# Patient Record
Sex: FEMALE | Race: WHITE | NOT HISPANIC OR LATINO | Employment: OTHER
[De-identification: names, ages, dates, MRNs, and addresses within clinical notes are randomized per-mention and may not be internally consistent; named-entity substitution may affect disease eponyms.]

---

## 2017-01-10 ENCOUNTER — RECORDS - HEALTHEAST (OUTPATIENT)
Dept: ADMINISTRATIVE | Facility: OTHER | Age: 82
End: 2017-01-10

## 2017-01-18 ENCOUNTER — RECORDS - HEALTHEAST (OUTPATIENT)
Dept: ADMINISTRATIVE | Facility: OTHER | Age: 82
End: 2017-01-18

## 2017-01-24 ENCOUNTER — RECORDS - HEALTHEAST (OUTPATIENT)
Dept: ADMINISTRATIVE | Facility: OTHER | Age: 82
End: 2017-01-24

## 2017-01-30 ENCOUNTER — OFFICE VISIT - HEALTHEAST (OUTPATIENT)
Dept: ENDOCRINOLOGY | Facility: CLINIC | Age: 82
End: 2017-01-30

## 2017-01-30 DIAGNOSIS — M94.9 DISORDER OF BONE AND CARTILAGE: ICD-10-CM

## 2017-01-30 DIAGNOSIS — M89.9 DISORDER OF BONE AND CARTILAGE: ICD-10-CM

## 2017-01-30 ASSESSMENT — MIFFLIN-ST. JEOR: SCORE: 791.21

## 2017-02-01 ENCOUNTER — RECORDS - HEALTHEAST (OUTPATIENT)
Dept: ADMINISTRATIVE | Facility: OTHER | Age: 82
End: 2017-02-01

## 2017-02-13 ENCOUNTER — COMMUNICATION - HEALTHEAST (OUTPATIENT)
Dept: ENDOCRINOLOGY | Facility: CLINIC | Age: 82
End: 2017-02-13

## 2017-03-01 ENCOUNTER — RECORDS - HEALTHEAST (OUTPATIENT)
Dept: ADMINISTRATIVE | Facility: OTHER | Age: 82
End: 2017-03-01

## 2017-03-03 ENCOUNTER — RECORDS - HEALTHEAST (OUTPATIENT)
Dept: ADMINISTRATIVE | Facility: OTHER | Age: 82
End: 2017-03-03

## 2017-04-05 ENCOUNTER — RECORDS - HEALTHEAST (OUTPATIENT)
Dept: ADMINISTRATIVE | Facility: OTHER | Age: 82
End: 2017-04-05

## 2017-04-06 ENCOUNTER — COMMUNICATION - HEALTHEAST (OUTPATIENT)
Dept: INTERNAL MEDICINE | Facility: CLINIC | Age: 82
End: 2017-04-06

## 2017-04-06 DIAGNOSIS — I10 HTN (HYPERTENSION): ICD-10-CM

## 2017-04-26 ENCOUNTER — COMMUNICATION - HEALTHEAST (OUTPATIENT)
Dept: INTERNAL MEDICINE | Facility: CLINIC | Age: 82
End: 2017-04-26

## 2017-04-26 ENCOUNTER — OFFICE VISIT - HEALTHEAST (OUTPATIENT)
Dept: INTERNAL MEDICINE | Facility: CLINIC | Age: 82
End: 2017-04-26

## 2017-04-26 DIAGNOSIS — E11.9 TYPE 2 DIABETES MELLITUS (H): ICD-10-CM

## 2017-04-26 DIAGNOSIS — R05.9 COUGH: ICD-10-CM

## 2017-04-26 LAB
CHOLEST SERPL-MCNC: 231 MG/DL
FASTING STATUS PATIENT QL REPORTED: YES
HBA1C MFR BLD: 7 % (ref 3.5–6)
HDLC SERPL-MCNC: 51 MG/DL
LDLC SERPL CALC-MCNC: 145 MG/DL
TRIGL SERPL-MCNC: 175 MG/DL

## 2017-05-05 ENCOUNTER — AMBULATORY - HEALTHEAST (OUTPATIENT)
Dept: ENDOCRINOLOGY | Facility: CLINIC | Age: 82
End: 2017-05-05

## 2017-05-05 DIAGNOSIS — M89.9 DISORDER OF BONE AND CARTILAGE: ICD-10-CM

## 2017-05-05 DIAGNOSIS — M94.9 DISORDER OF BONE AND CARTILAGE: ICD-10-CM

## 2017-05-10 ENCOUNTER — HOSPITAL ENCOUNTER (OUTPATIENT)
Dept: MAMMOGRAPHY | Facility: HOSPITAL | Age: 82
Discharge: HOME | End: 2017-05-10
Attending: INTERNAL MEDICINE

## 2017-05-10 DIAGNOSIS — Z12.31 VISIT FOR SCREENING MAMMOGRAM: ICD-10-CM

## 2017-05-12 ENCOUNTER — COMMUNICATION - HEALTHEAST (OUTPATIENT)
Dept: INTERNAL MEDICINE | Facility: CLINIC | Age: 82
End: 2017-05-12

## 2017-05-12 ENCOUNTER — RECORDS - HEALTHEAST (OUTPATIENT)
Dept: ADMINISTRATIVE | Facility: OTHER | Age: 82
End: 2017-05-12

## 2017-05-26 ENCOUNTER — COMMUNICATION - HEALTHEAST (OUTPATIENT)
Dept: INTERNAL MEDICINE | Facility: CLINIC | Age: 82
End: 2017-05-26

## 2017-05-26 DIAGNOSIS — I10 HTN (HYPERTENSION): ICD-10-CM

## 2017-06-15 ENCOUNTER — OFFICE VISIT - HEALTHEAST (OUTPATIENT)
Dept: PULMONOLOGY | Facility: OTHER | Age: 82
End: 2017-06-15

## 2017-06-15 DIAGNOSIS — J84.10 PULMONARY FIBROSIS (H): ICD-10-CM

## 2017-06-15 DIAGNOSIS — R05.9 COUGH: ICD-10-CM

## 2017-06-21 ENCOUNTER — COMMUNICATION - HEALTHEAST (OUTPATIENT)
Dept: INTERNAL MEDICINE | Facility: CLINIC | Age: 82
End: 2017-06-21

## 2017-06-21 ENCOUNTER — RECORDS - HEALTHEAST (OUTPATIENT)
Dept: ADMINISTRATIVE | Facility: OTHER | Age: 82
End: 2017-06-21

## 2017-06-22 ENCOUNTER — AMBULATORY - HEALTHEAST (OUTPATIENT)
Dept: PULMONOLOGY | Facility: OTHER | Age: 82
End: 2017-06-22

## 2017-06-22 DIAGNOSIS — R05.3 CHRONIC COUGH: ICD-10-CM

## 2017-07-13 ENCOUNTER — AMBULATORY - HEALTHEAST (OUTPATIENT)
Dept: PULMONOLOGY | Facility: OTHER | Age: 82
End: 2017-07-13

## 2017-07-13 ENCOUNTER — COMMUNICATION - HEALTHEAST (OUTPATIENT)
Dept: PULMONOLOGY | Facility: OTHER | Age: 82
End: 2017-07-13

## 2017-08-22 ENCOUNTER — OFFICE VISIT - HEALTHEAST (OUTPATIENT)
Dept: INTERNAL MEDICINE | Facility: CLINIC | Age: 82
End: 2017-08-22

## 2017-08-22 DIAGNOSIS — G47.00 INSOMNIA: ICD-10-CM

## 2017-08-22 DIAGNOSIS — D64.9 ANEMIA: ICD-10-CM

## 2017-08-22 DIAGNOSIS — E11.9 DIABETES MELLITUS (H): ICD-10-CM

## 2017-08-22 LAB — HBA1C MFR BLD: 6.6 % (ref 3.5–6)

## 2017-08-23 ENCOUNTER — COMMUNICATION - HEALTHEAST (OUTPATIENT)
Dept: INTERNAL MEDICINE | Facility: CLINIC | Age: 82
End: 2017-08-23

## 2017-08-27 ENCOUNTER — COMMUNICATION - HEALTHEAST (OUTPATIENT)
Dept: INTERNAL MEDICINE | Facility: CLINIC | Age: 82
End: 2017-08-27

## 2017-08-27 DIAGNOSIS — I25.10 CAD (CORONARY ARTERY DISEASE): ICD-10-CM

## 2017-10-10 ENCOUNTER — OFFICE VISIT - HEALTHEAST (OUTPATIENT)
Dept: CARDIOLOGY | Facility: CLINIC | Age: 82
End: 2017-10-10

## 2017-10-10 DIAGNOSIS — I10 ESSENTIAL HYPERTENSION: ICD-10-CM

## 2017-10-10 DIAGNOSIS — E78.2 MIXED HYPERLIPIDEMIA: ICD-10-CM

## 2017-10-10 DIAGNOSIS — I25.10 CORONARY ARTERY DISEASE INVOLVING NATIVE CORONARY ARTERY OF NATIVE HEART, ANGINA PRESENCE UNSPECIFIED: ICD-10-CM

## 2017-10-10 ASSESSMENT — MIFFLIN-ST. JEOR: SCORE: 780.32

## 2017-11-01 ENCOUNTER — COMMUNICATION - HEALTHEAST (OUTPATIENT)
Dept: INTERNAL MEDICINE | Facility: CLINIC | Age: 82
End: 2017-11-01

## 2017-11-01 DIAGNOSIS — I10 ESSENTIAL HYPERTENSION: ICD-10-CM

## 2017-11-01 DIAGNOSIS — I10 HTN (HYPERTENSION): ICD-10-CM

## 2017-11-13 ENCOUNTER — COMMUNICATION - HEALTHEAST (OUTPATIENT)
Dept: INTERNAL MEDICINE | Facility: CLINIC | Age: 82
End: 2017-11-13

## 2017-11-21 ENCOUNTER — COMMUNICATION - HEALTHEAST (OUTPATIENT)
Dept: PULMONOLOGY | Facility: OTHER | Age: 82
End: 2017-11-21

## 2017-11-21 ENCOUNTER — AMBULATORY - HEALTHEAST (OUTPATIENT)
Dept: PULMONOLOGY | Facility: OTHER | Age: 82
End: 2017-11-21

## 2017-11-21 DIAGNOSIS — R05.3 CHRONIC COUGH: ICD-10-CM

## 2018-04-11 ENCOUNTER — AMBULATORY - HEALTHEAST (OUTPATIENT)
Dept: PULMONOLOGY | Facility: OTHER | Age: 83
End: 2018-04-11

## 2018-04-11 DIAGNOSIS — R05.3 CHRONIC COUGH: ICD-10-CM

## 2018-04-29 ENCOUNTER — COMMUNICATION - HEALTHEAST (OUTPATIENT)
Dept: INTERNAL MEDICINE | Facility: CLINIC | Age: 83
End: 2018-04-29

## 2018-04-29 DIAGNOSIS — I10 HTN (HYPERTENSION): ICD-10-CM

## 2018-05-15 ENCOUNTER — OFFICE VISIT - HEALTHEAST (OUTPATIENT)
Dept: INTERNAL MEDICINE | Facility: CLINIC | Age: 83
End: 2018-05-15

## 2018-05-15 DIAGNOSIS — M89.9 DISORDER OF BONE AND CARTILAGE: ICD-10-CM

## 2018-05-15 DIAGNOSIS — E11.9 TYPE 2 DIABETES MELLITUS (H): ICD-10-CM

## 2018-05-15 DIAGNOSIS — Z00.00 ROUTINE GENERAL MEDICAL EXAMINATION AT A HEALTH CARE FACILITY: ICD-10-CM

## 2018-05-15 DIAGNOSIS — I10 ESSENTIAL HYPERTENSION: ICD-10-CM

## 2018-05-15 DIAGNOSIS — I25.10 CORONARY ARTERY DISEASE INVOLVING NATIVE CORONARY ARTERY OF NATIVE HEART, ANGINA PRESENCE UNSPECIFIED: ICD-10-CM

## 2018-05-15 DIAGNOSIS — Z12.31 SCREENING MAMMOGRAM, ENCOUNTER FOR: ICD-10-CM

## 2018-05-15 DIAGNOSIS — E78.2 MIXED HYPERLIPIDEMIA: ICD-10-CM

## 2018-05-15 DIAGNOSIS — M94.9 DISORDER OF BONE AND CARTILAGE: ICD-10-CM

## 2018-05-15 LAB
ALBUMIN SERPL-MCNC: 4 G/DL (ref 3.5–5)
ALP SERPL-CCNC: 70 U/L (ref 45–120)
ALT SERPL W P-5'-P-CCNC: 21 U/L (ref 0–45)
ANION GAP SERPL CALCULATED.3IONS-SCNC: 11 MMOL/L (ref 5–18)
AST SERPL W P-5'-P-CCNC: 28 U/L (ref 0–40)
BILIRUB SERPL-MCNC: 0.6 MG/DL (ref 0–1)
BUN SERPL-MCNC: 27 MG/DL (ref 8–28)
CALCIUM SERPL-MCNC: 10.1 MG/DL (ref 8.5–10.5)
CHLORIDE BLD-SCNC: 100 MMOL/L (ref 98–107)
CHOLEST SERPL-MCNC: 176 MG/DL
CO2 SERPL-SCNC: 27 MMOL/L (ref 22–31)
CREAT SERPL-MCNC: 0.83 MG/DL (ref 0.6–1.1)
FASTING STATUS PATIENT QL REPORTED: YES
GFR SERPL CREATININE-BSD FRML MDRD: >60 ML/MIN/1.73M2
GLUCOSE BLD-MCNC: 127 MG/DL (ref 70–125)
HBA1C MFR BLD: 7.1 % (ref 3.5–6)
HDLC SERPL-MCNC: 62 MG/DL
LDLC SERPL CALC-MCNC: 91 MG/DL
POTASSIUM BLD-SCNC: 4.3 MMOL/L (ref 3.5–5)
PROT SERPL-MCNC: 7.6 G/DL (ref 6–8)
SODIUM SERPL-SCNC: 138 MMOL/L (ref 136–145)
TRIGL SERPL-MCNC: 113 MG/DL

## 2018-05-15 ASSESSMENT — MIFFLIN-ST. JEOR: SCORE: 792.68

## 2018-05-18 ENCOUNTER — RECORDS - HEALTHEAST (OUTPATIENT)
Dept: ADMINISTRATIVE | Facility: OTHER | Age: 83
End: 2018-05-18

## 2018-05-31 ENCOUNTER — COMMUNICATION - HEALTHEAST (OUTPATIENT)
Dept: INTERNAL MEDICINE | Facility: CLINIC | Age: 83
End: 2018-05-31

## 2018-06-04 ENCOUNTER — COMMUNICATION - HEALTHEAST (OUTPATIENT)
Dept: INTERNAL MEDICINE | Facility: CLINIC | Age: 83
End: 2018-06-04

## 2018-06-04 DIAGNOSIS — E11.9 TYPE 2 DIABETES MELLITUS (H): ICD-10-CM

## 2018-06-12 ENCOUNTER — AMBULATORY - HEALTHEAST (OUTPATIENT)
Dept: EDUCATION SERVICES | Facility: CLINIC | Age: 83
End: 2018-06-12

## 2018-06-12 DIAGNOSIS — E11.9 TYPE 2 DIABETES MELLITUS WITHOUT COMPLICATION, WITHOUT LONG-TERM CURRENT USE OF INSULIN (H): ICD-10-CM

## 2018-06-15 ENCOUNTER — HOSPITAL ENCOUNTER (OUTPATIENT)
Dept: MAMMOGRAPHY | Facility: CLINIC | Age: 83
Discharge: HOME | End: 2018-06-15
Attending: INTERNAL MEDICINE

## 2018-06-15 DIAGNOSIS — Z12.31 SCREENING MAMMOGRAM, ENCOUNTER FOR: ICD-10-CM

## 2018-06-23 ENCOUNTER — COMMUNICATION - HEALTHEAST (OUTPATIENT)
Dept: INTERNAL MEDICINE | Facility: CLINIC | Age: 83
End: 2018-06-23

## 2018-06-23 DIAGNOSIS — I10 HTN (HYPERTENSION): ICD-10-CM

## 2018-07-06 ENCOUNTER — AMBULATORY - HEALTHEAST (OUTPATIENT)
Dept: PULMONOLOGY | Facility: OTHER | Age: 83
End: 2018-07-06

## 2018-07-06 DIAGNOSIS — R05.9 COUGH: ICD-10-CM

## 2018-07-12 ENCOUNTER — OFFICE VISIT - HEALTHEAST (OUTPATIENT)
Dept: PULMONOLOGY | Facility: OTHER | Age: 83
End: 2018-07-12

## 2018-07-12 DIAGNOSIS — J84.10 PULMONARY FIBROSIS (H): ICD-10-CM

## 2018-07-12 DIAGNOSIS — R09.82 POST-NASAL DRIP: ICD-10-CM

## 2018-07-27 ENCOUNTER — AMBULATORY - HEALTHEAST (OUTPATIENT)
Dept: PULMONOLOGY | Facility: OTHER | Age: 83
End: 2018-07-27

## 2018-07-27 DIAGNOSIS — R05.3 CHRONIC COUGH: ICD-10-CM

## 2018-08-01 ENCOUNTER — RECORDS - HEALTHEAST (OUTPATIENT)
Dept: ADMINISTRATIVE | Facility: OTHER | Age: 83
End: 2018-08-01

## 2018-08-01 ENCOUNTER — RECORDS - HEALTHEAST (OUTPATIENT)
Dept: PULMONOLOGY | Facility: OTHER | Age: 83
End: 2018-08-01

## 2018-08-01 DIAGNOSIS — R05.9 COUGH: ICD-10-CM

## 2018-08-07 ENCOUNTER — COMMUNICATION - HEALTHEAST (OUTPATIENT)
Dept: INTERNAL MEDICINE | Facility: CLINIC | Age: 83
End: 2018-08-07

## 2018-08-07 ENCOUNTER — AMBULATORY - HEALTHEAST (OUTPATIENT)
Dept: SCHEDULING | Facility: CLINIC | Age: 83
End: 2018-08-07

## 2018-08-07 DIAGNOSIS — M89.9 DISORDER OF BONE AND CARTILAGE: ICD-10-CM

## 2018-08-07 DIAGNOSIS — M94.9 DISORDER OF BONE AND CARTILAGE: ICD-10-CM

## 2018-08-10 ENCOUNTER — AMBULATORY - HEALTHEAST (OUTPATIENT)
Dept: PULMONOLOGY | Facility: OTHER | Age: 83
End: 2018-08-10

## 2018-08-19 ENCOUNTER — COMMUNICATION - HEALTHEAST (OUTPATIENT)
Dept: INTERNAL MEDICINE | Facility: CLINIC | Age: 83
End: 2018-08-19

## 2018-08-19 DIAGNOSIS — I25.10 CAD (CORONARY ARTERY DISEASE): ICD-10-CM

## 2018-08-23 ENCOUNTER — COMMUNICATION - HEALTHEAST (OUTPATIENT)
Dept: INTERNAL MEDICINE | Facility: CLINIC | Age: 83
End: 2018-08-23

## 2018-08-23 DIAGNOSIS — M85.80 OSTEOPENIA: ICD-10-CM

## 2018-08-23 DIAGNOSIS — Z78.0 MENOPAUSE: ICD-10-CM

## 2018-10-12 ENCOUNTER — AMBULATORY - HEALTHEAST (OUTPATIENT)
Dept: PULMONOLOGY | Facility: OTHER | Age: 83
End: 2018-10-12

## 2018-10-12 DIAGNOSIS — R05.3 CHRONIC COUGH: ICD-10-CM

## 2018-10-19 ENCOUNTER — COMMUNICATION - HEALTHEAST (OUTPATIENT)
Dept: ADMINISTRATIVE | Facility: CLINIC | Age: 83
End: 2018-10-19

## 2018-10-26 ENCOUNTER — AMBULATORY - HEALTHEAST (OUTPATIENT)
Dept: ENDOCRINOLOGY | Facility: CLINIC | Age: 83
End: 2018-10-26

## 2018-10-31 ENCOUNTER — COMMUNICATION - HEALTHEAST (OUTPATIENT)
Dept: ENDOCRINOLOGY | Facility: CLINIC | Age: 83
End: 2018-10-31

## 2018-10-31 DIAGNOSIS — M94.9 DISORDER OF BONE AND CARTILAGE: ICD-10-CM

## 2018-10-31 DIAGNOSIS — M89.9 DISORDER OF BONE AND CARTILAGE: ICD-10-CM

## 2018-11-02 ENCOUNTER — COMMUNICATION - HEALTHEAST (OUTPATIENT)
Dept: INTERNAL MEDICINE | Facility: CLINIC | Age: 83
End: 2018-11-02

## 2018-11-02 DIAGNOSIS — I10 ESSENTIAL HYPERTENSION: ICD-10-CM

## 2018-11-06 ENCOUNTER — AMBULATORY - HEALTHEAST (OUTPATIENT)
Dept: LAB | Facility: CLINIC | Age: 83
End: 2018-11-06

## 2018-11-06 DIAGNOSIS — M89.9 DISORDER OF BONE AND CARTILAGE: ICD-10-CM

## 2018-11-06 DIAGNOSIS — M94.9 DISORDER OF BONE AND CARTILAGE: ICD-10-CM

## 2018-11-06 LAB
CALCIUM SERPL-MCNC: 10.4 MG/DL (ref 8.5–10.5)
CREAT SERPL-MCNC: 0.86 MG/DL (ref 0.6–1.1)
GFR SERPL CREATININE-BSD FRML MDRD: >60 ML/MIN/1.73M2
POTASSIUM BLD-SCNC: 4.8 MMOL/L (ref 3.5–5)

## 2018-11-07 LAB — 25(OH)D3 SERPL-MCNC: 36.1 NG/ML (ref 30–80)

## 2018-11-13 ENCOUNTER — COMMUNICATION - HEALTHEAST (OUTPATIENT)
Dept: INTERNAL MEDICINE | Facility: CLINIC | Age: 83
End: 2018-11-13

## 2018-11-13 DIAGNOSIS — I10 HTN (HYPERTENSION): ICD-10-CM

## 2018-11-23 ENCOUNTER — OFFICE VISIT - HEALTHEAST (OUTPATIENT)
Dept: ENDOCRINOLOGY | Facility: CLINIC | Age: 83
End: 2018-11-23

## 2018-11-23 DIAGNOSIS — E11.65 TYPE 2 DIABETES MELLITUS WITH HYPERGLYCEMIA, WITHOUT LONG-TERM CURRENT USE OF INSULIN (H): ICD-10-CM

## 2018-11-23 ASSESSMENT — MIFFLIN-ST. JEOR: SCORE: 787.58

## 2018-11-26 ENCOUNTER — COMMUNICATION - HEALTHEAST (OUTPATIENT)
Dept: ENDOCRINOLOGY | Facility: CLINIC | Age: 83
End: 2018-11-26

## 2018-11-28 ENCOUNTER — COMMUNICATION - HEALTHEAST (OUTPATIENT)
Dept: INTERNAL MEDICINE | Facility: CLINIC | Age: 83
End: 2018-11-28

## 2018-11-28 DIAGNOSIS — E11.9 TYPE 2 DIABETES MELLITUS WITHOUT COMPLICATION, WITHOUT LONG-TERM CURRENT USE OF INSULIN (H): ICD-10-CM

## 2018-12-03 ENCOUNTER — COMMUNICATION - HEALTHEAST (OUTPATIENT)
Dept: INTERNAL MEDICINE | Facility: CLINIC | Age: 83
End: 2018-12-03

## 2018-12-03 ENCOUNTER — AMBULATORY - HEALTHEAST (OUTPATIENT)
Dept: LAB | Facility: CLINIC | Age: 83
End: 2018-12-03

## 2018-12-03 DIAGNOSIS — E11.9 TYPE 2 DIABETES MELLITUS WITHOUT COMPLICATION, WITHOUT LONG-TERM CURRENT USE OF INSULIN (H): ICD-10-CM

## 2018-12-03 LAB — HBA1C MFR BLD: 6.9 % (ref 3.5–6)

## 2019-01-22 ENCOUNTER — COMMUNICATION - HEALTHEAST (OUTPATIENT)
Dept: INTERNAL MEDICINE | Facility: CLINIC | Age: 84
End: 2019-01-22

## 2019-04-15 ENCOUNTER — COMMUNICATION - HEALTHEAST (OUTPATIENT)
Dept: PULMONOLOGY | Facility: OTHER | Age: 84
End: 2019-04-15

## 2019-04-15 ENCOUNTER — COMMUNICATION - HEALTHEAST (OUTPATIENT)
Dept: ADMINISTRATIVE | Facility: CLINIC | Age: 84
End: 2019-04-15

## 2019-04-15 DIAGNOSIS — Z79.4 TYPE 2 DIABETES MELLITUS WITH HYPERGLYCEMIA, WITH LONG-TERM CURRENT USE OF INSULIN (H): ICD-10-CM

## 2019-04-15 DIAGNOSIS — M94.9 DISORDER OF BONE AND CARTILAGE: ICD-10-CM

## 2019-04-15 DIAGNOSIS — E55.9 VITAMIN D DEFICIENCY: ICD-10-CM

## 2019-04-15 DIAGNOSIS — R73.9 HYPERGLYCEMIA: ICD-10-CM

## 2019-04-15 DIAGNOSIS — E11.65 TYPE 2 DIABETES MELLITUS WITH HYPERGLYCEMIA, WITH LONG-TERM CURRENT USE OF INSULIN (H): ICD-10-CM

## 2019-04-15 DIAGNOSIS — M89.9 DISORDER OF BONE AND CARTILAGE: ICD-10-CM

## 2019-04-17 ENCOUNTER — AMBULATORY - HEALTHEAST (OUTPATIENT)
Dept: PULMONOLOGY | Facility: OTHER | Age: 84
End: 2019-04-17

## 2019-04-17 ENCOUNTER — COMMUNICATION - HEALTHEAST (OUTPATIENT)
Dept: ENDOCRINOLOGY | Facility: CLINIC | Age: 84
End: 2019-04-17

## 2019-04-17 DIAGNOSIS — Z79.4 TYPE 2 DIABETES MELLITUS WITH HYPERGLYCEMIA, WITH LONG-TERM CURRENT USE OF INSULIN (H): ICD-10-CM

## 2019-04-17 DIAGNOSIS — R73.9 HYPERGLYCEMIA: ICD-10-CM

## 2019-04-17 DIAGNOSIS — E11.9 TYPE 2 DIABETES MELLITUS WITHOUT COMPLICATION, WITHOUT LONG-TERM CURRENT USE OF INSULIN (H): ICD-10-CM

## 2019-04-17 DIAGNOSIS — E11.65 TYPE 2 DIABETES MELLITUS WITH HYPERGLYCEMIA, WITH LONG-TERM CURRENT USE OF INSULIN (H): ICD-10-CM

## 2019-04-17 DIAGNOSIS — R05.3 CHRONIC COUGH: ICD-10-CM

## 2019-04-22 ENCOUNTER — COMMUNICATION - HEALTHEAST (OUTPATIENT)
Dept: ADMINISTRATIVE | Facility: CLINIC | Age: 84
End: 2019-04-22

## 2019-04-26 ENCOUNTER — COMMUNICATION - HEALTHEAST (OUTPATIENT)
Dept: ENDOCRINOLOGY | Facility: CLINIC | Age: 84
End: 2019-04-26

## 2019-05-01 ENCOUNTER — AMBULATORY - HEALTHEAST (OUTPATIENT)
Dept: ENDOCRINOLOGY | Facility: CLINIC | Age: 84
End: 2019-05-01

## 2019-05-01 ENCOUNTER — HOSPITAL ENCOUNTER (OUTPATIENT)
Dept: LAB | Age: 84
Setting detail: SPECIMEN
Discharge: HOME | End: 2019-05-01

## 2019-05-01 ENCOUNTER — AMBULATORY - HEALTHEAST (OUTPATIENT)
Dept: LAB | Facility: CLINIC | Age: 84
End: 2019-05-01

## 2019-05-01 DIAGNOSIS — Z79.4 TYPE 2 DIABETES MELLITUS WITH HYPERGLYCEMIA, WITH LONG-TERM CURRENT USE OF INSULIN (H): ICD-10-CM

## 2019-05-01 DIAGNOSIS — E11.65 TYPE 2 DIABETES MELLITUS WITH HYPERGLYCEMIA, WITH LONG-TERM CURRENT USE OF INSULIN (H): ICD-10-CM

## 2019-05-01 DIAGNOSIS — E55.9 VITAMIN D DEFICIENCY: ICD-10-CM

## 2019-05-01 LAB
CALCIUM SERPL-MCNC: 10.6 MG/DL (ref 8.5–10.5)
CREAT SERPL-MCNC: 0.97 MG/DL (ref 0.6–1.1)
GFR SERPL CREATININE-BSD FRML MDRD: 54 ML/MIN/1.73M2
HBA1C MFR BLD: 7 % (ref 3.5–6)
POTASSIUM BLD-SCNC: 4.6 MMOL/L (ref 3.5–5)

## 2019-05-02 LAB — 25(OH)D3 SERPL-MCNC: 32.6 NG/ML (ref 30–80)

## 2019-05-06 ENCOUNTER — OFFICE VISIT - HEALTHEAST (OUTPATIENT)
Dept: EDUCATION SERVICES | Facility: CLINIC | Age: 84
End: 2019-05-06

## 2019-05-06 DIAGNOSIS — E11.65 TYPE 2 DIABETES MELLITUS WITH HYPERGLYCEMIA, WITH LONG-TERM CURRENT USE OF INSULIN (H): ICD-10-CM

## 2019-05-06 DIAGNOSIS — Z79.4 TYPE 2 DIABETES MELLITUS WITH HYPERGLYCEMIA, WITH LONG-TERM CURRENT USE OF INSULIN (H): ICD-10-CM

## 2019-05-10 ENCOUNTER — COMMUNICATION - HEALTHEAST (OUTPATIENT)
Dept: ENDOCRINOLOGY | Facility: CLINIC | Age: 84
End: 2019-05-10

## 2019-06-11 ENCOUNTER — COMMUNICATION - HEALTHEAST (OUTPATIENT)
Dept: INTERNAL MEDICINE | Facility: CLINIC | Age: 84
End: 2019-06-11

## 2019-06-12 ENCOUNTER — OFFICE VISIT - HEALTHEAST (OUTPATIENT)
Dept: INTERNAL MEDICINE | Facility: CLINIC | Age: 84
End: 2019-06-12

## 2019-06-12 DIAGNOSIS — I25.10 CORONARY ARTERY DISEASE INVOLVING NATIVE CORONARY ARTERY OF NATIVE HEART, ANGINA PRESENCE UNSPECIFIED: ICD-10-CM

## 2019-06-12 DIAGNOSIS — E11.9 TYPE 2 DIABETES MELLITUS WITHOUT COMPLICATION, WITHOUT LONG-TERM CURRENT USE OF INSULIN (H): ICD-10-CM

## 2019-06-12 DIAGNOSIS — G47.00 INSOMNIA, UNSPECIFIED TYPE: ICD-10-CM

## 2019-06-12 DIAGNOSIS — E78.2 MIXED HYPERLIPIDEMIA: ICD-10-CM

## 2019-06-12 DIAGNOSIS — M85.80 OSTEOPENIA, UNSPECIFIED LOCATION: ICD-10-CM

## 2019-06-12 DIAGNOSIS — I10 ESSENTIAL HYPERTENSION: ICD-10-CM

## 2019-06-12 LAB
ALBUMIN SERPL-MCNC: 4.4 G/DL (ref 3.5–5)
ALP SERPL-CCNC: 58 U/L (ref 45–120)
ALT SERPL W P-5'-P-CCNC: 25 U/L (ref 0–45)
ANION GAP SERPL CALCULATED.3IONS-SCNC: 13 MMOL/L (ref 5–18)
AST SERPL W P-5'-P-CCNC: 29 U/L (ref 0–40)
BASOPHILS # BLD AUTO: 0.1 THOU/UL (ref 0–0.2)
BASOPHILS NFR BLD AUTO: 1 % (ref 0–2)
BILIRUB SERPL-MCNC: 0.5 MG/DL (ref 0–1)
BUN SERPL-MCNC: 24 MG/DL (ref 8–28)
CALCIUM SERPL-MCNC: 10.4 MG/DL (ref 8.5–10.5)
CHLORIDE BLD-SCNC: 98 MMOL/L (ref 98–107)
CHOLEST SERPL-MCNC: 194 MG/DL
CO2 SERPL-SCNC: 27 MMOL/L (ref 22–31)
CREAT SERPL-MCNC: 0.94 MG/DL (ref 0.6–1.1)
CREAT UR-MCNC: 26.4 MG/DL
EOSINOPHIL # BLD AUTO: 0.2 THOU/UL (ref 0–0.4)
EOSINOPHIL NFR BLD AUTO: 2 % (ref 0–6)
ERYTHROCYTE [DISTWIDTH] IN BLOOD BY AUTOMATED COUNT: 12 % (ref 11–14.5)
FASTING STATUS PATIENT QL REPORTED: NORMAL
GFR SERPL CREATININE-BSD FRML MDRD: 56 ML/MIN/1.73M2
GLUCOSE BLD-MCNC: 136 MG/DL (ref 70–125)
HBA1C MFR BLD: 7.3 % (ref 3.5–6)
HCT VFR BLD AUTO: 38.6 % (ref 35–47)
HDLC SERPL-MCNC: 63 MG/DL
HGB BLD-MCNC: 12.9 G/DL (ref 12–16)
LDLC SERPL CALC-MCNC: 102 MG/DL
LYMPHOCYTES # BLD AUTO: 1.5 THOU/UL (ref 0.8–4.4)
LYMPHOCYTES NFR BLD AUTO: 15 % (ref 20–40)
MCH RBC QN AUTO: 30.1 PG (ref 27–34)
MCHC RBC AUTO-ENTMCNC: 33.5 G/DL (ref 32–36)
MCV RBC AUTO: 90 FL (ref 80–100)
MICROALBUMIN UR-MCNC: 6.19 MG/DL (ref 0–1.99)
MICROALBUMIN/CREAT UR: 234.5 MG/G
MONOCYTES # BLD AUTO: 0.6 THOU/UL (ref 0–0.9)
MONOCYTES NFR BLD AUTO: 6 % (ref 2–10)
NEUTROPHILS # BLD AUTO: 7.8 THOU/UL (ref 2–7.7)
NEUTROPHILS NFR BLD AUTO: 78 % (ref 50–70)
PLATELET # BLD AUTO: 229 THOU/UL (ref 140–440)
PMV BLD AUTO: 8.6 FL (ref 7–10)
POTASSIUM BLD-SCNC: 4.6 MMOL/L (ref 3.5–5)
PROT SERPL-MCNC: 7.5 G/DL (ref 6–8)
RBC # BLD AUTO: 4.3 MILL/UL (ref 3.8–5.4)
SODIUM SERPL-SCNC: 138 MMOL/L (ref 136–145)
TRIGL SERPL-MCNC: 146 MG/DL
TSH SERPL DL<=0.005 MIU/L-ACNC: 1.95 UIU/ML (ref 0.3–5)
VIT B12 SERPL-MCNC: >2000 PG/ML (ref 213–816)
WBC: 10.1 THOU/UL (ref 4–11)

## 2019-06-12 ASSESSMENT — MIFFLIN-ST. JEOR: SCORE: 783.04

## 2019-06-13 ENCOUNTER — COMMUNICATION - HEALTHEAST (OUTPATIENT)
Dept: INTERNAL MEDICINE | Facility: CLINIC | Age: 84
End: 2019-06-13

## 2019-06-18 ENCOUNTER — OFFICE VISIT - HEALTHEAST (OUTPATIENT)
Dept: INTERNAL MEDICINE | Facility: CLINIC | Age: 84
End: 2019-06-18

## 2019-06-18 DIAGNOSIS — Z00.00 ENCOUNTER FOR MEDICARE ANNUAL WELLNESS EXAM: ICD-10-CM

## 2019-06-18 DIAGNOSIS — I10 ESSENTIAL HYPERTENSION: ICD-10-CM

## 2019-06-18 DIAGNOSIS — R80.9 PROTEINURIA, UNSPECIFIED TYPE: ICD-10-CM

## 2019-06-18 DIAGNOSIS — I25.10 CORONARY ARTERY DISEASE INVOLVING NATIVE CORONARY ARTERY OF NATIVE HEART, ANGINA PRESENCE UNSPECIFIED: ICD-10-CM

## 2019-06-18 DIAGNOSIS — M89.9 DISORDER OF BONE AND CARTILAGE: ICD-10-CM

## 2019-06-18 DIAGNOSIS — G47.00 INSOMNIA, UNSPECIFIED TYPE: ICD-10-CM

## 2019-06-18 DIAGNOSIS — E11.65 TYPE 2 DIABETES MELLITUS WITH HYPERGLYCEMIA, WITHOUT LONG-TERM CURRENT USE OF INSULIN (H): ICD-10-CM

## 2019-06-18 DIAGNOSIS — E78.2 MIXED HYPERLIPIDEMIA: ICD-10-CM

## 2019-06-18 DIAGNOSIS — M94.9 DISORDER OF BONE AND CARTILAGE: ICD-10-CM

## 2019-06-18 ASSESSMENT — MIFFLIN-ST. JEOR: SCORE: 783.04

## 2019-06-19 ENCOUNTER — COMMUNICATION - HEALTHEAST (OUTPATIENT)
Dept: INTERNAL MEDICINE | Facility: CLINIC | Age: 84
End: 2019-06-19

## 2019-06-20 ENCOUNTER — COMMUNICATION - HEALTHEAST (OUTPATIENT)
Dept: INTERNAL MEDICINE | Facility: CLINIC | Age: 84
End: 2019-06-20

## 2019-06-20 DIAGNOSIS — I10 HTN (HYPERTENSION): ICD-10-CM

## 2019-06-26 ENCOUNTER — COMMUNICATION - HEALTHEAST (OUTPATIENT)
Dept: INTERNAL MEDICINE | Facility: CLINIC | Age: 84
End: 2019-06-26

## 2019-07-02 ENCOUNTER — RECORDS - HEALTHEAST (OUTPATIENT)
Dept: ADMINISTRATIVE | Facility: OTHER | Age: 84
End: 2019-07-02

## 2019-07-18 ENCOUNTER — OFFICE VISIT - HEALTHEAST (OUTPATIENT)
Dept: PULMONOLOGY | Facility: OTHER | Age: 84
End: 2019-07-18

## 2019-07-18 DIAGNOSIS — R05.3 CHRONIC COUGH: ICD-10-CM

## 2019-07-18 DIAGNOSIS — J84.10 PULMONARY FIBROSIS (H): ICD-10-CM

## 2019-07-19 ENCOUNTER — OFFICE VISIT - HEALTHEAST (OUTPATIENT)
Dept: CARDIOLOGY | Facility: CLINIC | Age: 84
End: 2019-07-19

## 2019-07-19 DIAGNOSIS — E78.2 MIXED HYPERLIPIDEMIA: ICD-10-CM

## 2019-07-19 DIAGNOSIS — I25.10 CORONARY ARTERY DISEASE INVOLVING NATIVE CORONARY ARTERY OF NATIVE HEART WITHOUT ANGINA PECTORIS: ICD-10-CM

## 2019-07-19 DIAGNOSIS — I10 ESSENTIAL HYPERTENSION: ICD-10-CM

## 2019-07-19 ASSESSMENT — MIFFLIN-ST. JEOR: SCORE: 779.85

## 2019-07-23 ENCOUNTER — COMMUNICATION - HEALTHEAST (OUTPATIENT)
Dept: INTERNAL MEDICINE | Facility: CLINIC | Age: 84
End: 2019-07-23

## 2019-07-23 DIAGNOSIS — E11.65 TYPE 2 DIABETES MELLITUS WITH HYPERGLYCEMIA, WITHOUT LONG-TERM CURRENT USE OF INSULIN (H): ICD-10-CM

## 2019-07-25 ENCOUNTER — HOSPITAL ENCOUNTER (OUTPATIENT)
Dept: CT IMAGING | Facility: HOSPITAL | Age: 84
Discharge: HOME | End: 2019-07-25
Attending: INTERNAL MEDICINE

## 2019-07-25 DIAGNOSIS — J84.10 PULMONARY FIBROSIS (H): ICD-10-CM

## 2019-07-31 ENCOUNTER — OFFICE VISIT - HEALTHEAST (OUTPATIENT)
Dept: ENDOCRINOLOGY | Facility: CLINIC | Age: 84
End: 2019-07-31

## 2019-07-31 ENCOUNTER — AMBULATORY - HEALTHEAST (OUTPATIENT)
Dept: ENDOCRINOLOGY | Facility: CLINIC | Age: 84
End: 2019-07-31

## 2019-07-31 DIAGNOSIS — E11.65 TYPE 2 DIABETES MELLITUS WITH HYPERGLYCEMIA, WITHOUT LONG-TERM CURRENT USE OF INSULIN (H): ICD-10-CM

## 2019-07-31 DIAGNOSIS — M89.9 DISORDER OF BONE AND CARTILAGE: ICD-10-CM

## 2019-07-31 DIAGNOSIS — M94.9 DISORDER OF BONE AND CARTILAGE: ICD-10-CM

## 2019-07-31 LAB
ANION GAP SERPL CALCULATED.3IONS-SCNC: 9 MMOL/L (ref 5–18)
BUN SERPL-MCNC: 32 MG/DL (ref 8–28)
CALCIUM SERPL-MCNC: 10.7 MG/DL (ref 8.5–10.5)
CALCIUM SERPL-MCNC: 10.7 MG/DL (ref 8.5–10.5)
CHLORIDE BLD-SCNC: 100 MMOL/L (ref 98–107)
CO2 SERPL-SCNC: 29 MMOL/L (ref 22–31)
CREAT SERPL-MCNC: 1.02 MG/DL (ref 0.6–1.1)
CREAT SERPL-MCNC: 1.02 MG/DL (ref 0.6–1.1)
GFR SERPL CREATININE-BSD FRML MDRD: 51 ML/MIN/1.73M2
GFR SERPL CREATININE-BSD FRML MDRD: 51 ML/MIN/1.73M2
GLUCOSE BLD-MCNC: 180 MG/DL (ref 70–125)
PHOSPHATE SERPL-MCNC: 3.4 MG/DL (ref 2.5–4.5)
POTASSIUM BLD-SCNC: 4.8 MMOL/L (ref 3.5–5)
PTH-INTACT SERPL-MCNC: 36 PG/ML (ref 10–86)
SODIUM SERPL-SCNC: 138 MMOL/L (ref 136–145)

## 2019-07-31 ASSESSMENT — MIFFLIN-ST. JEOR: SCORE: 777.59

## 2019-08-05 ENCOUNTER — AMBULATORY - HEALTHEAST (OUTPATIENT)
Dept: LAB | Facility: CLINIC | Age: 84
End: 2019-08-05

## 2019-08-05 DIAGNOSIS — I10 BENIGN HYPERTENSION: ICD-10-CM

## 2019-08-05 DIAGNOSIS — N18.2 CHRONIC RENAL DISEASE, STAGE II: ICD-10-CM

## 2019-08-15 ENCOUNTER — COMMUNICATION - HEALTHEAST (OUTPATIENT)
Dept: ADMINISTRATIVE | Facility: CLINIC | Age: 84
End: 2019-08-15

## 2019-08-17 ENCOUNTER — RECORDS - HEALTHEAST (OUTPATIENT)
Dept: ADMINISTRATIVE | Facility: OTHER | Age: 84
End: 2019-08-17

## 2019-09-05 ENCOUNTER — COMMUNICATION - HEALTHEAST (OUTPATIENT)
Dept: INTERNAL MEDICINE | Facility: CLINIC | Age: 84
End: 2019-09-05

## 2019-09-05 ENCOUNTER — OFFICE VISIT - HEALTHEAST (OUTPATIENT)
Dept: INTERNAL MEDICINE | Facility: CLINIC | Age: 84
End: 2019-09-05

## 2019-09-05 ENCOUNTER — HOSPITAL ENCOUNTER (OUTPATIENT)
Dept: MAMMOGRAPHY | Facility: CLINIC | Age: 84
Discharge: HOME | End: 2019-09-05

## 2019-09-05 ENCOUNTER — AMBULATORY - HEALTHEAST (OUTPATIENT)
Dept: INTERNAL MEDICINE | Facility: CLINIC | Age: 84
End: 2019-09-05

## 2019-09-05 DIAGNOSIS — R10.13 EPIGASTRIC PAIN: ICD-10-CM

## 2019-09-05 DIAGNOSIS — I25.10 CAD (CORONARY ARTERY DISEASE): ICD-10-CM

## 2019-09-05 DIAGNOSIS — Z12.31 VISIT FOR SCREENING MAMMOGRAM: ICD-10-CM

## 2019-09-05 LAB
ALBUMIN SERPL-MCNC: 4 G/DL (ref 3.5–5)
ALBUMIN UR-MCNC: NEGATIVE MG/DL
ALP SERPL-CCNC: 55 U/L (ref 45–120)
ALT SERPL W P-5'-P-CCNC: 23 U/L (ref 0–45)
AMYLASE SERPL-CCNC: 44 U/L (ref 5–120)
ANION GAP SERPL CALCULATED.3IONS-SCNC: 10 MMOL/L (ref 5–18)
APPEARANCE UR: CLEAR
AST SERPL W P-5'-P-CCNC: 28 U/L (ref 0–40)
BACTERIA #/AREA URNS HPF: ABNORMAL HPF
BASOPHILS # BLD AUTO: 0.1 THOU/UL (ref 0–0.2)
BASOPHILS NFR BLD AUTO: 1 % (ref 0–2)
BILIRUB SERPL-MCNC: 0.4 MG/DL (ref 0–1)
BILIRUB UR QL STRIP: NEGATIVE
BUN SERPL-MCNC: 30 MG/DL (ref 8–28)
CALCIUM SERPL-MCNC: 10.7 MG/DL (ref 8.5–10.5)
CHLORIDE BLD-SCNC: 99 MMOL/L (ref 98–107)
CO2 SERPL-SCNC: 28 MMOL/L (ref 22–31)
COLOR UR AUTO: YELLOW
CREAT SERPL-MCNC: 1.05 MG/DL (ref 0.6–1.1)
EOSINOPHIL # BLD AUTO: 0.3 THOU/UL (ref 0–0.4)
EOSINOPHIL NFR BLD AUTO: 3 % (ref 0–6)
ERYTHROCYTE [DISTWIDTH] IN BLOOD BY AUTOMATED COUNT: 11.9 % (ref 11–14.5)
GFR SERPL CREATININE-BSD FRML MDRD: 49 ML/MIN/1.73M2
GLUCOSE BLD-MCNC: 130 MG/DL (ref 70–125)
GLUCOSE UR STRIP-MCNC: NEGATIVE MG/DL
HCT VFR BLD AUTO: 34.3 % (ref 35–47)
HGB BLD-MCNC: 11.8 G/DL (ref 12–16)
HGB UR QL STRIP: NEGATIVE
KETONES UR STRIP-MCNC: NEGATIVE MG/DL
LEUKOCYTE ESTERASE UR QL STRIP: ABNORMAL
LIPASE SERPL-CCNC: 48 U/L (ref 0–52)
LYMPHOCYTES # BLD AUTO: 2.4 THOU/UL (ref 0.8–4.4)
LYMPHOCYTES NFR BLD AUTO: 25 % (ref 20–40)
MCH RBC QN AUTO: 30.6 PG (ref 27–34)
MCHC RBC AUTO-ENTMCNC: 34.4 G/DL (ref 32–36)
MCV RBC AUTO: 89 FL (ref 80–100)
MONOCYTES # BLD AUTO: 0.7 THOU/UL (ref 0–0.9)
MONOCYTES NFR BLD AUTO: 7 % (ref 2–10)
MUCOUS THREADS #/AREA URNS LPF: ABNORMAL LPF
NEUTROPHILS # BLD AUTO: 6.2 THOU/UL (ref 2–7.7)
NEUTROPHILS NFR BLD AUTO: 65 % (ref 50–70)
NITRATE UR QL: NEGATIVE
PH UR STRIP: 5.5 [PH] (ref 5–8)
PLATELET # BLD AUTO: 285 THOU/UL (ref 140–440)
PMV BLD AUTO: 8.2 FL (ref 7–10)
POTASSIUM BLD-SCNC: 4.4 MMOL/L (ref 3.5–5)
PROT SERPL-MCNC: 7.6 G/DL (ref 6–8)
RBC # BLD AUTO: 3.85 MILL/UL (ref 3.8–5.4)
RBC #/AREA URNS AUTO: ABNORMAL HPF
SODIUM SERPL-SCNC: 137 MMOL/L (ref 136–145)
SP GR UR STRIP: 1.02 (ref 1–1.03)
SQUAMOUS #/AREA URNS AUTO: ABNORMAL LPF
UROBILINOGEN UR STRIP-ACNC: ABNORMAL
WBC #/AREA URNS AUTO: ABNORMAL HPF
WBC: 9.5 THOU/UL (ref 4–11)

## 2019-09-05 ASSESSMENT — MIFFLIN-ST. JEOR: SCORE: 770.79

## 2019-09-06 ENCOUNTER — COMMUNICATION - HEALTHEAST (OUTPATIENT)
Dept: ADMINISTRATIVE | Facility: CLINIC | Age: 84
End: 2019-09-06

## 2019-09-06 ENCOUNTER — COMMUNICATION - HEALTHEAST (OUTPATIENT)
Dept: INTERNAL MEDICINE | Facility: CLINIC | Age: 84
End: 2019-09-06

## 2019-09-06 LAB — BACTERIA SPEC CULT: NO GROWTH

## 2019-09-19 ENCOUNTER — OFFICE VISIT - HEALTHEAST (OUTPATIENT)
Dept: INTERNAL MEDICINE | Facility: CLINIC | Age: 84
End: 2019-09-19

## 2019-09-19 DIAGNOSIS — D64.9 ANEMIA, UNSPECIFIED TYPE: ICD-10-CM

## 2019-09-19 DIAGNOSIS — K29.70 GASTRITIS, PRESENCE OF BLEEDING UNSPECIFIED, UNSPECIFIED CHRONICITY, UNSPECIFIED GASTRITIS TYPE: ICD-10-CM

## 2019-09-19 LAB
BASOPHILS # BLD AUTO: 0 THOU/UL (ref 0–0.2)
BASOPHILS NFR BLD AUTO: 1 % (ref 0–2)
EOSINOPHIL # BLD AUTO: 0.2 THOU/UL (ref 0–0.4)
EOSINOPHIL NFR BLD AUTO: 3 % (ref 0–6)
ERYTHROCYTE [DISTWIDTH] IN BLOOD BY AUTOMATED COUNT: 11.7 % (ref 11–14.5)
HCT VFR BLD AUTO: 35.1 % (ref 35–47)
HGB BLD-MCNC: 11.9 G/DL (ref 12–16)
LYMPHOCYTES # BLD AUTO: 1.8 THOU/UL (ref 0.8–4.4)
LYMPHOCYTES NFR BLD AUTO: 21 % (ref 20–40)
MCH RBC QN AUTO: 30.6 PG (ref 27–34)
MCHC RBC AUTO-ENTMCNC: 33.9 G/DL (ref 32–36)
MCV RBC AUTO: 90 FL (ref 80–100)
MONOCYTES # BLD AUTO: 0.5 THOU/UL (ref 0–0.9)
MONOCYTES NFR BLD AUTO: 6 % (ref 2–10)
NEUTROPHILS # BLD AUTO: 5.8 THOU/UL (ref 2–7.7)
NEUTROPHILS NFR BLD AUTO: 70 % (ref 50–70)
PLATELET # BLD AUTO: 219 THOU/UL (ref 140–440)
PMV BLD AUTO: 8.3 FL (ref 7–10)
RBC # BLD AUTO: 3.89 MILL/UL (ref 3.8–5.4)
WBC: 8.4 THOU/UL (ref 4–11)

## 2019-09-19 ASSESSMENT — MIFFLIN-ST. JEOR: SCORE: 770.79

## 2019-10-03 ENCOUNTER — COMMUNICATION - HEALTHEAST (OUTPATIENT)
Dept: INTERNAL MEDICINE | Facility: CLINIC | Age: 84
End: 2019-10-03

## 2019-10-23 ENCOUNTER — AMBULATORY - HEALTHEAST (OUTPATIENT)
Dept: ENDOCRINOLOGY | Facility: CLINIC | Age: 84
End: 2019-10-23

## 2019-10-23 DIAGNOSIS — M89.9 DISORDER OF BONE AND CARTILAGE: ICD-10-CM

## 2019-10-23 DIAGNOSIS — M94.9 DISORDER OF BONE AND CARTILAGE: ICD-10-CM

## 2019-11-05 ENCOUNTER — COMMUNICATION - HEALTHEAST (OUTPATIENT)
Dept: ENDOCRINOLOGY | Facility: CLINIC | Age: 84
End: 2019-11-05

## 2019-11-05 DIAGNOSIS — E11.9 TYPE 2 DIABETES MELLITUS WITHOUT COMPLICATION, WITHOUT LONG-TERM CURRENT USE OF INSULIN (H): ICD-10-CM

## 2019-11-06 ENCOUNTER — COMMUNICATION - HEALTHEAST (OUTPATIENT)
Dept: ENDOCRINOLOGY | Facility: CLINIC | Age: 84
End: 2019-11-06

## 2019-11-06 DIAGNOSIS — E11.9 TYPE 2 DIABETES MELLITUS WITHOUT COMPLICATION, WITHOUT LONG-TERM CURRENT USE OF INSULIN (H): ICD-10-CM

## 2019-11-07 ENCOUNTER — AMBULATORY - HEALTHEAST (OUTPATIENT)
Dept: ENDOCRINOLOGY | Facility: CLINIC | Age: 84
End: 2019-11-07

## 2019-11-07 ENCOUNTER — COMMUNICATION - HEALTHEAST (OUTPATIENT)
Dept: ENDOCRINOLOGY | Facility: CLINIC | Age: 84
End: 2019-11-07

## 2019-11-07 DIAGNOSIS — E11.9 TYPE 2 DIABETES MELLITUS WITHOUT COMPLICATION, WITHOUT LONG-TERM CURRENT USE OF INSULIN (H): ICD-10-CM

## 2019-11-12 ENCOUNTER — COMMUNICATION - HEALTHEAST (OUTPATIENT)
Dept: ENDOCRINOLOGY | Facility: CLINIC | Age: 84
End: 2019-11-12

## 2019-11-13 ENCOUNTER — AMBULATORY - HEALTHEAST (OUTPATIENT)
Dept: LAB | Facility: CLINIC | Age: 84
End: 2019-11-13

## 2019-11-13 DIAGNOSIS — M89.9 DISORDER OF BONE AND CARTILAGE: ICD-10-CM

## 2019-11-13 DIAGNOSIS — E11.65 TYPE 2 DIABETES MELLITUS WITH HYPERGLYCEMIA, WITHOUT LONG-TERM CURRENT USE OF INSULIN (H): ICD-10-CM

## 2019-11-13 DIAGNOSIS — M94.9 DISORDER OF BONE AND CARTILAGE: ICD-10-CM

## 2019-11-13 LAB
CALCIUM SERPL-MCNC: 9.9 MG/DL (ref 8.5–10.5)
HBA1C MFR BLD: 6.8 % (ref 3.5–6)

## 2019-11-14 LAB — 25(OH)D3 SERPL-MCNC: 34.7 NG/ML (ref 30–80)

## 2019-11-19 ENCOUNTER — COMMUNICATION - HEALTHEAST (OUTPATIENT)
Dept: ENDOCRINOLOGY | Facility: CLINIC | Age: 84
End: 2019-11-19

## 2019-11-19 ENCOUNTER — OFFICE VISIT - HEALTHEAST (OUTPATIENT)
Dept: ENDOCRINOLOGY | Facility: CLINIC | Age: 84
End: 2019-11-19

## 2019-11-19 DIAGNOSIS — E11.65 TYPE 2 DIABETES MELLITUS WITH HYPERGLYCEMIA, WITH LONG-TERM CURRENT USE OF INSULIN (H): ICD-10-CM

## 2019-11-19 DIAGNOSIS — E11.65 TYPE 2 DIABETES MELLITUS WITH HYPERGLYCEMIA, WITHOUT LONG-TERM CURRENT USE OF INSULIN (H): ICD-10-CM

## 2019-11-19 DIAGNOSIS — Z79.4 TYPE 2 DIABETES MELLITUS WITH HYPERGLYCEMIA, WITH LONG-TERM CURRENT USE OF INSULIN (H): ICD-10-CM

## 2019-11-19 DIAGNOSIS — R73.9 HYPERGLYCEMIA: ICD-10-CM

## 2019-11-19 ASSESSMENT — MIFFLIN-ST. JEOR: SCORE: 772.15

## 2019-11-22 ENCOUNTER — OFFICE VISIT - HEALTHEAST (OUTPATIENT)
Dept: INTERNAL MEDICINE | Facility: CLINIC | Age: 84
End: 2019-11-22

## 2019-11-22 DIAGNOSIS — G47.00 INSOMNIA, UNSPECIFIED TYPE: ICD-10-CM

## 2019-11-22 DIAGNOSIS — K86.1 OTHER CHRONIC PANCREATITIS (H): ICD-10-CM

## 2019-11-22 ASSESSMENT — MIFFLIN-ST. JEOR: SCORE: 770.79

## 2019-12-11 ENCOUNTER — RECORDS - HEALTHEAST (OUTPATIENT)
Dept: ADMINISTRATIVE | Facility: OTHER | Age: 84
End: 2019-12-11

## 2019-12-27 ENCOUNTER — COMMUNICATION - HEALTHEAST (OUTPATIENT)
Dept: LAB | Facility: CLINIC | Age: 84
End: 2019-12-27

## 2019-12-27 ENCOUNTER — OFFICE VISIT - HEALTHEAST (OUTPATIENT)
Dept: INTERNAL MEDICINE | Facility: CLINIC | Age: 84
End: 2019-12-27

## 2019-12-27 DIAGNOSIS — E56.9 VITAMIN DEFICIENCY: ICD-10-CM

## 2019-12-27 DIAGNOSIS — K86.1 OTHER CHRONIC PANCREATITIS (H): ICD-10-CM

## 2019-12-27 ASSESSMENT — MIFFLIN-ST. JEOR: SCORE: 770.79

## 2020-01-02 ENCOUNTER — AMBULATORY - HEALTHEAST (OUTPATIENT)
Dept: PULMONOLOGY | Facility: OTHER | Age: 85
End: 2020-01-02

## 2020-01-02 DIAGNOSIS — R05.3 CHRONIC COUGH: ICD-10-CM

## 2020-01-20 ENCOUNTER — COMMUNICATION - HEALTHEAST (OUTPATIENT)
Dept: INTERNAL MEDICINE | Facility: CLINIC | Age: 85
End: 2020-01-20

## 2020-01-21 ENCOUNTER — AMBULATORY - HEALTHEAST (OUTPATIENT)
Dept: LAB | Facility: CLINIC | Age: 85
End: 2020-01-21

## 2020-01-21 DIAGNOSIS — E11.65 TYPE 2 DIABETES MELLITUS WITH HYPERGLYCEMIA, WITHOUT LONG-TERM CURRENT USE OF INSULIN (H): ICD-10-CM

## 2020-01-21 LAB
HBA1C MFR BLD: 6.9 % (ref 3.5–6)
VIT B12 SERPL-MCNC: 711 PG/ML (ref 213–816)

## 2020-01-23 ENCOUNTER — COMMUNICATION - HEALTHEAST (OUTPATIENT)
Dept: INTERNAL MEDICINE | Facility: CLINIC | Age: 85
End: 2020-01-23

## 2020-02-20 ENCOUNTER — RECORDS - HEALTHEAST (OUTPATIENT)
Dept: ADMINISTRATIVE | Facility: OTHER | Age: 85
End: 2020-02-20

## 2020-04-21 ENCOUNTER — COMMUNICATION - HEALTHEAST (OUTPATIENT)
Dept: ENDOCRINOLOGY | Facility: CLINIC | Age: 85
End: 2020-04-21

## 2020-04-21 ENCOUNTER — AMBULATORY - HEALTHEAST (OUTPATIENT)
Dept: ENDOCRINOLOGY | Facility: CLINIC | Age: 85
End: 2020-04-21

## 2020-04-21 DIAGNOSIS — M89.9 DISORDER OF BONE AND CARTILAGE: ICD-10-CM

## 2020-04-21 DIAGNOSIS — M94.9 DISORDER OF BONE AND CARTILAGE: ICD-10-CM

## 2020-04-28 ENCOUNTER — COMMUNICATION - HEALTHEAST (OUTPATIENT)
Dept: ADMINISTRATIVE | Facility: CLINIC | Age: 85
End: 2020-04-28

## 2020-04-28 ENCOUNTER — AMBULATORY - HEALTHEAST (OUTPATIENT)
Dept: ENDOCRINOLOGY | Facility: CLINIC | Age: 85
End: 2020-04-28

## 2020-04-28 DIAGNOSIS — N18.30 CHRONIC KIDNEY DISEASE (CKD), STAGE III (MODERATE) (H): ICD-10-CM

## 2020-04-28 DIAGNOSIS — M94.9 DISORDER OF BONE AND CARTILAGE: ICD-10-CM

## 2020-04-28 DIAGNOSIS — T88.7XXS UNSPECIFIED ADVERSE EFFECT OF DRUG OR MEDICAMENT, SEQUELA (CODE): ICD-10-CM

## 2020-04-28 DIAGNOSIS — M89.9 DISORDER OF BONE AND CARTILAGE: ICD-10-CM

## 2020-05-07 ENCOUNTER — AMBULATORY - HEALTHEAST (OUTPATIENT)
Dept: ENDOCRINOLOGY | Facility: CLINIC | Age: 85
End: 2020-05-07

## 2020-05-22 ENCOUNTER — AMBULATORY - HEALTHEAST (OUTPATIENT)
Dept: PULMONOLOGY | Facility: OTHER | Age: 85
End: 2020-05-22

## 2020-05-22 DIAGNOSIS — R05.3 CHRONIC COUGH: ICD-10-CM

## 2020-05-26 ENCOUNTER — AMBULATORY - HEALTHEAST (OUTPATIENT)
Dept: PULMONOLOGY | Facility: OTHER | Age: 85
End: 2020-05-26

## 2020-05-26 DIAGNOSIS — R05.3 CHRONIC COUGH: ICD-10-CM

## 2020-06-30 ENCOUNTER — AMBULATORY - HEALTHEAST (OUTPATIENT)
Dept: SCHEDULING | Facility: CLINIC | Age: 85
End: 2020-06-30

## 2020-06-30 DIAGNOSIS — M94.9 DISORDER OF BONE AND CARTILAGE: ICD-10-CM

## 2020-06-30 DIAGNOSIS — M89.9 DISORDER OF BONE AND CARTILAGE: ICD-10-CM

## 2020-07-01 ENCOUNTER — COMMUNICATION - HEALTHEAST (OUTPATIENT)
Dept: INTERNAL MEDICINE | Facility: CLINIC | Age: 85
End: 2020-07-01

## 2020-07-01 DIAGNOSIS — I25.10 CAD (CORONARY ARTERY DISEASE): ICD-10-CM

## 2020-07-24 ENCOUNTER — OFFICE VISIT - HEALTHEAST (OUTPATIENT)
Dept: PULMONOLOGY | Facility: OTHER | Age: 85
End: 2020-07-24

## 2020-07-24 DIAGNOSIS — J84.112 IPF (IDIOPATHIC PULMONARY FIBROSIS) (H): ICD-10-CM

## 2020-07-24 DIAGNOSIS — R05.3 CHRONIC COUGH: ICD-10-CM

## 2020-08-10 ENCOUNTER — COMMUNICATION - HEALTHEAST (OUTPATIENT)
Dept: INTERNAL MEDICINE | Facility: CLINIC | Age: 85
End: 2020-08-10

## 2020-08-10 DIAGNOSIS — I10 HTN (HYPERTENSION): ICD-10-CM

## 2020-08-28 ENCOUNTER — COMMUNICATION - HEALTHEAST (OUTPATIENT)
Dept: INTERNAL MEDICINE | Facility: CLINIC | Age: 85
End: 2020-08-28

## 2020-08-28 DIAGNOSIS — Z12.31 ENCOUNTER FOR SCREENING MAMMOGRAM FOR MALIGNANT NEOPLASM OF BREAST: ICD-10-CM

## 2020-08-28 DIAGNOSIS — Z12.39 ENCOUNTER FOR SCREENING BREAST EXAMINATION: ICD-10-CM

## 2020-09-03 ENCOUNTER — COMMUNICATION - HEALTHEAST (OUTPATIENT)
Dept: INTERNAL MEDICINE | Facility: CLINIC | Age: 85
End: 2020-09-03

## 2020-09-09 ENCOUNTER — HOSPITAL ENCOUNTER (OUTPATIENT)
Dept: MAMMOGRAPHY | Facility: CLINIC | Age: 85
Discharge: HOME | End: 2020-09-09

## 2020-09-09 DIAGNOSIS — Z12.31 VISIT FOR SCREENING MAMMOGRAM: ICD-10-CM

## 2020-09-22 ENCOUNTER — AMBULATORY - HEALTHEAST (OUTPATIENT)
Dept: ENDOCRINOLOGY | Facility: CLINIC | Age: 85
End: 2020-09-22

## 2020-09-22 DIAGNOSIS — E11.65 TYPE 2 DIABETES MELLITUS WITH HYPERGLYCEMIA, WITHOUT LONG-TERM CURRENT USE OF INSULIN (H): ICD-10-CM

## 2020-10-06 ENCOUNTER — COMMUNICATION - HEALTHEAST (OUTPATIENT)
Dept: ENDOCRINOLOGY | Facility: CLINIC | Age: 85
End: 2020-10-06

## 2020-10-08 ENCOUNTER — COMMUNICATION - HEALTHEAST (OUTPATIENT)
Dept: CARDIOLOGY | Facility: CLINIC | Age: 85
End: 2020-10-08

## 2020-10-09 ENCOUNTER — OFFICE VISIT - HEALTHEAST (OUTPATIENT)
Dept: CARDIOLOGY | Facility: CLINIC | Age: 85
End: 2020-10-09

## 2020-10-09 DIAGNOSIS — I25.10 CORONARY ARTERY DISEASE INVOLVING NATIVE CORONARY ARTERY OF NATIVE HEART WITHOUT ANGINA PECTORIS: ICD-10-CM

## 2020-10-09 DIAGNOSIS — E11.65 TYPE 2 DIABETES MELLITUS WITH HYPERGLYCEMIA, WITHOUT LONG-TERM CURRENT USE OF INSULIN (H): ICD-10-CM

## 2020-10-09 DIAGNOSIS — I10 ESSENTIAL HYPERTENSION: ICD-10-CM

## 2020-10-09 DIAGNOSIS — R60.0 EDEMA OF BOTH LEGS: ICD-10-CM

## 2020-10-09 LAB
ANION GAP SERPL CALCULATED.3IONS-SCNC: 10 MMOL/L (ref 5–18)
BUN SERPL-MCNC: 33 MG/DL (ref 8–28)
CALCIUM SERPL-MCNC: 9.7 MG/DL (ref 8.5–10.5)
CHLORIDE BLD-SCNC: 101 MMOL/L (ref 98–107)
CO2 SERPL-SCNC: 28 MMOL/L (ref 22–31)
CREAT SERPL-MCNC: 0.92 MG/DL (ref 0.6–1.1)
GFR SERPL CREATININE-BSD FRML MDRD: 57 ML/MIN/1.73M2
GLUCOSE BLD-MCNC: 204 MG/DL (ref 70–125)
LDLC SERPL CALC-MCNC: 85 MG/DL
POTASSIUM BLD-SCNC: 4.4 MMOL/L (ref 3.5–5)
SODIUM SERPL-SCNC: 139 MMOL/L (ref 136–145)

## 2020-10-09 ASSESSMENT — MIFFLIN-ST. JEOR: SCORE: 756.39

## 2020-10-10 LAB — HBA1C MFR BLD: 6.8 %

## 2020-10-16 ENCOUNTER — COMMUNICATION - HEALTHEAST (OUTPATIENT)
Dept: ADMINISTRATIVE | Facility: CLINIC | Age: 85
End: 2020-10-16

## 2020-10-16 DIAGNOSIS — M94.9 DISORDER OF BONE AND CARTILAGE: ICD-10-CM

## 2020-10-16 DIAGNOSIS — M89.9 DISORDER OF BONE AND CARTILAGE: ICD-10-CM

## 2020-10-29 ENCOUNTER — COMMUNICATION - HEALTHEAST (OUTPATIENT)
Dept: CARDIOLOGY | Facility: CLINIC | Age: 85
End: 2020-10-29

## 2020-10-30 ENCOUNTER — COMMUNICATION - HEALTHEAST (OUTPATIENT)
Dept: CARDIOLOGY | Facility: CLINIC | Age: 85
End: 2020-10-30

## 2020-11-06 ENCOUNTER — HOSPITAL ENCOUNTER (OUTPATIENT)
Dept: CARDIOLOGY | Facility: HOSPITAL | Age: 85
Discharge: HOME | End: 2020-11-06
Attending: INTERNAL MEDICINE

## 2020-11-06 ENCOUNTER — COMMUNICATION - HEALTHEAST (OUTPATIENT)
Dept: INTERNAL MEDICINE | Facility: CLINIC | Age: 85
End: 2020-11-06

## 2020-11-06 DIAGNOSIS — I10 ESSENTIAL HYPERTENSION: ICD-10-CM

## 2020-11-06 DIAGNOSIS — E11.9 TYPE 2 DIABETES MELLITUS WITHOUT COMPLICATION, WITHOUT LONG-TERM CURRENT USE OF INSULIN (H): ICD-10-CM

## 2020-11-06 DIAGNOSIS — R60.0 EDEMA OF BOTH LEGS: ICD-10-CM

## 2020-11-06 DIAGNOSIS — I25.10 CORONARY ARTERY DISEASE INVOLVING NATIVE CORONARY ARTERY OF NATIVE HEART WITHOUT ANGINA PECTORIS: ICD-10-CM

## 2020-11-06 LAB
AORTIC ROOT: 2.6 CM
AORTIC VALVE MEAN VELOCITY: 109 CM/S
ASCENDING AORTA: 2.7 CM
AV DIMENSIONLESS INDEX VTI: 0.7
AV MEAN GRADIENT: 5 MMHG
AV PEAK GRADIENT: 6.8 MMHG
AV VALVE AREA: 1.8 CM2
BSA FOR ECHO PROCEDURE: 1.4 M2
CV BLOOD PRESSURE: ABNORMAL MMHG
CV ECHO HEIGHT: 56 IN
CV ECHO WEIGHT: 109 LBS
DOP CALC AO PEAK VEL: 130 CM/S
DOP CALC AO VTI: 32.1 CM
DOP CALC LVOT AREA: 2.54 CM2
DOP CALC LVOT DIAMETER: 1.8 CM
DOP CALC LVOT STROKE VOLUME: 58.2 CM3
DOP CALC MV VTI: 26.5 CM
DOP CALCLVOT PEAK VEL VTI: 22.9 CM
EJECTION FRACTION: 62 % (ref 55–75)
FRACTIONAL SHORTENING: 37.9 % (ref 28–44)
INTERVENTRICULAR SEPTUM IN END DIASTOLE: 1.1 CM (ref 0.6–0.9)
IVS/PW RATIO: 1.2
LA AREA 1: 16.4 CM2
LA AREA 2: 14.4 CM2
LEFT ATRIUM AREA: 16.4 CM2
LEFT ATRIUM LENGTH: 5.1 CM
LEFT ATRIUM SIZE: 2.5 CM
LEFT ATRIUM TO AORTIC ROOT RATIO: 0.96 NO UNITS
LEFT ATRIUM VOLUME INDEX: 28.1 ML/M2
LEFT ATRIUM VOLUME: 39.4 ML
LEFT VENTRICLE CARDIAC INDEX: 3.2 L/MIN/M2
LEFT VENTRICLE CARDIAC OUTPUT: 4.5 L/MIN
LEFT VENTRICLE DIASTOLIC VOLUME INDEX: 37.9 CM3/M2 (ref 29–61)
LEFT VENTRICLE DIASTOLIC VOLUME: 53 CM3 (ref 46–106)
LEFT VENTRICLE HEART RATE: 77 BPM
LEFT VENTRICLE MASS INDEX: 55.9 G/M2
LEFT VENTRICLE SYSTOLIC VOLUME INDEX: 14.3 CM3/M2 (ref 8–24)
LEFT VENTRICLE SYSTOLIC VOLUME: 20 CM3 (ref 14–42)
LEFT VENTRICULAR INTERNAL DIMENSION IN DIASTOLE: 2.9 CM (ref 3.8–5.2)
LEFT VENTRICULAR INTERNAL DIMENSION IN SYSTOLE: 1.8 CM (ref 2.2–3.5)
LEFT VENTRICULAR MASS: 78.2 G
LEFT VENTRICULAR OUTFLOW TRACT MEAN GRADIENT: 2 MMHG
LEFT VENTRICULAR OUTFLOW TRACT MEAN VELOCITY: 71.5 CM/S
LEFT VENTRICULAR POSTERIOR WALL IN END DIASTOLE: 0.9 CM (ref 0.6–0.9)
LV STROKE VOLUME INDEX: 41.6 ML/M2
MITRAL VALVE E/A RATIO: 0.6
MITRAL VALVE MEAN INFLOW VELOCITY: 91.9 CM/S
MITRAL VALVE PEAK VELOCITY: 151 CM/S
MV AREA VTI: 2.2 CM2
MV AVERAGE E/E' RATIO: 20.5 CM/S
MV DECELERATION TIME: 211 MS
MV E'TISSUE VEL-LAT: 5.07 CM/S
MV E'TISSUE VEL-MED: 4.78 CM/S
MV LATERAL E/E' RATIO: 19.9
MV MEAN GRADIENT: 4 MMHG
MV MEDIAL E/E' RATIO: 21.1
MV PEAK A VELOCITY: 159 CM/S
MV PEAK E VELOCITY: 101 CM/S
MV PEAK GRADIENT: 9.1 MMHG
MV VALVE AREA BY CONTINUITY EQUATION: 2.2 CM2
NUC REST DIASTOLIC VOLUME INDEX: 1744 LBS
NUC REST SYSTOLIC VOLUME INDEX: 56 IN
TRICUSPID REGURGITATION PEAK PRESSURE GRADIENT: 23.6 MMHG
TRICUSPID VALVE ANULAR PLANE SYSTOLIC EXCURSION: 2.2 CM
TRICUSPID VALVE PEAK REGURGITANT VELOCITY: 243 CM/S

## 2020-11-06 RX ORDER — BLOOD SUGAR DIAGNOSTIC
STRIP MISCELLANEOUS
Qty: 100 STRIP | Refills: 5 | Status: SHIPPED | OUTPATIENT
Start: 2020-11-06 | End: 2021-12-14

## 2020-11-06 ASSESSMENT — MIFFLIN-ST. JEOR: SCORE: 752.42

## 2020-11-10 ENCOUNTER — AMBULATORY - HEALTHEAST (OUTPATIENT)
Dept: ENDOCRINOLOGY | Facility: CLINIC | Age: 85
End: 2020-11-10

## 2020-11-10 DIAGNOSIS — M89.9 DISORDER OF BONE AND CARTILAGE: ICD-10-CM

## 2020-11-10 DIAGNOSIS — M94.9 DISORDER OF BONE AND CARTILAGE: ICD-10-CM

## 2020-11-11 ENCOUNTER — COMMUNICATION - HEALTHEAST (OUTPATIENT)
Dept: CARDIOLOGY | Facility: CLINIC | Age: 85
End: 2020-11-11

## 2020-11-16 ENCOUNTER — AMBULATORY - HEALTHEAST (OUTPATIENT)
Dept: LAB | Facility: CLINIC | Age: 85
End: 2020-11-16

## 2020-11-16 ENCOUNTER — AMBULATORY - HEALTHEAST (OUTPATIENT)
Dept: ENDOCRINOLOGY | Facility: CLINIC | Age: 85
End: 2020-11-16

## 2020-11-16 DIAGNOSIS — M89.9 DISORDER OF BONE AND CARTILAGE: ICD-10-CM

## 2020-11-16 DIAGNOSIS — M94.9 DISORDER OF BONE AND CARTILAGE: ICD-10-CM

## 2020-11-16 LAB
CREAT UR-MCNC: 17.7 MG/DL
MICROALBUMIN UR-MCNC: 1.81 MG/DL (ref 0–1.99)
MICROALBUMIN/CREAT UR: 102.3 MG/G

## 2020-11-17 ENCOUNTER — OFFICE VISIT - HEALTHEAST (OUTPATIENT)
Dept: ENDOCRINOLOGY | Facility: CLINIC | Age: 85
End: 2020-11-17

## 2020-11-17 DIAGNOSIS — E11.65 TYPE 2 DIABETES MELLITUS WITH HYPERGLYCEMIA, WITHOUT LONG-TERM CURRENT USE OF INSULIN (H): ICD-10-CM

## 2020-11-17 LAB — 25(OH)D3 SERPL-MCNC: 39.3 NG/ML (ref 30–80)

## 2020-11-24 ENCOUNTER — COMMUNICATION - HEALTHEAST (OUTPATIENT)
Dept: INTERNAL MEDICINE | Facility: CLINIC | Age: 85
End: 2020-11-24

## 2020-11-24 DIAGNOSIS — I25.10 CAD (CORONARY ARTERY DISEASE): ICD-10-CM

## 2020-12-03 ENCOUNTER — COMMUNICATION - HEALTHEAST (OUTPATIENT)
Dept: CARDIOLOGY | Facility: CLINIC | Age: 85
End: 2020-12-03

## 2020-12-08 ENCOUNTER — RECORDS - HEALTHEAST (OUTPATIENT)
Dept: ADMINISTRATIVE | Facility: OTHER | Age: 85
End: 2020-12-08

## 2021-01-13 ENCOUNTER — AMBULATORY - HEALTHEAST (OUTPATIENT)
Dept: INTENSIVE CARE | Facility: CLINIC | Age: 86
End: 2021-01-13

## 2021-01-13 ENCOUNTER — COMMUNICATION - HEALTHEAST (OUTPATIENT)
Dept: PULMONOLOGY | Facility: OTHER | Age: 86
End: 2021-01-13

## 2021-01-13 DIAGNOSIS — R05.3 CHRONIC COUGH: ICD-10-CM

## 2021-01-13 RX ORDER — CODEINE PHOSPHATE AND GUAIFENESIN 10; 100 MG/5ML; MG/5ML
5-10 SOLUTION ORAL 4 TIMES DAILY PRN
Qty: 240 ML | Refills: 0 | Status: SHIPPED | OUTPATIENT
Start: 2021-01-13 | End: 2021-08-19

## 2021-01-22 ENCOUNTER — COMMUNICATION - HEALTHEAST (OUTPATIENT)
Dept: INTERNAL MEDICINE | Facility: CLINIC | Age: 86
End: 2021-01-22

## 2021-01-22 DIAGNOSIS — I10 HTN (HYPERTENSION): ICD-10-CM

## 2021-02-02 ENCOUNTER — COMMUNICATION - HEALTHEAST (OUTPATIENT)
Dept: TELEHEALTH | Facility: CLINIC | Age: 86
End: 2021-02-02

## 2021-02-16 ENCOUNTER — AMBULATORY - HEALTHEAST (OUTPATIENT)
Dept: NURSING | Facility: CLINIC | Age: 86
End: 2021-02-16

## 2021-02-19 ENCOUNTER — AMBULATORY - HEALTHEAST (OUTPATIENT)
Dept: LAB | Facility: CLINIC | Age: 86
End: 2021-02-19

## 2021-02-19 ENCOUNTER — OFFICE VISIT - HEALTHEAST (OUTPATIENT)
Dept: INTERNAL MEDICINE | Facility: CLINIC | Age: 86
End: 2021-02-19

## 2021-02-19 DIAGNOSIS — E11.65 TYPE 2 DIABETES MELLITUS WITH HYPERGLYCEMIA, WITHOUT LONG-TERM CURRENT USE OF INSULIN (H): ICD-10-CM

## 2021-02-19 DIAGNOSIS — I25.10 CAD (CORONARY ARTERY DISEASE): ICD-10-CM

## 2021-02-19 DIAGNOSIS — Z00.00 ROUTINE GENERAL MEDICAL EXAMINATION AT A HEALTH CARE FACILITY: ICD-10-CM

## 2021-02-19 DIAGNOSIS — M85.80 OSTEOPENIA, UNSPECIFIED LOCATION: ICD-10-CM

## 2021-02-19 DIAGNOSIS — R80.9 PROTEINURIA, UNSPECIFIED TYPE: ICD-10-CM

## 2021-02-19 DIAGNOSIS — N18.30 STAGE 3 CHRONIC KIDNEY DISEASE, UNSPECIFIED WHETHER STAGE 3A OR 3B CKD (H): ICD-10-CM

## 2021-02-19 DIAGNOSIS — D64.9 ANEMIA, UNSPECIFIED TYPE: ICD-10-CM

## 2021-02-19 DIAGNOSIS — I10 HTN (HYPERTENSION): ICD-10-CM

## 2021-02-19 LAB
ALBUMIN SERPL-MCNC: 4.4 G/DL (ref 3.5–5)
ALP SERPL-CCNC: 50 U/L (ref 45–120)
ALT SERPL W P-5'-P-CCNC: 28 U/L (ref 0–45)
ANION GAP SERPL CALCULATED.3IONS-SCNC: 12 MMOL/L (ref 5–18)
AST SERPL W P-5'-P-CCNC: 33 U/L (ref 0–40)
BASOPHILS # BLD AUTO: 0 THOU/UL (ref 0–0.2)
BASOPHILS NFR BLD AUTO: 0 % (ref 0–2)
BILIRUB SERPL-MCNC: 0.5 MG/DL (ref 0–1)
BUN SERPL-MCNC: 35 MG/DL (ref 8–28)
CALCIUM SERPL-MCNC: 9.6 MG/DL (ref 8.5–10.5)
CHLORIDE BLD-SCNC: 101 MMOL/L (ref 98–107)
CHOLEST SERPL-MCNC: 165 MG/DL
CO2 SERPL-SCNC: 26 MMOL/L (ref 22–31)
CREAT SERPL-MCNC: 0.94 MG/DL (ref 0.6–1.1)
EOSINOPHIL # BLD AUTO: 0.1 THOU/UL (ref 0–0.4)
EOSINOPHIL NFR BLD AUTO: 1 % (ref 0–6)
ERYTHROCYTE [DISTWIDTH] IN BLOOD BY AUTOMATED COUNT: 14 % (ref 11–14.5)
FASTING STATUS PATIENT QL REPORTED: YES
FERRITIN SERPL-MCNC: 113 NG/ML (ref 10–130)
GFR SERPL CREATININE-BSD FRML MDRD: 55 ML/MIN/1.73M2
GLUCOSE BLD-MCNC: 150 MG/DL (ref 70–125)
HBA1C MFR BLD: 6.6 %
HCT VFR BLD AUTO: 35.6 % (ref 35–47)
HDLC SERPL-MCNC: 62 MG/DL
HGB BLD-MCNC: 12 G/DL (ref 12–16)
IMM GRANULOCYTES # BLD: 0 THOU/UL
IMM GRANULOCYTES NFR BLD: 0 %
IRON SATN MFR SERPL: 21 % (ref 20–50)
IRON SERPL-MCNC: 77 UG/DL (ref 42–175)
LDLC SERPL CALC-MCNC: 85 MG/DL
LYMPHOCYTES # BLD AUTO: 1.4 THOU/UL (ref 0.8–4.4)
LYMPHOCYTES NFR BLD AUTO: 16 % (ref 20–40)
MCH RBC QN AUTO: 30.4 PG (ref 27–34)
MCHC RBC AUTO-ENTMCNC: 33.7 G/DL (ref 32–36)
MCV RBC AUTO: 90 FL (ref 80–100)
MONOCYTES # BLD AUTO: 0.5 THOU/UL (ref 0–0.9)
MONOCYTES NFR BLD AUTO: 5 % (ref 2–10)
NEUTROPHILS # BLD AUTO: 6.5 THOU/UL (ref 2–7.7)
NEUTROPHILS NFR BLD AUTO: 77 % (ref 50–70)
PLATELET # BLD AUTO: 209 THOU/UL (ref 140–440)
PMV BLD AUTO: 10.7 FL (ref 7–10)
POTASSIUM BLD-SCNC: 4.7 MMOL/L (ref 3.5–5)
PROT SERPL-MCNC: 7.5 G/DL (ref 6–8)
RBC # BLD AUTO: 3.95 MILL/UL (ref 3.8–5.4)
SODIUM SERPL-SCNC: 139 MMOL/L (ref 136–145)
TIBC SERPL-MCNC: 374 UG/DL (ref 313–563)
TRANSFERRIN SERPL-MCNC: 300 MG/DL (ref 212–360)
TRIGL SERPL-MCNC: 92 MG/DL
WBC: 8.5 THOU/UL (ref 4–11)

## 2021-02-19 RX ORDER — HYDROCHLOROTHIAZIDE 25 MG/1
25 TABLET ORAL DAILY
Qty: 90 TABLET | Refills: 1 | Status: SHIPPED | OUTPATIENT
Start: 2021-02-19 | End: 2021-08-31

## 2021-02-19 RX ORDER — LOSARTAN POTASSIUM 25 MG/1
25 TABLET ORAL DAILY
Qty: 90 TABLET | Refills: 1 | Status: SHIPPED | OUTPATIENT
Start: 2021-02-19 | End: 2021-09-13

## 2021-02-19 RX ORDER — ATORVASTATIN CALCIUM 40 MG/1
40 TABLET, FILM COATED ORAL DAILY
Qty: 90 TABLET | Refills: 1 | Status: SHIPPED | OUTPATIENT
Start: 2021-02-19 | End: 2021-09-13

## 2021-02-19 ASSESSMENT — MIFFLIN-ST. JEOR: SCORE: 756.96

## 2021-02-19 ASSESSMENT — PATIENT HEALTH QUESTIONNAIRE - PHQ9: SUM OF ALL RESPONSES TO PHQ QUESTIONS 1-9: 1

## 2021-02-25 ENCOUNTER — COMMUNICATION - HEALTHEAST (OUTPATIENT)
Dept: ADMINISTRATIVE | Facility: CLINIC | Age: 86
End: 2021-02-25

## 2021-03-09 ENCOUNTER — OFFICE VISIT - HEALTHEAST (OUTPATIENT)
Dept: INTERNAL MEDICINE | Facility: CLINIC | Age: 86
End: 2021-03-09

## 2021-03-09 ENCOUNTER — AMBULATORY - HEALTHEAST (OUTPATIENT)
Dept: NURSING | Facility: CLINIC | Age: 86
End: 2021-03-09

## 2021-03-09 DIAGNOSIS — M25.511 ACUTE PAIN OF RIGHT SHOULDER DUE TO TRAUMA: ICD-10-CM

## 2021-03-09 DIAGNOSIS — G89.11 ACUTE PAIN OF RIGHT SHOULDER DUE TO TRAUMA: ICD-10-CM

## 2021-03-09 RX ORDER — LORAZEPAM 0.5 MG/1
TABLET ORAL
Qty: 2 TABLET | Refills: 0 | Status: SHIPPED | OUTPATIENT
Start: 2021-03-09 | End: 2022-04-27

## 2021-03-10 ENCOUNTER — COMMUNICATION - HEALTHEAST (OUTPATIENT)
Dept: INTERNAL MEDICINE | Facility: CLINIC | Age: 86
End: 2021-03-10

## 2021-03-15 ENCOUNTER — COMMUNICATION - HEALTHEAST (OUTPATIENT)
Dept: SCHEDULING | Facility: CLINIC | Age: 86
End: 2021-03-15

## 2021-03-15 ENCOUNTER — HOSPITAL ENCOUNTER (OUTPATIENT)
Dept: MRI IMAGING | Facility: HOSPITAL | Age: 86
Discharge: HOME | End: 2021-03-15

## 2021-03-15 ENCOUNTER — COMMUNICATION - HEALTHEAST (OUTPATIENT)
Dept: INTERNAL MEDICINE | Facility: CLINIC | Age: 86
End: 2021-03-15

## 2021-03-15 DIAGNOSIS — G89.11 ACUTE PAIN OF RIGHT SHOULDER DUE TO TRAUMA: ICD-10-CM

## 2021-03-15 DIAGNOSIS — M25.511 ACUTE PAIN OF RIGHT SHOULDER DUE TO TRAUMA: ICD-10-CM

## 2021-03-17 ENCOUNTER — COMMUNICATION - HEALTHEAST (OUTPATIENT)
Dept: ADMINISTRATIVE | Facility: CLINIC | Age: 86
End: 2021-03-17

## 2021-03-17 ENCOUNTER — AMBULATORY - HEALTHEAST (OUTPATIENT)
Dept: INTERNAL MEDICINE | Facility: CLINIC | Age: 86
End: 2021-03-17

## 2021-03-17 DIAGNOSIS — S46.011A TRAUMATIC COMPLETE TEAR OF RIGHT ROTATOR CUFF, INITIAL ENCOUNTER: ICD-10-CM

## 2021-03-18 ENCOUNTER — RECORDS - HEALTHEAST (OUTPATIENT)
Dept: ADMINISTRATIVE | Facility: OTHER | Age: 86
End: 2021-03-18

## 2021-03-29 ENCOUNTER — COMMUNICATION - HEALTHEAST (OUTPATIENT)
Dept: HOME HEALTH SERVICES | Facility: HOME HEALTH | Age: 86
End: 2021-03-29

## 2021-04-16 ENCOUNTER — COMMUNICATION - HEALTHEAST (OUTPATIENT)
Dept: ADMINISTRATIVE | Facility: CLINIC | Age: 86
End: 2021-04-16

## 2021-04-16 DIAGNOSIS — M94.9 DISORDER OF BONE AND CARTILAGE: ICD-10-CM

## 2021-04-16 DIAGNOSIS — N18.30 CHRONIC KIDNEY DISEASE (CKD), STAGE III (MODERATE) (H): ICD-10-CM

## 2021-04-16 DIAGNOSIS — M89.9 DISORDER OF BONE AND CARTILAGE: ICD-10-CM

## 2021-04-16 DIAGNOSIS — T88.7XXS UNSPECIFIED ADVERSE EFFECT OF DRUG OR MEDICAMENT, SEQUELA (CODE): ICD-10-CM

## 2021-04-27 ENCOUNTER — COMMUNICATION - HEALTHEAST (OUTPATIENT)
Dept: INTERNAL MEDICINE | Facility: CLINIC | Age: 86
End: 2021-04-27

## 2021-04-30 ENCOUNTER — AMBULATORY - HEALTHEAST (OUTPATIENT)
Dept: ENDOCRINOLOGY | Facility: CLINIC | Age: 86
End: 2021-04-30

## 2021-04-30 ENCOUNTER — COMMUNICATION - HEALTHEAST (OUTPATIENT)
Dept: ENDOCRINOLOGY | Facility: CLINIC | Age: 86
End: 2021-04-30

## 2021-04-30 DIAGNOSIS — M89.9 DISORDER OF BONE AND CARTILAGE: ICD-10-CM

## 2021-04-30 DIAGNOSIS — Z92.29 PERSONAL HISTORY OF OTHER DRUG THERAPY: ICD-10-CM

## 2021-04-30 DIAGNOSIS — M94.9 DISORDER OF BONE AND CARTILAGE: ICD-10-CM

## 2021-05-18 ENCOUNTER — AMBULATORY - HEALTHEAST (OUTPATIENT)
Dept: ENDOCRINOLOGY | Facility: CLINIC | Age: 86
End: 2021-05-18

## 2021-05-21 ENCOUNTER — AMBULATORY - HEALTHEAST (OUTPATIENT)
Dept: LAB | Facility: CLINIC | Age: 86
End: 2021-05-21

## 2021-05-21 ENCOUNTER — RECORDS - HEALTHEAST (OUTPATIENT)
Dept: ADMINISTRATIVE | Facility: OTHER | Age: 86
End: 2021-05-21

## 2021-05-21 DIAGNOSIS — G45.3 AMAUROSIS FUGAX: ICD-10-CM

## 2021-05-21 LAB
C REACTIVE PROTEIN LHE: 0.3 MG/DL (ref 0–0.8)
ERYTHROCYTE [SEDIMENTATION RATE] IN BLOOD BY WESTERGREN METHOD: 31 MM/HR (ref 0–20)
PLATELET # BLD AUTO: 218 THOU/UL (ref 140–440)

## 2021-05-25 ENCOUNTER — RECORDS - HEALTHEAST (OUTPATIENT)
Dept: ADMINISTRATIVE | Facility: CLINIC | Age: 86
End: 2021-05-25

## 2021-05-26 ENCOUNTER — RECORDS - HEALTHEAST (OUTPATIENT)
Dept: ADMINISTRATIVE | Facility: CLINIC | Age: 86
End: 2021-05-26

## 2021-05-27 ENCOUNTER — RECORDS - HEALTHEAST (OUTPATIENT)
Dept: ADMINISTRATIVE | Facility: CLINIC | Age: 86
End: 2021-05-27

## 2021-05-27 ASSESSMENT — PATIENT HEALTH QUESTIONNAIRE - PHQ9: SUM OF ALL RESPONSES TO PHQ QUESTIONS 1-9: 1

## 2021-05-27 NOTE — TELEPHONE ENCOUNTER
Received faxed from [facility], pt requested new rx for meter and test strip.    rx sent for signature.

## 2021-05-27 NOTE — TELEPHONE ENCOUNTER
Labs order placed. Please call pt to [facility] lab appt.     DE referral placed.

## 2021-05-27 NOTE — TELEPHONE ENCOUNTER
Please verify with pt want pt call back.     Is pt testing blood sugar 2 times weekly or 2 times daily?    Sig - Route: Use 1 each As Directed daily. - Miscellaneous   Patient taking differently: Use 2 each As Directed once a week .             Received from [facility] that medicare part B only cover testing once daily.

## 2021-05-27 NOTE — TELEPHONE ENCOUNTER
Bone dexa scan reviewed, it shows moderate bone loss but it is stable. Which is an improvement. Repeat bone dexa scan in 2 years.

## 2021-05-27 NOTE — TELEPHONE ENCOUNTER
Per CSS    Please verify with pt want pt call back.      Is pt testing blood sugar 2 times weekly or 2 times daily?             Sig - Route: Use 1 each As Directed daily. - Miscellaneous   Patient taking differently: Use 2 each As Directed once a week .                    Received from [facility] pharmacy that medicare part B only cover testing once daily.

## 2021-05-27 NOTE — TELEPHONE ENCOUNTER
Call from [facility] asking for refill on her codeine cough syrup.  Please send refill to [facility] in [place].

## 2021-05-27 NOTE — TELEPHONE ENCOUNTER
Dr. Parikh patient      She is requesting for a new prescription for a new  Accu-chek Lacy meter as her current meter isn't working. She will need a prescription for the lancets as well.She just picked up her test strip refills, so she's good with the test strips.     She is also interested in Diabetes Education. Please have Dr. Parikh place an order for Diabetes Education.     She would also like to schedule her next prolia injection, but she wants to make sure Medicare will approve for her to receive the injection this year. Please start process for PA.       [facility]    Patricia @ [phone]

## 2021-05-27 NOTE — TELEPHONE ENCOUNTER
[facility] after 04.26.2019, DM Education and DXA scan.     Please have patient come in for labs to  check a1c , cr, k ca 25 vit d

## 2021-05-28 ENCOUNTER — RECORDS - HEALTHEAST (OUTPATIENT)
Dept: ADMINISTRATIVE | Facility: CLINIC | Age: 86
End: 2021-05-28

## 2021-05-28 NOTE — TELEPHONE ENCOUNTER
There are no overrides with Medicare Part B. Cash price for 50 strips = $86.00, so that is not an option for the patient. I spoke with repCinthya, from [facility] and am having a qty of 50 strips mailed to patients home directly along with a prepaid labeled return  for the patient to send back her old meter to the company. Left voice mail for patient to call me back to let her know this info. I also called wolf Julio at pharmacy to let her know so if she see's patient before I speak with her she can inform the patient of this as well. Let me know if you have any questions.     Micki

## 2021-05-28 NOTE — PROGRESS NOTES
Assessment: pt seen today for DM education. She brings in her folder of all her DM materials, food chart, labels etc. Also brings in her BG meter. She did get everything worked out with her new meter. She is checking her BG just a couple times per week. All readings are fasting: January: 138, 142, 134; February: 153, 130; March: 139, 121. Last night after ice cream 168, at  and FBG at 149.   Reviewed BG and A1c goals. Her A1c is very steady and within goal. Pt reports she is not taking any DM medications. She did not start the januvia last fall because her PCP was worried she would get too low. With her A1c really not changing, she likely does not need it.   Pt is active, [facility] also goes walking and golfing. Discussed the benefits of exercise and encouraged her to continue.   She feels the diet is going well. No concerns here.      Plan: continue to monitor BG weekly, eating healthy and staying active. Call with any concerns or if BG are out of goal. Otherwise f/u as needed per pt.     Subjective and Objective:      Patricia Bose is referred by Dr. Parikh for Diabetes Education.     Lab Results   Component Value Date    HGBA1C 7.0 (H) 05/01/2019       Current diabetes medications:  None       Follow up:   PRN per pt       Education:     Monitoring   Meter (per above goals): Assessed and Discussed  Monitoring: Assessed and Discussed  BG goals: Discussed    Nutrition Management  Nutrition Management: Assessed and Discussed  Weight: Not addressed  Portions/Balance: Assessed and Discussed  Carb ID/Count: Assessed and Discussed  Label Reading: Assessed and Discussed  Heart Healthy Fats: Assessed and Discussed  Menu Planning: Assessed  Dining Out: Assessed and Discussed  Physical Activity: Assessed and Discussed  Medications: Discussed    Diabetes Disease Process: Discussed    Acute Complications: Prevent, Detect, Treat:  Hypoglycemia: Assessed and  Discussed  Hyperglycemia: Assessed and Discussed  Sick Days: Discussed  Driving: Discussed    Chronic Complications  Foot Care:Needs instruction/review at follow-up  Skin Care: Needs instruction/review at follow-up  Eye: Discussed  ABC: Assessed and Discussed  Teeth:Needs instruction/review at follow-up  Goal Setting and Problem Solving: Assessed and Discussed  Barriers: Assessed and Discussed  Psychosocial Adjustments: Assessed and Discussed      Time spent with the patient: 60 minutes for diabetes education and counseling.   Previous Education: yes  Visit Type:DSMT  Hours Remaining: DSMT 1 and MNT 2      Lisette Garces  5/6/2019

## 2021-05-28 NOTE — TELEPHONE ENCOUNTER
----- Message from Titus Parikh MD sent at 5/9/2019  5:50 PM CDT -----  Please discontinue calcium supplements. Continue prolia 60 mg  Sq/ 6 months. Check same labs in 6 months plus PTH and ph. . The high calcium is most likely due to the HCTZ.

## 2021-05-28 NOTE — TELEPHONE ENCOUNTER
Patient returned call, I relayed the message below to her and she expressed verbal understanding. She will call if she has further questions.

## 2021-05-28 NOTE — TELEPHONE ENCOUNTER
I called the pt, she states that the meter stopped working and the pharmacy told her she cannot obtain a new meter and strips until 5/6/19. I informed I would call the pharmacy to obtain more information. I called the pharmacy, they inform me that that pt can get a meter now, however the insurance will not approve strips for her because she recently picked up strips for the old meter. The old meter was discontinued so they pharmacy cannot get a meter for her. I will see if we can get a over-ride as the pt cannot get a meter that the strips she picked up go with. The pt needs accu-check chinedu strips.

## 2021-05-28 NOTE — TELEPHONE ENCOUNTER
Patricia returned my call late yesterday afternoon, and I informed her of all the info in my note below and she verbalized understanding.

## 2021-05-28 NOTE — TELEPHONE ENCOUNTER
Informed Patricia that I spoke with [employer] rep that tracked her test stips and latest delivery with be Tuesday 4/30.

## 2021-05-28 NOTE — TELEPHONE ENCOUNTER
Endo  Team    In regards of: glucose meter    Lost her meter. unable to get a new meter until 5.6, has been unable to check sugars for 2 weeks now.     [id number] [place], [place] - [address]    She can be reached @ [phone]

## 2021-05-28 NOTE — PROGRESS NOTES
"Prolia Injection Phone Screen      Screening questions have been asked 2-3 days prior to administration visit for Prolia. If any questions are answered with \"Yes,\" this phone encounter were will routed to ordering provider for further evaluation.     1.  When was the last injection?  10/26/18    2.  Has insurance for this injection been verified?  Yes    3.  Did you experience any new onset achiness or rashes that lasted for over a month with your previous Prolia injection?   No    4.  Do you have a fever over 101?F or a new deep cough that is unusual for you today? No    5.  Have you started any new medications in the last 6 months that you were told could affect your immune system? These may have been prescribed by oncologist, transplant, rheumatology, or dermatology.   No    6.  In the last 6 months have you have gastric bypass or parathyroid surgery?   No    7.  Do you plan dental work requiring drilling into the bone such as implants/extractions or oral surgery in the next 2-3 months?   No    8. Do you have new insurance since the last injection?    Patient informed if symptoms discussed above present prior to their administration appointment, they are to notify clinic immediately.     Catina Guidry        The following steps were completed to comply with the REMS program for Prolia:  1. Ordering provider has previously reviewed information in the Medication Guide and Patient Counseling Chart, including the serious risks of Prolia  and the symptoms of each risk and have been advised to seek prompt medical attention if they have signs or symptoms of any of the serious risks.  2. Provided each patient a copy of the Medication Guide and Patient Brochure.  See MAR for administration details.   Indication: Prolia  (denosumab) is a prescription medicine used to treat osteoporosis in patients who:   Are at high risk for fracture, meaning patients who have had a fracture related to osteoporosis, or who have multiple " risk factors for fracture; Cannot use another osteoporosis medicine or other osteoporosis medicines did not work well.   The timeline for early/late injections would be 4 weeks early and any time after the 6 month grupo. If a patient receives their injection late, then the subsequent injection would be 6 months from the date that they actually received the injection    Have the screening questions been asked prior to this administration? Yes    After obtaining consent, and per orders of Dr. Parikh, injection of Prolia 60 mg given by Catina Guidry. Patient instructed to remain in clinic for 20 minutes afterwards, and to report any adverse reaction to me immediately.

## 2021-05-29 ENCOUNTER — RECORDS - HEALTHEAST (OUTPATIENT)
Dept: ADMINISTRATIVE | Facility: CLINIC | Age: 86
End: 2021-05-29

## 2021-05-29 NOTE — PROGRESS NOTES
Assessment/Plan:       1. Proteinuria, unspecified type  - Ambulatory referral to Nephrology    2. Type 2 diabetes mellitus with hyperglycemia, without long-term current use of insulin (H)  Remains stable recommend no changes  3. Coronary artery disease involving native coronary artery of native heart, angina presence unspecified  4. Mixed hyperlipidemia  Remains stable recommend no changes  5. Hypertension  Remains stable recommend no changes  6. Osteopenia  Recommend continued daily physical activity a  7. Insomnia, unspecified type  Continue to encourage patient to attempt to push back her bedtime  8. Encounter for Medicare annual wellness exam  Follow-up 6 months, sooner if needed.    Subjective:      Patricia Bose is a 89 y.o. male who presents for a Subsequent Annual Wellness Visit.  Patient has chronic conditions coronary artery disease involving native coronary artery of native heart and did not present which patient underwent an angiogram in 2015 is on med management, hypertension continues on amlodipine, atenolol and hydrochlorothiazide, mixed hyperlipidemia continues on atorvastatin 40 mg, type 2 diabetes managed with diet, osteopenia last DEXA scan fall 2017, insomnia, carpal tunnel syndrome.  Patient reports she is doing well.  She is completing yoga several times a week as well as swimming she remains very active she attends [employer], has a flower garden, enjoys crossword puzzles.    Patient has some insomnia she was advised that her previous clinic visit to attempt to push her bedtime back 30 minutes each day as she was going to sleep between 7 and 8 PM.  She reports she has been slowly doing this noting she is getting adequate rest now but does get up in the night and does have some difficulty with falling back to sleep after utilizing the restroom.    Patient completed her lab work at her last clinic visit was noted to have proteinuria.  These results reviewed with patient she is interested in  being seen by nephrology.    Healthy Habits:   Regular Exercise: Yes  Sunscreen Use: Yes  Healthy Diet: Yes  Dental Visits Regularly: Yes  Seat Belt: Yes  Sexually active: No  Monthly Self Testicular Exams:  N/A  Hemoccults: N/A  Flex Sig: N/A  Colonoscopy: Yes  Lipid Profile: Yes  Glucose Screen: Yes  Prevention of Osteoporosis: Yes  Last Dexa: Yes  Guns at Home:  No      Immunization History   Administered Date(s) Administered     DT (pediatric) 01/01/1995, 11/02/2004     Influenza high dose, seasonal 09/03/2016     Influenza, inj, historic,unspecified 10/28/2005, 11/02/2007, 09/20/2011, 09/10/2014, 08/20/2016     Pneumo Conj 13-V (2010&after) 10/28/2014     Pneumo Conj 7-V(before 2010) 10/28/2014     Pneumo Polysac 23-V 01/01/1991, 11/02/2004     Td,adult,historic,unspecified 11/02/2004     Tdap 10/28/2014     ZOSTER, LIVE 11/12/2009     Immunization status: up to date and documented.    Current Outpatient Medications   Medication Sig Dispense Refill     amLODIPine (NORVASC) 5 MG tablet Take 1 tablet (5 mg total) by mouth daily. 90 tablet 3     amLODIPine (NORVASC) 5 MG tablet TAKE 1 TABLET BY MOUTH ONCE DAILY. DUE FOR APPOINTMENT.. 90 tablet 1     amoxicillin (AMOXIL) 500 MG capsule Take 500 mg by mouth. Take 4 pills prior to dental work       aspirin 81 MG EC tablet Take 81 mg by mouth daily.       atenolol (TENORMIN) 25 MG tablet Take 1 tablet (25 mg total) by mouth daily. 90 tablet 2     atorvastatin (LIPITOR) 40 MG tablet TAKE 1 TABLET (40 MG TOTAL) BY MOUTH BEDTIME FOR CHOLESTEROL. 90 tablet 2     blood glucose meter (GLUCOMETER) Use 1 each As Directed daily. Dispense glucometer brand per patient's insurance at pharmacy discretion. 1 each 0     blood glucose test strips Use 2 each As Directed once a week. 100 strip 0     cholecalciferol, vitamin D3, (VITAMIN D3) 1,000 unit capsule Take 1,000 Units by mouth daily.       codeine 15 MG tablet Take 1 tablet (15 mg total) by mouth every 6 (six) hours as needed  for pain. 30 tablet 0     codeine-guaiFENesin (GUAIFENESIN AC)  mg/5 mL liquid Take 5-10 mL by mouth as needed for cough. 240 mL 0     cyanocobalamin (VITAMIN B-12) 50 mcg tablet Take 50 mcg by mouth daily.       DENTA 5000 PLUS 1.1 % Crea        fluticasone (FLONASE) 50 mcg/actuation nasal spray 1-2 sprays twice daily 16 g 3     generic lancets (ACCU-CHEK FASTCLIX LANCING DEV) Use 1 each As Directed 2 (two) times a day. 200 each 2     glucosamine-chondroitin 500-400 mg cap Take 2 capsules by mouth daily.       hydroCHLOROthiazide (HYDRODIURIL) 25 MG tablet Take 1 tablet (25 mg total) by mouth daily. 90 tablet 1     magnesium 250 mg Tab Take 1 tablet by mouth daily.       MELATONIN ORAL Take 1 capsule by mouth as needed.       multivitamin therapeutic (THERAGRAN) tablet Take 1 tablet by mouth daily.       sitaGLIPtin (JANUVIA) 25 MG tablet Take 1 tablet (25 mg total) by mouth daily. 90 tablet 1     vitamin A-vitamin C-vit E-min (OCUVITE) Tab tablet Take 1 tablet by mouth daily.       Current Facility-Administered Medications   Medication Dose Route Frequency Provider Last Rate Last Dose     denosumab 60 mg (PROLIA 60 mg/ml)  60 mg Subcutaneous Q6 Months Titus Parikh MD   60 mg at 05/01/19 1244     Past Medical History:   Diagnosis Date     Carpal tunnel syndrome      Diabetes mellitus (H)      Hyperlipidemia      Hypertension      Upper respiratory infection      Past Surgical History:   Procedure Laterality Date     BREAST BIOPSY Left 1980    benign     BREAST BIOPSY Left     before 1980    benign     CATARACT EXTRACTION       HYSTERECTOMY  1957    benign     OOPHORECTOMY  1957    benign     WA REMOVAL OF TONSILS,<13 Y/O      Description: Tonsillectomy;  Recorded: 04/29/2014;     WA TOTAL ABDOM HYSTERECTOMY      Description: Hysterectomy;  Recorded: 04/29/2014;     ROTATOR CUFF REPAIR  10 years ago    left shoulder      Ibuprofen  Family History   Problem Relation Age of Onset     Breast cancer  Mother 52     Parkinsonism Mother      Breast cancer Sister 72     Coronary artery disease Brother      Heart attack Brother 78     Diabetes Brother      Coronary artery disease Father      Heart attack Father 67     Cancer Father         bladder     Social History     Socioeconomic History     Marital status:      Spouse name: Not on file     Number of children: Not on file     Years of education: Not on file     Highest education level: Not on file   Occupational History     Not on file   Social Needs     Financial resource strain: Not on file     Food insecurity:     Worry: Not on file     Inability: Not on file     Transportation needs:     Medical: Not on file     Non-medical: Not on file   Tobacco Use     Smoking status: Never Smoker     Smokeless tobacco: Never Used   Substance and Sexual Activity     Alcohol use: Yes     Alcohol/week: 2.4 oz     Types: 4 Glasses of wine per week     Drug use: No     Sexual activity: Not Currently   Lifestyle     Physical activity:     Days per week: Not on file     Minutes per session: Not on file     Stress: Not on file   Relationships     Social connections:     Talks on phone: Not on file     Gets together: Not on file     Attends Gnosticism service: Not on file     Active member of club or organization: Not on file     Attends meetings of clubs or organizations: Not on file     Relationship status: Not on file     Intimate partner violence:     Fear of current or ex partner: Not on file     Emotionally abused: Not on file     Physically abused: Not on file     Forced sexual activity: Not on file   Other Topics Concern     Not on file   Social History Narrative    . Dated a gentlemen for 35 years who  .  Has 1 son, 2 grandchildren and 3 great grandchildren.  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  She is very social getting together with friends often, she is part of a book club, she enjoys yoga and swimming at the [facility].  She  "used to golf.       Current Diet:  well balanced diet  Amount Consumed Per Day  adequate    Exercise Type: walking, stretching/Yoga and swimming  Exercise Frequency: Daily exercise    Mood Disorder and Cognitive Impairment Screenings  Anxiety Screening Tool:  GAD7       Anxiety Screening Tool Score:   Depression Screening Tool:  PHQ9    Depression Screening Tool Score:    Cognitive Impairment and Additional Screening:  No difficulty.    Functional Ability/Level of Safety  Fall Risk Factors:  visual impairment  - macular degeneration left eye  Home Safety Risk Factors: NA    Advanced Directive:  The patient has a living will.    Co-Managers and Medical Equipment/Suppliers:  N/A    Review of Systems  Review of Systems     A complete 10 point review of systems was obtained and is negative other than what is stated in the HPI.       Objective:     Vitals:    06/18/19 1008   BP: 140/68   Pulse: 67   Resp: 20   Temp: 98.5  F (36.9  C)   SpO2: 96%   Weight: 114 lb (51.7 kg)   Height: 4' 8.5\" (1.435 m)          Gen: Well developed, well nourished, no acute distress.  HEENT: normocephalic/atraumatic, PERRLA/EOMI, TMs: Gray, without effusions or erythema.  Oral mucosa: no erythema/exudate  Neck: No LAD/masses/thyromegaly/bruits  Lungs: clear to auscultation bilaterally. No adventitious lung sounds noted.    Chest: regular rate and rhythm, no murmurs/gallops/rubs  Breasts: non-tender, symmetric, no masses/skin changes, nipple discharge, or axillary LAD.  BSE reviewed.  Abdomen: Normal bowel sounds, soft, non-tender, non-distended, no masses, neg Coon's/McBurney's, no rebound/guarding  Genital: Deferred   Lymphatics: no supraclavicular/axillary/epitrochlear/inguinal LAD. No edema.  Neuro/Musculoskeletal: A&O x 3, CN II-XII intact, strength 5/5, reflexes symmetric, sensory intact to light touch. No gross deformities.  Psych: Behavior appropriate, engaging.  Thought processes congruent, non-tangential.  Skin: no rashes or " lesions.

## 2021-05-29 NOTE — TELEPHONE ENCOUNTER
Refill Approved    Rx renewed per Medication Renewal Policy. Medication was last renewed on 4/29/18.    Nehal Liang, [facility] Triage/Med Refill 6/21/2019     Requested Prescriptions   Pending Prescriptions Disp Refills     hydroCHLOROthiazide (HYDRODIURIL) 25 MG tablet [Pharmacy Med Name: HYDROCHLOROTHIAZIDE 25 MG TAB] 90 tablet 1     Sig: TAKE 1 TABLET BY MOUTH DAILY       Diuretics/Combination Diuretics Refill Protocol  Passed - 6/20/2019  1:45 AM        Passed - Visit with PCP or prescribing provider visit in past 12 months     Last office visit with prescriber/PCP: 8/22/2017 Saritha Hampton MD OR same dept: Visit date not found OR same specialty: 6/12/2019 Vidhi Pearl, CNP  Last physical: 5/15/2018 Last MTM visit: Visit date not found   Next visit within 3 mo: Visit date not found  Next physical within 3 mo: Visit date not found  Prescriber OR PCP: Saritha Hampton MD  Last diagnosis associated with med order: 1. HTN (hypertension)  - hydroCHLOROthiazide (HYDRODIURIL) 25 MG tablet [Pharmacy Med Name: HYDROCHLOROTHIAZIDE 25 MG TAB]; TAKE 1 TABLET BY MOUTH DAILY  Dispense: 90 tablet; Refill: 1    If protocol passes may refill for 12 months if within 3 months of last provider visit (or a total of 15 months).             Passed - Serum Potassium in past 12 months      Lab Results   Component Value Date    Potassium 4.6 06/12/2019             Passed - Serum Sodium in past 12 months      Lab Results   Component Value Date    Sodium 138 06/12/2019             Passed - Blood pressure on file in past 12 months     BP Readings from Last 1 Encounters:   06/18/19 140/68             Passed - Serum Creatinine in past 12 months      Creatinine   Date Value Ref Range Status   06/12/2019 0.94 0.60 - 1.10 mg/dL Final

## 2021-05-29 NOTE — TELEPHONE ENCOUNTER
Upcoming Appointment Question  When is the appointment: Tomorrow  What is your appointment for?: To establish care with Vidhi Pearl.  Who is your appointment scheduled with?: Vidhi Pearl  What is your question/concern?: Patient said they know there will be a lot of paperwork to fill out at this appointment and is wondering if they can come in today and  the paperwork to fill out at their home before the appointment?  Okay to leave a detailed message?: Yes

## 2021-05-29 NOTE — TELEPHONE ENCOUNTER
Who is calling:  patient  Reason for Call:  Can you mail her a copy of her post visit summary from yesterdays visit?  Date of last appointment with primary care: 06/18/19  Okay to leave a detailed message: No

## 2021-05-29 NOTE — PROGRESS NOTES
Internal Medicine Office Visit  [facility]  Patient Name: Patricia Bose  Patient Age: 89 y.o.  YOB: 1926  MRN: [id number]    Date of Visit: 2019  Reason for Office Visit:   Chief Complaint   Patient presents with     Establish Care     Is requesting that she gets her labs done today. Patient is fasting.            Assessment / Plan / Medical Decision Makin. Type 2 diabetes mellitus without complication, without long-term current use of insulin (H)  - Microalbumin, Random Urine  - Glycosylated Hemoglobin A1c  - Comprehensive Metabolic Panel  - Vitamin B12    2. Osteopenia, unspecified location  Continue physical activity as she has been currently    3. Coronary artery disease involving native coronary artery of native heart, angina presence unspecified  - Lipid [place] FASTING    4. Hypertension  - HM1(CBC and Differential)  - HM1 (CBC with Diff)    5. Mixed hyperlipidemia  We will obtain a lipid panel today    6. Insomnia, unspecified type  - Thyroid Stimulating Hormone (TSH)  Patient states she is going to bed between 8 and 9:00 at night did recommend that patient attempt to extended which the time she goes to bed between 10 and 11    7. Essential hypertension  Remains stable continue current medications  - HM1(CBC and Differential)  - HM1 (CBC with Diff)    Patient will return in 2 weeks for annual Medicare exam, sooner if needed.  All patient's questions addressed she verbalized understanding and agree with plan.    Orders Placed This Encounter   Procedures     Microalbumin, Random Urine     Glycosylated Hemoglobin A1c     Thyroid Stimulating Hormone (TSH)     Lipid [place] FASTING     Comprehensive Metabolic Panel     Vitamin B12     HM1 (CBC with Diff)   Followup: Return in about 2 weeks (around 2019). earlier if needed.    Health Maintenance Review  Health Maintenance   Topic Date Due     ZOSTER VACCINES (2 of 3) 2010     DIABETES  FOLLOW-UP  10/26/2017     DIABETES FOOT EXAM  04/26/2018     DIABETES URINE MICROALBUMIN  04/26/2018     FALL RISK ASSESSMENT  04/26/2018     DIABETES OPHTHALMOLOGY EXAM  05/12/2018     INFLUENZA VACCINE RULE BASED (Season Ended) 08/01/2019     DIABETES HEMOGLOBIN A1C  11/01/2019     DXA SCAN  09/21/2020     ADVANCE DIRECTIVES DISCUSSED WITH PATIENT  05/15/2023     TD 18+ HE  10/28/2024     PNEUMOCOCCAL POLYSACCHARIDE VACCINE AGE 65 AND OVER  Completed     PNEUMOCOCCAL CONJUGATE VACCINE FOR ADULTS (PCV13 OR PREVNAR)  Completed         I am having Patricia Bose maintain her amoxicillin, aspirin, calcium carbonate-vitamin D3, glucosamine-chondroitin, magnesium, vitamin A-vitamin C-vit E-min, cyanocobalamin, cholecalciferol (vitamin D3), MELATONIN ORAL, multivitamin therapeutic, DENTA 5000 PLUS, codeine, hydroCHLOROthiazide, amLODIPine, fluticasone propionate, atorvastatin, atenolol, amLODIPine, sitaGLIPtin, generic lancets, blood glucose meter, blood glucose test, and codeine-guaiFENesin. We will continue to administer denosumab.      HPI:  Patricia Bose is a 89 y.o. year old who presents to the office today Presents to clinic today to establish care.  Patient has chronic conditions coronary artery disease involving native coronary artery of native heart and did not present which patient underwent an angiogram in 2015 is on med management, hypertension continues on amlodipine, atenolol and hydrochlorothiazide, mixed hyperlipidemia continues on atorvastatin 40 mg, type 2 diabetes managed with diet, osteopenia last DEXA scan fall 2017, insomnia, carpal tunnel syndrome.  Patient reports she is doing well.  She is completing yoga several times a week as well as swimming she remains very active she attends book club, has a flower garden, enjoys crossword puzzles.          Review of Systems- pertinent positive in bold:  Constitutional: Fever, chills, night sweats, fainting, weight change, fatigue, dizziness, sleeping  difficulties, loud snoring/pauses in breathing  Eyes: change in vision, blurred or double vision, redness/eye pain  Ears, nose, mouth, throat: change in hearing, ear pain, hoarseness, difficulty swallowing, sores in the mouth or throat  Respiratory: shortness of breath, cough, bloody sputum, wheezing  Cardiovascular: chest pain, palpitations   Gastrointestinal: abdominal pain, heartburn/indigestion, nausea/vomiting, change in appetite, change in bowel habits, constipation or diarrhea, rectal bleeding/dark stools, difficulty swallowing  Urinary: painful urination, frequent urination, urinary urgency/incontinence, blood in urine/dark urine, nocturia (new or increasing), muscle cramps, swelling of hands, feet, ankles, leg pain/redness  Skin: change in moles/freckles, rash, nodules  Hematologic/lymphatic: swollen lymph glands, abnormal bruising/bleeding  Endocrine: excessive thirst/urination, cold or heat intolerance  Neurologic/emotional: worrisome memory change, numbness/tingling, anxiety, mood swings      Current Scheduled Meds:  Outpatient Encounter Medications as of 6/12/2019   Medication Sig Dispense Refill     amLODIPine (NORVASC) 5 MG tablet Take 1 tablet (5 mg total) by mouth daily. 90 tablet 3     amLODIPine (NORVASC) 5 MG tablet TAKE 1 TABLET BY MOUTH ONCE DAILY. DUE FOR APPOINTMENT.. 90 tablet 1     amoxicillin (AMOXIL) 500 MG capsule Take 500 mg by mouth. Take 4 pills prior to dental work       aspirin 81 MG EC tablet Take 81 mg by mouth daily.       atenolol (TENORMIN) 25 MG tablet Take 1 tablet (25 mg total) by mouth daily. 90 tablet 2     atorvastatin (LIPITOR) 40 MG tablet TAKE 1 TABLET (40 MG TOTAL) BY MOUTH BEDTIME FOR CHOLESTEROL. 90 tablet 2     blood glucose meter (GLUCOMETER) Use 1 each As Directed daily. Dispense glucometer brand per patient's insurance at pharmacy discretion. 1 each 0     blood glucose test strips Use 2 each As Directed once a week. 100 strip 0     cholecalciferol, vitamin D3,  (VITAMIN D3) 1,000 unit capsule Take 1,000 Units by mouth daily.       codeine 15 MG tablet Take 1 tablet (15 mg total) by mouth every 6 (six) hours as needed for pain. 30 tablet 0     codeine-guaiFENesin (GUAIFENESIN AC)  mg/5 mL liquid Take 5-10 mL by mouth as needed for cough. 240 mL 0     cyanocobalamin (VITAMIN B-12) 50 mcg tablet Take 50 mcg by mouth daily.       DENTA 5000 PLUS 1.1 % Crea        fluticasone (FLONASE) 50 mcg/actuation nasal spray 1-2 sprays twice daily 16 g 3     generic lancets (ACCU-CHEK FASTCLIX LANCING DEV) Use 1 each As Directed 2 (two) times a day. 200 each 2     glucosamine-chondroitin 500-400 mg cap Take 2 capsules by mouth daily.       hydroCHLOROthiazide (HYDRODIURIL) 25 MG tablet Take 1 tablet (25 mg total) by mouth daily. 90 tablet 1     magnesium 250 mg Tab Take 1 tablet by mouth daily.       MELATONIN ORAL Take 1 capsule by mouth as needed.       multivitamin therapeutic (THERAGRAN) tablet Take 1 tablet by mouth daily.       vitamin A-vitamin C-vit E-min (OCUVITE) Tab tablet Take 1 tablet by mouth daily.       calcium carbonate-vitamin D3 (CALCIUM 600 + D,3,) 600 mg calcium- 200 unit cap Take 1 tablet by mouth daily.        sitaGLIPtin (JANUVIA) 25 MG tablet Take 1 tablet (25 mg total) by mouth daily. 90 tablet 1     Facility-Administered Encounter Medications as of 6/12/2019   Medication Dose Route Frequency Provider Last Rate Last Dose     denosumab 60 mg (PROLIA 60 mg/ml)  60 mg Subcutaneous Q6 Months Titus Parikh MD   60 mg at 05/01/19 1244     Past Medical History:   Diagnosis Date     Carpal tunnel syndrome      Diabetes mellitus (H)      Hyperlipidemia      Hypertension      Upper respiratory infection      Past Surgical History:   Procedure Laterality Date     BREAST BIOPSY Left 1980    benign     BREAST BIOPSY Left     before 1980    benign     CATARACT EXTRACTION       HYSTERECTOMY  1957    benign     OOPHORECTOMY  1957    benign     AK REMOVAL OF  "TONSILS,<11 Y/O      Description: Tonsillectomy;  Recorded: 04/29/2014;     MS TOTAL ABDOM HYSTERECTOMY      Description: Hysterectomy;  Recorded: 04/29/2014;     ROTATOR CUFF REPAIR  10 years ago    left shoulder      Social History     Tobacco Use     Smoking status: Never Smoker     Smokeless tobacco: Never Used   Substance Use Topics     Alcohol use: Yes     Alcohol/week: 2.4 oz     Types: 4 Glasses of wine per week     Drug use: No       Objective / Physical Examination:  Vitals:    06/12/19 0948   BP: 138/70   Pulse: 86   Resp: 16   Temp: 97.9  F (36.6  C)   SpO2: 97%   Weight: 114 lb (51.7 kg)   Height: 4' 8.5\" (1.435 m)     Wt Readings from Last 3 Encounters:   06/12/19 114 lb (51.7 kg)   11/23/18 115 lb (52.2 kg)   07/12/18 113 lb (51.3 kg)     Body mass index is 25.11 kg/m .     General Appearance: Alert and oriented, cooperative, affect appropriate, speech clear, in no apparent distress, appears younger than her stated age  Head: Normocephalic, atraumatic  Ears: Tympanic membrane clear with landmarks well visualized bilaterally cerumen noted right ear this is cleaned by the nurse  Eyes: PERRL,  Conjunctivae clear and sclerae non-icteric, corrective lenses noted  Nose: Septum midline, nares patent,  mucosa moist and without drainage  Throat: Lips and mucosa moist. Teeth in good repair, pharynx without erythema or exudate  Neck: Supple, trachea midline. No cervical adenopathy  Lungs: Clear to auscultation bilaterally. No adventitious lung sounds noted. Normal inspiratory and expiratory effort  Cardiovascular: Regular rate, normal S1, S2.   Extremities: Pulses 2+ and equal throughout. No edema.   Integumentary: Warm and dry. Without suspicious looking lesions  Neuro: Alert and oriented, follows commands appropriately.         Vidhi Pearl, CNP  "

## 2021-05-30 ENCOUNTER — RECORDS - HEALTHEAST (OUTPATIENT)
Dept: ADMINISTRATIVE | Facility: CLINIC | Age: 86
End: 2021-05-30

## 2021-05-30 VITALS — BODY MASS INDEX: 24.98 KG/M2 | WEIGHT: 115.8 LBS | HEIGHT: 57 IN

## 2021-05-30 VITALS — BODY MASS INDEX: 25.38 KG/M2 | WEIGHT: 115.25 LBS

## 2021-05-30 NOTE — TELEPHONE ENCOUNTER
Medication Request  Medication name:     sitaGLIPtin (JANUVIA) 25 MG tablet 90 tablet 1 11/23/2018     Sig - Route: Take 1 tablet (25 mg total) by mouth daily. - Oral    Sent to pharmacy as: sitaGLIPtin (JANUVIA) 25 MG tablet    E-Prescribing Status: Receipt confirmed by pharmacy (11/23/2018  1:58 PM CST)      Pharmacy Name and Location: [facility] [facility] - [place], [place] - [address]  Reason for request: The patient's endocrinologist is out of the office on medical leave and the patient would like to know if Vidhi Pearl NP would be willing to fill the prescription for her.  When did you use medication last?:  The patient states that she has not started taking the medication yet.  Patient offered appointment:  patient declined  Okay to leave a detailed message: yes

## 2021-05-30 NOTE — PROGRESS NOTES
Pulmonary Follow Up Note  7/18/2019      Reason for Follow Up: Pulmonary fibrosis, unspecified etiology, chronic cough      Problem List:     Patient Active Problem List   Diagnosis     Osteopenia     Mixed hyperlipidemia     Hypertension     Insomnia     CAD (coronary artery disease)     Type 2 diabetes mellitus (H)           History:     Ms. Patricia Bose is an 89 yo female with PMH significant for CAD, HTN, and DM who was seen in pulmonary clinic for evaluation of pulmonary fibrosis.  She reports that this was first identified about 7 years ago on a CXR.  She has never had many symptoms from it except for a cough.  She does note that she has an intermittent dry cough and this persists but does not bother her.  She has had this cough for some time, and it is mostly nonproductive.  She has been using codeine cough syrup for a cough at night, but this occurs after lying down.  She thinks she may be developing some post nasal drip.  She will use the codeine then, but not during the day as it makes her drowsy.  She does report mostly a throat clearing cough during the day but this does not bother her and is sporadic, not constant.  She denies any dyspnea, even on exertion.  She denies any chest pain.  She feels things are going well.  She does note today though that she seems to have mucus production.  She is not sure why.  She does wonder if it is coming from more postnasal drip.  Otherwise though her symptoms all remain very similar.    Past Medical History:   Diagnosis Date     Carpal tunnel syndrome      Diabetes mellitus (H)      Hyperlipidemia      Hypertension      Upper respiratory infection      Past Surgical History:   Procedure Laterality Date     BREAST BIOPSY Left 1980    benign     BREAST BIOPSY Left     before 1980    benign     CATARACT EXTRACTION       HYSTERECTOMY  1957    benign     OOPHORECTOMY  1957    benign     IA REMOVAL OF TONSILS,<13 Y/O      Description: Tonsillectomy;  Recorded:  2014;     SD TOTAL ABDOM HYSTERECTOMY      Description: Hysterectomy;  Recorded: 2014;     ROTATOR CUFF REPAIR  10 years ago    left shoulder      Social History     Socioeconomic History     Marital status:      Spouse name: Not on file     Number of children: Not on file     Years of education: Not on file     Highest education level: Not on file   Occupational History     Not on file   Social Needs     Financial resource strain: Not on file     Food insecurity:     Worry: Not on file     Inability: Not on file     Transportation needs:     Medical: Not on file     Non-medical: Not on file   Tobacco Use     Smoking status: Never Smoker     Smokeless tobacco: Never Used   Substance and Sexual Activity     Alcohol use: Yes     Alcohol/week: 2.4 oz     Types: 4 Glasses of wine per week     Drug use: No     Sexual activity: Not Currently   Lifestyle     Physical activity:     Days per week: Not on file     Minutes per session: Not on file     Stress: Not on file   Relationships     Social connections:     Talks on phone: Not on file     Gets together: Not on file     Attends Islam service: Not on file     Active member of club or organization: Not on file     Attends meetings of clubs or organizations: Not on file     Relationship status: Not on file     Intimate partner violence:     Fear of current or ex partner: Not on file     Emotionally abused: Not on file     Physically abused: Not on file     Forced sexual activity: Not on file   Other Topics Concern     Not on file   Social History Narrative    . Dated a gentlemen for 35 years who  .  Has 1 son, 2 grandchildren and 3 great grandchildren.  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  She is very social getting together with friends often, she is part of a book club, she enjoys yoga and [place] at the [place].  She used to golf.     Family History   Problem Relation Age of Onset     Breast cancer Mother 52      Parkinsonism Mother      Breast cancer Sister 72     Coronary artery disease Brother      Heart attack Brother 78     Diabetes Brother      Coronary artery disease Father      Heart attack Father 67     Cancer Father         bladder     Allergies   Allergen Reactions     Ibuprofen Shortness Of Breath       Review of Systems - 10 point review of systems negative except what is mentioned in the HPI.        Medications:     Current Outpatient Medications on File Prior to Visit   Medication Sig Dispense Refill     amoxicillin (AMOXIL) 500 MG capsule Take 500 mg by mouth. Take 4 pills prior to dental work       aspirin 81 MG EC tablet Take 81 mg by mouth daily.       atorvastatin (LIPITOR) 40 MG tablet TAKE 1 TABLET (40 MG TOTAL) BY MOUTH BEDTIME FOR CHOLESTEROL. 90 tablet 2     blood glucose meter (GLUCOMETER) Use 1 each As Directed daily. Dispense glucometer brand per patient's insurance at pharmacy discretion. 1 each 0     blood glucose test strips Use 2 each As Directed once a week. 100 strip 0     cholecalciferol, vitamin D3, (VITAMIN D3) 1,000 unit capsule Take 1,000 Units by mouth daily.       codeine-guaiFENesin (GUAIFENESIN AC)  mg/5 mL liquid Take 5-10 mL by mouth as needed for cough. 240 mL 0     DENTA 5000 PLUS 1.1 % Crea        fluticasone (FLONASE) 50 mcg/actuation nasal spray 1-2 sprays twice daily 16 g 3     generic lancets (ACCU-CHEK FASTCLIX LANCING DEV) Use 1 each As Directed 2 (two) times a day. 200 each 2     glucosamine-chondroitin 500-400 mg cap Take 2 capsules by mouth daily.       hydroCHLOROthiazide (HYDRODIURIL) 25 MG tablet TAKE 1 TABLET BY MOUTH DAILY 90 tablet 3     losartan (COZAAR) 25 MG tablet Take 25 mg by mouth daily.  11     magnesium 250 mg Tab Take 1 tablet by mouth daily.       MELATONIN ORAL Take 1 capsule by mouth as needed.       multivitamin therapeutic (THERAGRAN) tablet Take 1 tablet by mouth daily.       sitaGLIPtin (JANUVIA) 25 MG tablet Take 1 tablet (25 mg total)  by mouth daily. 90 tablet 1     vitamin A-vitamin C-vit E-min (OCUVITE) Tab tablet Take 1 tablet by mouth daily.       [DISCONTINUED] amLODIPine (NORVASC) 5 MG tablet Take 1 tablet (5 mg total) by mouth daily. 90 tablet 3     [DISCONTINUED] amLODIPine (NORVASC) 5 MG tablet TAKE 1 TABLET BY MOUTH ONCE DAILY. DUE FOR APPOINTMENT.. 90 tablet 1     [DISCONTINUED] atenolol (TENORMIN) 25 MG tablet Take 1 tablet (25 mg total) by mouth daily. 90 tablet 2     [DISCONTINUED] codeine 15 MG tablet Take 1 tablet (15 mg total) by mouth every 6 (six) hours as needed for pain. 30 tablet 0     [DISCONTINUED] cyanocobalamin (VITAMIN B-12) 50 mcg tablet Take 50 mcg by mouth daily.       Current Facility-Administered Medications on File Prior to Visit   Medication Dose Route Frequency Provider Last Rate Last Dose     denosumab 60 mg (PROLIA 60 mg/ml)  60 mg Subcutaneous Q6 Months Titus Parikh MD   60 mg at 05/01/19 1244           Exam/Data:   Vitals  Vitals:    07/18/19 1005   BP: 138/60   Pulse: 92   Resp: 24   SpO2: 97%       EXAM:  GEN: Alert and oriented x3, NAD  HEENT: Nares patent, posterior oropharynx clear.  Mild turbinate edema bilaterally, no ulcerations.  RESPIRATORY: CTAB.  No wheeze or rales  CARDIOVASCULAR: RRR, no m/r/g  GASTROINTESTINAL: Round, soft, NT/ND  HEM/ONC: no adenopathy, no ecchymosis  MSK: no clubbing.  Ambulates normally.  DIP deformities consistent with OA  NEURO: cranial nerves appear grossly intact, muscle strength equal  SKIN: no rash or ulcerations      DATA      PFT DATA:  Pulmonary Functions Testing Results:   FEV1/FVC is normal   FEV1 is normal   FVC is normal   There was no improvement in spirometry after a single inhaled dose of bronchodilator   TLC is normal   DLCO is reduced (62) when it is corrected for hemoglobin.   Impression:   Lung Volumes and Spirometry are normal   DLCO is mildly reduced (62).        IMAGING:     XR CHEST PA AND LATERAL7/29/2015 3:24 PMINDICATION: Chest  painCOMPARISON: NoneFINDINGS: Negative chest.    7/14/2015 11:18 AM   INDICATION: Shortness of breath. Abnormal stress test.   COMPARISON: None.   FINDINGS:   LIMITED CHEST: Slight septal thickening and either fibrosis or subsegmental atelectasis involving the periphery of both lower lobes.   LIMITED MEDIASTINUM: Coronary artery calcifications. Atherosclerotic disease thoracic aorta. Small esophageal hiatal hernia.   LIMITED UPPER ABDOMEN: Negative.   IMPRESSION:   CONCLUSION:   1. No significant incidental extracardiac findings.   2. Please refer to cardiologist's dictation for the cardiac CT report.    Personally reviewed previously.  Minimal basilar peripheral fibrosis.       Previous modified walk showed no evidence of desaturation or hypoxia on room.  Patient ambulated 300 ft.    Assessment/Plan:   Patricia Bose is a 89 y.o. female with pulmonary fibrosis on CT    1. Pulmonary Fibrosis:  The etiology is not clear, but is likely related to reflux or aspiration.  She has no systemic findings, and no real symptoms other than cough, but now has some symptoms of PND as the cough worsens about 2 hours after lying down.  She has normal pulmonary function tests, and she has excellent exertional capacity and this persists.  This is likely stable and will not cause her any detriment in the next several years.  She continues on omeprazole for GERD.  Otherwise, her pattern is not consistent with IPF, and she does not need further workup for other causes at this time.  We did ultimately elect to hold off on repeating her PFTs as they have been fairly unremarkable.  However due to the increased cough in the morning and some sputum production will repeat a CT scan to ensure stability of her fibrosis.    2. Cough:  Likely related to pulmonary fibrosis vs GERD vs post nasal drip.  Continued on omprazole.  Continue with codeine for social functions and as needed.  I will also try flonase 1-2 sprays each nostril twice daily.   We did discuss potential side effects including burning and epistaxis.  She will contact us if she experiences this and will back off the medication frequency or stop it all together.    Recommend:  - follow up in 1 year unless her CT scan reveals any other changes  - continue omeprazole  - try flonase 1-2 sprays twice daily as needed  - codeine romy Garces, DO

## 2021-05-30 NOTE — PATIENT INSTRUCTIONS - HE
I want to do a CT scan of your chest to see if the scarring is worse, and could be making the cough a little worse.    Otherwise keep using the cough syrup as needed for the cough.

## 2021-05-30 NOTE — TELEPHONE ENCOUNTER
Appt notes from 6/18/19 appt with Vidhi Pearl CNP:  Patient was seeing Dr. Parikh for this specific medication, who is out on leave.   Assessment/Plan:        1. Proteinuria, unspecified type  - Ambulatory referral to Nephrology     2. Type 2 diabetes mellitus with hyperglycemia, without long-term current use of insulin (H)  Remains stable recommend no changes  3. Coronary artery disease involving native coronary artery of native heart, angina presence unspecified  4. Mixed hyperlipidemia  Remains stable recommend no changes  5. Hypertension  Remains stable recommend no changes  6. Osteopenia  Recommend continued daily physical activity a  7. Insomnia, unspecified type  Continue to encourage patient to attempt to push back her bedtime  8. Encounter for Medicare annual wellness exam  Follow-up 6 months, sooner if needed.

## 2021-05-30 NOTE — TELEPHONE ENCOUNTER
Who is calling:  Patient   Reason for Call:  Patient is calling the nephrology office and no one is answering that line and all it says is leave a message and she is unsure who the message is going to and wondering if she has the correct number.   Date of last appointment with primary care: 06/18/19  Okay to leave a detailed message: Yes

## 2021-05-31 VITALS — WEIGHT: 114.9 LBS | BODY MASS INDEX: 25.31 KG/M2

## 2021-05-31 VITALS — HEIGHT: 57 IN | WEIGHT: 113.4 LBS | BODY MASS INDEX: 24.47 KG/M2

## 2021-05-31 VITALS — WEIGHT: 115.3 LBS | BODY MASS INDEX: 25.39 KG/M2

## 2021-05-31 NOTE — TELEPHONE ENCOUNTER
I called the pt to inform that she told BRENDA Riley that she never started the Januvia, and dario did not want her to start the Januvia because this could lead to low BG and in turn a fall. The pt understands.

## 2021-05-31 NOTE — PROGRESS NOTES
"[facility]  ENDOCRINOLOGY    Osteoporosis Follow Up 8/3/2019    Patricia Bose, 8/31/1926, [id number]          Reason for visit      1. Osteopenia    2. Type 2 diabetes mellitus with hyperglycemia, without long-term current use of insulin (H)        History     Patricia Bose is a very pleasant 89 y.o. old female who presents for follow up.   SUMMARY:  Patricia is here today as a CORRY from Dr Parikh for Osteopenia and DM 2.  She has been getting Prolia injections without difficulty and had her last on May 1 of this year. She has also taken Bisphosphonates in the past.  Her last Dexa Scan of 2018 showed: Since the previous bone density dated  September 19, 2016, stability is seen in the lumbar vertebrae and hips bilaterally.  Stability is seen as a positive response to pharmacologic treatment for osteoporosis. She remains quite active and to quote her Nephrologist \"is remarkably well preserved.\" Current Vit D level is 32.6 and Calcium level is 10.4.     Pt states that she is currently taking nothing for management of her BG. She was instructed by Dr Parikh to start taking Januvia at her last appointment, and in spite of picking it up, she reports that she did not start taking the medication. She states that she was told by her PCP, Dr Pearl, not to start the medication.  She brings her log book with her, and has been taking her BG every morning, after she takes her shower and readies herself for the day.  Her blood sugars range between 110 and 160. She reports no episodes of hypoglycemia.     Risk Factors     The following high- risk conditions have been ruled out: celiac disease, eating disorders, gastric bypass, hyperparathyroidism, inflammatory bowel disease, hyperthyroidism, rheumatoid arthritis, lupus, chronic kidney disease.    Patricia Bose has the following risk factors: Age, Female gender and     She is not on high risk medications such as glucocorticoids, anti-coagulants, anti-convulsants, " "chemotherapy or levothyroxine.    Patient deniesBreast cancer.        Past Medical History     Patient Active Problem List   Diagnosis     Osteopenia     Mixed hyperlipidemia     Hypertension     Insomnia     CAD (coronary artery disease)     Type 2 diabetes mellitus (H)       Family History       family history includes Breast cancer (age of onset: 52) in her mother; Breast cancer (age of onset: 72) in her sister; Cancer in her father; Coronary artery disease in her brother and father; Diabetes in her brother; Heart attack (age of onset: 67) in her father; Heart attack (age of onset: 78) in her brother; Parkinsonism in her mother.    Social History      reports that she has never smoked. She has never used smokeless tobacco. She reports that she drinks about 2.4 oz of alcohol per week. She reports that she does not use drugs.      Review of Systems     Patient denies current pain, limited mobility, fractures.   DelRemainder per HPI.      Vital Signs     /52 (Patient Site: Right Arm, Patient Position: Sitting, Cuff Size: Adult Regular)   Pulse 80   Ht 4' 8.3\" (1.43 m)   Wt 113 lb 8 oz (51.5 kg)   LMP 05/05/1957   BMI 25.18 kg/m      Physical Exam     GENERAL:  Normal, NIRD  EYES:  Pupils equal, round and reactive to light; no proptosis, lid lag or  periorbital edema.  THYROID:  Thyroid is normal.  No tenderness or bruit  NECK: No lymph nodes  MUSCULOSKELETAL: No joint abnormalities, FROM in all four extremities. No kyphosis. Muscle strength grossly normal without evidence of wasting.  HEART:  Regular rate and rhythm without murmur.  LUNGS:  Clear to auscultation.  ABDOMEN:Soft, non-tender, no masses or organomegaly  NEURO:  Patella Reflexes were normal.No tremors  SKIN:  No acanthosis nigricans or vitiligo        Assessment     1. Osteopenia    2. Type 2 diabetes mellitus with hyperglycemia, without long-term current use of insulin (H)        Plan     Patricia will continue getting Prolia injections every " 6 months. Instructed to double her current Vit D supplement to 2000 IU daily. She will also continue her activity, especially walking regularly. We will plan on another Dexa Scan next year. F/u with me afterward.     Instructed not to start the Januvia. We discussed the possibility of having low BG and how this could potentially cause a fall with possible hip fracture and that would be the end.  She whole heartedly agreed.  I did ask her to take her BG before she is up doing anything for a more accurate picture of her FBS. Verbalized understanding.       Total visit minutes:25  Time spent counseling and coordination of care:23    Verónica Lopez   Endocrinology  8/3/2019  12:34 PM      Current Medications     Outpatient Medications Prior to Visit   Medication Sig Dispense Refill     amoxicillin (AMOXIL) 500 MG capsule Take 500 mg by mouth. Take 4 pills prior to dental work       aspirin 81 MG EC tablet Take 81 mg by mouth daily.       atorvastatin (LIPITOR) 40 MG tablet TAKE 1 TABLET (40 MG TOTAL) BY MOUTH BEDTIME FOR CHOLESTEROL. 90 tablet 2     blood glucose meter (GLUCOMETER) Use 1 each As Directed daily. Dispense glucometer brand per patient's insurance at pharmacy discretion. 1 each 0     blood glucose test strips Use 2 each As Directed once a week. 100 strip 0     cholecalciferol, vitamin D3, (VITAMIN D3) 1,000 unit capsule Take 1,000 Units by mouth daily.       codeine-guaiFENesin (GUAIFENESIN AC)  mg/5 mL liquid Take 5-10 mL by mouth 4 (four) times a day as needed for cough. 240 mL 0     DENTA 5000 PLUS 1.1 % Crea        fluticasone (FLONASE) 50 mcg/actuation nasal spray 1-2 sprays twice daily 16 g 3     generic lancets (ACCU-CHEK FASTCLIX LANCING DEV) Use 1 each As Directed 2 (two) times a day. 200 each 2     glucosamine-chondroitin 500-400 mg cap Take 2 capsules by mouth daily.       hydroCHLOROthiazide (HYDRODIURIL) 25 MG tablet TAKE 1 TABLET BY MOUTH DAILY 90 tablet 3     losartan (COZAAR) 25 MG  tablet Take 25 mg by mouth daily.  11     magnesium 250 mg Tab Take 1 tablet by mouth daily.       MELATONIN ORAL Take 1 capsule by mouth as needed.       multivitamin therapeutic (THERAGRAN) tablet Take 1 tablet by mouth daily.       SITagliptin (JANUVIA) 25 MG tablet Take 1 tablet (25 mg total) by mouth daily. 90 tablet 1     vitamin A-vitamin C-vit E-min (OCUVITE) Tab tablet Take 1 tablet by mouth daily.       Facility-Administered Medications Prior to Visit   Medication Dose Route Frequency Provider Last Rate Last Dose     denosumab 60 mg (PROLIA 60 mg/ml)  60 mg Subcutaneous Q6 Months Titus Parikh MD   60 mg at 05/01/19 1244         Lab Results     TSH   Date Value Ref Range Status   06/12/2019 1.95 0.30 - 5.00 uIU/mL Final     PTH   Date Value Ref Range Status   07/31/2019 36 10 - 86 pg/mL Final     Calcium   Date Value Ref Range Status   07/31/2019 10.7 (H) 8.5 - 10.5 mg/dL Final   07/31/2019 10.7 (H) 8.5 - 10.5 mg/dL Final     Phosphorus   Date Value Ref Range Status   07/31/2019 3.4 2.5 - 4.5 mg/dL Final           Imaging Results   Last DEXA scan:  Results for orders placed in visit on 08/07/18   DXA Bone Density Scan    Narrative 9/21/2018      RE: Patricia Bose  YOB: 1926        Dear Saritha Hampton,    Patient Profile:  89 y.o. female, postmenopausal, is here for the follow up bone density   test.   History of fractures - None. Family history of osteoporosis - None.    Family history of hip fracture: None. Smoking history - No. Osteoporosis   treatment past -  Yes;  HRT and Bisphosphonates. Osteoporosis treatment   current - Yes;  Prolia.  Chronic medical problems - Hysterectomy,   Oophorectomy and Premature menopause. High risk medications -  None.      Assessment:    1. The spine bone density L1-L2 with T-score -1.8.  2. Femoral bone densities show left femoral neck T- score -1.5 and right   femoral neck T-score -1.5.  3. Trabecular bone score indicates good trabecular bone  architecture.      89 y.o. female with LOW BONE DENSITY (OSTEOPENIA), currently receiving   treatment with Prolia therapy.    Since the previous bone density dated  September 19, 2016, stability is   seen in the lumbar vertebrae and hips bilaterally.  Stability is seen as a   positive response to pharmacologic treatment for osteoporosis.    Recommendations:  Continuation of current therapy is recommended with follow up bone density   scan in 2 years.      Bone densitometry was performed on your patient using our Introhive iDOrbiter   densitometer. The results are summarized and a copy of the actual scans   are included for your review. In conformity with the International Society   of Clinical Densitometry's most recent position statement for DXA   interpretation (2015), the diagnosis will be made on the lowest measured   T-score of the lumbar spine, femoral neck, total proximal femur or 33%   radius. Note the change in terminology for diagnostic classification from   OSTEOPENIA to LOW BONE MASS. All trending for sequential exams will be   done using multiple vertebrae or the total proximal femur. Fracture risk   is based on the WHO Fracture Risk Assessment Tool (FRAX). If additional   information is needed or if you would like to discuss the results, please   do not hesitate to call me.       Thank you for referring this patient to [facility].   We are happy to be of service in support of you and your practice. If you   have any questions or suggestions to improve our service, please call me   at [phone].     Sincerely,     Tamika Ramirez M.D. CAlbaCAPRYL.  Osteoporosis Services, [facility]

## 2021-06-01 VITALS — WEIGHT: 116.13 LBS | BODY MASS INDEX: 25.06 KG/M2 | HEIGHT: 57 IN

## 2021-06-01 VITALS — BODY MASS INDEX: 24.89 KG/M2 | WEIGHT: 113 LBS

## 2021-06-01 NOTE — PROGRESS NOTES
Internal Medicine Office Visit  [facility]  Patient Name: Patricia Bose  Patient Age: 89 y.o.  YOB: 1926  MRN: [id number]    Date of Visit: 2019  Reason for Office Visit:   Chief Complaint   Patient presents with     Follow-up     patient is feeling better. Patient did not take the prilosec. She is feeling better. Took Tums instead and that helped.            Assessment / Plan / Medical Decision Makin. Anemia, unspecified type  We will recheck a hemoglobin today, if remains low, will recommend adding a peripheral smear    2. Gastritis, presence of bleeding unspecified, unspecified chronicity, unspecified gastritis type  To have completely resolved.  Discussed with patient utilization of a PPI for the next couple of months she declines at this time.  She requests to utilize Tums as needed.      Orders Placed This Encounter   Procedures     HM1 (CBC with Diff)   Followup: Return in about 3 months (around 2019). earlier if needed.    Health Maintenance Review  Health Maintenance   Topic Date Due     ZOSTER VACCINES (2 of 3) 2010     DIABETES FOLLOW-UP  10/26/2017     DIABETES FOOT EXAM  2018     DIABETES OPHTHALMOLOGY EXAM  2018     INFLUENZA VACCINE RULE BASED (1) 2019     DIABETES HEMOGLOBIN A1C  2019     DIABETES URINE MICROALBUMIN  2020     MEDICARE ANNUAL WELLNESS VISIT  2020     FALL RISK ASSESSMENT  2020     DXA SCAN  2020     ADVANCE CARE PLANNING  05/15/2023     TD 18+ HE  10/28/2024     PNEUMOCOCCAL POLYSACCHARIDE VACCINE AGE 65 AND OVER  Completed     PNEUMOCOCCAL CONJUGATE VACCINE FOR ADULTS (PCV13 OR PREVNAR)  Completed         I am having Patricia Bose maintain her amoxicillin, aspirin, glucosamine-chondroitin, magnesium, vitamin A-vitamin C-vit E-min, cholecalciferol (vitamin D3), MELATONIN ORAL, multivitamin therapeutic, DENTA 5000 PLUS, fluticasone propionate, generic  "lancets, blood glucose meter, blood glucose test, hydroCHLOROthiazide, losartan, codeine-guaiFENesin, SITagliptin, atorvastatin, and omeprazole. We will continue to administer denosumab.      HPI:  Patricia Bose is a 89 y.o. year old who presents to the office today patient's chronic conditions include hyperlipidemia, hypertension, coronary artery disease, type 2 diabetes, osteopenia, insomnia, history of upper respiratory infection.    Patient was last seen on 9/5/2019.  She reported that on 8/18/2019 she did a celebration of august birthdays and the next day she thought she had the flu.  She states on that Monday she had 1/2 dozen bowel movements on that day.  Reports \"uncomfortable feeling\" in the epigastric region states mild nausea which is most prominent in the morning and fades throughout the day. She states she does not eat a lot. She also reports a lack of energy.   She denies any vomiting, constipation, diarrhea, blood in stool, black tarry stools, no fever no chills, diaphoresis.    She recently completed colon screening though has not yet received the results.     She has been seen by the Nephrologist and was advised not to changes her diet but states that she has reduced her protein intake.    Patient does report she knows that she had a CT abdomen pelvis in the past which indicated she had pancreatitis.    Physical exam was unremarkable and her lab work indicated hemoglobin decreased slightly which will need to be rechecked today.  Suspected gastritis is all other diagnostic tests were unremarkable given patient's symptoms she was subsequently placed on omeprazole 40 mg daily and scheduled for follow-up. She states she has history of taking this medication and it caused a lot of gas.  She did report she took Tums 2 on date of clinic visit and then again the next day her symptoms have completely resolved.  During the clinic visit she was noted to have a slightly low hemoglobin this will be rechecked " today.  Patient notes no additional concerns.      Review of Systems- pertinent positive in bold:  Constitutional: Fever, chills, night sweats, fainting, weight change, fatigue, dizziness, sleeping difficulties, loud snoring/pauses in breathing  Eyes: change in vision, blurred or double vision, redness/eye pain  Ears, nose, mouth, throat: change in hearing, ear pain, hoarseness, difficulty swallowing, sores in the mouth or throat  Respiratory: shortness of breath, cough, bloody sputum, wheezing  Cardiovascular: chest pain, palpitations   Gastrointestinal: abdominal pain, heartburn/indigestion, nausea/vomiting, change in appetite, change in bowel habits, constipation or diarrhea, rectal bleeding/dark stools, difficulty swallowing  Urinary: painful urination, frequent urination, urinary urgency/incontinence, blood in urine/dark urine, nocturia (new or increasing), muscle cramps, swelling of hands, feet, ankles, leg pain/redness  Skin: change in moles/freckles, rash, nodules  Hematologic/lymphatic: swollen lymph glands, abnormal bruising/bleeding  Endocrine: excessive thirst/urination, cold or heat intolerance  Neurologic/emotional: worrisome memory change, numbness/tingling, anxiety, mood swings      Current Scheduled Meds:  Outpatient Encounter Medications as of 9/19/2019   Medication Sig Dispense Refill     amoxicillin (AMOXIL) 500 MG capsule Take 500 mg by mouth. Take 4 pills prior to dental work       aspirin 81 MG EC tablet Take 81 mg by mouth daily.       atorvastatin (LIPITOR) 40 MG tablet Take 1 tablet (40 mg total) by mouth daily. 90 tablet 1     blood glucose meter (GLUCOMETER) Use 1 each As Directed daily. Dispense glucometer brand per patient's insurance at pharmacy discretion. 1 each 0     blood glucose test strips Use 2 each As Directed once a week. 100 strip 0     cholecalciferol, vitamin D3, (VITAMIN D3) 1,000 unit capsule Take 1,000 Units by mouth daily.       codeine-guaiFENesin (GUAIFENESIN AC)   mg/5 mL liquid Take 5-10 mL by mouth 4 (four) times a day as needed for cough. 240 mL 0     DENTA 5000 PLUS 1.1 % Crea        fluticasone (FLONASE) 50 mcg/actuation nasal spray 1-2 sprays twice daily 16 g 3     generic lancets (ACCU-CHEK FASTCLIX LANCING DEV) Use 1 each As Directed 2 (two) times a day. 200 each 2     glucosamine-chondroitin 500-400 mg cap Take 2 capsules by mouth daily.       hydroCHLOROthiazide (HYDRODIURIL) 25 MG tablet TAKE 1 TABLET BY MOUTH DAILY 90 tablet 3     losartan (COZAAR) 25 MG tablet Take 25 mg by mouth daily.  11     magnesium 250 mg Tab Take 1 tablet by mouth daily.       MELATONIN ORAL Take 1 capsule by mouth as needed.       multivitamin therapeutic (THERAGRAN) tablet Take 1 tablet by mouth daily.       SITagliptin (JANUVIA) 25 MG tablet Take 1 tablet (25 mg total) by mouth daily. 90 tablet 1     vitamin A-vitamin C-vit E-min (OCUVITE) Tab tablet Take 1 tablet by mouth daily.       omeprazole (PRILOSEC) 40 MG capsule Take 1 capsule (40 mg total) by mouth daily. 30 capsule 1     Facility-Administered Encounter Medications as of 9/19/2019   Medication Dose Route Frequency Provider Last Rate Last Dose     denosumab 60 mg (PROLIA 60 mg/ml)  60 mg Subcutaneous Q6 Months Titus Parikh MD   60 mg at 05/01/19 1244     Past Medical History:   Diagnosis Date     Carpal tunnel syndrome      Diabetes mellitus (H)      Hyperlipidemia      Hypertension      Upper respiratory infection      Past Surgical History:   Procedure Laterality Date     BREAST BIOPSY Left 1980    benign     BREAST BIOPSY Left     before 1980    benign     CATARACT EXTRACTION       HYSTERECTOMY  1957    benign     OOPHORECTOMY  1957    benign     VT REMOVAL OF TONSILS,<11 Y/O      Description: Tonsillectomy;  Recorded: 04/29/2014;     VT TOTAL ABDOM HYSTERECTOMY      Description: Hysterectomy;  Recorded: 04/29/2014;     ROTATOR CUFF REPAIR  10 years ago    left shoulder      Social History     Tobacco Use      "Smoking status: Never Smoker     Smokeless tobacco: Never Used   Substance Use Topics     Alcohol use: Yes     Alcohol/week: 2.4 oz     Types: 4 Glasses of wine per week     Drug use: No     Family History   Problem Relation Age of Onset     Breast cancer Mother 52     Parkinsonism Mother      Breast cancer Sister 72     Coronary artery disease Brother      Heart attack Brother 78     Diabetes Brother      Coronary artery disease Father      Heart attack Father 67     Cancer Father         bladder     Objective / Physical Examination:  Vitals:    09/19/19 0948   BP: 142/62   Pulse: 93   Resp: 16   Temp: 97.9  F (36.6  C)   SpO2: 96%   Weight: 112 lb (50.8 kg)   Height: 4' 8.3\" (1.43 m)     Wt Readings from Last 3 Encounters:   09/19/19 112 lb (50.8 kg)   09/05/19 112 lb (50.8 kg)   07/31/19 113 lb 8 oz (51.5 kg)     Body mass index is 24.84 kg/m .     General Appearance: Alert and oriented, cooperative, affect appropriate, speech clear, in no apparent distress  Head: Normocephalic, atraumatic  Eyes:  Conjunctivae clear and sclerae non-icteric  Throat: Lips and mucosa moist.   Lungs: Normal inspiratory and expiratory effort  Neuro: Alert and oriented, follows commands appropriately.         Vidhi Pearl, CNP  "

## 2021-06-01 NOTE — TELEPHONE ENCOUNTER
Patient called.    Pending Prolia appointment scheduled for 11.07.2019.   She just spoke w/[employer] and was informed that a PA is required for the Prolia.   Please start PA asap. She was told it needs to be completed by 10.17.2019    She would appreciate a call back once PA is complete.    Patricia @ [phone]

## 2021-06-01 NOTE — TELEPHONE ENCOUNTER
Letter from health [employer] received, if medicare covers they will cover as secondary. Letter sent to scan.

## 2021-06-01 NOTE — PROGRESS NOTES
Internal Medicine Office Visit  [facility]  Patient Name: Patricia Bose  Patient Age: 89 y.o.  YOB: 1926  MRN: [id number]    Date of Visit: 2019  Reason for Office Visit:   Chief Complaint   Patient presents with     Abdominal Pain     Upper abd discomfort x 2 weeks. Wakes up with the discomfort and then it goes away through the day. BM are normal. No gas.      Shoulder Pain     Aches acorss the shoulders as well. No injuries. Feeling very fatigued.            Assessment / Plan / Medical Decision Makin. CAD (coronary artery disease)  Patient requesting refill of his Lipitor this is provided today  - atorvastatin (LIPITOR) 40 MG tablet; Take 1 tablet (40 mg total) by mouth daily.  Dispense: 90 tablet; Refill: 1    2. Epigastric pain  - Comprehensive Metabolic Panel  - Urinalysis-UC if Indicated  - HM1(CBC and Differential)  - Amylase  - Lipase  - HM1 (CBC with Diff)  Did advise patient possible need for imaging will contact patient post lab results.      Patient advised if symptoms persist, worsen or new symptoms arise they are to seek medical care.  All patients questions addressed. Patient verbalized understanding and agreement with plan.       Orders Placed This Encounter   Procedures     Comprehensive Metabolic Panel     Urinalysis-UC if Indicated     Amylase     Lipase     HM1 (CBC with Diff)   Followup: Return in about 1 week (around 2019). earlier if needed.    Health Maintenance Review  Health Maintenance   Topic Date Due     ZOSTER VACCINES (2 of 3) 2010     DIABETES FOLLOW-UP  10/26/2017     DIABETES FOOT EXAM  2018     DIABETES OPHTHALMOLOGY EXAM  2018     INFLUENZA VACCINE RULE BASED (1) 2019     DIABETES HEMOGLOBIN A1C  2019     DIABETES URINE MICROALBUMIN  2020     MEDICARE ANNUAL WELLNESS VISIT  2020     FALL RISK ASSESSMENT  2020     DXA SCAN  2020     ADVANCE CARE PLANNING   "05/15/2023     TD 18+ HE  10/28/2024     PNEUMOCOCCAL POLYSACCHARIDE VACCINE AGE 65 AND OVER  Completed     PNEUMOCOCCAL CONJUGATE VACCINE FOR ADULTS (PCV13 OR PREVNAR)  Completed         I have changed Patricia Bose's atorvastatin. I am also having her maintain her amoxicillin, aspirin, glucosamine-chondroitin, magnesium, vitamin A-vitamin C-vit E-min, cholecalciferol (vitamin D3), MELATONIN ORAL, multivitamin therapeutic, DENTA 5000 PLUS, fluticasone propionate, generic lancets, blood glucose meter, blood glucose test, hydroCHLOROthiazide, losartan, codeine-guaiFENesin, and SITagliptin. We will continue to administer denosumab.      HPI:  Patricia Bose is a 89 y.o. year old who presents to the office today patient's chronic conditions include hyperlipidemia, hypertension, coronary artery disease, type 2 diabetes, osteopenia, insomnia, history of upper respiratory infection.    She states it started on a Monday, 2 weeks ago.  She did a celebration of august birthdays the Sunday before and the next day she thought she had the flu.  She states on that Monday she had 1/2 dozen bowel movements on that day.  Reports \"uncomfortable feeling\" in the epigastric region states mild nausea which is most prominent in the morning and fades throughout the day. She states she does not eat a lot. She also reports a lack of energy.   She denies any vomiting, constipation, diarrhea, blood in stool, black tarry stools, no fever no chills, diaphoresis.    She recently completed colon screening though has not yet received the results.     She has been seen by the Nephrologist and was advised not to changes her diet but states that she has reduced her protein intake.    Patient does report she knows that she had a CT abdomen pelvis in the past which indicated she had pancreatitis.    She states she has not tried any OTC medications     Review of Systems- pertinent positive in bold:  Constitutional: Fever, chills, night sweats, " fainting, weight change, fatigue, dizziness, sleeping difficulties, loud snoring/pauses in breathing  Eyes: change in vision, blurred or double vision, redness/eye pain  Ears, nose, mouth, throat: change in hearing, ear pain, hoarseness, difficulty swallowing, sores in the mouth or throat  Respiratory: shortness of breath, cough, bloody sputum, wheezing  Cardiovascular: chest pain, palpitations   Gastrointestinal: abdominal pain, heartburn/indigestion, nausea/vomiting, change in appetite, change in bowel habits, constipation or diarrhea, rectal bleeding/dark stools, difficulty swallowing  Urinary: painful urination, frequent urination, urinary urgency/incontinence, blood in urine/dark urine, nocturia (new or increasing), muscle cramps, swelling of hands, feet, ankles, leg pain/redness  Skin: change in moles/freckles, rash, nodules  Hematologic/lymphatic: swollen lymph glands, abnormal bruising/bleeding  Endocrine: excessive thirst/urination, cold or heat intolerance  Neurologic/emotional: worrisome memory change, numbness/tingling, anxiety, mood swings      Current Scheduled Meds:  Outpatient Encounter Medications as of 9/5/2019   Medication Sig Dispense Refill     amoxicillin (AMOXIL) 500 MG capsule Take 500 mg by mouth. Take 4 pills prior to dental work       aspirin 81 MG EC tablet Take 81 mg by mouth daily.       atorvastatin (LIPITOR) 40 MG tablet Take 1 tablet (40 mg total) by mouth daily. 90 tablet 1     blood glucose meter (GLUCOMETER) Use 1 each As Directed daily. Dispense glucometer brand per patient's insurance at pharmacy discretion. 1 each 0     blood glucose test strips Use 2 each As Directed once a week. 100 strip 0     cholecalciferol, vitamin D3, (VITAMIN D3) 1,000 unit capsule Take 1,000 Units by mouth daily.       codeine-guaiFENesin (GUAIFENESIN AC)  mg/5 mL liquid Take 5-10 mL by mouth 4 (four) times a day as needed for cough. 240 mL 0     DENTA 5000 PLUS 1.1 % Crea        fluticasone  (FLONASE) 50 mcg/actuation nasal spray 1-2 sprays twice daily 16 g 3     generic lancets (ACCU-CHEK FASTCLIX LANCING DEV) Use 1 each As Directed 2 (two) times a day. 200 each 2     glucosamine-chondroitin 500-400 mg cap Take 2 capsules by mouth daily.       hydroCHLOROthiazide (HYDRODIURIL) 25 MG tablet TAKE 1 TABLET BY MOUTH DAILY 90 tablet 3     losartan (COZAAR) 25 MG tablet Take 25 mg by mouth daily.  11     magnesium 250 mg Tab Take 1 tablet by mouth daily.       MELATONIN ORAL Take 1 capsule by mouth as needed.       multivitamin therapeutic (THERAGRAN) tablet Take 1 tablet by mouth daily.       SITagliptin (JANUVIA) 25 MG tablet Take 1 tablet (25 mg total) by mouth daily. 90 tablet 1     vitamin A-vitamin C-vit E-min (OCUVITE) Tab tablet Take 1 tablet by mouth daily.       [DISCONTINUED] atorvastatin (LIPITOR) 40 MG tablet TAKE 1 TABLET (40 MG TOTAL) BY MOUTH BEDTIME FOR CHOLESTEROL. 90 tablet 2     Facility-Administered Encounter Medications as of 9/5/2019   Medication Dose Route Frequency Provider Last Rate Last Dose     denosumab 60 mg (PROLIA 60 mg/ml)  60 mg Subcutaneous Q6 Months Titus Parikh MD   60 mg at 05/01/19 1244     Past Medical History:   Diagnosis Date     Carpal tunnel syndrome      Diabetes mellitus (H)      Hyperlipidemia      Hypertension      Upper respiratory infection      Past Surgical History:   Procedure Laterality Date     BREAST BIOPSY Left 1980    benign     BREAST BIOPSY Left     before 1980    benign     CATARACT EXTRACTION       HYSTERECTOMY  1957    benign     OOPHORECTOMY  1957    benign     NM REMOVAL OF TONSILS,<11 Y/O      Description: Tonsillectomy;  Recorded: 04/29/2014;     NM TOTAL ABDOM HYSTERECTOMY      Description: Hysterectomy;  Recorded: 04/29/2014;     ROTATOR CUFF REPAIR  10 years ago    left shoulder      Social History     Tobacco Use     Smoking status: Never Smoker     Smokeless tobacco: Never Used   Substance Use Topics     Alcohol use: Yes      "Alcohol/week: 2.4 oz     Types: 4 Glasses of wine per week     Drug use: No     Family History   Problem Relation Age of Onset     Breast cancer Mother 52     Parkinsonism Mother      Breast cancer Sister 72     Coronary artery disease Brother      Heart attack Brother 78     Diabetes Brother      Coronary artery disease Father      Heart attack Father 67     Cancer Father         bladder     Objective / Physical Examination:  Vitals:    09/05/19 1156   BP: 140/72   Pulse: 76   Resp: 20   Temp: 98.4  F (36.9  C)   SpO2: 98%   Weight: 112 lb (50.8 kg)   Height: 4' 8.3\" (1.43 m)     Wt Readings from Last 3 Encounters:   09/05/19 112 lb (50.8 kg)   07/31/19 113 lb 8 oz (51.5 kg)   07/19/19 114 lb (51.7 kg)     Body mass index is 24.84 kg/m .     General Appearance: Alert and oriented, cooperative, affect appropriate, speech clear, in no apparent distress  Head: Normocephalic, atraumatic  Eyes: ,  Conjunctivae clear and sclerae non-icteric  Nose: Septum midline, nares patent,  mucosa moist and without drainage  Throat: Lips and mucosa moist.  pharynx without erythema or exudate  Neck: Supple, trachea midline. No cervical adenopathy  Lungs: Clear to auscultation bilaterally. No adventitious lung sounds noted. Normal inspiratory and expiratory effort  Cardiovascular: Regular rate, normal S1, S2.   Abdomen: Bowel sounds active all four quadrants.  Soft mild epigastric tenderness upon palpation, no organomegaly masses rebound or guarding.  Extremities: Pulses 2+ and equal throughout. No edema.   Integumentary: Warm and dry.   Neuro: Alert and oriented, follows commands appropriately.       Vidhi Pearl, CNP  "

## 2021-06-02 VITALS — BODY MASS INDEX: 24.81 KG/M2 | HEIGHT: 57 IN | WEIGHT: 115 LBS

## 2021-06-03 VITALS
BODY MASS INDEX: 25.19 KG/M2 | DIASTOLIC BLOOD PRESSURE: 72 MMHG | HEIGHT: 56 IN | OXYGEN SATURATION: 98 % | RESPIRATION RATE: 20 BRPM | WEIGHT: 112 LBS | TEMPERATURE: 98.4 F | SYSTOLIC BLOOD PRESSURE: 140 MMHG | HEART RATE: 76 BPM

## 2021-06-03 VITALS — HEIGHT: 57 IN | BODY MASS INDEX: 24.59 KG/M2 | WEIGHT: 114 LBS

## 2021-06-03 VITALS
OXYGEN SATURATION: 96 % | HEART RATE: 93 BPM | SYSTOLIC BLOOD PRESSURE: 142 MMHG | BODY MASS INDEX: 25.19 KG/M2 | DIASTOLIC BLOOD PRESSURE: 62 MMHG | WEIGHT: 112 LBS | RESPIRATION RATE: 16 BRPM | HEIGHT: 56 IN | TEMPERATURE: 97.9 F

## 2021-06-03 VITALS — WEIGHT: 114 LBS | HEIGHT: 56 IN | BODY MASS INDEX: 25.64 KG/M2

## 2021-06-03 VITALS — WEIGHT: 113.5 LBS | HEIGHT: 56 IN | BODY MASS INDEX: 25.53 KG/M2

## 2021-06-03 VITALS — BODY MASS INDEX: 25.17 KG/M2 | WEIGHT: 114.3 LBS

## 2021-06-03 NOTE — PROGRESS NOTES
[facility]  ENDOCRINOLOGY    Diabetes Note 11/20/2019    Patricia Bose, 8/31/1926, [id number]          Reason for visit      1. Type 2 diabetes mellitus with hyperglycemia, without long-term current use of insulin (H)        HPI     Patricia Bose is a very pleasant 89 y.o. old female who presents for follow up.  SUMMARY:  Patricia returns today in f/u for DM 2 and Osteoporosis.  Her current A1c is 6.8, and improved from her last at 7.3.  She is following up with me because 6 months ago, there was some concern regarding her starting Januvia for management of her DM. She is a CORRY from Dr Parikh, who suggested that she start it.  Her PCP, Vidhi Pearl, CNP didn't want her to start it because of possible hypoglycemia.  She came to me then with the same question. I didn't feel that it was a good idea either, but we decided to give it another 6 months to see how she did managing her BG without it. She has done very well, in fact has improved them without it.  She is not currently taking any medication, and is managing entirely with diet and movement. She goes to the  regularly and moves around.     She is getting Prolia injections every 6 months without difficulty.  Vit D level is 34.7 and Calcium level is 9.9.     Past Medical History     Patient Active Problem List   Diagnosis     Osteopenia     Mixed hyperlipidemia     Hypertension     Insomnia     CAD (coronary artery disease)     Type 2 diabetes mellitus (H)        Family History       family history includes Breast cancer (age of onset: 52) in her mother; Breast cancer (age of onset: 72) in her sister; Cancer in her father; Coronary artery disease in her brother and father; Diabetes in her brother; Heart attack (age of onset: 67) in her father; Heart attack (age of onset: 78) in her brother; Parkinsonism in her mother.    Social History      reports that she has never smoked. She has never used smokeless tobacco. She reports current alcohol use of about 4.0  "standard drinks of alcohol per week. She reports that she does not use drugs.      Review of Systems     Patient has no polyuria or polydipsia, no chest pain, dyspnea or TIA's, no numbness, tingling or pain in extremities  Remainder negative except as noted in HPI.    Vital Signs     /54 (Patient Site: Right Arm, Patient Position: Sitting, Cuff Size: Adult Regular)   Pulse 88   Ht 4' 8.3\" (1.43 m)   Wt 112 lb 4.8 oz (50.9 kg)   LMP 05/05/1957   BMI 24.91 kg/m    Wt Readings from Last 3 Encounters:   11/19/19 112 lb 4.8 oz (50.9 kg)   09/19/19 112 lb (50.8 kg)   09/05/19 112 lb (50.8 kg)       Physical Exam     Constitutional:  Well developed, Well nourished  HENT:  Normocephalic,   Neck: Thyroid normal, No lymph nodes, Supple  Eyes:  PERRL, Conjunctiva pink  Respiratory:  Normal breath sounds, No respiratory distress  Cardiovascular:  Normal heart rate, Normal rhythm, No murmurs  GI:  Bowel sounds normal, Soft, No tenderness  Musculoskeletal:  No gross deformity or lesions, normal dorsalis pedis pulses  Skin: No acanthosis nigricans, lipoatrophy or lipodystrophy  Neurologic:  Alert & oriented x 3, nonfocal  Psychiatric:  Affect, Mood, Insight appropriate      Assessment     1. Type 2 diabetes mellitus with hyperglycemia, without long-term current use of insulin (H)        Plan     Reassured pt today that she doesn't need the Januvia, and given means to dispose of medication safely. She has improved her DM control over the last 6 months without it.  I will see her back in clinic for her Osteoporosis treatment, which is already scheduled. Time spent with pt today: 25 min with >50% spent in counseling and coordination of care.          Verónica OLIVAS Endocrinology  11/20/2019  7:19 AM        Lab Results     Hemoglobin A1c   Date Value Ref Range Status   11/13/2019 6.8 (H) 3.5 - 6.0 % Final   06/12/2019 7.3 (H) 3.5 - 6.0 % Final   05/01/2019 7.0 (H) 3.5 - 6.0 % Final   12/03/2018 6.9 (H) 3.5 - 6.0 % " Final   05/15/2018 7.1 (H) 3.5 - 6.0 % Final     Creatinine   Date Value Ref Range Status   09/05/2019 1.05 0.60 - 1.10 mg/dL Final   07/31/2019 1.02 0.60 - 1.10 mg/dL Final   07/31/2019 1.02 0.60 - 1.10 mg/dL Final     Microalbumin, Random Urine   Date Value Ref Range Status   06/12/2019 6.19 (H) 0.00 - 1.99 mg/dL Final       Cholesterol   Date Value Ref Range Status   06/12/2019 194 <=199 mg/dL Final     HDL Cholesterol   Date Value Ref Range Status   06/12/2019 63 >=50 mg/dL Final     LDL Calculated   Date Value Ref Range Status   06/12/2019 102 <=129 mg/dL Final     Triglycerides   Date Value Ref Range Status   06/12/2019 146 <=149 mg/dL Final       Lab Results   Component Value Date    ALT 23 09/05/2019    AST 28 09/05/2019    ALKPHOS 55 09/05/2019    BILITOT 0.4 09/05/2019         Current Medications     Outpatient Medications Prior to Visit   Medication Sig Dispense Refill     amoxicillin (AMOXIL) 500 MG capsule Take 500 mg by mouth. Take 4 pills prior to dental work       aspirin 81 MG EC tablet Take 81 mg by mouth daily.       atorvastatin (LIPITOR) 40 MG tablet Take 1 tablet (40 mg total) by mouth daily. 90 tablet 1     blood glucose meter (GLUCOMETER) Use 1 each As Directed daily. Dispense glucometer brand per patient's insurance at pharmacy discretion. 1 each 0     blood glucose test (ACCU-CHEK AMIRA PLUS TEST STRP) strips USE 1 STRIP DAILY TO TEST BLOOD SUGAR 100 strip 5     cholecalciferol, vitamin D3, (VITAMIN D3) 1,000 unit capsule Take 1,000 Units by mouth daily.       codeine-guaiFENesin (GUAIFENESIN AC)  mg/5 mL liquid Take 5-10 mL by mouth 4 (four) times a day as needed for cough. 240 mL 0     DENTA 5000 PLUS 1.1 % Crea        FLUAD 2973-8456, 65 YR UP,,PF, 45 mcg (15 mcg x 3)/0.5 mL Syrg TO BE ADMINISTERED BY PHARMACIST FOR IMMUNIZATION  0     fluticasone (FLONASE) 50 mcg/actuation nasal spray 1-2 sprays twice daily 16 g 3     glucosamine-chondroitin 500-400 mg cap Take 2 capsules by mouth  daily.       hydroCHLOROthiazide (HYDRODIURIL) 25 MG tablet TAKE 1 TABLET BY MOUTH DAILY 90 tablet 3     losartan (COZAAR) 25 MG tablet Take 25 mg by mouth daily.  11     magnesium 250 mg Tab Take 1 tablet by mouth daily.       MELATONIN ORAL Take 1 capsule by mouth as needed.       multivitamin therapeutic (THERAGRAN) tablet Take 1 tablet by mouth daily.       SITagliptin (JANUVIA) 25 MG tablet Take 1 tablet (25 mg total) by mouth daily. 90 tablet 1     vitamin A-vitamin C-vit E-min (OCUVITE) Tab tablet Take 1 tablet by mouth daily.       generic lancets (ACCU-CHEK FASTCLIX LANCING DEV) Use 1 each As Directed 2 (two) times a day. 200 each 2     Facility-Administered Medications Prior to Visit   Medication Dose Route Frequency Provider Last Rate Last Dose     denosumab 60 mg (PROLIA 60 mg/ml)  60 mg Subcutaneous Q6 Months Titus Parikh MD   60 mg at 11/07/19 3822

## 2021-06-03 NOTE — PROGRESS NOTES
Internal Medicine Office Visit  [facility]  Patient Name: Patricia Bose  Patient Age: 89 y.o.  YOB: 1926  MRN: [id number]    Date of Visit: 2019  Reason for Office Visit:   Chief Complaint   Patient presents with     Follow Up     patient states that she is doing well. She would like to talk about the CT that was done in July. States that she has pancreatitis and is wanting to look in to that.            Assessment / Plan / Medical Decision Makin. Insomnia, unspecified type  Recommended to patient she may increase her melatonin to 6 mg at bedtime encouraged not to take all late nap in the afternoon and if she is having difficulty in the overnight it often do a quiet activity    2. Other chronic pancreatitis (H) as noted on imaging  No symptoms continue to monitor patient is provided education material on dietary considerations for chronic pancreatitis limit her alcohol consumption follow-up as needed.      No orders of the defined types were placed in this encounter.  Followup: Return in about 6 months (around 2020). earlier if needed.    Health Maintenance Review  Health Maintenance   Topic Date Due     ZOSTER VACCINES (2 of 3) 2010     DIABETIC FOOT EXAM  2018     DIABETIC EYE EXAM  2018     INFLUENZA VACCINE RULE BASED (1) 2019     A1C  2020     LIPID  2020     MICROALBUMIN  2020     MEDICARE ANNUAL WELLNESS VISIT  2020     FALL RISK ASSESSMENT  2020     BMP  2020     DXA SCAN  2020     ADVANCE CARE PLANNING  05/15/2023     TD 18+ HE  10/28/2024     PNEUMOCOCCAL IMMUNIZATION 65+ HIGH/HIGHEST RISK  Completed         I am having Patricia Bose maintain her amoxicillin, aspirin, glucosamine-chondroitin, magnesium, vitamin A-vitamin C-vit E-min, cholecalciferol (vitamin D3), MELATONIN ORAL, multivitamin therapeutic, DENTA 5000 PLUS, fluticasone propionate, blood glucose meter,  hydroCHLOROthiazide, losartan, codeine-guaiFENesin, atorvastatin, blood glucose test, FLUAD [id number] (65 YR UP)(PF), and generic lancets. We will continue to administer denosumab.      HPI:  Patricia Bose is a 89 y.o. year old who presents to the office today chronic conditions include hyperlipidemia, hypertension, coronary artery disease, type 2 diabetes, osteopenia, insomnia, history of upper respiratory infection.    Patient underwent CT chest high-resolution without contrast July 2019 due to history of cough and pulmonary fibrosis.  This was ordered by pulmonology which showed findings consistent with mild UIP pattern pulmonary fibrosis and findings have mildly worsened since CT July 2015.  Severe multivessel coronary calcifications.  Small hiatus hernia.  CT also had an incidental finding of a pancreatic calcifications suggesting chronic pancreatitis.  Patient has no symptoms of pancreatitis    She goes to bed at 9am she gets up between 12-2 up for an hour to two hours goes to the bathroom and then has warm milk and attempts to go back to bed with some success. She will doze off in afternoon at time for about 20-30 minutes.  She reports most days she feels well rested.  She will take tylenol pm, melatonin 3mg or codeine cough medication and will typically sleep through the night.       Review of Systems- pertinent positive in bold:  Constitutional: Fever, chills, night sweats, fainting, weight change, fatigue, dizziness, sleeping difficulties, loud snoring/pauses in breathing  Eyes: change in vision, blurred or double vision, redness/eye pain  Ears, nose, mouth, throat: change in hearing, ear pain, hoarseness, difficulty swallowing, sores in the mouth or throat  Respiratory: shortness of breath, cough, bloody sputum, wheezing  Cardiovascular: chest pain, palpitations   Gastrointestinal: abdominal pain, heartburn/indigestion, nausea/vomiting, change in appetite, change in bowel habits, constipation or  diarrhea, rectal bleeding/dark stools, difficulty swallowing  Urinary: painful urination, frequent urination, urinary urgency/incontinence, blood in urine/dark urine, nocturia (new or increasing), muscle cramps, swelling of hands, feet, ankles, leg pain/redness  Skin: change in moles/freckles, rash, nodules  Hematologic/lymphatic: swollen lymph glands, abnormal bruising/bleeding  Endocrine: excessive thirst/urination, cold or heat intolerance  Neurologic/emotional: worrisome memory change, numbness/tingling, anxiety, mood swings      Current Scheduled Meds:  Outpatient Encounter Medications as of 11/22/2019   Medication Sig Dispense Refill     amoxicillin (AMOXIL) 500 MG capsule Take 500 mg by mouth. Take 4 pills prior to dental work       aspirin 81 MG EC tablet Take 81 mg by mouth daily.       atorvastatin (LIPITOR) 40 MG tablet Take 1 tablet (40 mg total) by mouth daily. 90 tablet 1     blood glucose meter (GLUCOMETER) Use 1 each As Directed daily. Dispense glucometer brand per patient's insurance at pharmacy discretion. 1 each 0     blood glucose test (ACCU-CHEK AMIRA PLUS TEST STRP) strips USE 1 STRIP DAILY TO TEST BLOOD SUGAR 100 strip 5     cholecalciferol, vitamin D3, (VITAMIN D3) 1,000 unit capsule Take 1,000 Units by mouth daily.       codeine-guaiFENesin (GUAIFENESIN AC)  mg/5 mL liquid Take 5-10 mL by mouth 4 (four) times a day as needed for cough. 240 mL 0     DENTA 5000 PLUS 1.1 % Crea        FLUAD [id number], 65 YR UP,,PF, 45 mcg (15 mcg x 3)/0.5 mL Syrg TO BE ADMINISTERED BY PHARMACIST FOR IMMUNIZATION  0     fluticasone (FLONASE) 50 mcg/actuation nasal spray 1-2 sprays twice daily 16 g 3     generic lancets (ACCU-CHEK FASTCLIX LANCING DEV) Use 1 each As Directed daily. 200 each 2     glucosamine-chondroitin 500-400 mg cap Take 2 capsules by mouth daily.       hydroCHLOROthiazide (HYDRODIURIL) 25 MG tablet TAKE 1 TABLET BY MOUTH DAILY 90 tablet 3     losartan (COZAAR) 25 MG tablet Take 25 mg by  "mouth daily.  11     magnesium 250 mg Tab Take 1 tablet by mouth daily.       MELATONIN ORAL Take 1 capsule by mouth as needed.       multivitamin therapeutic (THERAGRAN) tablet Take 1 tablet by mouth daily.       vitamin A-vitamin C-vit E-min (OCUVITE) Tab tablet Take 1 tablet by mouth daily.       Facility-Administered Encounter Medications as of 11/22/2019   Medication Dose Route Frequency Provider Last Rate Last Dose     denosumab 60 mg (PROLIA 60 mg/ml)  60 mg Subcutaneous Q6 Months Titus Parikh MD   60 mg at 11/07/19 1431     Past Medical History:   Diagnosis Date     Carpal tunnel syndrome      Diabetes mellitus (H)      Hyperlipidemia      Hypertension      Upper respiratory infection      Past Surgical History:   Procedure Laterality Date     BREAST BIOPSY Left 1980    benign     BREAST BIOPSY Left     before 1980    benign     CATARACT EXTRACTION       HYSTERECTOMY  1957    benign     OOPHORECTOMY  1957    benign     OR REMOVAL OF TONSILS,<13 Y/O      Description: Tonsillectomy;  Recorded: 04/29/2014;     OR TOTAL ABDOM HYSTERECTOMY      Description: Hysterectomy;  Recorded: 04/29/2014;     ROTATOR CUFF REPAIR  10 years ago    left shoulder      Social History     Tobacco Use     Smoking status: Never Smoker     Smokeless tobacco: Never Used   Substance Use Topics     Alcohol use: Yes     Alcohol/week: 4.0 standard drinks     Types: 4 Glasses of wine per week     Drug use: No     Family History   Problem Relation Age of Onset     Breast cancer Mother 52     Parkinsonism Mother      Breast cancer Sister 72     Coronary artery disease Brother      Heart attack Brother 78     Diabetes Brother      Coronary artery disease Father      Heart attack Father 67     Cancer Father         bladder     Objective / Physical Examination:  Vitals:    11/22/19 1238   BP: 130/62   Pulse: 86   Resp: 16   Temp: 97.9  F (36.6  C)   SpO2: 97%   Weight: 112 lb (50.8 kg)   Height: 4' 8.3\" (1.43 m)     Wt Readings from " Last 3 Encounters:   11/22/19 112 lb (50.8 kg)   11/19/19 112 lb 4.8 oz (50.9 kg)   09/19/19 112 lb (50.8 kg)     Body mass index is 24.84 kg/m .     General Appearance: Alert and oriented, cooperative, affect appropriate, speech clear, in no apparent distress  Head: Normocephalic, atraumatic  Eyes:Conjunctivae clear and sclerae non-icteric  Throat: Lips and mucosa moist.   Lungs:  Normal inspiratory and expiratory effort  Neuro: Alert and oriented, follows commands appropriately.     No results found.  Recent Results (from the past 240 hour(s))   Vitamin D, Total (25-Hydroxy)   Result Value Ref Range    Vitamin D, Total (25-Hydroxy) 34.7 30.0 - 80.0 ng/mL   Calcium   Result Value Ref Range    Calcium 9.9 8.5 - 10.5 mg/dL   Hemoglobin A1c - future   Result Value Ref Range    Hemoglobin A1c 6.8 (H) 3.5 - 6.0 %       Vidhi Pearl, CNP

## 2021-06-03 NOTE — TELEPHONE ENCOUNTER
Patient would like a copy of her test results mailed to her. She did not receive a copy when she was in yesterday to see Rosemary.

## 2021-06-03 NOTE — PROGRESS NOTES

## 2021-06-04 VITALS
OXYGEN SATURATION: 97 % | WEIGHT: 112 LBS | SYSTOLIC BLOOD PRESSURE: 130 MMHG | TEMPERATURE: 97.9 F | DIASTOLIC BLOOD PRESSURE: 62 MMHG | HEART RATE: 86 BPM | BODY MASS INDEX: 25.19 KG/M2 | HEIGHT: 56 IN | RESPIRATION RATE: 16 BRPM

## 2021-06-04 VITALS
BODY MASS INDEX: 25.26 KG/M2 | DIASTOLIC BLOOD PRESSURE: 54 MMHG | WEIGHT: 112.3 LBS | HEART RATE: 88 BPM | SYSTOLIC BLOOD PRESSURE: 134 MMHG | HEIGHT: 56 IN

## 2021-06-04 VITALS
DIASTOLIC BLOOD PRESSURE: 60 MMHG | HEART RATE: 90 BPM | SYSTOLIC BLOOD PRESSURE: 138 MMHG | RESPIRATION RATE: 16 BRPM | WEIGHT: 112 LBS | BODY MASS INDEX: 25.19 KG/M2 | TEMPERATURE: 97.8 F | OXYGEN SATURATION: 98 % | HEIGHT: 56 IN

## 2021-06-04 NOTE — PROGRESS NOTES
Internal Medicine Office Visit  [facility]  Patient Name: Patricia Bose  Patient Age: 89 y.o.  YOB: 1926  MRN: [id number]    Date of Visit: 2019  Reason for Office Visit:   Chief Complaint   Patient presents with     Follow-up     Would like to talk about the treatment of her pancreas. having fatigue, weight loss, and pre DM.            Assessment / Plan / Medical Decision Makin. Other chronic pancreatitis (H)  - Ambulatory referral to Gastroenterology    2. Vitamin deficiency  - Vitamin B12      Orders Placed This Encounter   Procedures     Vitamin B12     Ambulatory referral to Gastroenterology   Followup: Return in about 3 months (around 3/27/2020). earlier if needed.    Health Maintenance Review  Health Maintenance   Topic Date Due     ZOSTER VACCINES (2 of 3) 2010     DIABETIC FOOT EXAM  2018     DIABETIC EYE EXAM  2018     A1C  2020     LIPID  2020     MICROALBUMIN  2020     MEDICARE ANNUAL WELLNESS VISIT  2020     FALL RISK ASSESSMENT  2020     BMP  2020     DXA SCAN  2020     ADVANCE CARE PLANNING  05/15/2023     TD 18+ HE  10/28/2024     PNEUMOCOCCAL IMMUNIZATION 65+ HIGH/HIGHEST RISK  Completed     INFLUENZA VACCINE RULE BASED  Completed         I am having Patricia Bose maintain her amoxicillin, aspirin, glucosamine-chondroitin, magnesium, vitamin A-vitamin C-vit E-min, cholecalciferol (vitamin D3), MELATONIN ORAL, multivitamin therapeutic, Denta 5000 Plus, fluticasone propionate, blood glucose meter, hydroCHLOROthiazide, losartan, atorvastatin, blood glucose test, Fluad [id number] (65 yr up)(PF), and generic lancets. We will continue to administer denosumab.      HPI:  Patricia Bose is a 89 y.o. year old who presents to the office today with chronic conditions including hyperlipidemia, hypertension, coronary artery disease, type 2 diabetes, osteopenia, insomnia, history  of upper respiratory infection, History of insomnia did increase her melatonin to 6 mg at bedtime in addition to encourage her not to take a late afternoon nap, chronic pancreatitis noted on imaging and she was advised to limit her alcohol consumption, pulmonary fibrosis.    She recently got over a cold and had a cough which has now improved.  She made an appt with Pulmonary due to pulmonary fibrosis.      Patient reports she is concerned for her chronic pancreatitis without symptoms. She reports she has been limiting her alcohol consumption.      She reports she is limiting her naps and still is having difficulty with sleeping through the night.  She does take a sleep aid on occasion and does report if she drinks a glass of wine, she does not sleep well. She is using a OTC sleep medication and does find some help.          Review of Systems- pertinent positive in bold:  Constitutional: Fever, chills, night sweats, fainting, weight change, fatigue, dizziness, sleeping difficulties, loud snoring/pauses in breathing  Eyes: change in vision, blurred or double vision, redness/eye pain  Ears, nose, mouth, throat: change in hearing, ear pain, hoarseness, difficulty swallowing, sores in the mouth or throat  Respiratory: shortness of breath, cough, bloody sputum, wheezing  Cardiovascular: chest pain, palpitations   Gastrointestinal: abdominal pain, heartburn/indigestion, nausea/vomiting, change in appetite, change in bowel habits, constipation or diarrhea, rectal bleeding/dark stools, difficulty swallowing  Urinary: painful urination, frequent urination, urinary urgency/incontinence, blood in urine/dark urine, nocturia (new or increasing), muscle cramps, swelling of hands, feet, ankles, leg pain/redness  Skin: change in moles/freckles, rash, nodules  Hematologic/lymphatic: swollen lymph glands, abnormal bruising/bleeding  Endocrine: excessive thirst/urination, cold or heat intolerance  Neurologic/emotional: worrisome memory  change, numbness/tingling, anxiety, mood swings      Current Scheduled Meds:  Outpatient Encounter Medications as of 12/27/2019   Medication Sig Dispense Refill     amoxicillin (AMOXIL) 500 MG capsule Take 500 mg by mouth. Take 4 pills prior to dental work       aspirin 81 MG EC tablet Take 81 mg by mouth daily.       atorvastatin (LIPITOR) 40 MG tablet Take 1 tablet (40 mg total) by mouth daily. 90 tablet 1     blood glucose meter (GLUCOMETER) Use 1 each As Directed daily. Dispense glucometer brand per patient's insurance at pharmacy discretion. 1 each 0     blood glucose test (ACCU-CHEK AMIRA PLUS TEST STRP) strips USE 1 STRIP DAILY TO TEST BLOOD SUGAR 100 strip 5     cholecalciferol, vitamin D3, (VITAMIN D3) 1,000 unit capsule Take 1,000 Units by mouth daily.       DENTA 5000 PLUS 1.1 % Crea        FLUAD 2101-8540, 65 YR UP,,PF, 45 mcg (15 mcg x 3)/0.5 mL Syrg TO BE ADMINISTERED BY PHARMACIST FOR IMMUNIZATION  0     fluticasone (FLONASE) 50 mcg/actuation nasal spray 1-2 sprays twice daily 16 g 3     generic lancets (ACCU-CHEK FASTCLIX LANCING DEV) Use 1 each As Directed daily. 200 each 2     glucosamine-chondroitin 500-400 mg cap Take 2 capsules by mouth daily.       hydroCHLOROthiazide (HYDRODIURIL) 25 MG tablet TAKE 1 TABLET BY MOUTH DAILY 90 tablet 3     losartan (COZAAR) 25 MG tablet Take 25 mg by mouth daily.  11     magnesium 250 mg Tab Take 1 tablet by mouth daily.       MELATONIN ORAL Take 1 capsule by mouth as needed.       multivitamin therapeutic (THERAGRAN) tablet Take 1 tablet by mouth daily.       vitamin A-vitamin C-vit E-min (OCUVITE) Tab tablet Take 1 tablet by mouth daily.       [DISCONTINUED] codeine-guaiFENesin (GUAIFENESIN AC)  mg/5 mL liquid Take 5-10 mL by mouth 4 (four) times a day as needed for cough. 240 mL 0     Facility-Administered Encounter Medications as of 12/27/2019   Medication Dose Route Frequency Provider Last Rate Last Dose     denosumab 60 mg (PROLIA 60 mg/ml)  60 mg  "Subcutaneous Q6 Months Titus Parikh MD   60 mg at 19 1431     Past Medical History:   Diagnosis Date     Carpal tunnel syndrome      Diabetes mellitus (H)      Hyperlipidemia      Hypertension      Upper respiratory infection      Past Surgical History:   Procedure Laterality Date     BREAST BIOPSY Left     benign     BREAST BIOPSY Left     before     benign     CATARACT EXTRACTION       HYSTERECTOMY      benign     OOPHORECTOMY      benign     CT REMOVAL OF TONSILS,<13 Y/O      Description: Tonsillectomy;  Recorded: 2014;     CT TOTAL ABDOM HYSTERECTOMY      Description: Hysterectomy;  Recorded: 2014;     ROTATOR CUFF REPAIR  10 years ago    left shoulder      Social History     Tobacco Use     Smoking status: Never Smoker     Smokeless tobacco: Never Used   Substance Use Topics     Alcohol use: Yes     Alcohol/week: 4.0 standard drinks     Types: 4 Glasses of wine per week     Drug use: No     Family History   Problem Relation Age of Onset     Breast cancer Mother 52     Parkinsonism Mother      Breast cancer Sister 72     Coronary artery disease Brother      Heart attack Brother 78     Diabetes Brother      Coronary artery disease Father      Heart attack Father 67     Cancer Father         bladder     Social History     Social History Narrative    . Dated a gentlemen for 35 years who  .  Has 1 son, 2 grandchildren and 3 great grandchildren.  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  She is very social getting together with friends often, she is part of a book club, she enjoys yoga and swimming at the [place].  She used to golf.       Objective / Physical Examination:  Vitals:    19 1415   BP: 138/60   Pulse: 90   Resp: 16   Temp: 97.8  F (36.6  C)   SpO2: 98%   Weight: 112 lb (50.8 kg)   Height: 4' 8.3\" (1.43 m)     Wt Readings from Last 3 Encounters:   19 112 lb (50.8 kg)   19 112 lb (50.8 kg)   19 112 lb 4.8 oz " (50.9 kg)     Body mass index is 24.84 kg/m .     General Appearance: Alert and oriented, cooperative, affect appropriate, speech clear, in no apparent distress  Head: Normocephalic, atraumatic  Eyes:  Conjunctivae clear and sclerae non-icteric  Throat: Lips and mucosa moist.   Lungs: Normal inspiratory and expiratory effort  Neuro: Alert and oriented, follows commands appropriately.     No results found.  No results found for this or any previous visit (from the past 240 hour(s)).        Vidhi Pearl, CNP

## 2021-06-05 ENCOUNTER — RECORDS - HEALTHEAST (OUTPATIENT)
Dept: BONE DENSITY | Facility: CLINIC | Age: 86
End: 2021-06-05

## 2021-06-05 VITALS
OXYGEN SATURATION: 100 % | SYSTOLIC BLOOD PRESSURE: 122 MMHG | TEMPERATURE: 96.6 F | HEIGHT: 56 IN | DIASTOLIC BLOOD PRESSURE: 68 MMHG | RESPIRATION RATE: 20 BRPM | BODY MASS INDEX: 24.75 KG/M2 | WEIGHT: 110 LBS | HEART RATE: 102 BPM

## 2021-06-05 VITALS
HEART RATE: 97 BPM | WEIGHT: 109 LBS | BODY MASS INDEX: 24.52 KG/M2 | RESPIRATION RATE: 16 BRPM | DIASTOLIC BLOOD PRESSURE: 60 MMHG | SYSTOLIC BLOOD PRESSURE: 154 MMHG | HEIGHT: 56 IN

## 2021-06-05 VITALS — BODY MASS INDEX: 24.52 KG/M2 | HEIGHT: 56 IN | WEIGHT: 109 LBS

## 2021-06-05 VITALS
HEART RATE: 80 BPM | WEIGHT: 110 LBS | SYSTOLIC BLOOD PRESSURE: 152 MMHG | DIASTOLIC BLOOD PRESSURE: 62 MMHG | BODY MASS INDEX: 24.66 KG/M2

## 2021-06-05 DIAGNOSIS — M89.9 DISORDER OF BONE AND CARTILAGE: ICD-10-CM

## 2021-06-05 DIAGNOSIS — M94.9 DISORDER OF BONE AND CARTILAGE: ICD-10-CM

## 2021-06-05 NOTE — TELEPHONE ENCOUNTER
Upcoming Appointment Question  When is the appointment: Tomorrow  What is your appointment for?: Lab   Who is your appointment scheduled with?: Lab only   What is your question/concern?: Patient is questioning what lab work she is co;kenneth in for and does she need to come fastening for labs.Please yon patient with information .  Okay to leave a detailed message?: No

## 2021-06-05 NOTE — TELEPHONE ENCOUNTER
Pt has orders from [facility] for A1C and previous order for B12 - does not need to fast for either test.      Called and LVM for pt that pt does not need to fast for tests

## 2021-06-07 NOTE — TELEPHONE ENCOUNTER
I called the pt and discussed the below. She will go ahead with the copay and continuing on the prolia. She does not understand why her insurance covered the medication 6 mo ago and not now. She does want to continue with therapy. [facility] will call her for the co-pay and to deliver the medication to the clinic.

## 2021-06-07 NOTE — TELEPHONE ENCOUNTER
I called the pt and informed that her insurance would not cover through medical benefit and had to go through pharmacy benefit for her insurance to pay for it The pt states that when she was called she was informed the co-pay was 72$. I informed the pt that the co-pay should be 24$. If the co-pay os 72$ the pt would like an alternative medication.

## 2021-06-07 NOTE — TELEPHONE ENCOUNTER
I sent in the Fosamax and THEN I decided that I should look at her Renal function - she shouldn't take it because it is hard on the kidneys, and she is starting into Renal failure.  The Prolia is only twice a year and will help keep her from broken bones.  Can we find some help for her if it is only $72?

## 2021-06-07 NOTE — TELEPHONE ENCOUNTER
I called  for the pt to c/b to inform that prolia would not be covered under her medical benefits because she has osteopenia, however her pharmacy benefits would cover prolia with a 24$ co-pay. The RX has been sent to  and they will call to set up payment for the co-pay and ship the medication to our office for her appt.

## 2021-06-07 NOTE — TELEPHONE ENCOUNTER
I think that she needs to be approached in that manner - This will be $72 twice a year to help protect you from a possible hip fracture or Vertebral fracture. Can you put back some money every month over 6 months to be able to afford your co-pay? That is 12 dollars a month... I wish that I could have her come in so I could talk to her and try and reason with her about this. She is still in charge though and if she understands fully what it means to stop the Prolia, and the risk to her, then that's all that we can do.

## 2021-06-07 NOTE — TELEPHONE ENCOUNTER
Patient would like a call back to discuss the Prolia injection and the payment for it.  She said someone called her and asked her to pay for it over the phone prior to the injection scheduled 5/7.  Is this something new and why?

## 2021-06-08 NOTE — PROGRESS NOTES

## 2021-06-08 NOTE — PROGRESS NOTES
Progress Note    Reason for Visit:  Chief Complaint     Osteoporosis          Progress Note:    HPI:         Saritha Hampton MD    This patient was seen in consultation at the request of  because of osteopenia.  Thank you for referring this pleasant 89-year-old female patient who has was to be up was high fracture risk.  She was on Actonel for 3 years.  At 8 years ago.  She had a bone DEXA scan which showed T score of the spine is -1.7    At the left hip -1.4 of the right -1.5 she has lost 1.8% since 2014.    Risk of major posttraumatic fracture is 11% and hip fracture 3.3%.    She is denying previous fractures but she has multiple attention height.      Patient Profile:  89 y.o. female, postmenopausal, is here for the follow up bone density test.   History of fractures - None. Family history of osteoporosis - None. Family history of hip fracture: None. Smoking history - No. Osteoporosis treatment past - Yes; Bisphosphonates. Osteoporosis treatment current - No. Chronic medical problems - None. High risk medications - None.        Assessment:     1. The spine bone density L1-L2 with T-score -1.7, stable compared to 2014.  2. Femoral bone densities show left femoral neck T- score -1.4 and right femoral neck T-score -1.5 with decline of 1.8% on the left hip compared to 2014.  3. Vertebral fracture assessment did not show any vertebral compression fracture.     89 y.o. female with LOW BONE DENSITY (OSTEOPENIA) and HIGH fracture risk with major osteoporotic fracture risk 11% and hip fracture risk 3.3%.        Patricia Bose comes in to follow up on her bone density test. She is a very  [place] 88-year-old woman who previously used Actonel and then her bone  density was actually quite satisfactory and we stopped treatment. She  remains very active playing golf once a week with a league and she walks in  the mall several days a week and maintains a very active lifestyle. She has  had no fractures.      Review of  Systems:    Nervous System: No headache, dizziness, fainting or memory loss. No tingling sensation of hand or feet.  Ears: No hearing loss or ringing in the ears  Eyes: No blurring of vision, redness, itching or dryness.  Nose: No nosebleed or loss of smell  Mouth: No mouth sores or loss of taste  Throat: No hoarseness or difficulty swallowing  Neck: No enlarged thyroid or lymph nodes.  Heart: No chest pain, palpitation or irregular heartbeat. No swelling of hands or feet  Lungs: No shortness of breath, cough, night sweats, wheezing or hemoptysis.  Gastrointestinal: No nausea or vomiting, constipation or diarrhea.  No acid reflux, abdominal pain or blood in stools.  Kidney/Bladdr: No polyuria, polydipsia, nocturia or hematuria.  Genital/Sexual: No loss of libido  Skin: No rash, hair loss or hirsutism.  No abnormal striae  Muscles/Joints/Bones: No morning stiffness, muscle aches and pain or loss of height.    Current Medications:  Current Outpatient Prescriptions   Medication Sig     amLODIPine (NORVASC) 5 MG tablet TAKE ONE TABLET BY MOUTH ONE TIME DAILY-Please Schedule Clinic Appointment in November     amoxicillin (AMOXIL) 500 MG capsule Take 500 mg by mouth. Take 4 pills prior to dental work     aspirin 81 MG EC tablet Take 81 mg by mouth daily.     atorvastatin (LIPITOR) 40 MG tablet Take 1 tablet (40 mg total) by mouth bedtime.     blood glucose test strips TEST TWICE WEEKLY     calcium carbonate-vitamin D3 (CALCIUM 600 + D,3,) 600 mg calcium- 200 unit cap Take 1 tablet by mouth daily.      cholecalciferol, vitamin D3, (VITAMIN D3) 1,000 unit capsule Take 1,000 Units by mouth daily.     cyanocobalamin (VITAMIN B-12) 50 mcg tablet Take 50 mcg by mouth daily.     DENTA 5000 PLUS 1.1 % Crea      EPIPEN 2-CAROLINA 0.3 mg/0.3 mL (1:1,000) atIn      generic lancets (ACCU-CHEK FASTCLIX) Use 1 each As Directed 2 (two) times a day.     glucosamine-chondroitin 500-400 mg cap Take 2 capsules by mouth daily.      hydroCHLOROthiazide (HYDRODIURIL) 25 MG tablet TAKE ONE TABLET BY MOUTH ONE TIME DAILY     magnesium 250 mg Tab Take 1 tablet by mouth daily.     MELATONIN ORAL Take 1 capsule by mouth as needed.     multivitamin therapeutic (THERAGRAN) tablet Take 1 tablet by mouth daily.     OMEGA-3/DHA/EPA/FISH OIL (FISH OIL-OMEGA-3 FATTY ACIDS) 300-1,000 mg capsule Take 1 g by mouth daily.     vitamin A-vitamin C-vit E-min (OCUVITE) Tab tablet Take 1 tablet by mouth daily.       Patients Active Problems:  Patient Active Problem List   Diagnosis     Osteopenia     Mixed hyperlipidemia     Hypertension     Insomnia     CAD (coronary artery disease)     Hyperglycemia       History:   reports that she has never smoked. She has never used smokeless tobacco. She reports that she drinks about 2.4 oz of alcohol per week  She reports that she does not use illicit drugs.   reports that she has never smoked. She has never used smokeless tobacco. She reports that she drinks about 2.4 oz of alcohol per week  She reports that she does not use illicit drugs.  History   Smoking Status     Never Smoker   Smokeless Tobacco     Never Used      reports that she has never smoked. She has never used smokeless tobacco. She reports that she drinks about 2.4 oz of alcohol per week  She reports that she does not use illicit drugs.  History   Sexual Activity     Sexual activity: Not on file     Past Medical History   Diagnosis Date     Carpal tunnel syndrome      Diabetes mellitus      Hyperlipidemia      Hypertension      Upper respiratory infection      Family History   Problem Relation Age of Onset     Breast cancer Mother 52     Breast cancer Sister 72     Coronary artery disease Brother      Heart attack Brother 78     Coronary artery disease Father      Heart attack Father 67     Past Medical History   Diagnosis Date     Carpal tunnel syndrome      Diabetes mellitus      Hyperlipidemia      Hypertension      Upper respiratory infection      Past  "Surgical History   Procedure Laterality Date     Pr total abdom hysterectomy       Description: Hysterectomy;  Recorded: 04/29/2014;     Pr removal of tonsils,<13 y/o       Description: Tonsillectomy;  Recorded: 04/29/2014;     Breast biopsy Left 1980     benign     Rotator cuff repair  10 years ago     left shoulder      Hysterectomy  1957     benign     Oophorectomy  1957     benign     Cataract extraction         Vitals   height is 4' 8.5\" (1.435 m) and weight is 115 lb 12.8 oz (52.5 kg). Her blood pressure is 128/60.         Exam  General appearance: The patient looked well, not in acute distress.  Eyes: no evidence of thyroid eye disease.   Retinal exam: No evidence of diabetic retinopathy.  Mouth and Throat: Normal  Neck: No evidence of thyromegaly, enlarged lymph node or tenderness  Chest: Trachea is central. Chest is clear to auscultation and percussion. Breat sounds are normal.  Cardiovascular exam: JVP is not raised. Heart sounds are normal, no murmurs or rub  Peripheral pulses are palpable.   Abdomen: No masses or tenderness.    Back: No vertebral tenderness or kyphosis.  Extremities: No evidence of leg edema.   Skin: Normal to touch.  No abnormal striae  Neurologic exam:  Visual fields are intact by confrontation, grossly intact. No evidence of peripheral neuropathy.  Detailed foot exam normal.        Diagnosis:  No diagnosis found.    Orders:   No orders of the defined types were placed in this encounter.        Assessment and Plan:  Osteopenia with high risk of fracture.  The patient does not smoke or drink he has low body weight she is physically active plays golf.    I did advise the patient that the high risk of fracture is at the head only prolia is proving to be effective and the hip I did advise her that we will contact insurance to get approval of prolia 60 mg subcutaneously.  Even if we can get that once a year it will be helpful.    The patient refuses to take Fosamax.    I did advise to take " vitamin D 2000 units daily plus calcium 600 mg daily she'll return to clinic in 1 year I did spend 45 minutes was a patient more than 50% was spent on counseling and managing her care.

## 2021-06-09 NOTE — PROGRESS NOTES
"Patricia Bose is a 89 y.o. female who is being evaluated via a billable telephone visit.      The patient has been notified of following:     \"This telephone visit will be conducted via a call between you and your physician/provider. We have found that certain health care needs can be provided without the need for a physical exam.  This service lets us provide the care you need with a short phone conversation.  If a prescription is necessary we can send it directly to your pharmacy.  If lab work is needed we can place an order for that and you can then stop by our lab to have the test done at a later time.    Telephone visits are billed at different rates depending on your insurance coverage. During this emergency period, for some insurers they may be billed the same as an in-person visit.  Please reach out to your insurance provider with any questions.    If during the course of the call the physician/provider feels a telephone visit is not appropriate, you will not be charged for this service.\"    Patient has given verbal consent to a Telephone visit? Yes    What phone number would you like to be contacted at? [phone]    Patient would like to receive their AVS by AVS Preference: Mail a copy.    Additional provider notes:   Ms. Patricia Bose is an 89 yo female with PMH significant for CAD, HTN, and DM who was seen in pulmonary clinic for evaluation of pulmonary fibrosis and associated chronic cough.  - she reports that her cough remains stable, if not a little better  - she really has no problems with dyspnea, and reports she can climb 1-2 flights of stairs without issues  - her cough is usually a little more prominent in the morning and at night  - she just uses prn codeine cough syrup  - her PFTs in 2018 were normal except a mild reduction in DLCO, and her CT scans have been stable in the past  - no other issues    A/P: Patricia Bose is a 89 y.o. female with pulmonary fibrosis on CT and chronic " cough     1. Pulmonary Fibrosis:  The etiology is not clear, but is likely related to reflux or aspiration.  She has no systemic findings, and no real symptoms other than cough, but now has some symptoms of PND as the cough worsens about 2 hours after lying down.  She has normal pulmonary function tests, and she has excellent exertional capacity and this persists.  This is likely stable and will not cause her any detriment in the next several years.  She continues on omeprazole for GERD.  Otherwise, her pattern is not consistent with IPF, and she does not need further workup for other causes at this time.  We did ultimately elect to hold off on repeating her PFTs as they have been fairly unremarkable.       2. Cough:  Likely related to pulmonary fibrosis vs GERD vs post nasal drip.  Continued on omprazole.  Continue with codeine for social functions and as needed.      Recommend:  - follow up in 1 year unless her CT scan reveals any other changes  - continue omeprazole  - codeine prn    Phone call duration: 10 minutes    Emilio Garces DO

## 2021-06-09 NOTE — TELEPHONE ENCOUNTER
Refill Approved    Rx renewed per Medication Renewal Policy. Medication was last renewed on 9/5/19.    Nehal Liang, [facility] Triage/Med Refill 7/2/2020     Requested Prescriptions   Pending Prescriptions Disp Refills     atorvastatin (LIPITOR) 40 MG tablet 90 tablet 1     Sig: Take 1 tablet (40 mg total) by mouth daily.       Statins Refill Protocol (Hmg CoA Reductase Inhibitors) Passed - 7/1/2020 12:59 PM        Passed - PCP or prescribing provider visit in past 12 months      Last office visit with prescriber/PCP: 12/27/2019 Vidhi Pearl CNP OR same dept: 12/27/2019 Vidhi Pearl CNP OR same specialty: 12/27/2019 Vidhi Pearl CNP  Last physical: 6/18/2019 Last MTM visit: Visit date not found   Next visit within 3 mo: Visit date not found  Next physical within 3 mo: Visit date not found  Prescriber OR PCP: Vidhi Pearl CNP  Last diagnosis associated with med order: 1. CAD (coronary artery disease)  - atorvastatin (LIPITOR) 40 MG tablet; Take 1 tablet (40 mg total) by mouth daily.  Dispense: 90 tablet; Refill: 1    If protocol passes may refill for 12 months if within 3 months of last provider visit (or a total of 15 months).

## 2021-06-09 NOTE — PATIENT INSTRUCTIONS - HE
I am glad you are still doing well.  Please call with concerns or change in your symptoms.    If you are interested in purchasing the device to check you oxygen saturation levels, it is called a PULSE OXIMETER.  These can be purchased for around $20-30 online through sites such as amazon.com, or through most local pharmacies.  Due to the pandemic though, they can be somewhat hard to find locally.    We will see you back again in 1 year.

## 2021-06-10 ENCOUNTER — RECORDS - HEALTHEAST (OUTPATIENT)
Dept: ADMINISTRATIVE | Facility: OTHER | Age: 86
End: 2021-06-10

## 2021-06-10 NOTE — TELEPHONE ENCOUNTER
"Medication Question or Clarification  Who is calling: patient What medication are you calling about (include dose and sig)?:  hydroCHLOROthiazide (HYDRODIURIL) 25 MG tablet           Summary: TAKE 1 TABLET BY MOUTH DAILY, Normal          Who prescribed the medication?: Vidhi Pearl, CNP  What is your question/concern?: Writer shared an appointment is due.  Trying to exepidte the request with a bridge.  Patient dismissed that she needs an appointment .  Charting states Vidhi wanted to see patient in March.  \"I do not see why this is such a problem I been getting it for years.\" Writer explained provider needs to have an appointment yearly at the very least to take good care of patient.  May also needs labs writer shared. Writer explained the virtual visit. Please call patient if needs an appointment  Requested Pharmacy: [facility]  [place]  Okay to leave a detailed message?: Yes        "

## 2021-06-10 NOTE — TELEPHONE ENCOUNTER
Last Office Visit  12/27/2019 Vidhi Pearl, CNP  Notes:  1. Other chronic pancreatitis (H)  - Ambulatory referral to Gastroenterology     2. Vitamin deficiency  - Vitamin B12    Last Filled:  hydroCHLOROthiazide (HYDRODIURIL) 25 MG tablet  90 tablet  3  6/21/2019   No    Sig: TAKE 1 TABLET BY MOUTH DAILY    Sent to pharmacy as: hydroCHLOROthiazide (HYDRODIURIL) 25 MG tablet    E-Prescribing Status: Receipt confirmed by pharmacy (6/21/2019 10:01 PM CDT)        Next OV:  Visit date not found      See message below     Medication teed up for provider signature

## 2021-06-10 NOTE — PROGRESS NOTES
ASSESSMENT and PLAN:  1. Cough  She's used this syrup in the past and it's worked well w/o negative SEs  - codeine-guaiFENesin (CHERATUSSIN AC)  mg/5 mL liquid; Take 5 mL by mouth 3 (three) times a day as needed for cough.  Dispense: 120 mL; Refill: 0    2. Type 2 diabetes mellitus  I anticipate she's well controlled, managed with diet.  - Glycosylated Hemoglobin A1c  - Microalbumin, Random Urine  - Lipid Cascade  - Comprehensive Metabolic Panel    Patient Instructions   I sent in the cough syrup for you.  Let me know if the cough gets worse and we can consider antibiotics at that point.  Today's labs will be available on [facility].  If you aren't signed up, then we'll mail them out.  If anything needs more immediate follow up, we'll also call.  Take care!          CHIEF COMPLAINT:  Chief Complaint   Patient presents with     Hypertension     Cough     Productive cough x 3 weeks, denies SOB     Labs Only     Pt requesting fasting labs       HISTORY OF PRESENT ILLNESS:  Patricia Bose is a 89 y.o. female  presenting to the clinic today for hypertension. She is fasting.     Cough: She has had a productive cough for 3 weeks. The phlegm is clear. She did improve for a little while, but it has since then worsened. She tried Cheratussin ac and found that it helped a lot. The medication allowed her to sleep through the night and she did not have a cough the rest of the night. She denies sinus headache or pressure.     Hyperglycemia: Her blood sugar this morning was 117. Other days her sugars tend to be more elevated. She has participated in the Pre-Diabetes class twice.     Hypertension: Her blood pressure today was 134/52.     Health Maintenance: She has her eyes checked regularly. She has not had a diabetic eye check. She is planning on seeing the pulmonologist in the fall.     REVIEW OF SYSTEMS:   She denies fevers, chills, and shortness of breath. She denies itchy or watery eyes or itchy throat. She denies  numbness or tingling in her feet. The fingers of her left hand are numb. She recently had a wart removed from her back. She consumes protein drinks. All other systems are negative.    PFSH:  She has fibrosis in her left lung.       TOBACCO USE:  History   Smoking Status     Never Smoker   Smokeless Tobacco     Never Used       VITALS:  Vitals:    04/26/17 0939   BP: 134/52   Patient Site: Right Arm   Pulse: 74   Weight: 115 lb 4 oz (52.3 kg)     Wt Readings from Last 3 Encounters:   04/26/17 115 lb 4 oz (52.3 kg)   01/30/17 115 lb 12.8 oz (52.5 kg)   11/15/16 116 lb (52.6 kg)       PHYSICAL EXAM:  Constitutional:  Reveals an alert, pleasant elderly female.   Vitals:  Noted.   Lungs: dry cackles in bases bilaterally   Heart: Regular rate and rhythm, S1 and S2 normal, no murmur, rub, or gallop,   Extremities: Extremities normal, atraumatic, no cyanosis or edema   Neurologic: Normal     ADDITIONAL HISTORY SUMMARIZED (2): None.  DECISION TO OBTAIN EXTRA INFORMATION (1): None.   RADIOLOGY TESTS (1): None.  LABS (1): Reviewed labs from 11/9/16. Ordered labs.   MEDICINE TESTS (1): None.  INDEPENDENT REVIEW (2 each): None.     The visit lasted a total of 10 minutes face to face with the patient. Over 50% of the time was spent counseling and educating the patient about cough.    I, Linda Tanner, am scribing for and in the presence of, Dr. Saritha Hampton.    I, Dr. Saritha Hampton, personally performed the services described in this documentation, as scribed by Linda Tanner in my presence, and it is both accurate and complete.    MEDICATIONS:  Current Outpatient Prescriptions   Medication Sig Dispense Refill     amLODIPine (NORVASC) 5 MG tablet TAKE ONE TABLET BY MOUTH ONE TIME DAILY-Please Schedule Clinic Appointment in November 90 tablet 0     amoxicillin (AMOXIL) 500 MG capsule Take 500 mg by mouth. Take 4 pills prior to dental work       aspirin 81 MG EC tablet Take 81 mg by mouth daily.       atorvastatin  (LIPITOR) 40 MG tablet Take 1 tablet (40 mg total) by mouth bedtime. 90 tablet 2     blood glucose test strips TEST TWICE WEEKLY 100 each 1     calcium carbonate-vitamin D3 (CALCIUM 600 + D,3,) 600 mg calcium- 200 unit cap Take 1 tablet by mouth daily.        cholecalciferol, vitamin D3, (VITAMIN D3) 1,000 unit capsule Take 1,000 Units by mouth daily.       cyanocobalamin (VITAMIN B-12) 50 mcg tablet Take 50 mcg by mouth daily.       DENTA 5000 PLUS 1.1 % Crea        generic lancets (ACCU-CHEK FASTCLIX) Use 1 each As Directed 2 (two) times a day. 200 each 3     glucosamine-chondroitin 500-400 mg cap Take 2 capsules by mouth daily.       hydroCHLOROthiazide (HYDRODIURIL) 25 MG tablet TAKE ONE TABLET BY MOUTH ONE TIME DAILY 90 tablet 1     magnesium 250 mg Tab Take 1 tablet by mouth daily.       MELATONIN ORAL Take 1 capsule by mouth as needed.       multivitamin therapeutic (THERAGRAN) tablet Take 1 tablet by mouth daily.       OMEGA-3/DHA/EPA/FISH OIL (FISH OIL-OMEGA-3 FATTY ACIDS) 300-1,000 mg capsule Take 1 g by mouth daily.       vitamin A-vitamin C-vit E-min (OCUVITE) Tab tablet Take 1 tablet by mouth daily.       codeine-guaiFENesin (CHERATUSSIN AC)  mg/5 mL liquid Take 5 mL by mouth 3 (three) times a day as needed for cough. 120 mL 0     EPIPEN 2-CAROLINA 0.3 mg/0.3 mL (1:1,000) atIn        No current facility-administered medications for this visit.        Total data points: 1

## 2021-06-11 NOTE — TELEPHONE ENCOUNTER
Orders being requested: 3 D Mammogram  Reason service is needed/diagnosis: previous hx of biopsies  When are orders needed by: next Wednesday has an appointment @ the [facility],patient reports they do not yet have any orders  Where to send Orders: please advise asap and notify patient  Okay to leave detailed message?  Yes

## 2021-06-11 NOTE — PROGRESS NOTES
"Pulmonary Follow Up Note  6/15/2017      Reason for Follow Up: Pulmonary fibrosis, unspecified etiology.      Problem List:     Patient Active Problem List   Diagnosis     Osteopenia     Mixed hyperlipidemia     Hypertension     Insomnia     CAD (coronary artery disease)     Type 2 diabetes mellitus           History:     Ms. Patricia Bose is an 89 yo female with PMH significant for CAD, HTN, and DM who was seen in pulmonary clinic for evaluation of pulmonary fibrosis.  She reports that this was first identified about 7 years ago on a CXR.  She has never had many symptoms from it, and was wondering if this is something she needs to worry about.  She does note that she has an intermittent dry cough and this persists but does not bother her.  She has had this cough for some time, and it is mostly nonproductive.  It does not bother her at night.  She does have a history of symptomatic GERD, and takes 20 mg omeprazole daily, but this is well controlled.  She does note that her nose is \"moist\" but denies any post-nasal drip.  She denies any fevers or chills, chest pain, abdominal pain, arthralgias.  She denies choking on any liquids or food.  She does not have any smoking history.  No family history of lung disease.      Since being seen last, she has continued to do well.  She was playing golf yesterday for 9 holes.  She has no dyspnea or dyspnea on exertion.  She feels well and has only a minimal cough that does not bother her.  We did do a modified walk and she had no exertional hypoxia.      Past Medical History:   Diagnosis Date     Carpal tunnel syndrome      Diabetes mellitus      Hyperlipidemia      Hypertension      Upper respiratory infection      Past Surgical History:   Procedure Laterality Date     BREAST BIOPSY Left 1980    benign     BREAST BIOPSY Left     before 1980    benign     CATARACT EXTRACTION       HYSTERECTOMY  1957    benign     OOPHORECTOMY  1957    benign     LA REMOVAL OF TONSILS,<13 Y/O   "    Description: Tonsillectomy;  Recorded: 04/29/2014;     OH TOTAL ABDOM HYSTERECTOMY      Description: Hysterectomy;  Recorded: 04/29/2014;     ROTATOR CUFF REPAIR  10 years ago    left shoulder      Social History     Social History     Marital status:      Spouse name: N/A     Number of children: N/A     Years of education: N/A     Occupational History     Not on file.     Social History Main Topics     Smoking status: Never Smoker     Smokeless tobacco: Never Used     Alcohol use 2.4 oz/week     4 Glasses of wine per week     Drug use: No     Sexual activity: Not on file     Other Topics Concern     Not on file     Social History Narrative     Family History   Problem Relation Age of Onset     Breast cancer Mother 52     Breast cancer Sister 72     Coronary artery disease Brother      Heart attack Brother 78     Coronary artery disease Father      Heart attack Father 67     Allergies   Allergen Reactions     Ibuprofen Shortness Of Breath       Review of Systems - 10 point review of systems negative except what is mentioned in the HPI.        Medications:     Current Outpatient Prescriptions on File Prior to Visit   Medication Sig Dispense Refill     amLODIPine (NORVASC) 5 MG tablet TAKE ONE TABLET BY MOUTH ONE TIME DAILY-Please Schedule Clinic Appointment in November 90 tablet 3     amoxicillin (AMOXIL) 500 MG capsule Take 500 mg by mouth. Take 4 pills prior to dental work       aspirin 81 MG EC tablet Take 81 mg by mouth daily.       atorvastatin (LIPITOR) 40 MG tablet Take 1 tablet (40 mg total) by mouth bedtime. 90 tablet 2     blood glucose test strips TEST TWICE WEEKLY 100 each 1     calcium carbonate-vitamin D3 (CALCIUM 600 + D,3,) 600 mg calcium- 200 unit cap Take 1 tablet by mouth daily.        cholecalciferol, vitamin D3, (VITAMIN D3) 1,000 unit capsule Take 1,000 Units by mouth daily.       cyanocobalamin (VITAMIN B-12) 50 mcg tablet Take 50 mcg by mouth daily.       DENTA 5000 PLUS 1.1 % Crea         generic lancets (ACCU-CHEK FASTCLIX) Use 1 each As Directed 2 (two) times a day. 200 each 3     glucosamine-chondroitin 500-400 mg cap Take 2 capsules by mouth daily.       hydroCHLOROthiazide (HYDRODIURIL) 25 MG tablet TAKE ONE TABLET BY MOUTH ONE TIME DAILY 90 tablet 1     magnesium 250 mg Tab Take 1 tablet by mouth daily.       MELATONIN ORAL Take 1 capsule by mouth as needed.       multivitamin therapeutic (THERAGRAN) tablet Take 1 tablet by mouth daily.       OMEGA-3/DHA/EPA/FISH OIL (FISH OIL-OMEGA-3 FATTY ACIDS) 300-1,000 mg capsule Take 1 g by mouth daily.       vitamin A-vitamin C-vit E-min (OCUVITE) Tab tablet Take 1 tablet by mouth daily.       [DISCONTINUED] EPIPEN 2-CAROLINA 0.3 mg/0.3 mL (1:1,000) atIn        Current Facility-Administered Medications on File Prior to Visit   Medication Dose Route Frequency Provider Last Rate Last Dose     denosumab 60 mg (PROLIA 60 mg/ml)  60 mg Subcutaneous Q6 Months Titus Parikh MD   60 mg at 05/05/17 1253           Exam/Data:   Vitals  Vitals:    06/15/17 1523   BP: 110/50   Pulse: 81   Resp: 16   SpO2: 98%       EXAM:  GEN: Alert and oriented x3, NAD  HEENT: Nares patent, posterior oropharynx clear.  RESPIRATORY: CTAB.  No wheeze or rales  CARDIOVASCULAR: RRR, no m/r/g  GASTROINTESTINAL: Round, soft, NT/ND  HEM/ONC: no adenopathy, no ecchymosis  MSK: no clubbing.  Ambulates normally.  DIP deformities consistent with OA  NEURO: cranial nerves appear grossly intact, muscle strength equal  SKIN: no rash or ulcerations      DATA      PFT DATA:  Pulmonary Functions Testing Results:   FEV1/FVC is normal   FEV1 is normal   FVC is normal   There was no improvement in spirometry after a single inhaled dose of bronchodilator   TLC is normal   DLCO is reduced (62) when it is corrected for hemoglobin.   Impression:   Lung Volumes and Spirometry are normal   DLCO is mildly reduced (62).        IMAGING:     XR CHEST PA AND LATERAL7/29/2015 3:24 PMINDICATION: Chest  painCOMPARISON: NoneFINDINGS: Negative chest.    7/14/2015 11:18 AM   INDICATION: Shortness of breath. Abnormal stress test.   COMPARISON: None.   FINDINGS:   LIMITED CHEST: Slight septal thickening and either fibrosis or subsegmental atelectasis involving the periphery of both lower lobes.   LIMITED MEDIASTINUM: Coronary artery calcifications. Atherosclerotic disease thoracic aorta. Small esophageal hiatal hernia.   LIMITED UPPER ABDOMEN: Negative.   IMPRESSION:   CONCLUSION:   1. No significant incidental extracardiac findings.   2. Please refer to cardiologist's dictation for the cardiac CT report.    Personally reviewed previously.  Minimal basilar peripheral fibrosis.       Modified walk showed no evidence of desaturation or hypoxia on room.  Patient ambulated 300 ft.    Assessment/Plan:   Patricia Bose is a 89 y.o. female with pulmonary fibrosis on CT    1. Pulmonary Fibrosis:  The etiology is not clear, but is likely related to reflux or aspiration.  She has no systemic findings, and no real symptoms other than cough.  She has normal pulmonary function tests, and she has excellent exertional capacity and this persists.  This is likely stable and will not cause her any detriment in the next several years.  She continues on omeprazole for GERD.  Otherwise, her pattern is not consistent with IPF, and she does not need further workup for other causes at this time.  I will see her back in 1 year with repeat PFTs to monitor for any progression.    2. Cough:  Likely related to pulmonary fibrosis vs GERD.  Continued on omprazole.  I will also try codeine for social functions.        Recommend:  - follow up in 1 year with repeat PFTs  - continue omeprazole  - codeine prn    Emilio Garces,

## 2021-06-11 NOTE — PROGRESS NOTES
"Called patient and gave her the message from Dr. Garces.  Dr. Garces wanted the patient to know that her \"breathing tests are good and unchanged from previous tests.\"  Patient stated that she would like a copy sent to her in the mail, but had no further questions at this time.  "

## 2021-06-11 NOTE — TELEPHONE ENCOUNTER
Orders being requested: 3D Mammogram  Reason service is needed/diagnosis: The patient states she had the 3D mammogram last year and if she wants it  again Vidhi Pearl CNP needs to recommend the 3D.  When are orders needed by: 9/9/20-the date she is scheduled for the mammogram  Where to send Orders: [facility] La[facility] to leave detailed message?  Yes

## 2021-06-12 NOTE — TELEPHONE ENCOUNTER
Return call to patient her that results were sent to Verónica Lopez NP for review since her orders were used and questioned if she had been contacted with results/recommendations - patient reported that she had not rec'd results as yet and confirmed she had rec'd 10-9-20 AVS was given to her after appt but she misplaced it.    Reassured patient that results would be forwarded to Dr. Krueger to review and new AVS would be mailed to her - instructed her to also contact endocrinologist KAMILLA Lopez NP for further recommendations - patient verbalized understanding.    Please review 10-9-20 lab results - echo sched for 11-6-20 - no f/u sched or pending - any new orders?  mg

## 2021-06-12 NOTE — TELEPHONE ENCOUNTER
[facility] Team    For upcoming prolia injection, 11/2020, pt is suppose to  injection and bring to our clinic so we can administer.     Patient is wondering how she is suppose to do this. Does pharmacy already know that they will be dispensing injection to her? Does she have to do anything on her end, besides picking up injection?      Please call her back @ [phone]

## 2021-06-12 NOTE — TELEPHONE ENCOUNTER
Wellness Screening Tool  Symptom Screening:  Do you have one of the following NEW symptoms:    Fever (subjective or >100.0)?  No    A new cough?  No    Shortness of breath?  No     Chills? No     New loss of taste or smell? No     Generalized body aches? No     New persistent headache? No     New sore throat? No     Nausea, vomiting, or diarrhea?  No    Within the past 2 weeks, have you been exposed to someone with a known positive illness below:    COVID-19 (known or suspected)?  No    Chicken pox?  No    Mealses?  No    Pertussis?  No    Patient notified of visitor policy- They may have one person accompany them to their appointment, but they will need to wear a mask and will be screened upon arrival for symptoms: Yes  Pt informed to wear a mask: Yes  Pt notified if they develop any symptoms listed above, prior to their appointment, they are to call the clinic directly at [phone] for further instructions.  Yes  Patient's appointment status: Patient will be seen in clinic as scheduled on 10/9

## 2021-06-12 NOTE — TELEPHONE ENCOUNTER
----- Message from Chichi Lyle sent at 10/29/2020  9:39 AM CDT -----  General phone call:  PATIENT CALLING FOR LAB RESULTS, SHE WOULD LIKE THEM MAILED TO HER ALONG WITH THE AVS FROM LAST VISIT WITH DR VANCE.  PLEASE CALL  Caller: PATIENT  Primary cardiologist: DR VANCE  Detailed reason for call: SEE ABOVE  New or active symptoms? NO  Best phone number: [phone]  Best time to contact: ANY TIME  Ok to leave a detailed message? YES  Device? NO    Additional Info:

## 2021-06-12 NOTE — TELEPHONE ENCOUNTER
FYI - Status Update  Who is Calling: Patient  Update: Please rush this I am totally out of test stripts,   Okay to leave a detailed message?:  Yes

## 2021-06-12 NOTE — TELEPHONE ENCOUNTER
RN cannot approve Refill Request    RN can NOT refill this medication Protocol failed and NO refill given. Last office visit: 12/27/2019 Vidhi Peral CNP Last Physical: 6/18/2019 Last MTM visit: Visit date not found Last visit same specialty: 12/27/2019 Vidhi Pearl CNP.  Next visit within 3 mo: Visit date not found  Next physical within 3 mo: Visit date not found      Nehal Liang, [facility] Triage/Med Refill 11/6/2020    Requested Prescriptions   Pending Prescriptions Disp Refills     blood glucose test (ACCU-CHEK AMIRA PLUS TEST STRP) strips 100 strip 5     Sig: USE 1 STRIP DAILY TO TEST BLOOD SUGAR       Diabetic Supplies Refill Protocol Failed - 11/6/2020  8:09 AM        Failed - Visit with PCP or prescribing provider visit in last 6 months     Last office visit with prescriber/PCP: 12/27/2019 Vidhi Pearl CNP OR same dept: 12/27/2019 Vidhi Pearl CNP OR same specialty: 12/27/2019 Vidhi Pearl CNP  Last physical: 6/18/2019 Last MTM visit: Visit date not found   Next visit within 3 mo: Visit date not found  Next physical within 3 mo: Visit date not found  Prescriber OR PCP: Vidhi Pearl CNP  Last diagnosis associated with med order: 1. Type 2 diabetes mellitus without complication, without long-term current use of insulin (H)  - blood glucose test (ACCU-CHEK AMIRA PLUS TEST STRP) strips; USE 1 STRIP DAILY TO TEST BLOOD SUGAR  Dispense: 100 strip; Refill: 5    If protocol passes may refill for 12 months if within 3 months of last provider visit (or a total of 15 months).             Passed - A1C in last 6 months     Hemoglobin A1c   Date Value Ref Range Status   10/09/2020 6.8 (H) <=5.6 % Final     Comment:     Prediabetes:   HBA1c       5.7 to 6.4%        Diabetes:        HBA1c        >=6.5%   Patients with Hgb F >5%, total bilirubin >10.0 mg/dL, abnormal red cell turnover, severe renal or hepatic disease or malignancy should not have this  A1C method used to diagnose or monitor diabetes.

## 2021-06-12 NOTE — TELEPHONE ENCOUNTER
You can let her know that her LDL looked good at 85.  No change in cholesterol medication at this point.    JAYDON

## 2021-06-12 NOTE — TELEPHONE ENCOUNTER
I called the pt and informed that I have sent the prescription to the pharmacy and they will call her to obtain her co-pay. I informed that they will send the medication to our clinic for her appt.

## 2021-06-12 NOTE — PROGRESS NOTES
ASSESSMENT and PLAN:  1. Insomnia  She'll try trazodone for her sleep.  Med usage and side effects discussed.  - traZODone (DESYREL) 50 MG tablet; Take 1 tablet (50 mg total) by mouth at bedtime as needed for sleep.  Dispense: 90 tablet; Refill: 1    2. Diabetes mellitus  She's been well controlled with diet.  We'll get her A1c today.  - Glycosylated Hemoglobin A1c  - blood glucose test strips; TEST TWICE WEEKLY  Dispense: 50 strip; Refill: 3    3. Anemia  She's fatigued, but otherwise asymptomatic.  Her fatigue is chronic.  She has no sx of active loss.  We'll get labs and then discuss possible eval pending results.  - HM2(CBC w/o Differential)  - Vitamin B12  - Ferritin        Medications Discontinued During This Encounter   Medication Reason     blood glucose test strips Reorder       No Follow-up on file.    Patient Instructions   Today's labs will be available on [facility].  If you aren't signed up, then we'll mail them out.  If anything needs more immediate follow up, we'll also call.    Try the sleep medication.    I'll see you in Nov; bring your list!    Take care!      CHIEF COMPLAINT:  Chief Complaint   Patient presents with     Follow-up     hemoglobin check       HISTORY OF PRESENT ILLNESS:  Patricia Bose is a 89 y.o. female  presenting to the clinic today for a recheck of her Hgb.  She was noted to be low at 10.8 on 6/21/17 when she had PFTs done.  She hasn't had any bloody stools or dark tarry stools.  She takes B12 supplements twice daily; there have been no changes w/ that.  She's tired, but that hasn't changed.      She has trouble w/ sleep.  She uses tylenol PM and melatonin; they help a little.      She needs her test strips refilled.  She could have an A1c checked today.  She remains controlled w/ diet.    REVIEW OF SYSTEMS:    All other systems are negative.    TOBACCO USE:  History   Smoking Status     Never Smoker   Smokeless Tobacco     Never Used       VITALS:  Vitals:    08/22/17 1531    BP: 134/66   Patient Site: Right Arm   Patient Position: Sitting   Cuff Size: Adult Regular   Pulse: 76   Weight: 115 lb 4.8 oz (52.3 kg)     Wt Readings from Last 3 Encounters:   08/22/17 115 lb 4.8 oz (52.3 kg)   06/15/17 114 lb 14.4 oz (52.1 kg)   04/26/17 115 lb 4 oz (52.3 kg)         PHYSICAL EXAM:  Constitutional:  Reveals an alert, pleasant adult female. , looks younger than stated age  Vitals:  Noted.       QUALITY MEASURES:  The following high BMI interventions were performed this visit: weight monitoring    MEDICATIONS:  Current Outpatient Prescriptions   Medication Sig Dispense Refill     amLODIPine (NORVASC) 5 MG tablet TAKE ONE TABLET BY MOUTH ONE TIME DAILY-Please Schedule Clinic Appointment in November 90 tablet 3     amoxicillin (AMOXIL) 500 MG capsule Take 500 mg by mouth. Take 4 pills prior to dental work       aspirin 81 MG EC tablet Take 81 mg by mouth daily.       atenolol (TENORMIN) 25 MG tablet TAKE 1 TABLET (25 MG TOTAL) BY MOUTH DAILY.  3     atorvastatin (LIPITOR) 40 MG tablet Take 1 tablet (40 mg total) by mouth bedtime. 90 tablet 2     blood glucose test strips TEST TWICE WEEKLY 50 strip 3     calcium carbonate-vitamin D3 (CALCIUM 600 + D,3,) 600 mg calcium- 200 unit cap Take 1 tablet by mouth daily.        cholecalciferol, vitamin D3, (VITAMIN D3) 1,000 unit capsule Take 1,000 Units by mouth daily.       codeine 15 MG tablet Take 1 tablet (15 mg total) by mouth every 6 (six) hours as needed for pain. 30 tablet 0     cyanocobalamin (VITAMIN B-12) 50 mcg tablet Take 50 mcg by mouth daily.       DENTA 5000 PLUS 1.1 % Crea        generic lancets (ACCU-CHEK FASTCLIX) Use 1 each As Directed 2 (two) times a day. 200 each 3     glucosamine-chondroitin 500-400 mg cap Take 2 capsules by mouth daily.       hydroCHLOROthiazide (HYDRODIURIL) 25 MG tablet TAKE ONE TABLET BY MOUTH ONE TIME DAILY 90 tablet 1     magnesium 250 mg Tab Take 1 tablet by mouth daily.       MELATONIN ORAL Take 1 capsule by  mouth as needed.       multivitamin therapeutic (THERAGRAN) tablet Take 1 tablet by mouth daily.       OMEGA-3/DHA/EPA/FISH OIL (FISH OIL-OMEGA-3 FATTY ACIDS) 300-1,000 mg capsule Take 1 g by mouth daily.       vitamin A-vitamin C-vit E-min (OCUVITE) Tab tablet Take 1 tablet by mouth daily.       traZODone (DESYREL) 50 MG tablet Take 1 tablet (50 mg total) by mouth at bedtime as needed for sleep. 90 tablet 1     Current Facility-Administered Medications   Medication Dose Route Frequency Provider Last Rate Last Dose     denosumab 60 mg (PROLIA 60 mg/ml)  60 mg Subcutaneous Q6 Months Titus Parikh MD   60 mg at 05/05/17 1477

## 2021-06-13 NOTE — TELEPHONE ENCOUNTER
Pt called for echo result from 11/6. I had spoken to her on 11/11 and sent results to her. She had no memory of this and states she did not get the report. Reviewed report with her and mail a new report.

## 2021-06-13 NOTE — PROGRESS NOTES
"Patricia Bose is a 89 y.o. female who is being evaluated via a billable telephone visit.      The patient has been notified of following:     \"This telephone visit will be conducted via a call between you and your physician/provider. We have found that certain health care needs can be provided without the need for a physical exam.  This service lets us provide the care you need with a short phone conversation.  If a prescription is necessary we can send it directly to your pharmacy.  If lab work is needed we can place an order for that and you can then stop by our lab to have the test done at a later time.    Telephone visits are billed at different rates depending on your insurance coverage. During this emergency period, for some insurers they may be billed the same as an in-person visit.  Please reach out to your insurance provider with any questions.    If during the course of the call the physician/provider feels a telephone visit is not appropriate, you will not be charged for this service.\"    Patient has given verbal consent to a Telephone visit? Yes    What phone number would you like to be contacted at? [phone]    Patient would like to receive their AVS by AVS Preference: Mail a copy.    Additional provider notes:       Reason for visit      1. Type 2 diabetes mellitus with hyperglycemia, without long-term current use of insulin (H)        HPI     Patricia Bose is a very pleasant 89 y.o. old female who presents for follow up.  SUMMARY:    Patricia is contacted today in f/u for DM 2 and Osteopenia. She reports that she is feeling well. Her last A1c was 6.8 and she is currently just managing with her diet. She reports that her son is shopping for her right now and that her nephew is living with her - he is 76.     She continues on Prolia injections without difficulty. She is doing \"a lot of walking\".  Her Vit D level is 39 and her Calcium level is 9.7. Her last   Dexa Scan showed: Since the previous " bone density dated  September 21, 2018, there has been a   +4.5% change in the bone density of the spine.  Additionally there has been no statistically significant % change in the hips bilaterally.   An increase in/or stability is considered a positive response to treatment.         Blood glucose data:  132,120,118    Past Medical History     Patient Active Problem List   Diagnosis     Osteopenia     Mixed hyperlipidemia     Hypertension     Insomnia     CAD (coronary artery disease)     Type 2 diabetes mellitus (H)        Family History       family history includes Breast cancer (age of onset: 52) in her mother; Breast cancer (age of onset: 72) in her sister; Cancer in her father; Coronary artery disease in her brother and father; Diabetes in her brother; Heart attack (age of onset: 67) in her father; Heart attack (age of onset: 78) in her brother; Parkinsonism in her mother.    Social History      reports that she has never smoked. She has never used smokeless tobacco. She reports current alcohol use of about 4.0 standard drinks of alcohol per week. She reports that she does not use drugs.      Review of Systems     Patient has no polyuria or polydipsia, no chest pain, dyspnea or TIA's, no numbness, tingling or pain in extremities  Remainder negative except as noted in HPI.    Vital Signs     LMP 05/05/1957   Wt Readings from Last 3 Encounters:   11/06/20 109 lb (49.4 kg)   10/09/20 109 lb (49.4 kg)   12/27/19 112 lb (50.8 kg)             Assessment     1. Type 2 diabetes mellitus with hyperglycemia, without long-term current use of insulin (H)        Plan     Patricia' DM management is great. No further treatment needed at this point.     She will remain on the Prolia injections. We will f/u together in 1 year, sooner with problems or concerns.         Lab Results     Hemoglobin A1c   Date Value Ref Range Status   10/09/2020 6.8 (H) <=5.6 % Final     Comment:     Prediabetes:   HBA1c       5.7 to 6.4%         Diabetes:        HBA1c        >=6.5%   Patients with Hgb F >5%, total bilirubin >10.0 mg/dL, abnormal red cell turnover, severe renal or hepatic disease or malignancy should not have this A1C method used to diagnose or monitor diabetes.       01/21/2020 6.9 (H) 3.5 - 6.0 % Final   11/13/2019 6.8 (H) 3.5 - 6.0 % Final   06/12/2019 7.3 (H) 3.5 - 6.0 % Final   05/01/2019 7.0 (H) 3.5 - 6.0 % Final     Creatinine   Date Value Ref Range Status   10/09/2020 0.92 0.60 - 1.10 mg/dL Final   09/05/2019 1.05 0.60 - 1.10 mg/dL Final   07/31/2019 1.02 0.60 - 1.10 mg/dL Final   07/31/2019 1.02 0.60 - 1.10 mg/dL Final     Microalbumin, Random Urine   Date Value Ref Range Status   11/16/2020 1.81 0.00 - 1.99 mg/dL Final       Cholesterol   Date Value Ref Range Status   06/12/2019 194 <=199 mg/dL Final     HDL Cholesterol   Date Value Ref Range Status   06/12/2019 63 >=50 mg/dL Final     LDL Calculated   Date Value Ref Range Status   06/12/2019 102 <=129 mg/dL Final     Triglycerides   Date Value Ref Range Status   06/12/2019 146 <=149 mg/dL Final       Lab Results   Component Value Date    ALT 23 09/05/2019    AST 28 09/05/2019    ALKPHOS 55 09/05/2019    BILITOT 0.4 09/05/2019         Current Medications     Outpatient Medications Prior to Visit   Medication Sig Dispense Refill     amoxicillin (AMOXIL) 500 MG capsule Take 500 mg by mouth. Take 4 pills prior to dental work       aspirin 81 MG EC tablet Take 81 mg by mouth daily.       atorvastatin (LIPITOR) 40 MG tablet Take 1 tablet (40 mg total) by mouth daily. 90 tablet 1     blood glucose meter (GLUCOMETER) Use 1 each As Directed daily. Dispense glucometer brand per patient's insurance at pharmacy discretion. 1 each 0     blood glucose test (ACCU-CHEK AMIRA PLUS TEST STRP) strips USE 1 STRIP DAILY TO TEST BLOOD SUGAR (Patient taking differently: USE 2 STRIP weekly TO TEST BLOOD SUGAR) 100 strip 5     cholecalciferol, vitamin D3, (VITAMIN D3) 1,000 unit capsule Take 1,000 Units  by mouth daily.       codeine-guaiFENesin (GUAIFENESIN AC)  mg/5 mL liquid Take 5-10 mL by mouth 4 (four) times a day as needed for cough. 240 mL 0     DENTA 5000 PLUS 1.1 % Crea        fluticasone (FLONASE) 50 mcg/actuation nasal spray 1-2 sprays twice daily 16 g 3     generic lancets (ACCU-CHEK FASTCLIX LANCING DEV) Use 1 each As Directed daily. 200 each 2     glucosamine-chondroitin 500-400 mg cap Take 2 capsules by mouth daily.       hydroCHLOROthiazide (HYDRODIURIL) 25 MG tablet Take 1 tablet (25 mg total) by mouth daily. 90 tablet 3     losartan (COZAAR) 25 MG tablet Take 25 mg by mouth daily.  11     magnesium 250 mg Tab Take 1 tablet by mouth daily.       MELATONIN ORAL Take 10 mg by mouth daily.        multivitamin therapeutic (THERAGRAN) tablet Take 1 tablet by mouth daily.       vitamin A-vitamin C-vit E-min (OCUVITE) Tab tablet Take 1 tablet by mouth 2 (two) times a day.        Facility-Administered Medications Prior to Visit   Medication Dose Route Frequency Provider Last Rate Last Admin     denosumab 60 mg (PROLIA 60 mg/ml)  60 mg Subcutaneous Q6 Months Verónica Lopez NP   60 mg at 11/16/20 1256     denosumab 60 mg (PROLIA 60 mg/ml)  60 mg Subcutaneous Q6 Months Titus Parikh MD   60 mg at 11/07/19 1431               Phone call duration: 18 minutes    Benita LANGFORD

## 2021-06-13 NOTE — PROGRESS NOTES
"Prolia Injection Phone Screen      Screening questions have been asked 2-3 days prior to administration visit for Prolia. If any questions are answered with \"Yes,\" this phone encounter were will routed to ordering provider for further evaluation.     1.  When was the last injection?  5/7/20    2.  Has insurance for this injection been verified?  Yes    3.  Did you experience any new onset achiness or rashes that lasted for over a month with your previous Prolia injection?   No    4.  Do you have a fever over 101?F or a new deep cough that is unusual for you today? No    5.  Have you started any new medications in the last 6 months that you were told could affect your immune system? These may have been prescribed by oncologist, transplant, rheumatology, or dermatology.   No    6.  In the last 6 months have you have gastric bypass or parathyroid surgery?   No    7.  Do you plan dental work requiring drilling into the bone such as implants/extractions or oral surgery in the next 2-3 months?   No    8. Do you have new insurance since the last injection?    Patient informed if symptoms discussed above present prior to their administration appointment, they are to notify clinic immediately.     Catina Guidry          The following steps were completed to comply with the REMS program for Prolia:  1. Ordering provider has previously reviewed information in the Medication Guide and Patient Counseling Chart, including the serious risks of Prolia  and the symptoms of each risk and have been advised to seek prompt medical attention if they have signs or symptoms of any of the serious risks.  2. Provided each patient a copy of the Medication Guide and Patient Brochure.  See MAR for administration details.   Indication: Prolia  (denosumab) is a prescription medicine used to treat osteoporosis in patients who:   Are at high risk for fracture, meaning patients who have had a fracture related to osteoporosis, or who have multiple " risk factors for fracture; Cannot use another osteoporosis medicine or other osteoporosis medicines did not work well.   The timeline for early/late injections would be 4 weeks early and any time after the 6 month grupo. If a patient receives their injection late, then the subsequent injection would be 6 months from the date that they actually received the injection    Have the screening questions been asked prior to this administration? Yes      After obtaining consent, and per orders of Verónica Lopez NP, injection of Prolia 60 mg given by Catina Guidry. Patient instructed to remain in clinic for 20 minutes afterwards, and to report any adverse reaction to me immediately.

## 2021-06-14 NOTE — TELEPHONE ENCOUNTER
RN cannot approve Refill Request    RN can NOT refill this medication Protocol failed and NO refill given. Last office visit: 12/27/2019 Vidhi Pearl CNP Last Physical: 6/18/2019 Last MTM visit: Visit date not found Last visit same specialty: 12/27/2019 Vidhi Pearl CNP.  Next visit within 3 mo: Visit date not found  Next physical within 3 mo: Visit date not found      Nehal Liang, [facility] Triage/Med Refill 1/22/2021    Requested Prescriptions   Pending Prescriptions Disp Refills     hydroCHLOROthiazide (HYDRODIURIL) 25 MG tablet 90 tablet 0     Sig: Take 1 tablet (25 mg total) by mouth daily.       Diuretics/Combination Diuretics Refill Protocol  Failed - 1/22/2021  9:31 AM        Failed - Visit with PCP or prescribing provider visit in past 12 months     Last office visit with prescriber/PCP: 12/27/2019 Vidhi Pearl CNP OR same dept: Visit date not found OR same specialty: 12/27/2019 Vidhi Pearl CNP  Last physical: 6/18/2019 Last MTM visit: Visit date not found   Next visit within 3 mo: Visit date not found  Next physical within 3 mo: Visit date not found  Prescriber OR PCP: Vidhi Pearl CNP  Last diagnosis associated with med order: 1. HTN (hypertension)  - hydroCHLOROthiazide (HYDRODIURIL) 25 MG tablet; Take 1 tablet (25 mg total) by mouth daily.  Dispense: 90 tablet; Refill: 0    If protocol passes may refill for 12 months if within 3 months of last provider visit (or a total of 15 months).             Passed - Serum Potassium in past 12 months      Lab Results   Component Value Date    Potassium 4.4 10/09/2020             Passed - Serum Sodium in past 12 months      Lab Results   Component Value Date    Sodium 139 10/09/2020             Passed - Blood pressure on file in past 12 months     BP Readings from Last 1 Encounters:   11/06/20 135/75             Passed - Serum Creatinine in past 12 months      Creatinine   Date Value Ref Range Status    10/09/2020 0.92 0.60 - 1.10 mg/dL Final

## 2021-06-14 NOTE — TELEPHONE ENCOUNTER
Called the patient and let her know that she is due for a follow up and patient wants to call back to schedule.

## 2021-06-15 NOTE — TELEPHONE ENCOUNTER
See other telephone encounter regarding this. Patient notified of provider recommendation to wait to take medication until she gets there

## 2021-06-15 NOTE — TELEPHONE ENCOUNTER
Charity Perdomo,    Patient is calling because she is having MRI this morning and she got a rx for Lorazepam and she was told by the pharmacist that she shouldn't take this if she has fibrosis of the lung.  She is wondering if it is ok to take her Lorazepam.  Please call patient back at .

## 2021-06-15 NOTE — TELEPHONE ENCOUNTER
Patient has an MRI scheduled today at 12:45. She was prescribed Ativan to take one hour prior to the imaging. Patient was reading the information given and is concerned that it says not to take if you have any lung issues. 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     Message will be sent to care team to advise. Please review and call patient back in the next 45 minutes with recommendations.    Jennifer Larson RN, BSN Nurse Triage Advisor 10:57 AM 3/15/2021

## 2021-06-15 NOTE — TELEPHONE ENCOUNTER
Per discussion with Charity Perdomo FNP- patient to not take 1 hr before but to take when she gets to where she is having her MRI. Patient stated understanding and thanked for the call.

## 2021-06-15 NOTE — TELEPHONE ENCOUNTER
Pt is requesting after visit summary printed out and mailed to her.    She does not want to access info via [employer] as that is on her son's PC.    She needs print out for her own records.

## 2021-06-15 NOTE — PROGRESS NOTES
Assessment and Plan:   1. Routine general medical examination at a health care facility  2. HTN (hypertension)  - losartan (COZAAR) 25 MG tablet; Take 1 tablet (25 mg total) by mouth daily.  Dispense: 90 tablet; Refill: 1  - hydroCHLOROthiazide (HYDRODIURIL) 25 MG tablet; Take 1 tablet (25 mg total) by mouth daily.  Dispense: 90 tablet; Refill: 1  - Comprehensive Metabolic Panel  3. Stage 3 chronic kidney disease, unspecified whether stage 3a or 3b CKD  4. CAD (coronary artery disease)  - Lipid Cascade FASTING  - atorvastatin (LIPITOR) 40 MG tablet; Take 1 tablet (40 mg total) by mouth daily.  Dispense: 90 tablet; Refill: 1  5. Anemia, unspecified type  - HM1(CBC and Differential)  - Iron and Transferrin Iron Binding Capacity  - Ferritin  6. Type 2 diabetes mellitus with hyperglycemia, without long-term current use of insulin (H)  - Glycosylated Hemoglobin A1c; Future  7. Proteinuria, unspecified type  8. Osteopenia, unspecified location  Known issue that I take into account further medical decisions, no current exacerbations or new concerns    The patient's current medical problems were reviewed.    The following health maintenance schedule was reviewed with the patient and provided in printed form in the after visit summary:   Health Maintenance   Topic Date Due     DIABETIC FOOT EXAM  04/26/2018     LIPID  06/12/2020     COVID-19 Vaccine (2 of 2 - Pfizer series) 03/09/2021     A1C  04/09/2021     DIABETIC EYE EXAM  10/01/2021     BMP  10/09/2021     MICROALBUMIN  11/16/2021     MEDICARE ANNUAL WELLNESS VISIT  02/19/2022     FALL RISK ASSESSMENT  02/19/2022     ADVANCE CARE PLANNING  05/15/2023     TD 18+ HE  10/28/2024     Pneumococcal Vaccine: Pediatrics (0 to 5 Years) and At-Risk Patients (6 to 64 Years)  Completed     Pneumococcal Vaccine: 65+ Years  Completed     INFLUENZA VACCINE RULE BASED  Completed     ZOSTER VACCINES  Completed        Subjective:   Chief Complaint: Patricia Bose is an 89 y.o. female  here for an Annual Wellness visit.   HPI: chronic conditions including hyperlipidemia, hypertension, coronary artery disease, type 2 diabetes, osteopenia, insomnia, history of upper respiratory infection, History of insomnia, chronic pancreatitis noted on imaging and she was advised to limit her alcohol consumption, pulmonary fibrosis.  She reports that she is feeling well and denies concerns today.    Review of Systems:   Please see above.  The rest of the review of systems are negative for all systems.    Patient Care Team:  Vidhi Pearl CNP as PCP - General (Nurse Practitioner)  Vidhi Pearl CNP as Assigned PCP  Garima Krueger MD as Assigned Heart and Vascular Provider  Emilio Garces DO as Assigned Pulmonology Provider     Patient Active Problem List   Diagnosis     Osteopenia     Mixed hyperlipidemia     Hypertension     Insomnia     CAD (coronary artery disease)     Type 2 diabetes mellitus (H)     Past Medical History:   Diagnosis Date     Carpal tunnel syndrome      Diabetes mellitus (H)      Hyperlipidemia      Hypertension      Upper respiratory infection       Past Surgical History:   Procedure Laterality Date     BREAST BIOPSY Left 1980    benign     BREAST BIOPSY Left     before 1980    benign     CATARACT EXTRACTION       HYSTERECTOMY  1957    benign     OOPHORECTOMY  1957    benign     CO REMOVAL OF TONSILS,<11 Y/O      Description: Tonsillectomy;  Recorded: 04/29/2014;     CO TOTAL ABDOM HYSTERECTOMY      Description: Hysterectomy;  Recorded: 04/29/2014;     ROTATOR CUFF REPAIR  10 years ago    left shoulder       Family History   Problem Relation Age of Onset     Breast cancer Mother 52     Parkinsonism Mother      Breast cancer Sister 72     Coronary artery disease Brother      Heart attack Brother 78     Diabetes Brother      Coronary artery disease Father      Heart attack Father 67     Cancer Father         bladder      Social History     Socioeconomic History      Marital status:      Spouse name: Not on file     Number of children: Not on file     Years of education: Not on file     Highest education level: Not on file   Occupational History     Not on file   Social Needs     Financial resource strain: Not on file     Food insecurity     Worry: Not on file     Inability: Not on file     Transportation needs     Medical: Not on file     Non-medical: Not on file   Tobacco Use     Smoking status: Never Smoker     Smokeless tobacco: Never Used   Substance and Sexual Activity     Alcohol use: Yes     Alcohol/week: 4.0 standard drinks     Types: 4 Glasses of wine per week     Drug use: No     Sexual activity: Not Currently   Lifestyle     Physical activity     Days per week: Not on file     Minutes per session: Not on file     Stress: Not on file   Relationships     Social connections     Talks on phone: Not on file     Gets together: Not on file     Attends Gnosticist service: Not on file     Active member of club or organization: Not on file     Attends meetings of clubs or organizations: Not on file     Relationship status: Not on file     Intimate partner violence     Fear of current or ex partner: Not on file     Emotionally abused: Not on file     Physically abused: Not on file     Forced sexual activity: Not on file   Other Topics Concern     Not on file   Social History Narrative    . Dated a gentlemen for 35 years who  .  Has 1 son, 2 grandchildren and 3 great grandchildren.  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  She is very social getting together with friends often, she is part of a book club, she enjoys yoga and swimming at the [employer].  She used to golf.      Current Outpatient Medications   Medication Sig Dispense Refill     aspirin 81 MG EC tablet Take 81 mg by mouth daily.       atorvastatin (LIPITOR) 40 MG tablet Take 1 tablet (40 mg total) by mouth daily. 90 tablet 1     blood glucose meter (GLUCOMETER) Use 1 each As  "Directed daily. Dispense glucometer brand per patient's insurance at pharmacy discretion. 1 each 0     blood glucose test (ACCU-CHEK AMIRA PLUS TEST STRP) strips USE 1 STRIP DAILY TO TEST BLOOD SUGAR (Patient taking differently: USE 2 STRIP weekly TO TEST BLOOD SUGAR) 100 strip 5     cholecalciferol, vitamin D3, (VITAMIN D3) 1,000 unit capsule Take 1,000 Units by mouth daily.       codeine-guaiFENesin (GUAIFENESIN AC)  mg/5 mL liquid TAKE 5-10 ML BY MOUTH 4 (FOUR) TIMES A DAY AS NEEDED FOR COUGH. 240 mL 0     DENTA 5000 PLUS 1.1 % Crea        generic lancets (ACCU-CHEK FASTCLIX LANCING DEV) Use 1 each As Directed daily. 200 each 2     glucosamine-chondroitin 500-400 mg cap Take 2 capsules by mouth daily.       hydroCHLOROthiazide (HYDRODIURIL) 25 MG tablet Take 1 tablet (25 mg total) by mouth daily. 90 tablet 0     losartan (COZAAR) 25 MG tablet Take 25 mg by mouth daily.  11     magnesium 250 mg Tab Take 1 tablet by mouth daily.       MELATONIN ORAL Take 10 mg by mouth daily.        multivitamin therapeutic (THERAGRAN) tablet Take 1 tablet by mouth daily.       vitamin A-vitamin C-vit E-min (OCUVITE) Tab tablet Take 1 tablet by mouth 2 (two) times a day.        amoxicillin (AMOXIL) 500 MG capsule Take 500 mg by mouth. Take 4 pills prior to dental work       fluticasone (FLONASE) 50 mcg/actuation nasal spray 1-2 sprays twice daily 16 g 3     Current Facility-Administered Medications   Medication Dose Route Frequency Provider Last Rate Last Admin     denosumab 60 mg (PROLIA 60 mg/ml)  60 mg Subcutaneous Q6 Months Verónica Lopez NP   60 mg at 11/16/20 1256      Objective:   Vital Signs:   Visit Vitals  /74   Pulse (!) 102   Temp 96.6  F (35.9  C)   Resp 20   Ht 4' 8\" (1.422 m)   Wt 110 lb (49.9 kg)   LMP 05/05/1957   SpO2 100%   BMI 24.66 kg/m           VisionScreening:  No exam data present     PHYSICAL EXAM  /68   Pulse (!) 102   Temp 96.6  F (35.9  C)   Resp 20   Ht 4' 8\" (1.422 m)   Wt " 110 lb (49.9 kg)   LMP 05/05/1957   SpO2 100%   BMI 24.66 kg/m      General Appearance:    Alert, cooperative, no distress, appears stated age   Head:    Normocephalic, without obvious abnormality, atraumatic   Eyes:    PERRL, conjunctiva/corneas clear, EOM's intact   Ears:    Normal TM's and external ear canals, both ears   Nose:   Nares normal, septum midline, mucosa normal, no drainage     or sinus tenderness   Throat:   Lips, mucosa, and tongue normal; teeth and gums normal   Neck:   Supple, symmetrical, trachea midline, no adenopathy;     thyroid:  no enlargement/tenderness/nodules; no carotid    bruit or JVD   Back:     Symmetric, no curvature, ROM normal, no CVA tenderness   Lungs:     Clear to auscultation bilaterally, respirations unlabored   Chest Wall:    No tenderness or deformity    Heart:    Regular rate and rhythm, S1 and S2 normal, no murmur, rub    or gallop   Breast Exam:    No tenderness, masses, or nipple abnormality   Abdomen:     Soft, non-tender, bowel sounds active all four quadrants,     no masses, no organomegaly   Extremities:   Extremities normal, atraumatic, no cyanosis or edema Diabetic foot exam: normal DP and PT pulses, no trophic changes or ulcerative lesions and normal sensory exam   Pulses:   2+ and symmetric all extremities   Skin:   Skin color, texture, turgor normal, no rashes or lesions   Lymph nodes:   Cervical, supraclavicular, and axillary nodes normal   Neurologic:   CNII-XII intact, normal strength, sensation and reflexes     throughout         Assessment Results 2/19/2021   Activities of Daily Living No help needed   Instrumental Activities of Daily Living No help needed   Mini Cog Total Score 5   Some recent data might be hidden     A Mini-Cog score of 0-2 suggests the possibility of dementia, score of 3-5 suggests no dementia    Identified Health Risks:     She is at risk for falling and has been provided with information to reduce the risk of falling at  home.  Patient's advanced directive was discussed and I am comfortable with the patient's wishes.

## 2021-06-15 NOTE — TELEPHONE ENCOUNTER
Pt has my chart but she does not have a computer.  She uses her son's computer when she is at his home.    She is requesting a copy of her lab results are mailed to her for her file.

## 2021-06-15 NOTE — PROGRESS NOTES
Internal Medicine Office Visit  [facility]   Patient Name: Patricia Bose  Patient Age: 89 y.o.  YOB: 1926  MRN: [id number]    Date of Visit: 3/9/2021  Reason for Office Visit:   Chief Complaint   Patient presents with     Fall     happened 1 week ago, rt arm not having full ROM           Assessment / Plan / Medical Decision Making:    Problem List Items Addressed This Visit     None      Visit Diagnoses     Acute pain of right shoulder due to trauma    -  Primary    - No fractures or dislocations on xray  - Given weakness and diminished ROM after injury, MRI recommended to evaluate for a rotator cuff pathology. Pre-procedure valium d/t h/o claustrophobia with MRI   - Continue acetaminophen for pain  - Prior  Shoulder surgeon was Dr. Rivera [facility]    Relevant Medications    LORazepam (ATIVAN) 0.5 MG tablet    Other Relevant Orders    XR Shoulder Right 2 or More VWS (Completed)    XR Humerus Right 2 VWS (Completed)    MR Shoulder Without Contrast Right           I am having Patricia Bose start on LORazepam. I am also having her maintain her amoxicillin, aspirin, glucosamine-chondroitin, magnesium, vitamin A-vitamin C-vit E-min, cholecalciferol (vitamin D3), MELATONIN ORAL, multivitamin therapeutic, Denta 5000 Plus, fluticasone propionate, blood glucose meter, generic lancets, Accu-Chek Tracie Plus test strp, codeine-guaiFENesin, losartan, hydroCHLOROthiazide, and atorvastatin. We will continue to administer denosumab.                Orders Placed This Encounter   Procedures     XR Shoulder Right 2 or More VWS     XR Humerus Right 2 VWS     MR Shoulder Without Contrast Right   Followup: Return in about 2 weeks (around 3/23/2021) for Recheck BP and follow up of MRI. earlier if needed.        Charity Perdomo, PATRICE        HPI:  Patricia Bose is a 89 y.o. year old who presents to the office today for right shoulder pain. 1 week ago yesterday she fell on ice landing on her  right side. She was able to stand unassisted and walk back home. She has a bruise on the right hip but no pain with ambulation. Her primary concern is right shoulder pain. She has a history of right rotator cuff repair in 2004 and has less ROM in this shoulder at baseline but she now has limited strength and increased pain in the right shoulder. She is taking acetaminophen for pain, this helps.       Health Maintenance Review  Health Maintenance   Topic Date Due     A1C  08/19/2021     DIABETIC EYE EXAM  10/01/2021     MICROALBUMIN  11/16/2021     MEDICARE ANNUAL WELLNESS VISIT  02/19/2022     BMP  02/19/2022     DIABETIC FOOT EXAM  02/19/2022     LIPID  02/19/2022     FALL RISK ASSESSMENT  02/19/2022     TD 18+ HE  10/28/2024     ADVANCE CARE PLANNING  02/19/2026     Pneumococcal Vaccine: Pediatrics (0 to 5 Years) and At-Risk Patients (6 to 64 Years)  Completed     Pneumococcal Vaccine: 65+ Years  Completed     INFLUENZA VACCINE RULE BASED  Completed     ZOSTER VACCINES  Completed     COVID-19 Vaccine  Completed       Current Scheduled Meds:  Outpatient Encounter Medications as of 3/9/2021   Medication Sig Dispense Refill     amoxicillin (AMOXIL) 500 MG capsule Take 500 mg by mouth. Take 4 pills prior to dental work       aspirin 81 MG EC tablet Take 81 mg by mouth daily.       atorvastatin (LIPITOR) 40 MG tablet Take 1 tablet (40 mg total) by mouth daily. 90 tablet 1     blood glucose meter (GLUCOMETER) Use 1 each As Directed daily. Dispense glucometer brand per patient's insurance at pharmacy discretion. 1 each 0     blood glucose test (ACCU-CHEK AMIRA PLUS TEST STRP) strips USE 1 STRIP DAILY TO TEST BLOOD SUGAR (Patient taking differently: USE 2 STRIP weekly TO TEST BLOOD SUGAR) 100 strip 5     cholecalciferol, vitamin D3, (VITAMIN D3) 1,000 unit capsule Take 1,000 Units by mouth daily.       codeine-guaiFENesin (GUAIFENESIN AC)  mg/5 mL liquid TAKE 5-10 ML BY MOUTH 4 (FOUR) TIMES A DAY AS NEEDED FOR  COUGH. 240 mL 0     DENTA 5000 PLUS 1.1 % Crea        fluticasone (FLONASE) 50 mcg/actuation nasal spray 1-2 sprays twice daily 16 g 3     generic lancets (ACCU-CHEK FASTCLIX LANCING DEV) Use 1 each As Directed daily. 200 each 2     glucosamine-chondroitin 500-400 mg cap Take 2 capsules by mouth daily.       hydroCHLOROthiazide (HYDRODIURIL) 25 MG tablet Take 1 tablet (25 mg total) by mouth daily. 90 tablet 1     losartan (COZAAR) 25 MG tablet Take 1 tablet (25 mg total) by mouth daily. 90 tablet 1     magnesium 250 mg Tab Take 1 tablet by mouth daily.       MELATONIN ORAL Take 10 mg by mouth daily.        multivitamin therapeutic (THERAGRAN) tablet Take 1 tablet by mouth daily.       vitamin A-vitamin C-vit E-min (OCUVITE) Tab tablet Take 1 tablet by mouth 2 (two) times a day.        LORazepam (ATIVAN) 0.5 MG tablet Take 1 hour prior to imaging and may take a second dose right before procedure if needed. Fill at preferred pharmacy and bring to MRI appointment. You will need a  to bring you home after your appointment. 2 tablet 0     Facility-Administered Encounter Medications as of 3/9/2021   Medication Dose Route Frequency Provider Last Rate Last Admin     denosumab 60 mg (PROLIA 60 mg/ml)  60 mg Subcutaneous Q6 Months Verónica Lopez NP   60 mg at 11/16/20 1256           Objective / Physical Examination:  Vitals:    03/09/21 1454   BP: 152/62   Pulse: 80   Weight: 110 lb (49.9 kg)     Wt Readings from Last 3 Encounters:   03/09/21 110 lb (49.9 kg)   02/19/21 110 lb (49.9 kg)   11/06/20 109 lb (49.4 kg)     Body mass index is 24.66 kg/m .     Constitutional: In no apparent distress  MSK: Shoulders appear symmetrical without obvious deformity. Some mild bruising on the right upper arm. Active shoulder flexion is limited to about 70 degrees and strength is 3/5. Abduction is also limited to about 90 degrees right shoulder. Full internal/external rotation and extension

## 2021-06-16 NOTE — TELEPHONE ENCOUNTER
Patient calling this morning to get the results of her recent MRI shoulder.  Writer found Charity Perdomo's result message in the MRI report.  Information relayed to patient, who expressed understanding.  Scheduling number given for [facility], patient will call today to schedule appt with Dr Berlin Rivera.

## 2021-06-16 NOTE — TELEPHONE ENCOUNTER
----- Message from Catina Guidry RN sent at 4/16/2021  8:25 AM CDT -----  Regarding: FW: Prolia last inj 11/16/20, RX from  pharm  Please call the pt to schedule a prolia 5/17 or after. Please let me know when she is scheduled so I can send an RX to  [facility].

## 2021-06-16 NOTE — TELEPHONE ENCOUNTER
Requesting verbal okay for follow-up home PT visits 2w3 to address R shoulder pain and weakness.      Please respond to this telephone encounter.    Thanks.

## 2021-06-16 NOTE — TELEPHONE ENCOUNTER
Date: 5/18/2021 Status: [facility]   Time: 10:00 AM Length: 15   Visit Type: NURSE VISIT [[id number]] Copay: $0.00   Provider: [facility]       Pt is wondering if she picks up RX from the pharm to bring here or if it gets shipped from pharm to us?

## 2021-06-17 NOTE — TELEPHONE ENCOUNTER
Pt is requesting to make a  Pharmacy Change    [facility] [place] beginning today.     is the [facility] number.

## 2021-06-17 NOTE — PATIENT INSTRUCTIONS - HE
Patient Instructions by Vidhi Pearl CNP at 11/22/2019 12:50 PM     Author: Vidhi Pearl CNP Service: -- Author Type: Nurse Practitioner    Filed: 11/22/2019  1:01 PM Encounter Date: 11/22/2019 Status: Addendum    : Vidhi Pearl CNP (Nurse Practitioner)    Related Notes: Original Note by Vidhi Pearl CNP (Nurse Practitioner) filed at 11/22/2019 12:47 PM         Patient Education     Discharge Instructions for Chronic Pancreatitis  You have been diagnosed with long-term (chronic) pancreatitis. This is caused by repeated cases of inflammation of your pancreas. It results in permanent scarring of the pancreatic tissue. The pancreas is an organ that makes chemicals and hormones that help you digest food and use sugar for energy. Some causes of chronic pancreatitis are the continued use of alcohol and tobacco, genetic disorders, and structural problems in the pancreas. Here's what you can do at home to help with your condition.  Home care  Suggestions for home care include the following:     Ask someone to drive you to appointments until you know how the illness has affected you.    Tell your healthcare provider about any medicines you take.    Take your medicines exactly as directed. Dont skip doses.    Ask your healthcare provider about over-the-counter pain medicines, if needed.    Learn to monitor your blood sugar. Keep a record of your readings. Work with your healthcare provider to control blood sugar levels.    Learn to take your own pulse. Keep a record of your results. Ask your healthcare provider which readings mean that you need medical attention.    Watch for symptoms that your pancreatitis is getting worse. These symptoms include abdominal pain, nausea and vomiting, diarrhea or oil in your stool, weight loss, and fever.  Diet changes  Suggestions for dietary changes include the following:    Eat a low-fat diet. Ask your healthcare provider for menus and other  diet information.    Take vitamins A, D, and E, and add calcium to your diet.    Your healthcare provider may recommend digestive enzymes to take with each meal and snack.    Stop drinking, especially if your illness was caused by alcohol.  ? Ask your healthcare provider about alcohol abuse programs and support groups such as Alcoholics Anonymous.  ? Ask your healthcare provider about prescription medicines that can help you stop drinking.  Follow-up care  Make a follow-up appointment, or as advised. Be honest in follow-up appointments about any alcohol and tobacco use. Your providers need complete health information so they can prescribe appropriate treatments   When to call your healthcare provider  Call your healthcare provider right away if you have any of the following:    Fever above 100.4 F (38 C), or as directed by your healthcare provider    Severe pain in your upper abdomen to your back    Nausea and vomiting    Abdominal swelling and tenderness    Loss of weight without dieting  Date Last Reviewed: 7/1/2016 2000-2019 [employer] [address] [place] [address]. All rights reserved. This information is not intended as a substitute for professional medical care. Always follow your healthcare professional's instructions.           Patient Education     Treating Insomnia     Learning to relax before bedtime can improve your sleep.   Good sleeping habits are a key part of treatment. If needed, some medicines may help you sleep better at first. Making healthy lifestyle changes and learning to relax can improve your sleep. Treating insomnia takes commitment. But trust that your efforts will pay off. Be sure to talk with your healthcare provider before taking any medicine.  Healthy lifestyle  Your lifestyle affects your health and your sleep. Here are some healthy habits:    Keep a regular sleep schedule. Go to bed and get up at the same time each day.    Exercise regularly. It may  help you reduce stress. Avoid strenuous exercise for 2 to 4 hours before bedtime.    Avoid or limit naps, especially in the late afternoon.    Use your bed only for sleep and sex.    Dont spend too much time in bed trying to fall asleep. If you cant fall asleep, get up and do something (no electronics) until you become tired and drowsy.    Avoid or limit caffeine and nicotine for up to 6 hours before bedtime. They can keep you awake at night.     Also avoid alcohol for at least 4 to 6 hours before bedtime. It may help you fall asleep at first. But you will have more awakenings during the night. And your sleep will not be restful.  Before bedtime  To sleep better every night, try these tips:    Have a bedtime routine to let your body and mind know when its time to sleep.    Think of going to bed as relaxing and enjoyable. Sleep will come sooner.    If your worries dont let you sleep, write them down in a diary. Then close it, and go to bed.    Make sure the room is not too hot or too cold. If its not dark enough, an eye mask can help. If its noisy, try using earplugs.    Don't eat a large meal just before bedtime.    Remove noises, bright lights, TVs, cell phones, and computers from your sleeping environment.    Use a comfortable mattress and pillow.  Learn to relax  Stress, anxiety, and body tension may keep you awake at night. To unwind before bedtime, try a warm bath, meditation, or yoga. Also try the following:    Deep breathing. Sit or lie back in a chair. Take a slow, deep breath. Hold it for 5 counts. Then breathe out slowly through your mouth. Keep doing this until you feel relaxed.    Progressive muscle relaxation. Tense and then relax the muscles in your body as you breathe deeply. Start with your feet and work up your body to your neck and face.  Date Last Reviewed: 8/1/2017 2000-2017 [employer]. [address] [place] [address], [place] [address]. All rights reserved. This information is not  intended as a substitute for professional medical care. Always follow your healthcare professional's instructions.           Patient Education     Insomnia  Insomnia is repeated difficulty going to sleep or staying asleep, or both. Whether you have insomnia is not defined by a specific amount of sleep. Different people need different amounts of sleep, and you may need more or less sleep at different times of your life.  There are 3 major types of insomnia:  short-term, chronic, and other.  Short-term, or acute insomnia lasts less than 3 months.  The symptoms are temporary and can be linked directly to a stressor, such as the death of a loved one, financial problems, or a new physical problem.  Short-term insomnia stops when the stressor resolves or the person adapts to its presence.  Chronic insomnia occurs at least 3 times a week and lasts longer than 3 months.  Chronic insomnia can occur when either the cause of the sleeping problem is not clear, or the insomnia does not get better when the stressor is resolved. A number of other criteria are also used to make the diagnosis of chronic insomnia.   Other insomnia is the third type of insomnia-related sleep disorders.  This description applies to people who have problems getting to sleep or staying asleep, but do not meet all of the factors that describe either short-term or chronic insomnia.    Many things cause insomnia. Different people may have different causes. It can be from an underlying medical or psychological condition, or lifestyle. It can also be primary insomnia, which means no cause can be found.  Causes of insomnia include:    Chronic medical problems- heart disease, gastrointestinal problems, hormonal changes, breathing problems    Anxiety    Stress    Depression    Pain    Work schedule    Sleep apnea    Illegal drugs    Certain medicines  Many different medidcines can affect your sleep, such as stimulants, caffeine, alcohol, some decongestants, and  diet pills. Other medicines may include some types of blood pressure pills, steroids, asthma medicines, antihistamines, antidepressants, seizure medicines and statins. Not all of these will affect your sleep, and they shouldnt be stopped without talking to your doctor.  Symptoms of insomnia can include:    Lying awake for long periods at night before falling asleep    Waking up several times during the night    Waking up early in the morning and not being able to get back to sleep    Feeling tired and not refreshed by sleep    Not being able to function properly during the day and finding it hard to concentrate    Irritability    Tiredness and fatigue during the day  Home care    Review your medicines with your doctor or pharmacist to find out if they can cause insomnia. Not all medicines will affect your sleep, but they shouldn't be stopped without reviewing them with your doctor. There may be serious side effects and consequences from suddenly stopping your medicines. Not taking them may cause strokes, heart attacks, and many other problems.    Caffeine, smoking and alcohol also affect sleep. Limit your daily use and do not use these before bedtime. Alcohol may make you sleepy at first, but as its effects wear off, you may awaken a few hours later and have trouble returning to sleep.    Do not exercise, eat or drink large amounts of liquid within 2 hours of your bedtime.    Improve your sleep habits. Have a fixed bed and wake-up time. Try to keep noise, light and heat in your bedroom at a comfortable level. Try using earplugs or eyeshades if needed.     Avoid watching TV in bed.    If you do not fall asleep within 30 minutes, try to relax by reading or listening to soft music.    Limit daytime napping to one 30 minute period, early in the day.    Get regular exercise. Find other ways to lessen your stress level.    If a medicine was prescribed to help reset your sleep patterns, take it as directed. Sleeping pills  are intended for short-term use, only. If taken for too long, the effect wears off while the risk of physical addiction and psychological dependence increases.  Sleep diary  If the cause isnt obvious and it is not improving, try keeping a sleep diary for a couple of weeks. Include in it:    The time you go to bed    How long it takes to fall asleep    How many times you wake up    What time you wake up    Your meal times and what you eat    What time you drink alcohol    Your exercise habits and times  Follow-up care  Follow up with your healthcare provider, or as advised. If X-rays or CT scans were done, you will be notified if there is a change in the reading, especially if it affects treatment.  Call 911  Call 911 if any of these occur:    Trouble breathing    Confusion or trouble waking    Fainting or loss of consciousness    Rapid heart rate    New chest, arm, shoulder, neck or upper back pain    Trouble with speech or vision, weakness of an arm or leg    Trouble walking or talking, loss of balance, numbness or weakness in one side of your body, facial droop  When to seek medical advice  Call your healthcare provider right away if any of these occur:    Extreme restlessness or irritability    Confusion or hallucinations (seeing or hearing things that are not there)    Anxiety, depression    Several days without sleeping  Date Last Reviewed: 11/19/2015 2000-2017 [employer]. [address] [place] [address], [place] [address]. All rights reserved. This information is not intended as a substitute for professional medical care. Always follow your healthcare professional's instructions.           Patient Education     What Is Insomnia?    Do you have trouble falling asleep? Do you wake up often during the night? Or maybe you wake up too early in the morning. You may be suffering from insomnia. Talk with your healthcare provider if it lasts longer than a few weeks and you feel tired most of the time.  What  causes insomnia?  Some common causes of insomnia are:    Health problems. These may be things such as pain, depression, medicine side effects, or trouble breathing.    Circadian rhythm disorder. This is a shift in the bodys normal 24-hour activity cycle.    Lifestyle factors. These can include a changing sleep schedule, lack of exercise, or too much caffeine or alcohol.    Sleep settings. This includes things such as a poor mattress, noise, or a room thats too hot or too cold.    Stress. You may be stressed about problems at work, money worries, or family events.  Talk to your healthcare provider  Describe your sleeping problems to your healthcare provider. Try to keep a daily sleep diary for a couple of weeks. Write down the time you go to bed, the time you wake up, and anything that seems to affect your sleep. Your healthcare provider can work with you to create a treatment plan. You may need to learn good sleeping habits and make some lifestyle changes. If you have any health problems, these may need to be treated first.  Date Last Reviewed: 8/1/2017 2000-2019 [employer]. [address] [place] [address], [place] [address]. All rights reserved. This information is not intended as a substitute for professional medical care. Always follow your healthcare professional's instructions.

## 2021-06-17 NOTE — TELEPHONE ENCOUNTER
Telephone Encounter by Micki Perea at 4/21/2020  9:13 AM     Author: Micki Perea Service: -- Author Type: Financial Resource Guide    Filed: 4/21/2020  9:20 AM Encounter Date: 4/21/2020 Status: Signed    : Micki Perea (Financial Resource Guide)       Medication: Prolia 60mg/ml  Qty: 1 syringe for 6 months  Effective Dates: 03/20/2020 - 04/20/2021  Pharmacy: [facility]  Copay Amount: $24.00  Copay Assistance: not eligible

## 2021-06-17 NOTE — TELEPHONE ENCOUNTER
Telephone Encounter by Micki Perea at 5/3/2021  2:37 PM     Author: Micki Perea Service: -- Author Type: Financial Resource Guide    Filed: 5/3/2021  2:41 PM Encounter Date: 4/30/2021 Status: Signed    : Micki Perea (Financial Resource Guide)       Prior Authorization Approval  Medication: Prolia 60mg/ml  Qty: 1 syringe for 6 months  Effective Dates: 3/30/21 - 4/30/22  Reference Number: [id number]  Insurance Company:  [employer]  Pharmacy: [facility]   Expected Copay: $72  Foundation Assistance Needed/Available: no  Patient Assistance Program Needed: [place]  Patient Notified? Already aware  Pharmacy Notified? Already aware

## 2021-06-17 NOTE — PROGRESS NOTES
"Prolia Injection Phone Screen      Screening questions have been asked 2-3 days prior to administration visit for Prolia. If any questions are answered with \"Yes,\" this phone encounter were will routed to ordering provider for further evaluation.     1.  When was the last injection?  11/16/20    2.  Has insurance for this injection been verified?  No, pt supplied, from  [facility]    3.  Did you experience any new onset achiness or rashes that lasted for over a month with your previous Prolia injection?   No    4.  Do you have a fever over 101?F or a new deep cough that is unusual for you today? No    5.  Have you started any new medications in the last 6 months that you were told could affect your immune system? These may have been prescribed by oncologist, transplant, rheumatology, or dermatology.   No    6.  In the last 6 months have you have gastric bypass or parathyroid surgery?   No    7.  Do you plan dental work requiring drilling into the bone such as implants/extractions or oral surgery in the next 2-3 months?   No    8. Do you have new insurance since the last injection?    Patient informed if symptoms discussed above present prior to their administration appointment, they are to notify clinic immediately.     Catina Guidry          The following steps were completed to comply with the REMS program for Prolia:  1. Ordering provider has previously reviewed information in the Medication Guide and Patient Counseling Chart, including the serious risks of Prolia  and the symptoms of each risk and have been advised to seek prompt medical attention if they have signs or symptoms of any of the serious risks.  2. Provided each patient a copy of the Medication Guide and Patient Brochure.  See MAR for administration details.   Indication: Prolia  (denosumab) is a prescription medicine used to treat osteoporosis in patients who:   Are at high risk for fracture, meaning patients who have had a fracture " related to osteoporosis, or who have multiple risk factors for fracture; Cannot use another osteoporosis medicine or other osteoporosis medicines did not work well.   The timeline for early/late injections would be 4 weeks early and any time after the 6 month grupo. If a patient receives their injection late, then the subsequent injection would be 6 months from the date that they actually received the injection    Have the screening questions been asked prior to this administration? Yes        After obtaining consent, and per orders of Alexa Lopez NP, injection of Prolia 60 mg given by Catina Guidry. Patient instructed to remain in clinic for 20 minutes afterwards, and to report any adverse reaction to me immediately.

## 2021-06-17 NOTE — TELEPHONE ENCOUNTER
Telephone Encounter by Micki Perea at 4/30/2021  1:13 PM     Author: Micki Perea Service: -- Author Type: Financial Resource Guide    Filed: 4/30/2021  1:15 PM Encounter Date: 4/30/2021 Status: Signed    : Micki Perea (Financial Resource Guide)       PA Initiation  Medication: Prolia 60mg/ml  QTY: 1 syringe for 6 months  Insurance Company:  [employer]  Pharmacy Filing Rx: [facility]   Filling Pharmacy Phone:   Filling Pharmacy Fax: na  Start Date: 4/30/21  Additional Information: renewal

## 2021-06-18 NOTE — PROGRESS NOTES
Assessment and Plan:     1. Coronary artery disease involving native coronary artery of native heart, angina presence unspecified  She had an angio in 2015 and is on med management.  She has no active sx.    2. Hypertension  Stable and well controlled on amlodipine, atenolol, hctz.  [place].  - Comprehensive Metabolic Panel    3. Mixed hyperlipidemia  Stable on 40 mg daily of atorvastatin.  - Comprehensive Metabolic Panel  - Lipid Cascade    4. Type 2 diabetes mellitus  Managed w/ diet.  No sx.  Labs today.  - Glycosylated Hemoglobin A1c    5. Osteopenia  Her dexa would be due this fall.    6. Routine general medical examination at a health care facility  She's UTD on her immunizations and screening.    7. Screening mammogram, encounter for  She'd like to continue breast ca screening.  - Mammo Screening Bilateral; Future        The patient's current medical problems were reviewed.    The following high BMI interventions were performed this visit: weight monitoring  The following health maintenance schedule was reviewed with the patient and provided in printed form in the after visit summary:   Health Maintenance   Topic Date Due     DIABETES FOLLOW-UP  10/26/2017     DIABETES FOOT EXAM  04/26/2018     DIABETES URINE MICROALBUMIN  04/26/2018     FALL RISK ASSESSMENT  04/26/2018     DIABETES OPHTHALMOLOGY EXAM  05/12/2018     INFLUENZA VACCINE RULE BASED (Season Ended) 08/01/2018     DXA SCAN  09/19/2018     DIABETES HEMOGLOBIN A1C  11/15/2018     ADVANCE DIRECTIVES DISCUSSED WITH PATIENT  10/28/2019     TD 18+ HE  10/28/2024     PNEUMOCOCCAL POLYSACCHARIDE VACCINE AGE 65 AND OVER  Completed     PNEUMOCOCCAL CONJUGATE VACCINE FOR ADULTS (PCV13 OR PREVNAR)  Completed     ZOSTER VACCINE  Completed        Subjective:   Chief Complaint: Patricia Bose is an 89 y.o. female here for an Annual Wellness visit.   HPI:  Patricia is a very pleasant 89 yr old female here for her annual wellness exam.  She wanted to make sure  that her statin isn't causing her sugars to be higher.  She's not sleeping well and would like to try something mild that she can use as needed to help quiet her mind.  She's interested in getting shingrix.  She wants an order for 3D mammo.  Occasionally, she takes tylenol PM.  She's taken melatonin w/ mixed results.  Saturday, she woke up and felt like she couldn't breath.  It's happened before.  She remembers being told she should go in for evaluation when it happens.      Review of Systems:  Endo: excessive urination  Please see above.  The rest of the review of systems are negative for all systems.    Patient Care Team:  Saritha Hampton MD as PCP - General     Patient Active Problem List   Diagnosis     Osteopenia     Mixed hyperlipidemia     Hypertension     Insomnia     CAD (coronary artery disease)     Type 2 diabetes mellitus     Past Medical History:   Diagnosis Date     Carpal tunnel syndrome      Diabetes mellitus      Hyperlipidemia      Hypertension      Upper respiratory infection       Past Surgical History:   Procedure Laterality Date     BREAST BIOPSY Left 1980    benign     BREAST BIOPSY Left     before 1980    benign     CATARACT EXTRACTION       HYSTERECTOMY  1957    benign     OOPHORECTOMY  1957    benign     TN REMOVAL OF TONSILS,<11 Y/O      Description: Tonsillectomy;  Recorded: 04/29/2014;     TN TOTAL ABDOM HYSTERECTOMY      Description: Hysterectomy;  Recorded: 04/29/2014;     ROTATOR CUFF REPAIR  10 years ago    left shoulder       Family History   Problem Relation Age of Onset     Breast cancer Mother 52     Breast cancer Sister 72     Coronary artery disease Brother      Heart attack Brother 78     Coronary artery disease Father      Heart attack Father 67      Social History     Social History     Marital status:      Spouse name: N/A     Number of children: N/A     Years of education: N/A     Occupational History     Not on file.     Social History Main Topics     Smoking  status: Never Smoker     Smokeless tobacco: Never Used     Alcohol use 2.4 oz/week     4 Glasses of wine per week     Drug use: No     Sexual activity: Not on file     Other Topics Concern     Not on file     Social History Narrative      Current Outpatient Prescriptions   Medication Sig Dispense Refill     amLODIPine (NORVASC) 5 MG tablet TAKE ONE TABLET BY MOUTH ONE TIME DAILY-Please Schedule Clinic Appointment in November 90 tablet 3     amoxicillin (AMOXIL) 500 MG capsule Take 500 mg by mouth. Take 4 pills prior to dental work       aspirin 81 MG EC tablet Take 81 mg by mouth daily.       atenolol (TENORMIN) 25 MG tablet TAKE 1 TABLET (25 MG TOTAL) BY MOUTH DAILY. 90 tablet 3     atorvastatin (LIPITOR) 40 MG tablet TAKE 1 TABLET (40 MG TOTAL) BY MOUTH BEDTIME FOR CHOLESTEROL. 90 tablet 3     blood glucose test strips TEST TWICE WEEKLY 50 strip 3     calcium carbonate-vitamin D3 (CALCIUM 600 + D,3,) 600 mg calcium- 200 unit cap Take 1 tablet by mouth daily.        cholecalciferol, vitamin D3, (VITAMIN D3) 1,000 unit capsule Take 1,000 Units by mouth daily.       codeine 15 MG tablet Take 1 tablet (15 mg total) by mouth every 6 (six) hours as needed for pain. 30 tablet 0     codeine-guaiFENesin (GUAIFENESIN AC)  mg/5 mL liquid Take 5-10 mL by mouth as needed for cough. 240 mL 0     cyanocobalamin (VITAMIN B-12) 50 mcg tablet Take 50 mcg by mouth daily.       DENTA 5000 PLUS 1.1 % Crea        generic lancets (ACCU-CHEK FASTCLIX) Use 1 each As Directed 2 (two) times a day. 200 each 3     glucosamine-chondroitin 500-400 mg cap Take 2 capsules by mouth daily.       hydroCHLOROthiazide (HYDRODIURIL) 25 MG tablet Take 1 tablet (25 mg total) by mouth daily. 90 tablet 1     magnesium 250 mg Tab Take 1 tablet by mouth daily.       MELATONIN ORAL Take 1 capsule by mouth as needed.       multivitamin therapeutic (THERAGRAN) tablet Take 1 tablet by mouth daily.       vitamin A-vitamin C-vit E-min (OCUVITE) Tab tablet  "Take 1 tablet by mouth daily.       atenolol (TENORMIN) 25 MG tablet TAKE 1 TABLET (25 MG TOTAL) BY MOUTH DAILY.  3     OMEGA-3/DHA/EPA/FISH OIL (FISH OIL-OMEGA-3 FATTY ACIDS) 300-1,000 mg capsule Take 1 g by mouth daily.       Current Facility-Administered Medications   Medication Dose Route Frequency Provider Last Rate Last Dose     denosumab 60 mg (PROLIA 60 mg/ml)  60 mg Subcutaneous Q6 Months Titus Parikh MD   60 mg at 05/05/17 1253      Objective:   Vital Signs:   Visit Vitals     /68 (Patient Site: Left Arm, Patient Position: Sitting, Cuff Size: Adult Regular)     Pulse 66     Ht 4' 8.5\" (1.435 m)     Wt 116 lb 2 oz (52.7 kg)     LMP 05/05/1957     SpO2 98%     Breastfeeding No     BMI 25.58 kg/m2        VisionScreening:  No exam data present     PHYSICAL EXAM  PHYSICAL EXAM:  Constitutional:  Reveals an alert, pleasant adult female.   Vitals:  Noted.   Ears: TM's normal bilaterally   Eyes: PERRL, conjunctiva/corneas clear, EOM's intact   Throat: Lips, mucosa, and tongue normal; teeth and gums normal   Neck: Normal ROM, no carotid bruits, no thyromegaly   Lungs: Clear to auscultation bilaterally, respirations unlabored.   Breast exam:  Normal skin overlying her breasts bilaterally no discrete nodule is palpable in the axilla bilaterally  Heart: Regular rate and rhythm, S1 and S2 normal, no murmur, rub, or gallop,   Abdomen: Soft, non-tender, bowel sounds active, no masses, no organomegaly   Extremities: Extremities normal, atraumatic, no cyanosis or edema   Pelvic:Not examined  Skin: Skin color, texture, turgor normal, no rashes or lesions   Neurologic: Normal       Assessment Results 5/15/2018   Activities of Daily Living No help needed   Instrumental Activities of Daily Living No help needed   Some recent data might be hidden     A Mini-Cog score of 0-2 suggests the possibility of dementia, score of 3-5 suggests no dementia    Identified Health Risks:     Patient's advanced directive was " discussed and I am comfortable with the patient's wishes.

## 2021-06-18 NOTE — PROGRESS NOTES
Assessment: Patricia is here for education on diabetes management.  She has been managing diabetes off of medications very well for 3 years.   Her last a1c came up a bit from 6.6 to 7.1.  She has been checking her blood sugars in the morning which range from 110-140 on average.  She is active and exercises 5x/weekly to stay looking so young.  She has an accu-chek timo which is about 1 year old & needs more strips.  Today we went over diabetes pathophysiology, metformin physiology and side effects, A1C & bg goals, preventing future complications, hypo/hyperglycemia, nutritional label reading, portion sizing, carb counting, and when to test in a day.  We discussed metformin physiology as this may be a medication she needs in the next few years.  She is ambitious to improve her eating to not need medication for a long time.  We discussed substitutes to pasta, rice, & potatoes such as edamame/zucchini noodles & cauliflower rice which she is interested to try.  She was encouraged to check her blood sugars before and 2 hours after a specific meal to see how certain foods influence her blood sugars.  This will also help her to make healthier choices that do not raise her blood sugars as often.      Plan: Patricia will check her blood sugars before and 2 hours after a meal to see how certain foods influence her blood sugars.  She will continue to walk and exercise for better health.  She will try some lower carb options above and eat more whole foods.  We also discussed eating more healthy proteins that were organic if she can afford them.  She will follow up with Dr. Hampton in 3-4 months for a DM check.  She will call with questions or to schedule another appointment.      Subjective and Objective:      Patricia Bose is referred by Saritha Hampton for Diabetes Education.     Lab Results   Component Value Date    HGBA1C 7.1 (H) 05/15/2018         Current diabetes medications:  none    Goals       a1c <7.0       Monitoring             Patricia will check her blood sugar before and 2 hours after a specific meal to see how certain foods influence her blood sugars.  She will do this 2-3x/week.              Follow up:   Primary care visit      Education:     Monitoring   Meter (per above goals): Assessed, Discussed and Literature provided  Monitoring: Assessed, Discussed and Literature provided  BG goals: Assessed, Discussed and Literature provided    Nutrition Management  Nutrition Management: Assessed and Discussed  Weight: Assessed, Discussed and Literature provided  Portions/Balance: Assessed, Discussed and Literature provided  Carb ID/Count: Assessed, Discussed and Literature provided  Label Reading: Assessed, Discussed and Literature provided  Heart Healthy Fats: Assessed and Discussed  Menu Planning: Assessed and Discussed  Dining Out: Assessed and Discussed  Physical Activity: Assessed and Discussed  Medications: Assessed and Discussed  Orals: Assessed and Discussed  Injected Medications: Not addressed   Storage/Exp:Not addressed   Site Rotation: Not addressed   Sites Assessed: no    Diabetes Disease Process: Assessed and Discussed    Acute Complications: Prevent, Detect, Treat:  Hypoglycemia: Not addressed  Hyperglycemia: Assessed and Discussed  Sick Days: Not addressed  Driving: Not addressed    Chronic Complications  Foot Care:Assessed and Discussed  Skin Care: Not addressed  Eye: Assessed and Discussed  ABC: Assessed and Discussed  Teeth:Not addressed  Goal Setting and Problem Solving: Assessed and Discussed  Barriers: Assessed and Discussed  Psychosocial Adjustments: Assessed and Discussed      Time spent with the patient: 60 minutes for diabetes education and counseling.   Previous Education: no  Visit Type:DSMT  Hours Remaining: DSMT 9 and MNT 3      Devin Giles  6/12/2018

## 2021-06-18 NOTE — PATIENT INSTRUCTIONS - HE
Patient Instructions by Vidhi Pearl CNP at 2/19/2021 10:20 AM     Author: Vidhi Pearl CNP Service: -- Author Type: Nurse Practitioner    Filed: 2/19/2021 10:40 AM Encounter Date: 2/19/2021 Status: Signed    : Vidhi Pearl CNP (Nurse Practitioner)         Patient Education   Preventing Falls in the Home  As you get older, falls are more likely. Thats because your reaction time slows. Your muscles and joints may also get stiffer, making them less flexible. Illness, medications, and vision changes can also affect your balance. A fall could leave you unable to live on your own. To make your home safer, follow these tips:    Floors    Put nonskid pads under area rugs.    Remove throw rugs.    Replace worn floor coverings.    Tack carpets firmly to each step on carpeted stairs. Put nonskid strips on the edges of uncarpeted stairs.    Keep floors and stairs free of clutter and cords.    Arrange furniture so there are clear pathways.    Clean up any spills right away.    Bathrooms    Install grab bars in the tub or shower.    Apply nonskid strips or put a nonskid rubber mat in the tub or shower.    Sit on a bath chair to bathe.    Use bathmats with nonskid backing.    Lighting    Keep a flashlight in each room.    Put a nightlight along the pathway between the bedroom and the bathroom.    6518-5888 [employer]. [address] [place] [address]. All rights reserved. This information is not intended as a substitute for professional medical care. Always follow your healthcare professional's instructions.           Advance Directive  Patients advance directive was discussed and I am comfortable with the patients wishes.  Patient Education   Personalized Prevention Plan  You are due for the preventive services outlined below.  Your care team is available to assist you in scheduling these services.  If you have already completed any of these items, please share that  information with your care team to update in your medical record.  Health Maintenance   Topic Date Due   ? DIABETIC FOOT EXAM  04/26/2018   ? LIPID  06/12/2020   ? COVID-19 Vaccine (2 of 2 - Pfizer series) 03/09/2021   ? A1C  04/09/2021   ? DIABETIC EYE EXAM  10/01/2021   ? BMP  10/09/2021   ? MICROALBUMIN  11/16/2021   ? MEDICARE ANNUAL WELLNESS VISIT  02/19/2022   ? FALL RISK ASSESSMENT  02/19/2022   ? ADVANCE CARE PLANNING  05/15/2023   ? TD 18+ HE  10/28/2024   ? Pneumococcal Vaccine: Pediatrics (0 to 5 Years) and At-Risk Patients (6 to 64 Years)  Completed   ? Pneumococcal Vaccine: 65+ Years  Completed   ? INFLUENZA VACCINE RULE BASED  Completed   ? ZOSTER VACCINES  Completed

## 2021-06-19 NOTE — PROGRESS NOTES
Fern called for results of her PFT done on 8/1.  Note sent to Dr Garces to review before calling with results.    Results mailed to patient at her home address at her request.

## 2021-06-19 NOTE — LETTER
Letter by Vidhi Pearl CNP at      Author: Vidhi Pearl CNP Service: -- Author Type: --    Filed:  Encounter Date: 10/3/2019 Status: Signed        Dear Patricia,     Here are the results that you requested for your pancreas. Let me know if you are in need of anything else. Have a good day!     PATRICE Regalado, EDIE     Comprehensive Metabolic Panel     Ref Range & Units 9/5/19 1225 7/31/19 0855 7/31/19 0855    Sodium 136 - 145 mmol/L 137   138     Potassium 3.5 - 5.0 mmol/L 4.4   4.8     Chloride 98 - 107 mmol/L 99   100     CO2 22 - 31 mmol/L 28   29     Anion Gap, Calculation 5 - 18 mmol/L 10   9     Glucose 70 - 125 mg/dL 130High    180High      BUN 8 - 28 mg/dL 30High    32High      Creatinine 0.60 - 1.10 mg/dL 1.05  1.02  1.02     GFR MDRD Af Amer >60 mL/min/1.73m2 59Low   >60  >60     GFR MDRD Non Af Amer >60 mL/min/1.73m2 49Low   51Low   51Low      Bilirubin, Total 0.0 - 1.0 mg/dL 0.4       Calcium 8.5 - 10.5 mg/dL 10.7High   10.7High   10.7High      Protein, Total 6.0 - 8.0 g/dL 7.6       Albumin 3.5 - 5.0 g/dL 4.0       Alkaline Phosphatase 45 - 120 U/L 55       AST 0 - 40 U/L 28       ALT 0 - 45 U/L 23

## 2021-06-19 NOTE — LETTER
Letter by Vidhi Pearl CNP at      Author: Vidhi Pearl CNP Service: -- Author Type: --    Filed:  Encounter Date: 6/13/2019 Status: (Other)         Patricia Bose  [address]  [place] [address]             June 13, 2019         Dear Ms. Bose,    Below are the results from your recent visit:    Resulted Orders   Microalbumin, Random Urine   Result Value Ref Range    Microalbumin, Random Urine 6.19 (H) 0.00 - 1.99 mg/dL    Creatinine, Urine 26.4 mg/dL    Microalbumin/Creatinine Ratio Random Urine 234.5 (H) <=19.9 mg/g    Narrative    Microalbumin, Random Urine  <2.0 mg/dL . . . . . . . . Normal  3.0-30.0 mg/dL . . . . . . Microalbuminuria  >30.0 mg/dL . . . . . .  . Clinical Proteinuria  Microalbumin/Creatinine Ratio, Random Urine  <20 mg/g . . . . .. . . . Normal   mg/g . . . . . . . Microalbuminuria  >300 mg/g . . . . . . . . Clinical Proteinuria   Glycosylated Hemoglobin A1c   Result Value Ref Range    Hemoglobin A1c 7.3 (H) 3.5 - 6.0 %   Thyroid Stimulating Hormone (TSH)   Result Value Ref Range    TSH 1.95 0.30 - 5.00 uIU/mL   Lipid Cascade FASTING   Result Value Ref Range    Cholesterol 194 <=199 mg/dL    Triglycerides 146 <=149 mg/dL    HDL Cholesterol 63 >=50 mg/dL    LDL Calculated 102 <=129 mg/dL    Patient Fasting > 8hrs? Unknown    Comprehensive Metabolic Panel   Result Value Ref Range    Sodium 138 136 - 145 mmol/L    Potassium 4.6 3.5 - 5.0 mmol/L    Chloride 98 98 - 107 mmol/L    CO2 27 22 - 31 mmol/L    Anion Gap, Calculation 13 5 - 18 mmol/L    Glucose 136 (H) 70 - 125 mg/dL    BUN 24 8 - 28 mg/dL    Creatinine 0.94 0.60 - 1.10 mg/dL    GFR MDRD Af Amer >60 >60 mL/min/1.73m2    GFR MDRD Non Af Amer 56 (L) >60 mL/min/1.73m2    Bilirubin, Total 0.5 0.0 - 1.0 mg/dL    Calcium 10.4 8.5 - 10.5 mg/dL    Protein, Total 7.5 6.0 - 8.0 g/dL    Albumin 4.4 3.5 - 5.0 g/dL    Alkaline Phosphatase 58 45 - 120 U/L    AST 29 0 - 40 U/L    ALT 25 0 - 45 U/L    Narrative     Fasting Glucose reference range is 70-99 mg/dL per  American Diabetes Association (ADA) guidelines.   Vitamin B12   Result Value Ref Range    Vitamin B-12 >2,000 (H) 213 - 816 pg/mL   HM1 (CBC with Diff)   Result Value Ref Range    WBC 10.1 4.0 - 11.0 thou/uL    RBC 4.30 3.80 - 5.40 mill/uL    Hemoglobin 12.9 12.0 - 16.0 g/dL    Hematocrit 38.6 35.0 - 47.0 %    MCV 90 80 - 100 fL    MCH 30.1 27.0 - 34.0 pg    MCHC 33.5 32.0 - 36.0 g/dL    RDW 12.0 11.0 - 14.5 %    Platelets 229 140 - 440 thou/uL    MPV 8.6 7.0 - 10.0 fL    Neutrophils % 78 (H) 50 - 70 %    Lymphocytes % 15 (L) 20 - 40 %    Monocytes % 6 2 - 10 %    Eosinophils % 2 0 - 6 %    Basophils % 1 0 - 2 %    Neutrophils Absolute 7.8 (H) 2.0 - 7.7 thou/uL    Lymphocytes Absolute 1.5 0.8 - 4.4 thou/uL    Monocytes Absolute 0.6 0.0 - 0.9 thou/uL    Eosinophils Absolute 0.2 0.0 - 0.4 thou/uL    Basophils Absolute 0.1 0.0 - 0.2 thou/uL        Please see your recent lab results.  You have significant proteinuria since your previous urine 2 years  ago.  Would recommend being seen by nephrology.  I recommend no additional changes at this time.     Please call with questions or contact us using [employer].    Sincerely,        Electronically signed by Vidhi Pearl CNP

## 2021-06-19 NOTE — PROGRESS NOTES
Pulmonary Follow Up Note  7/12/2018      Reason for Follow Up: Pulmonary fibrosis, unspecified etiology.      Problem List:     Patient Active Problem List   Diagnosis     Osteopenia     Mixed hyperlipidemia     Hypertension     Insomnia     CAD (coronary artery disease)     Type 2 diabetes mellitus (H)           History:     Ms. Patricia Bose is an 89 yo female with PMH significant for CAD, HTN, and DM who was seen in pulmonary clinic for evaluation of pulmonary fibrosis.  She reports that this was first identified about 7 years ago on a CXR.  She has never had many symptoms from it except for a cough.  She does note that she has an intermittent dry cough and this persists but does not bother her.  She has had this cough for some time, and it is mostly nonproductive.  She has been using codeine cough syrup for a cough at night, but this occurs after lying down.  She thinks she may be developing some post nasal drip.  She will use the codeine then, but not during the day as it makes her drowsy.  She does report mostly a throat clearing cough during the day but this does not bother her and is sporadic, not constant.  She denies any dyspnea, even on exertion.  She denies any chest pain.  She feels things are going well.    Past Medical History:   Diagnosis Date     Carpal tunnel syndrome      Diabetes mellitus (H)      Hyperlipidemia      Hypertension      Upper respiratory infection      Past Surgical History:   Procedure Laterality Date     BREAST BIOPSY Left 1980    benign     BREAST BIOPSY Left     before 1980    benign     CATARACT EXTRACTION       HYSTERECTOMY  1957    benign     OOPHORECTOMY  1957    benign     IA REMOVAL OF TONSILS,<13 Y/O      Description: Tonsillectomy;  Recorded: 04/29/2014;     IA TOTAL ABDOM HYSTERECTOMY      Description: Hysterectomy;  Recorded: 04/29/2014;     ROTATOR CUFF REPAIR  10 years ago    left shoulder      Social History     Social History     Marital status:       Spouse name: N/A     Number of children: N/A     Years of education: N/A     Occupational History     Not on file.     Social History Main Topics     Smoking status: Never Smoker     Smokeless tobacco: Never Used     Alcohol use 2.4 oz/week     4 Glasses of wine per week     Drug use: No     Sexual activity: Not on file     Other Topics Concern     Not on file     Social History Narrative     Family History   Problem Relation Age of Onset     Breast cancer Mother 52     Breast cancer Sister 72     Coronary artery disease Brother      Heart attack Brother 78     Coronary artery disease Father      Heart attack Father 67     Allergies   Allergen Reactions     Ibuprofen Shortness Of Breath       Review of Systems - 10 point review of systems negative except what is mentioned in the HPI.        Medications:     Current Outpatient Prescriptions on File Prior to Visit   Medication Sig Dispense Refill     amLODIPine (NORVASC) 5 MG tablet Take 1 tablet (5 mg total) by mouth daily. 90 tablet 3     amoxicillin (AMOXIL) 500 MG capsule Take 500 mg by mouth. Take 4 pills prior to dental work       aspirin 81 MG EC tablet Take 81 mg by mouth daily.       atenolol (TENORMIN) 25 MG tablet TAKE 1 TABLET (25 MG TOTAL) BY MOUTH DAILY. 90 tablet 3     atorvastatin (LIPITOR) 40 MG tablet TAKE 1 TABLET (40 MG TOTAL) BY MOUTH BEDTIME FOR CHOLESTEROL. 90 tablet 3     blood glucose test strips Use 1 each As Directed daily. 50 strip 3     calcium carbonate-vitamin D3 (CALCIUM 600 + D,3,) 600 mg calcium- 200 unit cap Take 1 tablet by mouth daily.        cholecalciferol, vitamin D3, (VITAMIN D3) 1,000 unit capsule Take 1,000 Units by mouth daily.       codeine 15 MG tablet Take 1 tablet (15 mg total) by mouth every 6 (six) hours as needed for pain. 30 tablet 0     codeine-guaiFENesin (GUAIFENESIN AC)  mg/5 mL liquid Take 5-10 mL by mouth as needed for cough. 240 mL 0     cyanocobalamin (VITAMIN B-12) 50 mcg tablet Take 50 mcg by mouth  daily.       DENTA 5000 PLUS 1.1 % Crea        generic lancets (ACCU-CHEK FASTCLIX) Use 1 each As Directed 2 (two) times a day. 200 each 3     glucosamine-chondroitin 500-400 mg cap Take 2 capsules by mouth daily.       hydroCHLOROthiazide (HYDRODIURIL) 25 MG tablet Take 1 tablet (25 mg total) by mouth daily. 90 tablet 1     magnesium 250 mg Tab Take 1 tablet by mouth daily.       MELATONIN ORAL Take 1 capsule by mouth as needed.       multivitamin therapeutic (THERAGRAN) tablet Take 1 tablet by mouth daily.       vitamin A-vitamin C-vit E-min (OCUVITE) Tab tablet Take 1 tablet by mouth daily.       [DISCONTINUED] atenolol (TENORMIN) 25 MG tablet TAKE 1 TABLET (25 MG TOTAL) BY MOUTH DAILY.  3     [DISCONTINUED] LORazepam (ATIVAN) 0.5 MG tablet Take 1 tablet (0.5 mg total) by mouth at bedtime as needed (insomnia). 15 tablet 0     Current Facility-Administered Medications on File Prior to Visit   Medication Dose Route Frequency Provider Last Rate Last Dose     denosumab 60 mg (PROLIA 60 mg/ml)  60 mg Subcutaneous Q6 Months Titus Parikh MD   60 mg at 05/05/17 1253           Exam/Data:   Vitals  Vitals:    07/12/18 0919   BP: 132/58   Pulse: 82   Resp: 16   SpO2: 97%       EXAM:  GEN: Alert and oriented x3, NAD  HEENT: Nares patent, posterior oropharynx clear.  Mild turbinate edema bilaterally, no ulcerations.  RESPIRATORY: CTAB.  No wheeze or rales  CARDIOVASCULAR: RRR, no m/r/g  GASTROINTESTINAL: Round, soft, NT/ND  HEM/ONC: no adenopathy, no ecchymosis  MSK: no clubbing.  Ambulates normally.  DIP deformities consistent with OA  NEURO: cranial nerves appear grossly intact, muscle strength equal  SKIN: no rash or ulcerations      DATA      PFT DATA:  Pulmonary Functions Testing Results:   FEV1/FVC is normal   FEV1 is normal   FVC is normal   There was no improvement in spirometry after a single inhaled dose of bronchodilator   TLC is normal   DLCO is reduced (62) when it is corrected for hemoglobin.    Impression:   Lung Volumes and Spirometry are normal   DLCO is mildly reduced (62).        IMAGING:     XR CHEST PA AND LATERAL7/29/2015 3:24 PMINDICATION: Chest painCOMPARISON: NoneFINDINGS: Negative chest.    7/14/2015 11:18 AM   INDICATION: Shortness of breath. Abnormal stress test.   COMPARISON: None.   FINDINGS:   LIMITED CHEST: Slight septal thickening and either fibrosis or subsegmental atelectasis involving the periphery of both lower lobes.   LIMITED MEDIASTINUM: Coronary artery calcifications. Atherosclerotic disease thoracic aorta. Small esophageal hiatal hernia.   LIMITED UPPER ABDOMEN: Negative.   IMPRESSION:   CONCLUSION:   1. No significant incidental extracardiac findings.   2. Please refer to cardiologist's dictation for the cardiac CT report.    Personally reviewed previously.  Minimal basilar peripheral fibrosis.       Previous modified walk showed no evidence of desaturation or hypoxia on room.  Patient ambulated 300 ft.    Assessment/Plan:   Patricia Bose is a 89 y.o. female with pulmonary fibrosis on CT    1. Pulmonary Fibrosis:  The etiology is not clear, but is likely related to reflux or aspiration.  She has no systemic findings, and no real symptoms other than cough, but now has some symptoms of PND as the cough worsens about 2 hours after lying down.  She has normal pulmonary function tests, and she has excellent exertional capacity and this persists.  This is likely stable and will not cause her any detriment in the next several years.  She continues on omeprazole for GERD.  Otherwise, her pattern is not consistent with IPF, and she does not need further workup for other causes at this time.  I will see her back in 1 year with repeat PFTs to monitor for any progression. (this years PFTs are not done yet but we will call with results once completed).    2. Cough:  Likely related to pulmonary fibrosis vs GERD vs post nasal drip.  Continued on omprazole.  Continue with codeine for  social functions and as needed.  I will also try flonase 1-2 sprays each nostril twice daily.  We did discuss potential side effects including burning and epistaxis.  She will contact us if she experiences this and will back off the medication frequency or stop it all together.    Recommend:  - follow up in 1 year with repeat PFTs  - continue omeprazole  - try flonase 1-2 sprays twice daily as needed  - codeine romy Garces, DO

## 2021-06-19 NOTE — PROGRESS NOTES
"Spoke with Fern.  Per Dr. Garces:  \"Can you let Fern know that her PFT's are pretty much normal and unchanged compared to the previous.\"    Fern had no further questions.  Wanted to know when she should follow up?  Per Dr. Garces's note, follow up in 1 year with repeat PFT.  "

## 2021-06-19 NOTE — LETTER
Letter by Vidhi Pearl CNP at      Author: Vidhi Pearl CNP Service: -- Author Type: --    Filed:  Encounter Date: 9/6/2019 Status: (Other)         Patricia Bose  [address]  [place] [address]             September 6, 2019         Dear Ms. Bose,    Below are the results from your recent visit:    Resulted Orders   Comprehensive Metabolic Panel   Result Value Ref Range    Sodium 137 136 - 145 mmol/L    Potassium 4.4 3.5 - 5.0 mmol/L    Chloride 99 98 - 107 mmol/L    CO2 28 22 - 31 mmol/L    Anion Gap, Calculation 10 5 - 18 mmol/L    Glucose 130 (H) 70 - 125 mg/dL    BUN 30 (H) 8 - 28 mg/dL    Creatinine 1.05 0.60 - 1.10 mg/dL    GFR MDRD Af Amer 59 (L) >60 mL/min/1.73m2    GFR MDRD Non Af Amer 49 (L) >60 mL/min/1.73m2    Bilirubin, Total 0.4 0.0 - 1.0 mg/dL    Calcium 10.7 (H) 8.5 - 10.5 mg/dL    Protein, Total 7.6 6.0 - 8.0 g/dL    Albumin 4.0 3.5 - 5.0 g/dL    Alkaline Phosphatase 55 45 - 120 U/L    AST 28 0 - 40 U/L    ALT 23 0 - 45 U/L    Narrative    Fasting Glucose reference range is 70-99 mg/dL per  American Diabetes Association (ADA) guidelines.   Urinalysis-UC if Indicated   Result Value Ref Range    Color, UA Yellow Colorless, Yellow, Straw, Light Yellow    Clarity, UA Clear Clear    Glucose, UA Negative Negative    Bilirubin, UA Negative Negative    Ketones, UA Negative Negative    Specific Gravity, UA 1.020 1.005 - 1.030    Blood, UA Negative Negative    pH, UA 5.5 5.0 - 8.0    Protein, UA Negative Negative mg/dL    Urobilinogen, UA 0.2 E.U./dL 0.2 E.U./dL, 1.0 E.U./dL    Nitrite, UA Negative Negative    Leukocytes, UA Small (!) Negative    Bacteria, UA None Seen None Seen hpf    RBC, UA 0-2 None Seen, 0-2 hpf    WBC, UA 10-25 (!) None Seen, 0-5 hpf    Squam Epithel, UA 0-5 None Seen, 0-5 lpf    Mucus, UA Few (!) None Seen lpf    Narrative    Urine Culture ordered based on [facility] criteria   Amylase   Result Value Ref Range    Amylase 44 5 - 120  U/L   Lipase   Result Value Ref Range    Lipase 48 0 - 52 U/L   HM1 (CBC with Diff)   Result Value Ref Range    WBC 9.5 4.0 - 11.0 thou/uL    RBC 3.85 3.80 - 5.40 mill/uL    Hemoglobin 11.8 (L) 12.0 - 16.0 g/dL    Hematocrit 34.3 (L) 35.0 - 47.0 %    MCV 89 80 - 100 fL    MCH 30.6 27.0 - 34.0 pg    MCHC 34.4 32.0 - 36.0 g/dL    RDW 11.9 11.0 - 14.5 %    Platelets 285 140 - 440 thou/uL    MPV 8.2 7.0 - 10.0 fL    Neutrophils % 65 50 - 70 %    Lymphocytes % 25 20 - 40 %    Monocytes % 7 2 - 10 %    Eosinophils % 3 0 - 6 %    Basophils % 1 0 - 2 %    Neutrophils Absolute 6.2 2.0 - 7.7 thou/uL    Lymphocytes Absolute 2.4 0.8 - 4.4 thou/uL    Monocytes Absolute 0.7 0.0 - 0.9 thou/uL    Eosinophils Absolute 0.3 0.0 - 0.4 thou/uL    Basophils Absolute 0.1 0.0 - 0.2 thou/uL        Your lab results show your hemoglobin as decreased a little.  We will recheck at your next appt.   Also, I am having the lab culture your urine and will have those results late tomorrow.  I  suspect you have a little bit of gastritis and I have sent a medication to the pharmacy to be  taken  daily.  I would like to see you back in 2 weeks, sooner if needed.  If your symptoms  persist, worsen or new symptoms arise, please see medical care sooner than your follow up  appt.     Please call with questions or contact us using [employer].    Sincerely,        Electronically signed by Vidhi Pearl CNP

## 2021-06-19 NOTE — LETTER
Letter by Vidhi Pearl CNP at      Author: Vidhi Pearl CNP Service: -- Author Type: --    Filed:  Encounter Date: 9/5/2019 Status: (Other)         Patricia Bose  [address]  [place] [address]             September 5, 2019         Dear Ms. Bose,    Below are the results from your recent visit:    Resulted Orders   Mammo Screening Bilateral    Narrative    BILATERAL FULL FIELD DIGITAL SCREENING MAMMOGRAM WITH TOMOSYNTHESIS    Performed on: 9/5/19      Compared to: 06/15/2018 Mammo Screening Bilateral, 05/10/2017 Mammo   Screening Bilateral, 05/05/2016 Mammo Screening Bilateral, 05/01/2015   Mammo Screening Bilateral, 04/18/2014 Mammo Screening Bilateral, and   04/22/2013 Mammo Diagnostic Left    Findings: The breasts are heterogeneously dense, which may obscure small   masses. There is no radiographic evidence of malignancy. This study was   evaluated with the assistance of Computer-Aided Detection. Breast   Tomosynthesis was used in interpretation. Repeat routine screening   mammogram in one year is recommended.    ACR BI-RADS Category 1: Negative        Please see recent mammogram results.  Recommend continued routine screening repeat  mammogram one year.     Please call with questions or contact us using [employer].    Sincerely,        Electronically signed by Vidhi Pearl CNP

## 2021-06-20 NOTE — LETTER
Letter by Vidhi Pearl CNP at      Author: Vidhi Pearl CNP Service: -- Author Type: --    Filed:  Encounter Date: 1/23/2020 Status: (Other)         Patricia Bose  [address]  [place] [address]             January 23, 2020         Dear Ms. Bose,    Below are the results from your recent visit:    Resulted Orders   Vitamin B12   Result Value Ref Range    Vitamin B-12 711 213 - 816 pg/mL        your B12 is within normal limits I recommend no changes at this time.     Please call with questions or contact us using [employer].    Sincerely,        Electronically signed by Vidhi Pearl CNP

## 2021-06-20 NOTE — LETTER
Letter by Verónica Lopez NP at      Author: Verónica Lopez NP Service: -- Author Type: --    Filed:  Encounter Date: 10/6/2020 Status: (Other)         Patricia Bose  [address]  [place] [address]             October 6, 2020         Dear Ms. Bose,    Below are the results from your recent visit:    Resulted Orders   DXA Bone Density Scan    Narrative    9/30/2020      RE: Patricia Bose  YOB: 1926        Dear Verónica Lopez,    Patient Profile:  89 y.o. female, postmenopausal, is here for the follow up bone density   test.   History of fractures - None. Family history of osteoporosis - Yes;  mother   and sibling.  Family history of hip fracture: Yes;  Sister. Smoking   history - No. Osteoporosis treatment past -  Yes;  HRT and   Bisphosphonates. Osteoporosis treatment current - Yes;  Prolia.  Chronic   medical problems - History of kidney disease, Hysterectomy, Oophorectomy   and Premature menopause. High risk medications -  None.      Assessment:    1. The spine bone density L1-L2 with T-score -1.4.  2. Femoral bone densities show left femoral neck T- score -1.5 and right   femoral neck T-score -1.6.  3. Trabecular bone score indicates good trabecular bone architecture.      89 y.o. female with LOW BONE DENSITY (OSTEOPENIA) and HIGH fracture risk.     Since the previous bone density dated  September 21, 2018, there has been   a   +4.5% change in the bone density of the spine.  Additionally there has   been no statistically significant % change in the hips bilaterally.   An   increase in/or stability is considered a positive response to treatment.           Recommendations:  A continuation of the current treatment is recommended with follow up bone   density scan in 2 years.      Bone densitometry was performed on your patient using our paymio   densitometer. The results are summarized and a copy of the actual scans   are included for your review. In conformity with the  International Society   of Clinical Densitometry's most recent position statement for DXA   interpretation (2015), the diagnosis will be made on the lowest measured   T-score of the lumbar spine, femoral neck, total proximal femur or 33%   radius. Note the change in terminology for diagnostic classification from   OSTEOPENIA to LOW BONE MASS. All trending for sequential exams will be   done using multiple vertebrae or the total proximal femur. Fracture risk   is based on the WHO Fracture Risk Assessment Tool (FRAX). If additional   information is needed or if you would like to discuss the results, please   do not hesitate to call me.       Thank you for referring this patient to [facility].   We are happy to be of service in support of you and your practice. If you   have any questions or suggestions to improve our service, please call me   at [phone].     Sincerely,     Tamika Ramirez M.D. C.C.D.  [facility]opor[facility], [facility]     The test results show that your current treatment is working. Please continue your current medication and plan. We recommend that you repeat the above test(s) in 2 years.    Please call with questions or contact us using [employer].    Sincerely,        Electronically signed by Verónica Lopez NP

## 2021-06-21 NOTE — LETTER
Letter by Nehal Terry RN at      Author: Nehal Terry RN Service: -- Author Type: --    Filed:  Encounter Date: 11/11/2020 Status: (Other)         Patricia Bose  [address]  [place] [address]             November 11, 2020         Dear Ms. Fariaor,    Below are the results from your recent visit:  Garima Krueger MD sent to Oly Holloway RN             No evidence of pulmonary hypertension based on echocardiogram.  Suspect lower extremity edema may be due to venous insufficiency.  Can follow-up with primary physician on this as well as her blood pressures.     KML    Contains abnormal data Echo Complete  Order: [id number]  Status:  Final result   Visible to patient:  No (not released) Dx:  Essential hypertension; Edema of both...  Details    Reading Physician Reading Date Result Priority   Presley Dunn MD  [phone] 11/6/2020 Routine      Result Text   1.   Normal left ventricular size and systolic performance with a visually estimated ejection fraction of 60%.   2. No significant valvular heart disease is identified on this study.   3. Normal right ventricular size and systolic performance.                     The test results show that your current treatment is working. P    Please call with questions or contact us using [employer].    Sincerely,        Electronically signed by Nehal Terry RN

## 2021-06-21 NOTE — PROGRESS NOTES
Progress Note    Reason for Visit:  Chief Complaint     Osteopenia          Progress Note:    HPI: This patient is here for follow-up because of osteopenia with high risk of fracture.      Component      Latest Ref Rng & Units 5/15/2018 11/6/2018   Creatinine      0.60 - 1.10 mg/dL 0.83 0.86   GFR MDRD Af Amer      >60 mL/min/1.73m2 >60 >60   GFR MDRD Non Af Amer      >60 mL/min/1.73m2 >60 >60   Bilirubin, Total      0.0 - 1.0 mg/dL 0.6    Calcium      8.5 - 10.5 mg/dL 10.1 10.4   Protein, Total      6.0 - 8.0 g/dL 7.6    ALBUMIN      3.5 - 5.0 g/dL 4.0    Alkaline Phosphatase      45 - 120 U/L 70    AST      0 - 40 U/L 28    ALT      0 - 45 U/L 21    Cholesterol      <=199 mg/dL 176    Triglycerides      <=149 mg/dL 113    HDL Cholesterol      >=50 mg/dL 62    LDL Calculated      <=129 mg/dL 91    Patient Fasting > 8hrs?       Yes    Hemoglobin A1c      3.5 - 6.0 % 7.1 (H)    Vitamin D, Total (25-Hydroxy)      30.0 - 80.0 ng/mL  36.1       Review of Systems:    Nervous System: No headache, dizziness, fainting or memory loss. No tingling sensation of hand or feet.  Ears: No hearing loss or ringing in the ears  Eyes: No blurring of vision, redness, itching or dryness.  Nose: No nosebleed or loss of smell  Mouth: No mouth sores or loss of taste  Throat: No hoarseness or difficulty swallowing  Neck: No enlarged thyroid or lymph nodes.  Heart: No chest pain, palpitation or irregular heartbeat. No swelling of hands or feet  Lungs: No shortness of breath, cough, night sweats, wheezing or hemoptysis.  Gastrointestinal: No nausea or vomiting, constipation or diarrhea.  No acid reflux, abdominal pain or blood in stools.  Kidney/Bladdr: No polyuria, polydipsia, nocturia or hematuria.  Genital/Sexual: No loss of libido  Skin: No rash, hair loss or hirsutism.  No abnormal striae  Muscles/Joints/Bones: No morning stiffness, muscle aches and pain or loss of height.    Current Medications:  Current Outpatient Medications    Medication Sig     amLODIPine (NORVASC) 5 MG tablet Take 1 tablet (5 mg total) by mouth daily.     amLODIPine (NORVASC) 5 MG tablet TAKE 1 TABLET BY MOUTH ONCE DAILY. DUE FOR APPOINTMENT..     amoxicillin (AMOXIL) 500 MG capsule Take 500 mg by mouth. Take 4 pills prior to dental work     aspirin 81 MG EC tablet Take 81 mg by mouth daily.     atenolol (TENORMIN) 25 MG tablet Take 1 tablet (25 mg total) by mouth daily.     atorvastatin (LIPITOR) 40 MG tablet TAKE 1 TABLET (40 MG TOTAL) BY MOUTH BEDTIME FOR CHOLESTEROL.     blood glucose test strips Use 1 each As Directed daily. (Patient taking differently: Use 2 each As Directed once a week .      )     calcium carbonate-vitamin D3 (CALCIUM 600 + D,3,) 600 mg calcium- 200 unit cap Take 1 tablet by mouth daily.      cholecalciferol, vitamin D3, (VITAMIN D3) 1,000 unit capsule Take 1,000 Units by mouth daily.     codeine-guaiFENesin (GUAIFENESIN AC)  mg/5 mL liquid Take 5-10 mL by mouth as needed for cough.     cyanocobalamin (VITAMIN B-12) 50 mcg tablet Take 50 mcg by mouth daily.     DENTA 5000 PLUS 1.1 % Crea      generic lancets (ACCU-CHEK FASTCLIX) Use 1 each As Directed 2 (two) times a day.     glucosamine-chondroitin 500-400 mg cap Take 2 capsules by mouth daily.     hydroCHLOROthiazide (HYDRODIURIL) 25 MG tablet Take 1 tablet (25 mg total) by mouth daily.     magnesium 250 mg Tab Take 1 tablet by mouth daily.     MELATONIN ORAL Take 1 capsule by mouth as needed.     multivitamin therapeutic (THERAGRAN) tablet Take 1 tablet by mouth daily.     vitamin A-vitamin C-vit E-min (OCUVITE) Tab tablet Take 1 tablet by mouth daily.     codeine 15 MG tablet Take 1 tablet (15 mg total) by mouth every 6 (six) hours as needed for pain.     fluticasone (FLONASE) 50 mcg/actuation nasal spray 1-2 sprays twice daily       Patients Active Problems:  Patient Active Problem List   Diagnosis     Osteopenia     Mixed hyperlipidemia     Hypertension     Insomnia     CAD  "(coronary artery disease)     Type 2 diabetes mellitus (H)       History:   reports that  has never smoked. she has never used smokeless tobacco. She reports that she drinks about 2.4 oz of alcohol per week. She reports that she does not use drugs.   reports that  has never smoked. she has never used smokeless tobacco. She reports that she drinks about 2.4 oz of alcohol per week. She reports that she does not use drugs.  Social History     Tobacco Use   Smoking Status Never Smoker   Smokeless Tobacco Never Used      reports that  has never smoked. she has never used smokeless tobacco. She reports that she drinks about 2.4 oz of alcohol per week. She reports that she does not use drugs.  Social History     Substance and Sexual Activity   Sexual Activity Not on file     Past Medical History:   Diagnosis Date     Carpal tunnel syndrome      Diabetes mellitus (H)      Hyperlipidemia      Hypertension      Upper respiratory infection      Family History   Problem Relation Age of Onset     Breast cancer Mother 52     Breast cancer Sister 72     Coronary artery disease Brother      Heart attack Brother 78     Coronary artery disease Father      Heart attack Father 67     Past Medical History:   Diagnosis Date     Carpal tunnel syndrome      Diabetes mellitus (H)      Hyperlipidemia      Hypertension      Upper respiratory infection      Past Surgical History:   Procedure Laterality Date     BREAST BIOPSY Left 1980    benign     BREAST BIOPSY Left     before 1980    benign     CATARACT EXTRACTION       HYSTERECTOMY  1957    benign     OOPHORECTOMY  1957    benign     ID REMOVAL OF TONSILS,<13 Y/O      Description: Tonsillectomy;  Recorded: 04/29/2014;     ID TOTAL ABDOM HYSTERECTOMY      Description: Hysterectomy;  Recorded: 04/29/2014;     ROTATOR CUFF REPAIR  10 years ago    left shoulder        Vitals   height is 4' 8.5\" (1.435 m) and weight is 115 lb (52.2 kg). Her blood pressure is 136/56.         Exam  General " appearance: The patient looked well, not in acute distress.  Eyes: no evidence of thyroid eye disease.   Retinal exam: No evidence of diabetic retinopathy.  Mouth and Throat: Normal  Neck: No evidence of thyromegaly, enlarged lymph node or tenderness  Chest: Trachea is central. Chest is clear to auscultation and percussion. Breat sounds are normal.  Cardiovascular exam: JVP is not raised. Heart sounds are normal, no murmurs or rub  Peripheral pulses are palpable.   Abdomen: No masses or tenderness.    Back: No vertebral tenderness or kyphosis.  Extremities: No evidence of leg edema.   Skin: Normal to touch.  No abnormal striae  Neurologic exam:  Visual fields are intact by confrontation, grossly intact. No evidence of peripheral neuropathy.  Detailed foot exam normal.        Diagnosis:  No diagnosis found.    Orders:   No orders of the defined types were placed in this encounter.        Assessment and Plan: Osteopenia with high risk of fracture the patient is taking Prolia the patient took 2 injection 1 year apart.  She had no side effects.    She had a bone DEXA scan which showed that T score at the spine is -1.8 at the left and the right hip -1.5 and has been stable since 2016.    I did advise the patient to take the Prolia every 6 months.    She does have hypertension on amlodipine 5 mg daily atenolol 25 mg daily hydrochlorothiazide 25 mg blood pressure 136/56.    She takes calcium 1 tablet a day 1500 mg daily plus vitamin D 1000 units a day I did advise the patient to discontinue calcium and take vitamin D 2000 units a day her calcium is 10.4.    I suppose the hydrochlorothiazide is also causing the high calcium.    She has hyperlipidemia on Lipitor 40 mg.    Vitamin D 36.1 creatinine 0.8 5.    She has type 2 diabetes A1c 7.1 fasting blood sugar 155 I did advise the patient to start Januvia 25 mg once a day if blood sugar after 1 week fasting above 120 to go up to 2 tablets in the morning.    Patient will return  to clinic in 1 year.    I have reviewed and ordered clinical lab test    I have reviewed and ordered radiology tests.    I have reviewed and ordered her medication as required.    I have reviewed her test results and advised with the performing physician.    I have reviewed the patient's old records.    I have reviewed and summarized the patient old records.    I did spend 40 minutes with the patient more than 50% was spent on counseling and managing her care.

## 2021-06-21 NOTE — LETTER
Letter by Oly Holloway RN at      Author: Oly Holloway RN Service: -- Author Type: --    Filed:  Encounter Date: 10/30/2020 Status: (Other)         Patricia Bose  [address]  [place] [address]               October 30, 2020    Dear Patricia:    The results of your recent cholesterol test are    Ref Range & Units 10/9/20 0921     Direct LDL <=129 mg/dl 85         Your LDL looked good at 85.  No change in cholesterol medication at this point.    Please call with questions or contact us using [facility].      Sincerely,      Dr. Garima Krueger

## 2021-06-23 NOTE — TELEPHONE ENCOUNTER
FYI - Status Update  Who is Calling: Patient  Update: due ti insurance change. Patient's INR's will not go through [facility] and there is a fax coming to the clinic about this information   Okay to leave a detailed message?:  Yes

## 2021-06-25 NOTE — PROGRESS NOTES
Progress Notes by Garima Krueger MD at 10/10/2017 10:50 AM     Author: Garima Krueger MD Service: -- Author Type: Physician    Filed: 10/10/2017 11:27 AM Encounter Date: 10/10/2017 Status: Signed    : Garima Krueger MD (Physician)           Click to link to [facility]     [facility] NOTE    Thank you, Dr. Hampton, for asking the [facility] team to see Ms. Patricia Bose to follow-up on her coronary artery disease.    Assessment/Recommendations   Assessment:    1.  Coronary artery disease, moderate by angiography in 2015.  Patricia continues to deny any exertional chest discomfort.  She did switch from working out at [employer] to working out at the [facility].  She does believe she is doing more physical activity but has also noted some exertional dyspnea.  Unclear whether this could be an anginal equivalent or related to an inadequate chronotropic response as a result of her atenolol.  I suggested we switch her atenolol to half of its current dose and see if she notes any improvement.  2.  Essential hypertension, well controlled  3.  Idiopathic pulmonary fibrosis, stable  4.  Hypercholesterolemia, treated with atorvastatin    Plan:  1.  Decrease atenolol to 12.5 mg daily.  Call with update in 2 weeks as to whether improvement in symptoms noted  2.  Negative follow-up in 1 year or sooner if worsening symptoms       History of Present Illness    Ms. Patricia Bose is a 89 y.o. female with history of moderate single-vessel coronary artery disease involving the mid LAD, idiopathic pulmonary fibrosis, essential hypertension and hypercholesterolemia who presents today for a yearly visit.  She tells me she has switched from working out at [employer] to the [facility] and has been working with a  and believe she is actually doing more physically than she did in previous years.  With the harder levels of activity, she has noted shortness of breath.  She denies any associated chest discomfort.  No  lightheadedness or near syncope.  She was seen by Dr. Garces from pulmonology who felt her pulmonary fibrosis has remained stable for the last 7 years and likely would remain stable in the near future.  Suspect this is unlikely the cause of her dyspnea on exertion.  I question whether may be due to chronotropic incompetence as a result of beta-blocker therapy.    ECG (personally reviewed): No ECG today    Cardiac Imaging Studies (personally reviewed): No recent imaging     Physical Examination Review of Systems   Vitals:    10/10/17 1045   BP: 128/50   Pulse: 60   Resp: 20     Body mass index is 24.98 kg/(m^2).  Wt Readings from Last 3 Encounters:   10/10/17 113 lb 6.4 oz (51.4 kg)   08/22/17 115 lb 4.8 oz (52.3 kg)   06/15/17 114 lb 14.4 oz (52.1 kg)     General Appearance:   Awake, Alert, No acute distress.   HEENT:  No scleral icterus; the mucous membranes were pink and moist.   Neck: No cervical bruits or jugular venous distention    Chest: The spine was straight. The chest was symmetric.   Lungs:   Respirations unlabored; the lungs are clear to auscultation. No wheezing   Cardiovascular:    Regular rate and rhythm.  S1, S2 normal.  No murmur, gallop or rub   Abdomen:  No organomegaly, masses, bruits, or tenderness. Bowels sounds are present   Extremities:  No peripheral edema bilaterally   Skin: No xanthelasma. Warm, Dry.   Musculoskeletal: No tenderness.   Neurologic: Mood and affect are appropriate.    General: WNL  Eyes: WNL  Ears/Nose/Throat: WNL  Lungs: Cough  Heart: Shortness of Breath with activity  Stomach: WNL  Bladder: WNL  Muscle/Joints: WNL  Skin: WNL  Nervous System: Daytime Sleepiness  Mental Health: WNL     Blood: WNL     Medical History  Surgical History Family History Social History   Past Medical History:   Diagnosis Date   ? Carpal tunnel syndrome    ? Diabetes mellitus    ? Hyperlipidemia    ? Hypertension    ? Upper respiratory infection     Past Surgical History:   Procedure Laterality  Date   ? BREAST BIOPSY Left 1980    benign   ? BREAST BIOPSY Left     before 1980    benign   ? CATARACT EXTRACTION     ? HYSTERECTOMY  1957    benign   ? OOPHORECTOMY  1957    benign   ? OR REMOVAL OF TONSILS,<11 Y/O      Description: Tonsillectomy;  Recorded: 04/29/2014;   ? OR TOTAL ABDOM HYSTERECTOMY      Description: Hysterectomy;  Recorded: 04/29/2014;   ? ROTATOR CUFF REPAIR  10 years ago    left shoulder     Family History   Problem Relation Age of Onset   ? Breast cancer Mother 52   ? Breast cancer Sister 72   ? Coronary artery disease Brother    ? Heart attack Brother 78   ? Coronary artery disease Father    ? Heart attack Father 67    Social History     Social History   ? Marital status:      Spouse name: N/A   ? Number of children: N/A   ? Years of education: N/A     Occupational History   ? Not on file.     Social History Main Topics   ? Smoking status: Never Smoker   ? Smokeless tobacco: Never Used   ? Alcohol use 2.4 oz/week     4 Glasses of wine per week   ? Drug use: No   ? Sexual activity: Not on file     Other Topics Concern   ? Not on file     Social History Narrative          Medications  Allergies   Current Outpatient Prescriptions   Medication Sig Dispense Refill   ? amLODIPine (NORVASC) 5 MG tablet TAKE ONE TABLET BY MOUTH ONE TIME DAILY-Please Schedule Clinic Appointment in November 90 tablet 3   ? amoxicillin (AMOXIL) 500 MG capsule Take 500 mg by mouth. Take 4 pills prior to dental work     ? aspirin 81 MG EC tablet Take 81 mg by mouth daily.     ? atenolol (TENORMIN) 25 MG tablet TAKE 1 TABLET (25 MG TOTAL) BY MOUTH DAILY.  3   ? atorvastatin (LIPITOR) 40 MG tablet TAKE 1 TABLET (40 MG TOTAL) BY MOUTH BEDTIME FOR CHOLESTEROL. 90 tablet 3   ? blood glucose test strips TEST TWICE WEEKLY 50 strip 3   ? cholecalciferol, vitamin D3, (VITAMIN D3) 1,000 unit capsule Take 1,000 Units by mouth daily.     ? codeine 15 MG tablet Take 1 tablet (15 mg total) by mouth every 6 (six) hours as  needed for pain. 30 tablet 0   ? codeine-guaiFENesin (GUAIFENESIN AC)  mg/5 mL liquid Take 5 mL by mouth as needed for cough.     ? cyanocobalamin (VITAMIN B-12) 50 mcg tablet Take 50 mcg by mouth daily.     ? DENTA 5000 PLUS 1.1 % Crea      ? generic lancets (ACCU-CHEK FASTCLIX) Use 1 each As Directed 2 (two) times a day. 200 each 3   ? glucosamine-chondroitin 500-400 mg cap Take 2 capsules by mouth daily.     ? hydroCHLOROthiazide (HYDRODIURIL) 25 MG tablet TAKE ONE TABLET BY MOUTH ONE TIME DAILY 90 tablet 1   ? magnesium 250 mg Tab Take 1 tablet by mouth daily.     ? MELATONIN ORAL Take 1 capsule by mouth as needed.     ? multivitamin therapeutic (THERAGRAN) tablet Take 1 tablet by mouth daily.     ? traZODone (DESYREL) 50 MG tablet Take 1 tablet (50 mg total) by mouth at bedtime as needed for sleep. 90 tablet 1   ? vitamin A-vitamin C-vit E-min (OCUVITE) Tab tablet Take 1 tablet by mouth daily.     ? calcium carbonate-vitamin D3 (CALCIUM 600 + D,3,) 600 mg calcium- 200 unit cap Take 1 tablet by mouth daily.      ? OMEGA-3/DHA/EPA/FISH OIL (FISH OIL-OMEGA-3 FATTY ACIDS) 300-1,000 mg capsule Take 1 g by mouth daily.       Current Facility-Administered Medications   Medication Dose Route Frequency Provider Last Rate Last Dose   ? denosumab 60 mg (PROLIA 60 mg/ml)  60 mg Subcutaneous Q6 Months Titus Parikh MD   60 mg at 05/05/17 1253      Allergies   Allergen Reactions   ? Ibuprofen Shortness Of Breath         Lab Results    Chemistry/lipid CBC Cardiac Enzymes/BNP/TSH/INR   Lab Results   Component Value Date    CHOL 231 (H) 04/26/2017    HDL 51 04/26/2017    LDLCALC 145 (H) 04/26/2017    TRIG 175 (H) 04/26/2017    CREATININE 0.82 04/26/2017    BUN 22 04/26/2017    K 3.9 04/26/2017     04/26/2017     04/26/2017    CO2 27 04/26/2017    Lab Results   Component Value Date    WBC 8.6 08/22/2017    HGB 12.6 08/22/2017    HCT 37.6 08/22/2017    MCV 90 08/22/2017     08/22/2017    Lab  Results   Component Value Date    TSH 2.24 09/27/2016

## 2021-06-27 ENCOUNTER — HEALTH MAINTENANCE LETTER (OUTPATIENT)
Age: 86
End: 2021-06-27

## 2021-06-27 NOTE — PROGRESS NOTES
Progress Notes by Garima Krueger MD at 7/19/2019 10:30 AM     Author: Garima Krueger MD Service: -- Author Type: Physician    Filed: 7/19/2019 11:05 AM Encounter Date: 7/19/2019 Status: Signed    : Garima Krueger MD (Physician)           Click to link to [facility]     [facility] HEART CARE NOTE    Thank you, Dr. Pearl, for asking the [facility] team to see Ms. Patricia Bose to follow-up on coronary artery disease and hypercholesterolemia.    Assessment/Recommendations   Assessment:    1.  Coronary artery disease, moderate by angiography in 2015.  She continues to report no anginal symptoms or decline in exercise capacity over the past year.  Have recommended continue medical management and risk factor modification.  I did explain to her that being on statin agents likely will not prolong her life although would help to stabilize the plaque that is present.   2.  Hypercholesterolemia, currently on low-dose atorvastatin.  Her most recent lipid profile this year demonstrated an LDL of 102.  We did talk a bit about her diet and I suggested that she try to cut back on high saturated fats.  I would not alter her dose of atorvastatin as risks outweigh benefits at her age.  3.  Pulmonary fibrosis, stable  4.  Essential hypertension, stable      Plan:  1.  Continue current medications  2.  Follow-up in 1 year or sooner if change in symptoms       History of Present Illness    Ms. Patricia Bose is a 89 y.o. female with history of moderate coronary artery disease by angiography in 2015, pulmonary fibrosis, osteopenia who presents today for a follow-up visit.  Since I last saw her 18 months ago, she reports no complaints of exertional chest discomfort or dyspnea.  She continues to exercise regularly at the [facility].  She denies any decline in her exercise capacity during this time frame.  No orthopnea, PND or lower extremity edema.  She did see Dr. Garces from pulmonary yesterday for follow-up of  her pulmonary fibrosis which appears to be fairly stable.    ECG (personally reviewed): No ECG today    Cardiac Imaging Studies (personally reviewed): No new imaging     Physical Examination Review of Systems   Vitals:    07/19/19 1041   BP: 124/56   Pulse: 88   Resp: 16     Body mass index is 25.29 kg/m .  Wt Readings from Last 3 Encounters:   07/19/19 114 lb (51.7 kg)   07/18/19 114 lb 4.8 oz (51.8 kg)   06/18/19 114 lb (51.7 kg)     General Appearance:   Awake, Alert, No acute distress.   HEENT:  No scleral icterus; the mucous membranes were pink and moist.   Neck: No cervical bruits or jugular venous distention    Chest: The spine was straight. The chest was symmetric.   Lungs:   Respirations unlabored; the lungs are clear to auscultation. No wheezing   Cardiovascular:    Regular rate and rhythm.  S1, S2 normal.  No murmur, gallop or rub   Abdomen:  No organomegaly, masses, bruits, or tenderness. Bowels sounds are present   Extremities:  No peripheral edema bilaterally.   Skin: No xanthelasma. Warm, Dry.   Musculoskeletal: No tenderness.   Neurologic: Mood and affect are appropriate.    General: WNL  Eyes: Visual Distubance  Ears/Nose/Throat: WNL  Lungs: Cough, Shortness of Breath  Heart: WNL  Stomach: WNL  Bladder: Frequent Urination at Night  Muscle/Joints: WNL  Skin: WNL  Nervous System: Daytime Sleepiness  Mental Health: WNL     Blood: WNL     Medical History  Surgical History Family History Social History   Past Medical History:   Diagnosis Date   ? Carpal tunnel syndrome    ? Diabetes mellitus (H)    ? Hyperlipidemia    ? Hypertension    ? Upper respiratory infection     Past Surgical History:   Procedure Laterality Date   ? BREAST BIOPSY Left 1980    benign   ? BREAST BIOPSY Left     before 1980    benign   ? CATARACT EXTRACTION     ? HYSTERECTOMY  1957    benign   ? OOPHORECTOMY  1957    benign   ? [place] REMOVAL OF TONSILS,<13 Y/O      Description: Tonsillectomy;  Recorded: 04/29/2014;   ? [place] TOTAL ABDOM  HYSTERECTOMY      Description: Hysterectomy;  Recorded: 2014;   ? ROTATOR CUFF REPAIR  10 years ago    left shoulder     Family History   Problem Relation Age of Onset   ? Breast cancer Mother 52   ? Parkinsonism Mother    ? Breast cancer Sister 72   ? Coronary artery disease Brother    ? Heart attack Brother 78   ? Diabetes Brother    ? Coronary artery disease Father    ? Heart attack Father 67   ? Cancer Father         bladder    Social History     Socioeconomic History   ? Marital status:      Spouse name: Not on file   ? Number of children: Not on file   ? Years of education: Not on file   ? Highest education level: Not on file   Occupational History   ? Not on file   Social Needs   ? Financial resource strain: Not on file   ? Food insecurity:     Worry: Not on file     Inability: Not on file   ? Transportation needs:     Medical: Not on file     Non-medical: Not on file   Tobacco Use   ? Smoking status: Never Smoker   ? Smokeless tobacco: Never Used   Substance and Sexual Activity   ? Alcohol use: Yes     Alcohol/week: 2.4 oz     Types: 4 Glasses of wine per week   ? Drug use: No   ? Sexual activity: Not Currently   Lifestyle   ? Physical activity:     Days per week: Not on file     Minutes per session: Not on file   ? Stress: Not on file   Relationships   ? Social connections:     Talks on phone: Not on file     Gets together: Not on file     Attends [employer] service: Not on file     Active member of club or organization: Not on file     Attends meetings of clubs or organizations: Not on file     Relationship status: Not on file   ? Intimate partner violence:     Fear of current or ex partner: Not on file     Emotionally abused: Not on file     Physically abused: Not on file     Forced sexual activity: Not on file   Other Topics Concern   ? Not on file   Social History Narrative    . Dated a gentlemen for 35 years who  .  Has 1 son, 2 grandchildren and 3 great grandchildren.   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  She is very social getting together with friends often, she is part of a book club, she enjoys yoga and swimming at the [employer].  She used to golf.          Medications  Allergies   Current Outpatient Medications   Medication Sig Dispense Refill   ? amoxicillin (AMOXIL) 500 MG capsule Take 500 mg by mouth. Take 4 pills prior to dental work     ? aspirin 81 MG EC tablet Take 81 mg by mouth daily.     ? atorvastatin (LIPITOR) 40 MG tablet TAKE 1 TABLET (40 MG TOTAL) BY MOUTH BEDTIME FOR CHOLESTEROL. 90 tablet 2   ? blood glucose meter (GLUCOMETER) Use 1 each As Directed daily. Dispense glucometer brand per patient's insurance at pharmacy discretion. 1 each 0   ? blood glucose test strips Use 2 each As Directed once a week. 100 strip 0   ? cholecalciferol, vitamin D3, (VITAMIN D3) 1,000 unit capsule Take 1,000 Units by mouth daily.     ? codeine-guaiFENesin (GUAIFENESIN AC)  mg/5 mL liquid Take 5-10 mL by mouth 4 (four) times a day as needed for cough. 240 mL 0   ? DENTA 5000 PLUS 1.1 % Crea      ? fluticasone (FLONASE) 50 mcg/actuation nasal spray 1-2 sprays twice daily 16 g 3   ? generic lancets (ACCU-CHEK FASTCLIX LANCING DEV) Use 1 each As Directed 2 (two) times a day. 200 each 2   ? glucosamine-chondroitin 500-400 mg cap Take 2 capsules by mouth daily.     ? hydroCHLOROthiazide (HYDRODIURIL) 25 MG tablet TAKE 1 TABLET BY MOUTH DAILY 90 tablet 3   ? losartan (COZAAR) 25 MG tablet Take 25 mg by mouth daily.  11   ? magnesium 250 mg Tab Take 1 tablet by mouth daily.     ? MELATONIN ORAL Take 1 capsule by mouth as needed.     ? multivitamin therapeutic (THERAGRAN) tablet Take 1 tablet by mouth daily.     ? vitamin A-vitamin C-vit E-min (OCUVITE) Tab tablet Take 1 tablet by mouth daily.     ? sitaGLIPtin (JANUVIA) 25 MG tablet Take 1 tablet (25 mg total) by mouth daily. 90 tablet 1     Current Facility-Administered Medications   Medication Dose Route  Frequency Provider Last Rate Last Dose   ? denosumab 60 mg (PROLIA 60 mg/ml)  60 mg Subcutaneous Q6 Months Titus Parikh MD   60 mg at 05/01/19 1244      Allergies   Allergen Reactions   ? Ibuprofen Shortness Of Breath         Lab Results    Chemistry/lipid CBC Cardiac Enzymes/BNP/TSH/INR   Lab Results   Component Value Date    CHOL 194 06/12/2019    HDL 63 06/12/2019    LDLCALC 102 06/12/2019    TRIG 146 06/12/2019    CREATININE 0.94 06/12/2019    BUN 24 06/12/2019    K 4.6 06/12/2019     06/12/2019    CL 98 06/12/2019    CO2 27 06/12/2019    Lab Results   Component Value Date    WBC 10.1 06/12/2019    HGB 12.9 06/12/2019    HCT 38.6 06/12/2019    MCV 90 06/12/2019     06/12/2019    Lab Results   Component Value Date    TSH 1.95 06/12/2019

## 2021-06-29 ENCOUNTER — COMMUNICATION - HEALTHEAST (OUTPATIENT)
Dept: PULMONOLOGY | Facility: OTHER | Age: 86
End: 2021-06-29

## 2021-06-29 NOTE — PROGRESS NOTES
Progress Notes by Garima Krueger MD at 10/9/2020  8:30 AM     Author: Garima Krueger MD Service: -- Author Type: Physician    Filed: 10/9/2020  9:01 AM Encounter Date: 10/9/2020 Status: Signed    : Garima Krueger MD (Physician)           Thank you, Dr. Pearl, for asking the [facility] team to see Ms. Patricia Bose to follow-up on coronary artery disease, mixed hyperlipidemia.    Assessment/Recommendations   Assessment:    1.  Coronary artery disease, documented to be moderate on angiography in 2015.  She reports no symptoms of exertional chest discomfort or decline in exercise capacity.  She continues to walk 45 minutes to an hour 4 days/week without issue.  At this point recommended continuation of medical therapy and risk factor modification.  2.  Hypercholesterolemia, currently on low-dose atorvastatin.  It is been more than a year since her last lipid profile.  She does have orders placed by her primary clinic.  Suggested we draw those for her today.   3.  Pulmonary fibrosis, stable  4.  Essential hypertension, questionably controlled as her blood pressure is elevated here today although she was rushing a bit to get here.  We will hold off on making a change in medication.  She will be following with her primary care physician in the next month or 2.  5.  Bilateral ankle edema, etiology unclear.  Could be related to mild pulmonary hypertension from pulmonary fibrosis although could also be related to venous insufficiency.  Suggested we check an echocardiogram as we have not done one.    Plan:  1.  Continue current medications  2.  Obtain labs today  3.  Schedule echocardiogram to further evaluate potential cardiac cause for referral lower extremity edema       History of Present Illness    Ms. Patricia Bose is a 89 y.o. female with history of essential hypertension, moderate coronary artery disease by angiography in 2015, pulmonary fibrosis and hypercholesterolemia who presents today  for a follow-up visit.  She has continued to do well over the course of the last year with no symptoms of exertional chest discomfort or dyspnea.  She denies orthopnea, PND, palpitations, lightheadedness or near syncope.  She has begun to notice swelling of her ankles which started several months ago.    ECG (personally reviewed): No ECG    Cardiac Imaging Studies (personally reviewed): No recent imaging     Physical Examination Review of Systems   Vitals:    10/09/20 0837   BP: 154/60   Pulse: 97   Resp: 16     Body mass index is 24.22 kg/m .  Wt Readings from Last 3 Encounters:   10/09/20 109 lb (49.4 kg)   12/27/19 112 lb (50.8 kg)   11/22/19 112 lb (50.8 kg)     General Appearance:   Awake, Alert, No acute distress.   HEENT:  No scleral icterus; the mucous membranes were pink and moist.   Neck: No cervical bruits or jugular venous distention    Chest: The spine was straight. The chest was symmetric.   Lungs:   Respirations unlabored; the lungs are clear to auscultation. No wheezing   Cardiovascular:    Regular rate and rhythm.  S1, S2 normal.  Soft systolic murmur heard at the left sternal border.   Abdomen:  No organomegaly, masses, bruits, or tenderness. Bowels sounds are present   Extremities:  1+ edema to the lower calves bilaterally.   Skin: No xanthelasma. Warm, Dry.   Musculoskeletal: No tenderness.   Neurologic: Mood and affect are appropriate.    General: WNL  Eyes: Visual Distubance  Ears/Nose/Throat: WNL  Lungs: Cough  Heart: Leg Swelling  Stomach: WNL  Bladder: WNL  Muscle/Joints: WNL  Skin: WNL  Nervous System: Daytime Sleepiness  Mental Health: WNL     Blood: WNL     Medical History  Surgical History Family History Social History   Past Medical History:   Diagnosis Date   ? Carpal tunnel syndrome    ? Diabetes mellitus (H)    ? Hyperlipidemia    ? Hypertension    ? Upper respiratory infection     Past Surgical History:   Procedure Laterality Date   ? BREAST BIOPSY Left 1980    benign   ? BREAST  BIOPSY Left     before 1980    benign   ? CATARACT EXTRACTION     ? HYSTERECTOMY  1957    benign   ? OOPHORECTOMY  1957    benign   ? [place] REMOVAL OF TONSILS,<13 Y/O      Description: Tonsillectomy;  Recorded: 04/29/2014;   ? [place] TOTAL ABDOM HYSTERECTOMY      Description: Hysterectomy;  Recorded: 04/29/2014;   ? ROTATOR CUFF REPAIR  10 years ago    left shoulder     Family History   Problem Relation Age of Onset   ? Breast cancer Mother 52   ? Parkinsonism Mother    ? Breast cancer Sister 72   ? Coronary artery disease Brother    ? Heart attack Brother 78   ? Diabetes Brother    ? Coronary artery disease Father    ? Heart attack Father 67   ? Cancer Father         bladder    Social History     Socioeconomic History   ? Marital status:      Spouse name: Not on file   ? Number of children: Not on file   ? Years of education: Not on file   ? Highest education level: Not on file   Occupational History   ? Not on file   Social Needs   ? Financial resource strain: Not on file   ? Food insecurity     Worry: Not on file     Inability: Not on file   ? Transportation needs     Medical: Not on file     Non-medical: Not on file   Tobacco Use   ? Smoking status: Never Smoker   ? Smokeless tobacco: Never Used   Substance and Sexual Activity   ? Alcohol use: Yes     Alcohol/week: 4.0 standard drinks     Types: 4 Glasses of wine per week   ? Drug use: No   ? Sexual activity: Not Currently   Lifestyle   ? Physical activity     Days per week: Not on file     Minutes per session: Not on file   ? Stress: Not on file   Relationships   ? Social connections     Talks on phone: Not on file     Gets together: Not on file     Attends Jewish service: Not on file     Active member of club or organization: Not on file     Attends meetings of clubs or organizations: Not on file     Relationship status: Not on file   ? Intimate partner violence     Fear of current or ex partner: Not on file     Emotionally abused: Not on file      Physically abused: Not on file     Forced sexual activity: Not on file   Other Topics Concern   ? Not on file   Social History Narrative    . Dated a gentlemen for 35 years who  .  Has 1 son, 2 grandchildren and 3 great grandchildren.  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  She is very social getting together with friends often, she is part of a book club, she enjoys yoga and swimming at the [employer].  She used to golf.          Medications  Allergies   Current Outpatient Medications   Medication Sig Dispense Refill   ? amoxicillin (AMOXIL) 500 MG capsule Take 500 mg by mouth. Take 4 pills prior to dental work     ? aspirin 81 MG EC tablet Take 81 mg by mouth daily.     ? atorvastatin (LIPITOR) 40 MG tablet Take 1 tablet (40 mg total) by mouth daily. 90 tablet 1   ? blood glucose meter (GLUCOMETER) Use 1 each As Directed daily. Dispense glucometer brand per patient's insurance at pharmacy discretion. 1 each 0   ? blood glucose test (ACCU-CHEK AMIRA PLUS TEST STRP) strips USE 1 STRIP DAILY TO TEST BLOOD SUGAR 100 strip 5   ? cholecalciferol, vitamin D3, (VITAMIN D3) 1,000 unit capsule Take 1,000 Units by mouth daily.     ? codeine-guaiFENesin (GUAIFENESIN AC)  mg/5 mL liquid Take 5-10 mL by mouth 4 (four) times a day as needed for cough. 240 mL 0   ? DENTA 5000 PLUS 1.1 % Crea      ? fluticasone (FLONASE) 50 mcg/actuation nasal spray 1-2 sprays twice daily 16 g 3   ? generic lancets (ACCU-CHEK FASTCLIX LANCING DEV) Use 1 each As Directed daily. 200 each 2   ? glucosamine-chondroitin 500-400 mg cap Take 2 capsules by mouth daily.     ? hydroCHLOROthiazide (HYDRODIURIL) 25 MG tablet Take 1 tablet (25 mg total) by mouth daily. 90 tablet 3   ? losartan (COZAAR) 25 MG tablet Take 25 mg by mouth daily.  11   ? magnesium 250 mg Tab Take 1 tablet by mouth daily.     ? MELATONIN ORAL Take 1 capsule by mouth as needed.     ? multivitamin therapeutic (THERAGRAN) tablet Take 1 tablet by  mouth daily.     ? vitamin A-vitamin C-vit E-min (OCUVITE) Tab tablet Take 1 tablet by mouth 2 (two) times a day.        Current Facility-Administered Medications   Medication Dose Route Frequency Provider Last Rate Last Dose   ? denosumab 60 mg (PROLIA 60 mg/ml)  60 mg Subcutaneous Q6 Months Titus Parikh MD   60 mg at 11/07/19 1431   ? denosumab 60 mg (PROLIA 60 mg/ml)  60 mg Subcutaneous Q6 Months Verónica Lopez NP   60 mg at 05/07/20 1352      Allergies   Allergen Reactions   ? Ibuprofen Shortness Of Breath         Lab Results    Chemistry/lipid CBC Cardiac Enzymes/BNP/TSH/INR   Lab Results   Component Value Date    CHOL 194 06/12/2019    HDL 63 06/12/2019    LDLCALC 102 06/12/2019    TRIG 146 06/12/2019    CREATININE 1.05 09/05/2019    BUN 30 (H) 09/05/2019    K 4.4 09/05/2019     09/05/2019    CL 99 09/05/2019    CO2 28 09/05/2019    Lab Results   Component Value Date    WBC 8.4 09/19/2019    HGB 11.9 (L) 09/19/2019    HCT 35.1 09/19/2019    MCV 90 09/19/2019     09/19/2019    Lab Results   Component Value Date    TSH 1.95 06/12/2019

## 2021-07-03 NOTE — ADDENDUM NOTE
Addendum Note by Catina Cortez RN at 10/30/2020  3:11 PM     Author: Catina Cortez RN Service: -- Author Type: Registered Nurse    Filed: 10/30/2020  3:11 PM Encounter Date: 10/16/2020 Status: Signed    : Catina Cortez RN (Registered Nurse)    Addended by: CATINA CORTEZ on: 10/30/2020 03:11 PM        Modules accepted: Orders

## 2021-07-03 NOTE — ADDENDUM NOTE
Addendum Note by Catina Cortez RN at 10/20/2020  8:23 AM     Author: Catina Cortez RN Service: -- Author Type: Registered Nurse    Filed: 10/20/2020  8:23 AM Encounter Date: 10/16/2020 Status: Signed    : Catina Cortez RN (Registered Nurse)    Addended by: CATINA CORTEZ on: 10/20/2020 08:23 AM        Modules accepted: Orders

## 2021-07-03 NOTE — ADDENDUM NOTE
Addendum Note by Padmini Evans CMA at 4/15/2019  4:22 PM     Author: Padmini Evans CMA Service: -- Author Type: Certified Medical Assistant    Filed: 4/15/2019  4:22 PM Encounter Date: 4/15/2019 Status: Signed    : Padmini Evans CMA (Certified Medical Assistant)    Addended by: PADMINI EVANS on: 4/15/2019 04:22 PM        Modules accepted: Orders

## 2021-07-05 PROBLEM — J84.112 IPF (IDIOPATHIC PULMONARY FIBROSIS) (H): Status: ACTIVE | Noted: 2021-06-23

## 2021-07-05 PROBLEM — K86.1 OTHER CHRONIC PANCREATITIS (H): Status: ACTIVE | Noted: 2021-06-23

## 2021-07-07 NOTE — LETTER
Letter by Cydney Blackwell ERT at      Author: Cydney Blackwell ERT Service: -- Author Type: --    Filed:  Encounter Date: 2021 Status: (Other)         Patricia Bose   [address]  [place] [address]      2021      MRN: [id number]  : 1926      Dear Ms. Bose,    Thank you for choosing [facility] for your clinic needs. It is our privilege to help our patients achieve and maintain the best possible lung health through regular clinic visits.     We have attempted to contact you to schedule an appointment with  Pulmonary Medicine, to go over the results of the . To help ensure you are in the best health possible, regular follow-up visits and scheduled/ordered tests with your pulmonary team is essential.      Please call our Patient Scheduling Line at [phone] to schedule your appointment.     We look forward to providing your care. As always, we are available at the number above for any questions or concerns you may have.    Sincerely,    [facility] Pulmonary Medicine

## 2021-07-13 ENCOUNTER — RECORDS - HEALTHEAST (OUTPATIENT)
Dept: ADMINISTRATIVE | Facility: CLINIC | Age: 86
End: 2021-07-13

## 2021-07-16 ENCOUNTER — TRANSCRIBE ORDERS (OUTPATIENT)
Dept: ENDOCRINOLOGY | Facility: CLINIC | Age: 86
End: 2021-07-16

## 2021-07-16 DIAGNOSIS — M85.80 OSTEOPENIA: ICD-10-CM

## 2021-07-16 DIAGNOSIS — Z92.29 PERSONAL HISTORY OF OTHER DRUG THERAPY: Primary | ICD-10-CM

## 2021-07-16 NOTE — PROGRESS NOTES
As part of the required manual data conversion process for integration, this encounter was created to document a CAM (Clinic Administered Medication) order. This information was copied from the [facility] patient's chart to the [facility] patient chart.     Nneka Leigh PharmD  July 16, 2021

## 2021-07-21 ENCOUNTER — RECORDS - HEALTHEAST (OUTPATIENT)
Dept: ADMINISTRATIVE | Facility: CLINIC | Age: 86
End: 2021-07-21

## 2021-07-23 ENCOUNTER — TELEPHONE (OUTPATIENT)
Dept: FAMILY MEDICINE | Facility: CLINIC | Age: 86
End: 2021-07-23

## 2021-07-23 NOTE — LETTER
July 30, 2021      Patricia Bose  [address]  [place] [address]        Dear ,    We are writing to inform you of your test results.    Your Lab results remained stable with recommendation for no changes        If you have any questions or concerns, please call the clinic at the number listed above.       Sincerely,    Vidhi Pearl NP

## 2021-07-30 NOTE — TELEPHONE ENCOUNTER
Vidhi Pearl, NP  [facility] Care Team Pool  Caller: Unspecified (1 week ago, 10:41 AM)  Lab results remained stable with recommendation for no changes.       Letter printed and sent   William Clay CMA on 7/30/2021 at 11:32 AM

## 2021-08-19 DIAGNOSIS — R05.3 CHRONIC COUGH: ICD-10-CM

## 2021-08-19 RX ORDER — CODEINE PHOSPHATE AND GUAIFENESIN 10; 100 MG/5ML; MG/5ML
5-10 SOLUTION ORAL 4 TIMES DAILY PRN
Qty: 240 ML | Refills: 0 | Status: SHIPPED | OUTPATIENT
Start: 2021-08-19 | End: 2022-01-26

## 2021-08-31 DIAGNOSIS — I10 HTN (HYPERTENSION): ICD-10-CM

## 2021-08-31 DIAGNOSIS — I10 HYPERTENSION, UNSPECIFIED TYPE: Primary | ICD-10-CM

## 2021-09-01 RX ORDER — HYDROCHLOROTHIAZIDE 25 MG/1
25 TABLET ORAL DAILY
Qty: 90 TABLET | Refills: 1 | Status: SHIPPED | OUTPATIENT
Start: 2021-09-01 | End: 2022-02-22

## 2021-09-13 ENCOUNTER — OFFICE VISIT (OUTPATIENT)
Dept: PULMONOLOGY | Facility: OTHER | Age: 86
End: 2021-09-13
Payer: COMMERCIAL

## 2021-09-13 VITALS
WEIGHT: 110.5 LBS | HEIGHT: 57 IN | DIASTOLIC BLOOD PRESSURE: 56 MMHG | BODY MASS INDEX: 23.84 KG/M2 | SYSTOLIC BLOOD PRESSURE: 122 MMHG | HEART RATE: 92 BPM | OXYGEN SATURATION: 98 %

## 2021-09-13 DIAGNOSIS — J84.112 IPF (IDIOPATHIC PULMONARY FIBROSIS) (H): Primary | ICD-10-CM

## 2021-09-13 PROCEDURE — 99213 OFFICE O/P EST LOW 20 MIN: CPT | Performed by: INTERNAL MEDICINE

## 2021-09-13 ASSESSMENT — MIFFLIN-ST. JEOR: SCORE: 770.1

## 2021-09-13 NOTE — PROGRESS NOTES
PULMONARY CONSULT NOTE    Assessment:   89yoF with IPF that has been stable.   Cough is likely related to IPF, controlled with codeine      Plan:   Repeat CT scan next year prior to visit.  Requested she contact clinic with worsening symptoms  Robitussin with Codeine refilled, she has taken it reliably. Wrote appeal to [employer] who declined to cover.   Do not think Pirfenidone is appropriate here given how mild her IPF is. Likely die with this than from it at age 89.       Subjective:    Cc: follow up IPF    HPI: 89 year old female with history of DMII, hypertension, hyperlipidemia and very mild IPF presents for follow up. She is a former patient of Dr. Garces.     Her cough is baseline, codeine helps her sleep. Her breathing is stable, no change in dyspnea, able to do her activities of daily living.     Past Medical History:   Diagnosis Date     Carpal tunnel syndrome      Diabetes mellitus (H)      Hyperlipidemia      Hypertension      Upper respiratory infection        Social History     Tobacco Use     Smoking status: Never Smoker     Smokeless tobacco: Never Used   Substance Use Topics     Alcohol use: Yes     Alcohol/week: 4.0 standard drinks       Family History   Problem Relation Age of Onset     Breast Cancer Mother 52.00     Parkinsonism Mother      Breast Cancer Sister 72.00     Coronary Artery Disease Brother      Coronary Artery Disease Brother 78.00     Diabetes Brother      Coronary Artery Disease Father 67.00     Cancer Father         bladder       Current Outpatient Medications   Medication     amoxicillin (AMOXIL) 500 MG capsule     aspirin 81 MG EC tablet     atorvastatin (LIPITOR) 40 MG tablet     atorvastatin (LIPITOR) 40 MG tablet     blood glucose meter (GLUCOMETER)     blood glucose test (ACCU-CHEK AMIRA PLUS TEST STRP) strips     cholecalciferol, vitamin D3, (VITAMIN D3) 1,000 unit capsule     DENTA 5000 PLUS 1.1 % Crea     fluticasone (FLONASE) 50 mcg/actuation nasal spray     generic lancets  "(ACCU-CHEK FASTCLIX LANCING DEV)     glucosamine-chondroitin 500-400 mg cap     guaiFENesin-codeine (ROBITUSSIN AC) 100-10 MG/5ML solution     hydrochlorothiazide (HYDRODIURIL) 25 MG tablet     hydroCHLOROthiazide (HYDRODIURIL) 25 MG tablet     LORazepam (ATIVAN) 0.5 MG tablet     losartan (COZAAR) 25 MG tablet     losartan (COZAAR) 25 MG tablet     magnesium 250 mg Tab     MELATONIN ORAL     multivitamin therapeutic (THERAGRAN) tablet     vit C/E/Zn/coppr/lutein/zeaxan (PRESERVISION AREDS-2 ORAL)     vitamin A-vitamin C-vit E-min (OCUVITE) Tab tablet     Current Facility-Administered Medications   Medication     denosumab (PROLIA) injection 60 mg         Review of Systems: Reviewed and negative aside from that noted in HPI.    Objective:    Vital signs:  /56   Pulse 92   Ht 1.448 m (4' 9\")   Wt 50.1 kg (110 lb 8 oz)   SpO2 98%   BMI 23.91 kg/m      General appearance: alert, appears stated age and cooperative  Neck: no adenopathy and supple, symmetrical, trachea midline  Lungs: rales bilaterally  Heart: RRR, S1 and S2  Abdomen: soft, non-tender; bowel sounds normal; no masses,  no organomegaly  Extremities: No edema or cyanosis  Skin: Skin color, texture, turgor normal. No rashes or lesions  Neurologic: Grossly normal        Data    CT chest 2019: personally reviewed  EXAM: CT CHEST HIGH RESOLUTION WO CONTRAST  LOCATION: [facility]  DATE/TIME: 7/25/2019 11:43 AM     INDICATION: Cough. Pulmonary fibrosis.  COMPARISON: Cardiac CT 7/14/2015.  TECHNIQUE: High resolution images were obtained through the chest during inspiration with select expiratory views. Prone imaging was performed. Multiplanar reformats were obtained. Dose reduction techniques were used.  IV CONTRAST: None.     FINDINGS:   LUNGS AND PLEURA: Mild basilar predominant fibrotic change with reticulation, honeycombing, traction bronchiectasis (honeycombing best seen on prone imaging). No gas trapping.     MEDIASTINUM: No significant " adenopathy. Severe multivessel coronary calcifications. Normal caliber aorta and pulmonary trunk. Small hiatus hernia.     LIMITED UPPER ABDOMEN: Pancreatic calcifications suggest chronic pancreatitis.     MUSCULOSKELETAL: Bony demineralization and degenerative changes.     IMPRESSION:  CONCLUSION:      1.  Findings consistent with mild UIP pattern pulmonary fibrosis. Findings have mildly worsened since cardiac CT July 2015.     2.  Severe multivessel coronary calcifications.     3.  Small hiatus hernia.    PFT: personally reviewed  FEV1/FVC  is normal  FEV1 is normal  FVC is normal  There was no improvement in spirometry after a single inhaled dose of bronchodilator  TLC is normal  DLCO is reduced to 55 when it is corrected for hemoglobin.     Impression:    Full Pulmonary Function Test is abnormal.    There is isolated reduction in diffusion capacity.      Laboratory:  Results for orders placed or performed in visit on 10/09/20   Basic metabolic panel   Result Value Ref Range    Sodium 139 136 - 145 mmol/L    Potassium 4.4 3.5 - 5.0 mmol/L    Chloride 101 98 - 107 mmol/L    Carbon Dioxide (CO2) 28 22 - 31 mmol/L    Anion Gap 10 5 - 18 mmol/L    Glucose 204 (H) 70 - 125 mg/dL    Calcium 9.7 8.5 - 10.5 mg/dL    Urea Nitrogen 33 (H) 8 - 28 mg/dL    Creatinine 0.92 0.60 - 1.10 mg/dL    GFR Estimate If Black >60 >60 mL/min/1.73m2    GFR Estimate 57 (L) >60 mL/min/1.73m2     Lab Results   Component Value Date    WBC 7.1 06/23/2021    HGB 12.2 06/23/2021    HCT 38.4 06/23/2021    MCV 95 06/23/2021     06/23/2021

## 2021-09-13 NOTE — PATIENT INSTRUCTIONS
Dr. Eufemia Morse  [address]  [place] [address] [phone] if symptoms worsen prior to 1 year follow up.

## 2021-09-25 ENCOUNTER — TRANSFERRED RECORDS (OUTPATIENT)
Dept: HEALTH INFORMATION MANAGEMENT | Facility: CLINIC | Age: 86
End: 2021-09-25
Payer: COMMERCIAL

## 2021-09-29 ENCOUNTER — TELEPHONE (OUTPATIENT)
Dept: PULMONOLOGY | Facility: OTHER | Age: 86
End: 2021-09-29

## 2021-09-29 NOTE — TELEPHONE ENCOUNTER
Called and spoke with Patricia regarding her cough medication.    Dr. Morse did an appeal to  for her cough medication with codeine.  This was DENIED.   Patricia said that she did receive the same letter that we have.  She will most likely just try to purchase out of pocket if she needs this for her cough.

## 2021-10-29 DIAGNOSIS — Z92.29 PERSONAL HISTORY OF OTHER DRUG THERAPY: Primary | ICD-10-CM

## 2021-10-29 DIAGNOSIS — M85.80 OSTEOPENIA: ICD-10-CM

## 2021-11-01 ENCOUNTER — TELEPHONE (OUTPATIENT)
Dept: LAB | Facility: CLINIC | Age: 86
End: 2021-11-01

## 2021-11-01 DIAGNOSIS — E11.65 TYPE 2 DIABETES MELLITUS WITH HYPERGLYCEMIA, WITHOUT LONG-TERM CURRENT USE OF INSULIN (H): Primary | ICD-10-CM

## 2021-11-01 NOTE — PROGRESS NOTES
Patient has an upcoming appointment for Rosemary's labs.  No orders are in the chart.    Please place orders or advise pt.    Thanks

## 2021-11-05 ENCOUNTER — TELEPHONE (OUTPATIENT)
Dept: INTERNAL MEDICINE | Facility: CLINIC | Age: 86
End: 2021-11-05
Payer: COMMERCIAL

## 2021-11-05 DIAGNOSIS — E11.65 TYPE 2 DIABETES MELLITUS WITH HYPERGLYCEMIA, WITH LONG-TERM CURRENT USE OF INSULIN (H): ICD-10-CM

## 2021-11-05 DIAGNOSIS — Z79.4 TYPE 2 DIABETES MELLITUS WITH HYPERGLYCEMIA, WITH LONG-TERM CURRENT USE OF INSULIN (H): ICD-10-CM

## 2021-11-05 DIAGNOSIS — R73.9 HYPERGLYCEMIA: ICD-10-CM

## 2021-11-05 NOTE — TELEPHONE ENCOUNTER
Pt is requesting new blood glucose meter.    Her current monitor is an Accu Chek Tracie Plus.  If possible she would like the same monitor.    [facility] in [facility] [place] is the preferred pharmacy.

## 2021-11-12 ENCOUNTER — LAB (OUTPATIENT)
Dept: LAB | Facility: CLINIC | Age: 86
End: 2021-11-12
Payer: COMMERCIAL

## 2021-11-12 DIAGNOSIS — E11.65 TYPE 2 DIABETES MELLITUS WITH HYPERGLYCEMIA, WITHOUT LONG-TERM CURRENT USE OF INSULIN (H): ICD-10-CM

## 2021-11-12 LAB
ANION GAP SERPL CALCULATED.3IONS-SCNC: 12 MMOL/L (ref 5–18)
BUN SERPL-MCNC: 30 MG/DL (ref 8–28)
CALCIUM SERPL-MCNC: 10.3 MG/DL (ref 8.5–10.5)
CHLORIDE BLD-SCNC: 100 MMOL/L (ref 98–107)
CO2 SERPL-SCNC: 26 MMOL/L (ref 22–31)
CREAT SERPL-MCNC: 0.97 MG/DL (ref 0.6–1.1)
CREAT UR-MCNC: 27 MG/DL
GFR SERPL CREATININE-BSD FRML MDRD: 50 ML/MIN/1.73M2
GLUCOSE BLD-MCNC: 138 MG/DL (ref 70–125)
HBA1C MFR BLD: 6.6 % (ref 0–5.6)
MICROALBUMIN UR-MCNC: 7.31 MG/DL (ref 0–1.99)
MICROALBUMIN/CREAT UR: 270.7 MG/G CR
POTASSIUM BLD-SCNC: 4.6 MMOL/L (ref 3.5–5)
SODIUM SERPL-SCNC: 138 MMOL/L (ref 136–145)

## 2021-11-12 PROCEDURE — 82043 UR ALBUMIN QUANTITATIVE: CPT

## 2021-11-12 PROCEDURE — 80048 BASIC METABOLIC PNL TOTAL CA: CPT

## 2021-11-12 PROCEDURE — 83036 HEMOGLOBIN GLYCOSYLATED A1C: CPT

## 2021-11-12 PROCEDURE — 36415 COLL VENOUS BLD VENIPUNCTURE: CPT

## 2021-11-15 NOTE — TELEPHONE ENCOUNTER
Patient calling to check status of the below refill request.  Patient stating pharmacy still has not heard anything.  Please call patient with update as she has been without a meter for the last 10 days now.    Patient can be reached at [phone].

## 2021-11-17 ENCOUNTER — VIRTUAL VISIT (OUTPATIENT)
Dept: ENDOCRINOLOGY | Facility: CLINIC | Age: 86
End: 2021-11-17
Payer: COMMERCIAL

## 2021-11-17 DIAGNOSIS — E11.9 TYPE 2 DIABETES MELLITUS WITHOUT COMPLICATION, WITHOUT LONG-TERM CURRENT USE OF INSULIN (H): ICD-10-CM

## 2021-11-17 DIAGNOSIS — M85.80 OSTEOPENIA, UNSPECIFIED LOCATION: Primary | ICD-10-CM

## 2021-11-17 PROBLEM — R80.9 PROTEIN IN URINE: Status: ACTIVE | Noted: 2019-07-02

## 2021-11-17 PROBLEM — E83.52 HYPERCALCEMIA: Status: ACTIVE | Noted: 2019-07-02

## 2021-11-17 PROCEDURE — 99214 OFFICE O/P EST MOD 30 MIN: CPT | Mod: TEL | Performed by: NURSE PRACTITIONER

## 2021-11-17 RX ORDER — SODIUM FLUORIDE 1.1 G/100G
GEL ORAL
COMMUNITY
Start: 2021-11-06 | End: 2022-10-10

## 2021-11-26 ENCOUNTER — NURSE TRIAGE (OUTPATIENT)
Dept: NURSING | Facility: CLINIC | Age: 86
End: 2021-11-26
Payer: COMMERCIAL

## 2021-11-26 ENCOUNTER — VIRTUAL VISIT (OUTPATIENT)
Dept: FAMILY MEDICINE | Facility: CLINIC | Age: 86
End: 2021-11-26
Payer: COMMERCIAL

## 2021-11-26 DIAGNOSIS — N30.00 ACUTE CYSTITIS WITHOUT HEMATURIA: ICD-10-CM

## 2021-11-26 DIAGNOSIS — J06.9 VIRAL UPPER RESPIRATORY TRACT INFECTION: Primary | ICD-10-CM

## 2021-11-26 PROCEDURE — 99213 OFFICE O/P EST LOW 20 MIN: CPT | Mod: TEL | Performed by: NURSE PRACTITIONER

## 2021-11-26 RX ORDER — SULFAMETHOXAZOLE/TRIMETHOPRIM 800-160 MG
TABLET ORAL
COMMUNITY
Start: 2021-11-24 | End: 2022-04-27

## 2021-11-26 NOTE — PROGRESS NOTES
"Patricia is a 89 year old who is being evaluated via a billable telephone visit.      What phone number would you like to be contacted at? [phone]  How would you like to obtain your AVS? MyChart    Assessment & Plan     Viral upper respiratory tract infection  Since symptoms are improving, most likely viral URI as cause and she has low risk for COVID 19.  Recommended continuing OTC medications as she has been taking them and follow-up if any worsening symptoms or if symptoms persist and do not resolve.  Handout given on viral URI and symptom management.    Acute cystitis without hematuria  Patient is feeling better on her antibiotic that she was changed to.  Recommend continuing this through the full course and follow-up with OB provider if symptoms recur.         No follow-ups on file.    Jahaira Baltazar NP  [facility]    Reynaldo Gomez is a 89 year old who presents for the following health issues.    HPI     Acute Illness  Acute illness concerns: Patient states she hasn't been felling well and that she has a cold - coughing and runny nose.   Onset/Duration: \"At least a week and a half.\"  She is taking an antibiotic for a bladder infection.  Started her on Nitrofurantoin and now is on a \"sulfa\" medication for 7 days and has been taking this on 11/24/2.  Today symptoms are improving.    Symptoms:  Fever: no, temp 98.6  Chills/Sweats: no  Headache (location?): no  Sinus Pressure: no  Conjunctivitis:  no  Ear Pain: no  Rhinorrhea: YES, better today  Congestion: no  Sore Throat: no  Cough: YES-non-productive most of the time, productive of clear sputum sometimes.   Wheeze: no  Decreased Appetite: no  Nausea: no  Vomiting: no  Diarrhea: no  Dysuria/Freq.: YES  Dysuria or Hematuria: YES  Fatigue/Achiness: YES- very tired, but is improving  Sick/Strep Exposure: no    Does take Nyquil cough syrup has been helping to sleep well    Concern for COVID-19  About how many days ago did " these symptoms start? 10 days  Is this your first visit for this illness? Yes  In the 14 days before your symptoms started, have you had close contact with someone with COVID-19 (Coronavirus)? I do not know.  Do you have a fever or chills? No  Are you having new or worsening difficulty breathing? No  Do you have new or worsening cough? Yes, it's a dry cough.   Have you had any new or unexplained body aches? No    Have you experienced any of the following NEW symptoms?    Headache: No    Sore throat: No    Loss of taste or smell: No    Chest pain: No    Diarrhea: No    Rash: No  Who do you live with? Nephew is fully vaccinated with a head cold  Are you, or a household member, a healthcare worker or a ? No  Do you live in a nursing home, group home, or shelter? No  Do you have a way to get food/medications if quarantined? Yes, I have a friend or family member who can help me.    Patient only leaves her house for grocery and short errands otherwise is home.  She is vaccinated and wears her mask when she is out on errands.    Review of Systems   CONSTITUTIONAL: NEGATIVE for fever, chills, change in weight  ENT/MOUTH: NEGATIVE for ear, mouth and throat problems  RESP:POSITIVE for cough-non productive, denies any wheezing or shortness of breath  CV: NEGATIVE for chest pain, palpitations or peripheral edema  GI: NEGATIVE for nausea, abdominal pain, heartburn, or change in bowel habits  PSYCHIATRIC: NEGATIVE for changes in mood or affect  ROS otherwise negative      Objective    Vitals - Patient Reported  Systolic (Patient Reported): (!) 152  Diastolic (Patient Reported): 67  SpO2 (Patient Reported): 97  Temperature (Patient Reported): 98.6  F (37  C)      Vitals:  No vitals were obtained today due to virtual visit.    Physical Exam   healthy, alert and no distress  PSYCH: Alert and oriented times 3; coherent speech, normal   rate and volume, able to articulate logical thoughts, able   to abstract reason, no  tangential thoughts, no hallucinations   or delusions  Her affect is normal  RESP: No cough, no audible wheezing, able to talk in full sentences  Remainder of exam unable to be completed due to telephone visits    Phone call duration: 12 minutes

## 2021-11-26 NOTE — TELEPHONE ENCOUNTER
Patient is calling today requesting a covid test. She states she is not feeling well - she has a cough and runny nose. She has also been on antibiotics since 11/19/21 for UTI.    She does not think she has a fever, but has not checked. Patient is high risk due to her age and underlying health issues. Patient does not have a computer to go through [employer].    Per protocol, recommendations are for patient to Call Telemedicine provider now.  Care advice given. Advised patient to call back if they develop any new or worsening symptoms. Patient verbalizes understanding and agrees with plan of care. Transferred patient to scheduling to help set up virtual appointment.    Guillermina Lucas RN  11/26/21 9:29 AM  [facility] Nurse Advisor        COVID-19 testing at [facility] is by appointment only. You'll need to schedule a time to get tested. If you have symptoms (signs) of COVID, please log in to [employer] to complete an e-visit (virtual visit). This is the first step to getting tested.    If you don't have COVID symptoms and want to get tested, you should also log in to [employer] for an e-visit. This includes people who:    have had close contact with a COVID-positive person    want to be tested before or after travel    have taken part in high-risk activities    have a school testing mandate, or     were told to get tested by their care team or the health department.     A [employer] e-visit is the fastest way for you to be seen by our care team. Please choose  Next available provider  to complete an e-visit. When you choose this option, the average response time is less than one hour.  After the e-visit, you'll be able to self-schedule your test at one of our testing locations. To learn more about our testing locations or for other details, please visit our COVID-19 Resource Hub.    [employer] is also the fastest way to get your test results. You'll get your results in [employer] within 3 days. If you don't use [employer],  you'll get your results in the mail in 7 to 10 days. If your test is positive and you don't view your result in [employer] within 1 business day, you'll get a phone call with your result. A positive result means that you have COVID-19.    If you have an upcoming procedure at [facility], you'll need to be tested for COVID. The test needs to happen 2 to 4 days before your procedure. If you have an upcoming procedure, we will contact you to schedule a COVID test.    If you don't have a [employer] account, please call [phone] to set up a virtual visit. You can also find community testing sites in [place] at [place]/get-tested/testing-locations. If you live in [place], please visit [email or web address].[email or web address]/covid-19/community-testing.htm.        Reason for Disposition    HIGH RISK for severe COVID complications (e.g., age > 64 years, obesity with BMI > 25, pregnant, chronic lung disease or other chronic medical condition)  (Exception: Already seen by PCP and no new or worsening symptoms.)    Additional Information    Negative: SEVERE difficulty breathing (e.g., struggling for each breath, speaks in single words)    Negative: Difficult to awaken or acting confused (e.g., disoriented, slurred speech)    Negative: Bluish (or gray) lips or face now    Negative: Shock suspected (e.g., cold/pale/clammy skin, too weak to stand, low BP, rapid pulse)    Negative: Sounds like a life-threatening emergency to the triager    Negative: [1] COVID-19 exposure AND [2] no symptoms    Negative: COVID-19 vaccine reaction suspected (e.g., fever, headache, muscle aches) occurring 1 to 3 days after getting vaccine    Negative: COVID-19 vaccine, questions about    Negative: [1] Lives with someone known to have influenza (flu test positive) AND [2] flu-like symptoms (e.g., cough, runny nose, sore throat, SOB; with or without fever)    Negative: [1] Adult with possible COVID-19 symptoms AND [2] triager concerned about severity of  symptoms or other causes    Negative: COVID-19 and breastfeeding, questions about    Negative: SEVERE or constant chest pain or pressure (Exception: mild central chest pain, present only when coughing)    Negative: MODERATE difficulty breathing (e.g., speaks in phrases, SOB even at rest, pulse 100-120)    Negative: [1] Headache AND [2] stiff neck (can't touch chin to chest)    Negative: MILD difficulty breathing (e.g., minimal/no SOB at rest, SOB with walking, pulse <100)    Negative: Chest pain or pressure    Negative: Patient sounds very sick or weak to the triager    Negative: Fever > 103 F (39.4 C)    Negative: [1] Fever > 101 F (38.3 C) AND [2] age > 60 years    Negative: [1] Fever > 100.0 F (37.8 C) AND [2] bedridden (e.g., nursing home patient, CVA, chronic illness, recovering from surgery)    Protocols used: CORONAVIRUS (COVID-19) DIAGNOSED OR [id number] 8.25.2021  COVID 19 Nurse Triage Plan/Patient Instructions    Please be aware that novel coronavirus (COVID-19) may be circulating in the community. If you develop symptoms such as fever, cough, or SOB or if you have concerns about the presence of another infection including coronavirus (COVID-19), please contact your health care provider or visit [email or web address].     Disposition/Instructions    Virtual Visit with provider recommended. Reference Visit Selection Guide.    Thank you for taking steps to prevent the spread of this virus.  o Limit your contact with others.  o Wear a simple mask to cover your cough.  o Wash your hands well and often.    Resources    [facility]: About COVID-19: [email or web address]    CDC: What to Do If You're Sick: www.cdc.gov/coronavirus/2019-ncov/about/steps-when-sick.html    CDC: Ending Home Isolation: www.cdc.gov/coronavirus/2019-ncov/hcp/disposition-in-home-patients.html     CDC: Caring for Someone: www.cdc.gov/coronavirus/2019-ncov/if-you-are-sick/care-for-someone.html     CHAR[facility]: Interim  Guidance for Hospital Discharge to Home: www.health.[facility].[place]/diseases/coronavirus/hcp/hospdischarge.pdf    [facility] clinical trials (COVID-19 research studies): clinicalaffairs.[facility]/umn-clinical-trials     Below are the COVID-19 hotlines at the [facility] ([facility]). Interpreters are available.   o For health questions: Call 514-831-1001 or 1-227.520.9702 (7 a.m. to 7 p.m.)  o For questions about schools and childcare: Call [phone] or [phone] (7 a.m. to 7 p.m.)

## 2021-12-14 DIAGNOSIS — E11.9 TYPE 2 DIABETES MELLITUS WITHOUT COMPLICATION, WITHOUT LONG-TERM CURRENT USE OF INSULIN (H): ICD-10-CM

## 2021-12-15 ENCOUNTER — ALLIED HEALTH/NURSE VISIT (OUTPATIENT)
Dept: ENDOCRINOLOGY | Facility: CLINIC | Age: 86
End: 2021-12-15
Payer: COMMERCIAL

## 2021-12-15 DIAGNOSIS — Z92.29 PERSONAL HISTORY OF OTHER DRUG THERAPY: ICD-10-CM

## 2021-12-15 DIAGNOSIS — M85.80 OSTEOPENIA, UNSPECIFIED LOCATION: Primary | ICD-10-CM

## 2021-12-15 PROCEDURE — 99207 PR NO CHARGE NURSE ONLY: CPT

## 2021-12-15 PROCEDURE — 96372 THER/PROPH/DIAG INJ SC/IM: CPT | Performed by: NURSE PRACTITIONER

## 2021-12-15 NOTE — PROGRESS NOTES
"Prolia Injection Phone Screen      Screening questions have been asked 2-3 days prior to administration visit for Prolia. If any questions are answered with \"Yes,\" this phone encounter were will routed to ordering provider for further evaluation.     1.  When was the last injection?  5/18/21    2.  Has insurance for this injection been verified?  Yes    3.  Did you experience any new onset achiness or rashes that lasted for over a month with your previous Prolia injection?   No    4.  Do you have a fever over 101?F or a new deep cough that is unusual for you today? No    5.  Have you started any new medications in the last 6 months that you were told could affect your immune system? These may have been prescribed by oncologist, transplant, rheumatology, or dermatology.   No    6.  In the last 6 months have you have gastric bypass or parathyroid surgery?   No    7.  Do you plan dental work requiring drilling into the bone such as implants/extractions or oral surgery in the next 2-3 months?   No    8. Do you have new insurance since the last injection?    Patient informed if symptoms discussed above present prior to their administration appointment, they are to notify clinic immediately.     Catina Guidry RN          The following steps were completed to comply with the REMS program for Prolia:  1. Ordering provider has previously reviewed information in the Medication Guide and Patient Counseling Chart, including the serious risks of Prolia  and the symptoms of each risk and have been advised to seek prompt medical attention if they have signs or symptoms of any of the serious risks.  2. Provided each patient a copy of the Medication Guide and Patient Brochure.  See MAR for administration details.   Indication: Prolia  (denosumab) is a prescription medicine used to treat osteoporosis in patients who:   Are at high risk for fracture, meaning patients who have had a fracture related to osteoporosis, or who have " multiple risk factors for fracture; Cannot use another osteoporosis medicine or other osteoporosis medicines did not work well.   The timeline for early/late injections would be 4 weeks early and any time after the 6 month grupo. If a patient receives their injection late, then the subsequent injection would be 6 months from the date that they actually received the injection    Have the screening questions been asked prior to this administration? Yes        The following medication was given:     MEDICATION: Denosumab (Prolia) 60 mg/ml SOLN  ROUTE: SQ  SITE: Arm - Left  DOSE: 60 mg  LOT #: [id number]  :  [employer]  EXPIRATION DATE:  02/24  NDC#: see mar

## 2021-12-16 ENCOUNTER — TELEPHONE (OUTPATIENT)
Dept: ENDOCRINOLOGY | Facility: CLINIC | Age: 86
End: 2021-12-16
Payer: COMMERCIAL

## 2021-12-16 RX ORDER — BLOOD SUGAR DIAGNOSTIC
1 STRIP MISCELLANEOUS 3 TIMES DAILY
Qty: 100 STRIP | Refills: 1 | Status: SHIPPED | OUTPATIENT
Start: 2021-12-16 | End: 2022-04-27

## 2021-12-16 NOTE — TELEPHONE ENCOUNTER
[facility] Call [facility]    Phone Message    May a detailed message be left on voicemail: yes     Reason for Call: Form or Letter   Type or form/letter needing completion: lab results from 11/12/21  Provider: John Arnold form needed: whenever possible  Once completed: Mail form to Name: Patricia, at Address: Home      Action Taken: Message routed to:  Other: endo    Travel Screening: Not Applicable

## 2021-12-16 NOTE — TELEPHONE ENCOUNTER
"Last Written Prescription Date:  11/16/2020  Last Fill Quantity: 100,  # refills: 5   Last office visit provider:  6/23/2021     Requested Prescriptions   Pending Prescriptions Disp Refills     blood glucose (ACCU-CHEK AMIRA PLUS) test strip 100 strip 5     Sig: Use to test blood sugar 3   times daily or as directed.       Diabetic Supplies Protocol Failed - 12/14/2021  7:07 AM        Failed - Recent (6 mo) or future (30 days) visit within the authorizing provider's specialty     Patient had office visit in the last 6 months or has a visit in the next 30 days with authorizing provider.  See \"Patient Info\" tab in inbasket, or \"Choose Columns\" in Meds & Orders section of the refill encounter.            Passed - Medication is active on med list        Passed - Patient is 18 years of age or older             Cydney Rucker RN 12/16/21 7:55 AM  "

## 2022-01-07 DIAGNOSIS — Z76.0 ENCOUNTER FOR MEDICATION REFILL: Primary | ICD-10-CM

## 2022-01-10 RX ORDER — AMOXICILLIN 500 MG/1
500 CAPSULE ORAL ONCE
Qty: 4 CAPSULE | Refills: 3 | Status: SHIPPED | OUTPATIENT
Start: 2022-01-10 | End: 2022-01-10

## 2022-01-12 ENCOUNTER — TELEPHONE (OUTPATIENT)
Dept: INTERNAL MEDICINE | Facility: CLINIC | Age: 87
End: 2022-01-12
Payer: COMMERCIAL

## 2022-01-12 DIAGNOSIS — Z76.0 ENCOUNTER FOR MEDICATION REFILL: Primary | ICD-10-CM

## 2022-01-12 RX ORDER — AMOXICILLIN 500 MG/1
CAPSULE ORAL
Qty: 4 CAPSULE | Refills: 3 | Status: SHIPPED | OUTPATIENT
Start: 2022-01-12 | End: 2023-06-28

## 2022-01-12 NOTE — TELEPHONE ENCOUNTER
Pharmacy needs clarification on Rx that was sent.   Sig - Route: Take 1 capsule (500 mg) by mouth once for 1 dose [AMOXICILLIN (AMOXIL) 500 MG CAPSULE] Take 500 mg by mouth. Take 4 pills prior to dental work - Oral   Sent to pharmacy as: Amoxicillin 500 MG Oral Capsule (AMOXIL)   Class: E-Prescribe   Order: [id number]   E-Prescribing Status: Receipt confirmed by pharmacy (1/10/2022 11:22 AM CST)       Please resend Rx

## 2022-01-25 DIAGNOSIS — I10 HTN (HYPERTENSION): ICD-10-CM

## 2022-01-26 DIAGNOSIS — R05.3 CHRONIC COUGH: ICD-10-CM

## 2022-01-26 RX ORDER — CODEINE PHOSPHATE AND GUAIFENESIN 10; 100 MG/5ML; MG/5ML
5-10 SOLUTION ORAL 4 TIMES DAILY PRN
Qty: 240 ML | Refills: 0 | Status: SHIPPED | OUTPATIENT
Start: 2022-01-26 | End: 2022-08-05

## 2022-01-27 RX ORDER — LOSARTAN POTASSIUM 25 MG/1
25 TABLET ORAL DAILY
Qty: 90 TABLET | Refills: 1 | Status: SHIPPED | OUTPATIENT
Start: 2022-01-27 | End: 2022-04-27

## 2022-01-27 NOTE — TELEPHONE ENCOUNTER
"Last Written Prescription Date:  6/23/21  Last Fill Quantity: 90,  # refills: 1   Last office visit provider:  6/23/21     Requested Prescriptions   Pending Prescriptions Disp Refills     losartan (COZAAR) 25 MG tablet 90 tablet 1     Sig: Take 1 tablet (25 mg) by mouth daily       Angiotensin-II Receptors Passed - 1/25/2022  3:34 PM        Passed - Last blood pressure under 140/90 in past 12 months     BP Readings from Last 3 Encounters:   09/13/21 122/56   06/23/21 136/64   03/09/21 (!) 152/62                 Passed - Recent (12 mo) or future (30 days) visit within the authorizing provider's specialty     Patient has had an office visit with the authorizing provider or a provider within the authorizing providers department within the previous 12 mos or has a future within next 30 days. See \"Patient Info\" tab in inbasket, or \"Choose Columns\" in Meds & Orders section of the refill encounter.              Passed - Medication is active on med list        Passed - Patient is age 18 or older        Passed - No active pregnancy on record        Passed - Normal serum creatinine on file in past 12 months     Recent Labs   Lab Test 11/12/21  1023   CR 0.97       Ok to refill medication if creatinine is low          Passed - Normal serum potassium on file in past 12 months     Recent Labs   Lab Test 11/12/21  1023   POTASSIUM 4.6                    Passed - No positive pregnancy test in past 12 months             Oskar Hernandez RN 01/27/22 9:44 AM  "

## 2022-02-18 DIAGNOSIS — I25.10 CAD (CORONARY ARTERY DISEASE): ICD-10-CM

## 2022-02-18 NOTE — TELEPHONE ENCOUNTER
Received fax from pharmacy requesting refill for atorvastatin (LIPITOR) 40 MG tablet    Last refill: 06/23/2021  Patient last seen: 06/23/2021    Okay for refill?  Adali Fernandez MA on 2/18/2022 at 8:17 AM

## 2022-02-21 DIAGNOSIS — I10 HYPERTENSION, UNSPECIFIED TYPE: ICD-10-CM

## 2022-02-21 RX ORDER — ATORVASTATIN CALCIUM 40 MG/1
40 TABLET, FILM COATED ORAL DAILY
Qty: 90 TABLET | Refills: 1 | Status: SHIPPED | OUTPATIENT
Start: 2022-02-21 | End: 2022-04-27

## 2022-02-21 NOTE — TELEPHONE ENCOUNTER
"Last Written Prescription Date:  6/23/21  Last Fill Quantity: 90,  # refills: 1   Last office visit provider:  6/23/21     Requested Prescriptions   Pending Prescriptions Disp Refills     atorvastatin (LIPITOR) 40 MG tablet 90 tablet 1     Sig: Take 1 tablet (40 mg) by mouth daily       Statins Protocol Passed - 2/21/2022  8:47 AM        Passed - LDL on file in past 12 months     Recent Labs   Lab Test 06/23/21  0834   LDL 86             Passed - No abnormal creatine kinase in past 12 months     No lab results found.             Passed - Recent (12 mo) or future (30 days) visit within the authorizing provider's specialty     Patient has had an office visit with the authorizing provider or a provider within the authorizing providers department within the previous 12 mos or has a future within next 30 days. See \"Patient Info\" tab in inbasket, or \"Choose Columns\" in Meds & Orders section of the refill encounter.              Passed - Medication is active on med list        Passed - Patient is age 18 or older        Passed - No active pregnancy on record        Passed - No positive pregnancy test in past 12 months             Oskar Hernandez RN 02/21/22 8:47 AM  "

## 2022-02-21 NOTE — TELEPHONE ENCOUNTER
Received fax from pharmacy requesting refill for hydrochlorothiazide (HYDRODIURIL) 25 MG tablet    Last refill: 09/01/2021  Patient last seen: 06/23/2021    Okay for refill?  Adali Fernandez MA on 2/21/2022 at 3:52 PM

## 2022-02-22 RX ORDER — HYDROCHLOROTHIAZIDE 25 MG/1
25 TABLET ORAL DAILY
Qty: 90 TABLET | Refills: 1 | Status: SHIPPED | OUTPATIENT
Start: 2022-02-22 | End: 2022-04-27

## 2022-02-22 NOTE — TELEPHONE ENCOUNTER
"    Last Written Prescription Date:  9/1/2021  Last Fill Quantity: 90,  # refills: 1   Last office visit provider:  06/23/2021     Requested Prescriptions   Pending Prescriptions Disp Refills     hydrochlorothiazide (HYDRODIURIL) 25 MG tablet 90 tablet 1     Sig: Take 1 tablet (25 mg) by mouth daily       Diuretics (Including Combos) Protocol Passed - 2/21/2022  3:52 PM        Passed - Blood pressure under 140/90 in past 12 months     BP Readings from Last 3 Encounters:   09/13/21 122/56   06/23/21 136/64   03/09/21 (!) 152/62                 Passed - Recent (12 mo) or future (30 days) visit within the authorizing provider's specialty     Patient has had an office visit with the authorizing provider or a provider within the authorizing providers department within the previous 12 mos or has a future within next 30 days. See \"Patient Info\" tab in inbasket, or \"Choose Columns\" in Meds & Orders section of the refill encounter.              Passed - Medication is active on med list        Passed - Patient is age 18 or older        Passed - No active pregancy on record        Passed - Normal serum creatinine on file in past 12 months     Recent Labs   Lab Test 11/12/21  1023   CR 0.97              Passed - Normal serum potassium on file in past 12 months     Recent Labs   Lab Test 11/12/21  1023   POTASSIUM 4.6                    Passed - Normal serum sodium on file in past 12 months     Recent Labs   Lab Test 11/12/21  1023                 Passed - No positive pregnancy test in past 12 months             Guillermina Lucas RN 02/22/22 1:35 PM  "

## 2022-03-29 ENCOUNTER — TELEPHONE (OUTPATIENT)
Dept: INTERNAL MEDICINE | Facility: CLINIC | Age: 87
End: 2022-03-29
Payer: COMMERCIAL

## 2022-03-29 DIAGNOSIS — R73.9 HYPERGLYCEMIA: ICD-10-CM

## 2022-03-29 DIAGNOSIS — Z79.4 TYPE 2 DIABETES MELLITUS WITH HYPERGLYCEMIA, WITH LONG-TERM CURRENT USE OF INSULIN (H): ICD-10-CM

## 2022-03-29 DIAGNOSIS — E11.65 TYPE 2 DIABETES MELLITUS WITH HYPERGLYCEMIA, WITH LONG-TERM CURRENT USE OF INSULIN (H): ICD-10-CM

## 2022-03-29 NOTE — TELEPHONE ENCOUNTER
Patient is requesting RX is sent to [facility] pharmacy on [address] for a blood glucose monitor.     Patient states her blood glucose monitor is no longer working.

## 2022-03-30 NOTE — PATIENT INSTRUCTIONS
1.  Continue uses of Nyquil if this is helpful for your cough.  2.  Use humidifier in your home to help with symptoms.  3.  Finish your antibiotic until completion and follow-up with OB provider if symptoms recur.  4.  Follow-up in clinic if any worsening or persistent symptoms over the next week with your upper respiratory infection for re-evaluation.      Patient Education     Viral Upper Respiratory Illness (Adult)    You have a viral upper respiratory illness (URI), which is another term for the common cold. This illness is contagious during the first few days. It is spread through the air by coughing and sneezing. It may also be spread by direct contact (touching the sick person and then touching your own eyes, nose, or mouth). Frequent handwashing will decrease risk of spread. Most viral illnesses go away within 7 to 10 days with rest and simple home remedies. Sometimes the illness may last for several weeks. Antibiotics will not kill a virus, and they are generally not prescribed for this condition.  Home care    If symptoms are severe, rest at home for the first 2 to 3 days. When you resume activity, don't let yourself get too tired.    Don't smoke. If you need help stopping, talk with your healthcare provider.    Avoid being exposed to cigarette smoke (yours or others ).    You may use acetaminophen or ibuprofen to control pain and fever, unless another medicine was prescribed. If you have chronic liver or kidney disease, have ever had a stomach ulcer or gastrointestinal bleeding, or are taking blood-thinning medicines, talk with your healthcare provider before using these medicines. Aspirin should never be given to anyone under 18 years of age who is ill with a viral infection or fever. It may cause severe liver or brain damage.    Your appetite may be poor, so a light diet is fine. Stay well hydrated by drinking 6 to 8 glasses of fluids per day (water, soft drinks, juices, tea, or soup). Extra fluids will  Deb calling through call center.  She is 5 1/7 weeks pregnant by LMP.      She has had pink/red bleeding for about 2 hours.  No recent intercourse or straining for BM    Discussed ED precautions    Will go to lab today for BHCG.  Blood type on file as O pos.   help loosen secretions in the nose and lungs.    Over-the-counter cold medicines will not shorten the length of time you re sick, but they may be helpful for the following symptoms: cough, sore throat, and nasal and sinus congestion. If you take prescription medicines, ask your healthcare provider or pharmacist which over-the-counter medicines are safe to use. (Note: Don't use decongestants if you have high blood pressure.)  Follow-up care  Follow up with your healthcare provider, or as advised.  When to seek medical advice  Call your healthcare provider right away if any of these occur:    Cough with lots of colored sputum (mucus)    Severe headache; face, neck, or ear pain    Difficulty swallowing due to throat pain    Fever of 100.4 F (38 C) or higher, or as directed by your healthcare provider  Call 911  Call 911 if any of these occur:    Chest pain, shortness of breath, wheezing, or difficulty breathing    Coughing up blood    Very severe pain with swallowing, especially if it goes along with a muffled voice   Anne last reviewed this educational content on 6/1/2018 2000-2021 [employer]. All rights reserved. This information is not intended as a substitute for professional medical care. Always follow your healthcare professional's instructions.

## 2022-04-02 ENCOUNTER — HEALTH MAINTENANCE LETTER (OUTPATIENT)
Age: 87
End: 2022-04-02

## 2022-04-07 NOTE — TELEPHONE ENCOUNTER
Patient is looking for update on new blood glucose monitor.    Needs RX sent to [facility] in [employer] [place].

## 2022-04-08 NOTE — TELEPHONE ENCOUNTER
Chart review shows that new glucose monitoring device was ordered on 3/30. Patient called and confirmed that her pharmacy never called to update her on status of the meter. Pharmacy contacted and they confirm the monitoring device was indeed received, but was on hold for some reason. Pharmacy released hold on monitoring device and states they will need new orders sent over for test strips and lancets due to old monitoring device being discontinued. Old device was ACCU-CHEK AMIRA PLUS. New device that will be given by pharmacy is ACCU-CHEK GUIDE.

## 2022-04-14 NOTE — TELEPHONE ENCOUNTER
Patient is calling for RX for blood glucose to be updated.    The pharmacy carries One Touch Ultra II Glucose SYS    Requesting RX is sent to [facility] in [employer] as soon as feasible.

## 2022-04-27 ENCOUNTER — OFFICE VISIT (OUTPATIENT)
Dept: INTERNAL MEDICINE | Facility: CLINIC | Age: 87
End: 2022-04-27
Payer: COMMERCIAL

## 2022-04-27 VITALS
DIASTOLIC BLOOD PRESSURE: 62 MMHG | SYSTOLIC BLOOD PRESSURE: 114 MMHG | HEIGHT: 56 IN | HEART RATE: 76 BPM | RESPIRATION RATE: 18 BRPM | WEIGHT: 109.2 LBS | BODY MASS INDEX: 24.56 KG/M2

## 2022-04-27 DIAGNOSIS — I25.10 CORONARY ARTERY DISEASE INVOLVING NATIVE CORONARY ARTERY OF NATIVE HEART WITHOUT ANGINA PECTORIS: ICD-10-CM

## 2022-04-27 DIAGNOSIS — Z00.00 ENCOUNTER FOR MEDICARE ANNUAL WELLNESS EXAM: Primary | ICD-10-CM

## 2022-04-27 DIAGNOSIS — N18.30 STAGE 3 CHRONIC KIDNEY DISEASE, UNSPECIFIED WHETHER STAGE 3A OR 3B CKD (H): ICD-10-CM

## 2022-04-27 DIAGNOSIS — Z12.31 ENCOUNTER FOR SCREENING MAMMOGRAM FOR BREAST CANCER: ICD-10-CM

## 2022-04-27 DIAGNOSIS — I10 HYPERTENSION, UNSPECIFIED TYPE: ICD-10-CM

## 2022-04-27 DIAGNOSIS — E11.65 TYPE 2 DIABETES MELLITUS WITH HYPERGLYCEMIA, WITHOUT LONG-TERM CURRENT USE OF INSULIN (H): ICD-10-CM

## 2022-04-27 DIAGNOSIS — H35.30 MACULAR DEGENERATION, UNSPECIFIED LATERALITY, UNSPECIFIED TYPE: ICD-10-CM

## 2022-04-27 DIAGNOSIS — M85.80 OSTEOPENIA, UNSPECIFIED LOCATION: ICD-10-CM

## 2022-04-27 DIAGNOSIS — Z01.419 ENCOUNTER FOR GYNECOLOGICAL EXAMINATION WITHOUT ABNORMAL FINDING: ICD-10-CM

## 2022-04-27 DIAGNOSIS — K86.1 OTHER CHRONIC PANCREATITIS (H): ICD-10-CM

## 2022-04-27 DIAGNOSIS — Z78.0 POSTMENOPAUSAL STATUS: ICD-10-CM

## 2022-04-27 LAB
ALBUMIN SERPL-MCNC: 4.1 G/DL (ref 3.5–5)
ALBUMIN UR-MCNC: NEGATIVE MG/DL
ALP SERPL-CCNC: 50 U/L (ref 45–120)
ALT SERPL W P-5'-P-CCNC: 21 U/L (ref 0–45)
ANION GAP SERPL CALCULATED.3IONS-SCNC: 10 MMOL/L (ref 5–18)
APPEARANCE UR: CLEAR
AST SERPL W P-5'-P-CCNC: 29 U/L (ref 0–40)
BASOPHILS # BLD AUTO: 0.1 10E3/UL (ref 0–0.2)
BASOPHILS NFR BLD AUTO: 1 %
BILIRUB SERPL-MCNC: 0.5 MG/DL (ref 0–1)
BILIRUB UR QL STRIP: NEGATIVE
BUN SERPL-MCNC: 29 MG/DL (ref 8–28)
CALCIUM SERPL-MCNC: 10.2 MG/DL (ref 8.5–10.5)
CHLORIDE BLD-SCNC: 100 MMOL/L (ref 98–107)
CHOLEST SERPL-MCNC: 162 MG/DL
CO2 SERPL-SCNC: 30 MMOL/L (ref 22–31)
COLOR UR AUTO: YELLOW
CREAT SERPL-MCNC: 1.01 MG/DL (ref 0.6–1.1)
EOSINOPHIL # BLD AUTO: 0.2 10E3/UL (ref 0–0.7)
EOSINOPHIL NFR BLD AUTO: 3 %
ERYTHROCYTE [DISTWIDTH] IN BLOOD BY AUTOMATED COUNT: 13.9 % (ref 10–15)
FASTING STATUS PATIENT QL REPORTED: YES
GFR SERPL CREATININE-BSD FRML MDRD: 51 ML/MIN/1.73M2
GLUCOSE BLD-MCNC: 135 MG/DL (ref 70–125)
GLUCOSE UR STRIP-MCNC: NEGATIVE MG/DL
HBA1C MFR BLD: 6.7 % (ref 0–5.6)
HCT VFR BLD AUTO: 34.7 % (ref 35–47)
HDLC SERPL-MCNC: 59 MG/DL
HGB BLD-MCNC: 11.5 G/DL (ref 11.7–15.7)
HGB UR QL STRIP: NEGATIVE
IMM GRANULOCYTES # BLD: 0 10E3/UL
IMM GRANULOCYTES NFR BLD: 0 %
KETONES UR STRIP-MCNC: NEGATIVE MG/DL
LDLC SERPL CALC-MCNC: 81 MG/DL
LEUKOCYTE ESTERASE UR QL STRIP: NEGATIVE
LYMPHOCYTES # BLD AUTO: 1.6 10E3/UL (ref 0.8–5.3)
LYMPHOCYTES NFR BLD AUTO: 18 %
MCH RBC QN AUTO: 30.7 PG (ref 26.5–33)
MCHC RBC AUTO-ENTMCNC: 33.1 G/DL (ref 31.5–36.5)
MCV RBC AUTO: 93 FL (ref 78–100)
MONOCYTES # BLD AUTO: 0.6 10E3/UL (ref 0–1.3)
MONOCYTES NFR BLD AUTO: 7 %
NEUTROPHILS # BLD AUTO: 6.3 10E3/UL (ref 1.6–8.3)
NEUTROPHILS NFR BLD AUTO: 72 %
NITRATE UR QL: NEGATIVE
PH UR STRIP: 6 [PH] (ref 5–8)
PLATELET # BLD AUTO: 217 10E3/UL (ref 150–450)
POTASSIUM BLD-SCNC: 4.8 MMOL/L (ref 3.5–5)
PROT SERPL-MCNC: 7.2 G/DL (ref 6–8)
RBC # BLD AUTO: 3.75 10E6/UL (ref 3.8–5.2)
SODIUM SERPL-SCNC: 140 MMOL/L (ref 136–145)
SP GR UR STRIP: 1.01 (ref 1–1.03)
TRIGL SERPL-MCNC: 111 MG/DL
UROBILINOGEN UR STRIP-ACNC: 0.2 E.U./DL
WBC # BLD AUTO: 8.8 10E3/UL (ref 4–11)

## 2022-04-27 PROCEDURE — 99397 PER PM REEVAL EST PAT 65+ YR: CPT | Performed by: NURSE PRACTITIONER

## 2022-04-27 PROCEDURE — 85025 COMPLETE CBC W/AUTO DIFF WBC: CPT | Performed by: NURSE PRACTITIONER

## 2022-04-27 PROCEDURE — 81003 URINALYSIS AUTO W/O SCOPE: CPT | Performed by: NURSE PRACTITIONER

## 2022-04-27 PROCEDURE — 36415 COLL VENOUS BLD VENIPUNCTURE: CPT | Performed by: NURSE PRACTITIONER

## 2022-04-27 PROCEDURE — 80061 LIPID PANEL: CPT | Performed by: NURSE PRACTITIONER

## 2022-04-27 PROCEDURE — 83036 HEMOGLOBIN GLYCOSYLATED A1C: CPT | Performed by: NURSE PRACTITIONER

## 2022-04-27 PROCEDURE — 80053 COMPREHEN METABOLIC PANEL: CPT | Performed by: NURSE PRACTITIONER

## 2022-04-27 RX ORDER — BLOOD SUGAR DIAGNOSTIC
1 STRIP MISCELLANEOUS 3 TIMES DAILY
Qty: 100 STRIP | Refills: 1 | Status: SHIPPED | OUTPATIENT
Start: 2022-04-27 | End: 2023-06-28

## 2022-04-27 RX ORDER — HYDROCHLOROTHIAZIDE 25 MG/1
25 TABLET ORAL DAILY
Qty: 90 TABLET | Refills: 3 | Status: SHIPPED | OUTPATIENT
Start: 2022-04-27 | End: 2023-04-24

## 2022-04-27 RX ORDER — LOSARTAN POTASSIUM 25 MG/1
25 TABLET ORAL DAILY
Qty: 90 TABLET | Refills: 3 | Status: SHIPPED | OUTPATIENT
Start: 2022-04-27 | End: 2023-04-24

## 2022-04-27 RX ORDER — ATORVASTATIN CALCIUM 40 MG/1
40 TABLET, FILM COATED ORAL DAILY
Qty: 90 TABLET | Refills: 3 | Status: SHIPPED | OUTPATIENT
Start: 2022-04-27 | End: 2023-03-23

## 2022-04-27 ASSESSMENT — PAIN SCALES - GENERAL: PAINLEVEL: NO PAIN (0)

## 2022-04-27 ASSESSMENT — ACTIVITIES OF DAILY LIVING (ADL): CURRENT_FUNCTION: NO ASSISTANCE NEEDED

## 2022-04-27 NOTE — LETTER
May 2, 2022      Patricia Bose  [address]  [place] [address]        Dear ,    We are writing to inform you of your test results.    Your test results fall within the expected range(s) or remain unchanged from previous results.  Please continue with current treatment plan.    Resulted Orders   Lipid Profile (Chol, Trig, HDL, LDL calc)   Result Value Ref Range    Cholesterol 162 <=199 mg/dL    Triglycerides 111 <=149 mg/dL    Direct Measure HDL 59 >=50 mg/dL      Comment:      HDL Cholesterol Reference Range:     0-2 years:   No reference ranges established for patients under 2 years old  at [facility] for lipid analytes.    2-8 years:  Greater than 45 mg/dL     18 years and older:   Female: Greater than or equal to 50 mg/dL   Male:   Greater than or equal to 40 mg/dL    LDL Cholesterol Calculated 81 <=129 mg/dL    Patient Fasting > 8hrs? Yes    Comprehensive metabolic panel   Result Value Ref Range    Sodium 140 136 - 145 mmol/L    Potassium 4.8 3.5 - 5.0 mmol/L    Chloride 100 98 - 107 mmol/L    Carbon Dioxide (CO2) 30 22 - 31 mmol/L    Anion Gap 10 5 - 18 mmol/L    Urea Nitrogen 29 (H) 8 - 28 mg/dL    Creatinine 1.01 0.60 - 1.10 mg/dL    Calcium 10.2 8.5 - 10.5 mg/dL    Glucose 135 (H) 70 - 125 mg/dL    Alkaline Phosphatase 50 45 - 120 U/L    AST 29 0 - 40 U/L    ALT 21 0 - 45 U/L    Protein Total 7.2 6.0 - 8.0 g/dL    Albumin 4.1 3.5 - 5.0 g/dL    Bilirubin Total 0.5 0.0 - 1.0 mg/dL    GFR Estimate 51 (L) >60 mL/min/1.73m2      Comment:      Effective December 21, 2021 eGFRcr in adults is calculated using the 2021 CKD-EPI creatinine equation which includes age and gender (Kalyn et al., NEJ, DOI: 10.1056/NNPSrm2464200)   Hemoglobin A1c (aka HBA1C)   Result Value Ref Range    Hemoglobin A1C 6.7 (H) 0.0 - 5.6 %      Comment:      Normal <5.7%   Prediabetes 5.7-6.4%    Diabetes 6.5% or higher     Note: Adopted from ADA consensus guidelines.   CBC with platelets and differential    Result Value Ref Range    WBC Count 8.8 4.0 - 11.0 10e3/uL    RBC Count 3.75 (L) 3.80 - 5.20 10e6/uL    Hemoglobin 11.5 (L) 11.7 - 15.7 g/dL    Hematocrit 34.7 (L) 35.0 - 47.0 %    MCV 93 78 - 100 fL    MCH 30.7 26.5 - 33.0 pg    MCHC 33.1 31.5 - 36.5 g/dL    RDW 13.9 10.0 - 15.0 %    Platelet Count 217 150 - 450 10e3/uL    % Neutrophils 72 %    % Lymphocytes 18 %    % Monocytes 7 %    % Eosinophils 3 %    % Basophils 1 %    % Immature Granulocytes 0 %    Absolute Neutrophils 6.3 1.6 - 8.3 10e3/uL    Absolute Lymphocytes 1.6 0.8 - 5.3 10e3/uL    Absolute Monocytes 0.6 0.0 - 1.3 10e3/uL    Absolute Eosinophils 0.2 0.0 - 0.7 10e3/uL    Absolute Basophils 0.1 0.0 - 0.2 10e3/uL    Absolute Immature Granulocytes 0.0 <=0.4 10e3/uL   UA Macro with Reflex to Micro and Culture - lab collect   Result Value Ref Range    Color Urine Yellow Colorless, Straw, Light Yellow, Yellow    Appearance Urine Clear Clear    Glucose Urine Negative Negative mg/dL    Bilirubin Urine Negative Negative    Ketones Urine Negative Negative mg/dL    Specific Gravity Urine 1.010 1.005 - 1.030    Blood Urine Negative Negative    pH Urine 6.0 5.0 - 8.0    Protein Albumin Urine Negative Negative mg/dL    Urobilinogen Urine 0.2 0.2, 1.0 E.U./dL    Nitrite Urine Negative Negative    Leukocyte Esterase Urine Negative Negative    Narrative    Microscopic not indicated       If you have any questions or concerns, please call the clinic at the number listed above.       Sincerely,      Vidhi Pearl NP

## 2022-04-27 NOTE — PROGRESS NOTES
"[employer] Answers for HPI/ROS submitted by the patient on 4/27/2022  In general, how would you rate your overall physical health?: good  Frequency of exercise:: 2-3 days/week  Do you usually eat at least 4 servings of fruit and vegetables a day, include whole grains & fiber, and avoid regularly eating high fat or \"junk\" foods? : Yes  Taking medications regularly:: Yes  Medication side effects:: None  Activities of Daily Living: no assistance needed  Home safety: throw rugs in the hallway  Hearing Impairment:: difficulty understanding speech on the telephone  In the past 6 months, have you been bothered by leaking of urine?: No  In general, how would you rate your overall mental or emotional health?: excellent  Additional concerns today:: No  Duration of exercise:: 30-45 minutes      SUBJECTIVE:   Patricia Bose is a 89 year old female who presents for Preventive Visit.      Patient has been advised of split billing requirements and indicates understanding: Yes  Are you in the first 12 months of your Medicare Part B coverage?  No    Physical Health:    In general, how would you rate your overall physical health? good    Outside of work, how many days during the week do you exercise? 2-3 days/week    Outside of work, approximately how many minutes a day do you exercise?30-45 minutes    If you drink alcohol do you typically have >3 drinks per day or >7 drinks per week? No    Do you usually eat at least 4 servings of fruit and vegetables a day, include whole grains & fiber and avoid regularly eating high fat or \"junk\" foods? Yes    Do you have any problems taking medications regularly?  No    Do you have any side effects from medications? none    Needs assistance for the following daily activities: no assistance needed    Which of the following safety concerns are present in your home?  throw rugs in the hallway     Hearing impairment: No    In the past 6 months, have you been bothered by leaking of urine? no    Mental " Health:    In general, how would you rate your overall mental or emotional health? excellent  PHQ-2 Score: (P) 0    Do you feel safe in your environment? Yes    Have you ever done Advance Care Planning? (For example, a Health Directive, POLST, or a discussion with a medical provider or your loved ones about your wishes): Yes, patient states has an Advance Care Planning document and will bring a copy to the clinic.    Additional concerns to address?      Fall risk:  Fallen 2 or more times in the past year?: (P) No  Any fall with injury in the past year?: (P) No    Cognitive Screenin) Repeat 3 items (Leader, Season, Table)    2) Clock draw: NORMAL  3) 3 item recall: Recalls 2 objects   Results: NORMAL clock, 1-2 items recalled: COGNITIVE IMPAIRMENT LESS LIKELY    Mini-CogTM Copyright HUSSAIN Williamson. Licensed by the author for use in [facility]; reprinted with permission (tennille@[facility]). All rights reserved.      Do you have sleep apnea, excessive snoring or daytime drowsiness?: yes            Reviewed and updated as needed this visit by clinical staff                    Reviewed and updated as needed this visit by Provider                   Social History     Tobacco Use     Smoking status: Never Smoker     Smokeless tobacco: Never Used   Substance Use Topics     Alcohol use: Yes     Alcohol/week: 4.0 standard drinks                           Current providers sharing in care for this patient include:   Patient Care Team:  Vidhi Pearl NP as PCP - General  Vidhi Pearl NP as Assigned PCP  Verónica Lopez NP as Nurse Practitioner  Eufemia Morse MD as Assigned Pulmonology Provider    The following health maintenance items are reviewed in Epic and correct as of today:  Health Maintenance   Topic Date Due     ANNUAL REVIEW OF HM ORDERS  Never done     EYE EXAM  10/01/2021     DIABETIC FOOT EXAM  2022     FALL RISK ASSESSMENT  2022     HEMOGLOBIN  2022     A1C  2022     LIPID   06/23/2022     BMP  11/12/2022     MICROALBUMIN  11/12/2022     MEDICARE ANNUAL WELLNESS VISIT  04/27/2023     ADVANCE CARE PLANNING  02/19/2026     DTAP/TDAP/TD IMMUNIZATION (3 - Td or Tdap) 06/18/2031     PHQ-2 (once per calendar year)  Completed     INFLUENZA VACCINE  Completed     Pneumococcal Vaccine: 65+ Years  Completed     URINALYSIS  Completed     ZOSTER IMMUNIZATION  Completed     COVID-19 Vaccine  Completed     IPV IMMUNIZATION  Aged Out     MENINGITIS IMMUNIZATION  Aged Out     BP Readings from Last 3 Encounters:   04/27/22 114/62   09/13/21 122/56   06/23/21 136/64    Wt Readings from Last 3 Encounters:   04/27/22 49.5 kg (109 lb 3.2 oz)   09/13/21 50.1 kg (110 lb 8 oz)   06/23/21 50.5 kg (111 lb 6.4 oz)                  Patient Active Problem List   Diagnosis     Osteopenia     Mixed hyperlipidemia     Hypertension     Insomnia     CAD (coronary artery disease)     Type 2 diabetes mellitus with hyperglycemia, without long-term current use of insulin (H)     Chronic kidney disease, stage 3 (H)     Other chronic pancreatitis (H)     IPF (idiopathic pulmonary fibrosis) (H)     Hypercalcemia     Osteopenia     Protein in urine     Past Surgical History:   Procedure Laterality Date     ARTHROSCOPY SHOULDER ROTATOR CUFF REPAIR  10 years ago    left shoulder      BIOPSY BREAST Left 1980    benign     BIOPSY BREAST Left     before 1980    benign     CATARACT EXTRACTION       HC REMOVAL OF TONSILS,<13 Y/O      Description: Tonsillectomy;  Recorded: 04/29/2014;     HYSTERECTOMY  1957    benign     OOPHORECTOMY  1957    benign     ZZC TOTAL ABDOM HYSTERECTOMY      Description: Hysterectomy;  Recorded: 04/29/2014;       Social History     Tobacco Use     Smoking status: Never Smoker     Smokeless tobacco: Never Used   Substance Use Topics     Alcohol use: Not Currently     Alcohol/week: 4.0 standard drinks     Family History   Problem Relation Age of Onset     Breast Cancer Mother 52.00     Parkinsonism Mother       Breast Cancer Sister 72.00     Coronary Artery Disease Brother      Coronary Artery Disease Brother 78.00     Diabetes Brother      Coronary Artery Disease Father 67.00     Cancer Father         bladder         Current Outpatient Medications   Medication Sig Dispense Refill     amoxicillin (AMOXIL) 500 MG capsule take 4 capsules prior to dental procedure 4 capsule 3     aspirin 81 MG EC tablet [ASPIRIN 81 MG EC TABLET] Take 81 mg by mouth daily.       atorvastatin (LIPITOR) 40 MG tablet Take 1 tablet (40 mg) by mouth daily 90 tablet 3     blood glucose (ACCU-CHEK AMIRA PLUS) test strip 1 strip by In Vitro route 3 times daily Use to test blood sugar 3 times daily or as directed. 100 strip 1     blood glucose (NO BRAND SPECIFIED) lancets standard Use to test blood sugar three times daily or as directed. 300 lancet 1     blood glucose monitoring (NO BRAND SPECIFIED) meter device kit by In Vitro route daily 1 kit 0     cholecalciferol, vitamin D3, (VITAMIN D3) 1,000 unit capsule [CHOLECALCIFEROL, VITAMIN D3, (VITAMIN D3) 1,000 UNIT CAPSULE] Take 1,000 Units by mouth daily.       DENTA 5000 PLUS 1.1 % Crea [DENTA 5000 PLUS 1.1 % CREA]        DENTAGEL 1.1 % GEL topical gel        generic lancets (ACCU-CHEK FASTCLIX LANCING DEV) [GENERIC LANCETS (ACCU-CHEK FASTCLIX LANCING DEV)] Use 1 each As Directed daily. 200 each 2     glucosamine-chondroitin 500-400 mg cap [GLUCOSAMINE-CHONDROITIN 500-400 MG CAP] Take 2 capsules by mouth daily.       guaiFENesin-codeine (ROBITUSSIN AC) 100-10 MG/5ML solution Take 5-10 mLs by mouth 4 times daily as needed for cough 240 mL 0     hydrochlorothiazide (HYDRODIURIL) 25 MG tablet Take 1 tablet (25 mg) by mouth daily 90 tablet 3     losartan (COZAAR) 25 MG tablet Take 1 tablet (25 mg) by mouth daily 90 tablet 3     magnesium 250 mg Tab [MAGNESIUM 250 MG TAB] Take 1 tablet by mouth daily.       MELATONIN ORAL [MELATONIN ORAL] Take 10 mg by mouth daily.        multivitamin therapeutic  "(THERAGRAN) tablet [MULTIVITAMIN THERAPEUTIC (THERAGRAN) TABLET] Take 1 tablet by mouth daily.       vit C/E/Zn/coppr/lutein/zeaxan (PRESERVISION AREDS-2 ORAL) [VIT C/E/ZN/COPPR/LUTEIN/ZEAXAN (PRESERVISION AREDS-2 ORAL)] Take 1 tablet by mouth 2 (two) times a day.       denosumab (PROLIA) 60 MG/ML SOSY injection Inject 1 mL (60 mg) Subcutaneous once for 1 dose 1 mL 1     Allergies   Allergen Reactions     Ibuprofen Shortness Of Breath     Recent Labs   Lab Test 11/12/21  1023 06/23/21  0834 02/19/21  1055 10/09/20  0921 10/09/20  0921 11/13/19  1357 09/05/19  1225 07/31/19  0855 06/12/19  1039   A1C 6.6* 7.0* 6.6*   < >  --    < >  --   --  7.3*   LDL  --  86 85  --  85  --   --   --  102   HDL  --  55 62  --   --   --   --   --  63   TRIG  --  87 92  --   --   --   --   --  146   ALT  --  20 28  --   --   --  23  --  25   CR 0.97 0.97 0.94  --  0.92  --  1.05   < > 0.94   GFRESTIMATED 50* 53* 55*  --  57*  --  49*   < > 56*   GFRESTBLACK  --  >60 >60  --  >60  --  59*   < > >60   POTASSIUM 4.6 4.7 4.7  --  4.4  --  4.4   < > 4.6   TSH  --   --   --   --   --   --   --   --  1.95    < > = values in this interval not displayed.      Mammogram Screening: Mammogram Screening - Patient over age 75, has elected to continue with screening.    ROS:  Constitutional, HEENT, cardiovascular, pulmonary, GI, , musculoskeletal, neuro, skin, endocrine and psych systems are negative, except as otherwise noted.    OBJECTIVE:   There were no vitals taken for this visit. Estimated body mass index is 23.91 kg/m  as calculated from the following:    Height as of 9/13/21: 1.448 m (4' 9\").    Weight as of 9/13/21: 50.1 kg (110 lb 8 oz).  EXAM:   GENERAL APPEARANCE: healthy, alert and no distress  EYES: Eyes grossly normal to inspection, PERRL and conjunctivae and sclerae normal  HENT: ear canals and TM's normal, nose and mouth without ulcers or lesions, oropharynx clear and oral mucous membranes moist  NECK: no adenopathy  RESP: lungs " clear to auscultation - no rales, rhonchi or wheezes  CV: regular rate and rhythm, normal S1 S2, no peripheral edema and peripheral pulses strong  ABDOMEN: soft, nontender, no hepatosplenomegaly, no masses and bowel sounds normal  MS: no musculoskeletal defects are noted and gait is age appropriate without ataxia  SKIN: no suspicious lesions or rashes  NEURO: Normal strength and tone, sensory exam grossly normal, mentation intact and speech normal  PSYCH: mentation appears normal and affect normal/bright        ASSESSMENT / PLAN:     Problem List Items Addressed This Visit        Digestive    Other chronic pancreatitis (H)       Endocrine    Type 2 diabetes mellitus with hyperglycemia, without long-term current use of insulin (H)    Relevant Medications    blood glucose (ACCU-CHEK AMIRA PLUS) test strip    Other Relevant Orders    Hemoglobin A1c (aka HBA1C)    UA Macro with Reflex to Micro and Culture - lab collect       Circulatory    CAD (coronary artery disease)    Relevant Medications    atorvastatin (LIPITOR) 40 MG tablet    Other Relevant Orders    Lipid Profile (Chol, Trig, HDL, LDL calc)       Musculoskeletal and Integumentary    Osteopenia       Urinary    Chronic kidney disease, stage 3 (H)      Other Visit Diagnoses     Encounter for Medicare annual wellness exam    -  Primary    Encounter for gynecological examination without abnormal finding        Macular degeneration, unspecified laterality, unspecified type        Postmenopausal status        Relevant Orders    DX Hip/Pelvis/Spine    Hypertension, unspecified type        Relevant Medications    losartan (COZAAR) 25 MG tablet    hydrochlorothiazide (HYDRODIURIL) 25 MG tablet    Other Relevant Orders    Comprehensive metabolic panel    CBC with platelets and differential    Encounter for screening mammogram for breast cancer        Relevant Orders    *MA Screening Digital Bilateral                Patients gastroneterologist has retired and is in need  "of new pancreatic doctor.  Patient will notify our office as to where she would like the referral placed.      COUNSELING:  Reviewed preventive health counseling, as reflected in patient instructions       Regular exercise       Healthy diet/nutrition       Vision screening       Hearing screening       Dental care       Fall risk prevention       Osteoporosis prevention/bone health    Estimated body mass index is 23.91 kg/m  as calculated from the following:    Height as of 9/13/21: 1.448 m (4' 9\").    Weight as of 9/13/21: 50.1 kg (110 lb 8 oz).        She reports that she has never smoked. She has never used smokeless tobacco.    Appropriate preventive services were discussed with this patient, including applicable screening as appropriate for cardiovascular disease, diabetes, osteopenia/osteoporosis, and glaucoma.  As appropriate for age/gender, discussed screening for colorectal cancer, prostate cancer, breast cancer, and cervical cancer. Checklist reviewing preventive services available has been given to the patient.    Reviewed patients plan of care and provided an AVS. The Basic Care Plan (routine screening as documented in Health Maintenance) for Patricia meets the Care Plan requirement. This Care Plan has been established and reviewed with the Patient.    Counseling Resources:  ATP IV Guidelines  Pooled Cohorts Equation Calculator  Breast Cancer Risk Calculator  BRCA-Related Cancer Risk Assessment: FHS-7 Tool  FRAX Risk Assessment  ICSI Preventive Guidelines  Dietary Guidelines for Americans, 2010  USDA's MyPlate  ASA Prophylaxis  Lung CA Screening    Vidhi Pearl NP  [facility]  "

## 2022-04-27 NOTE — PATIENT INSTRUCTIONS
Patient Education   Personalized Prevention Plan  You are due for the preventive services outlined below.  Your care team is available to assist you in scheduling these services.  If you have already completed any of these items, please share that information with your care team to update in your medical record.  Health Maintenance Due   Topic Date Due     ANNUAL REVIEW OF HM ORDERS  Never done     Eye Exam  10/01/2021     Diabetic Foot Exam  02/19/2022     FALL RISK ASSESSMENT  02/19/2022     Hemoglobin  06/23/2022     Preventive Health Recommendations    See your health care provider every year to    Review health changes.     Discuss preventive care.      Review your medicines if your doctor has prescribed any.    You no longer need a yearly Pap test unless you've had an abnormal Pap test in the past 10 years. If you have vaginal symptoms, such as bleeding or discharge, be sure to talk with your provider about a Pap test.    Every 1 to 2 years, have a mammogram.  If you are over 69, talk with your health care provider about whether or not you want to continue having screening mammograms.    Every 10 years, have a colonoscopy. Or, have a yearly FIT test (stool test). These exams will check for colon cancer.     Have a cholesterol test every 5 years, or more often if your doctor advises it.     Have a diabetes test (fasting glucose) every three years. If you are at risk for diabetes, you should have this test more often.     At age 65, have a bone density scan (DEXA) to check for osteoporosis (brittle bone disease).    Shots:    Get a flu shot each year.    Get a tetanus shot every 10 years.    Talk to your doctor about your pneumonia vaccines. There are now two you should receive - Pneumovax (PPSV 23) and Prevnar (PCV 13).    Talk to your pharmacist about the shingles vaccine.    Talk to your doctor about the hepatitis B vaccine.    Nutrition:     Eat at least 5 servings of fruits and vegetables each day.    Eat  whole-grain bread, whole-wheat pasta and brown rice instead of white grains and rice.    Get adequate Calcium and Vitamin D.     Lifestyle    Exercise at least 150 minutes a week (30 minutes a day, 5 days a week). This will help you control your weight and prevent disease.    Limit alcohol to one drink per day.    No smoking.     Wear sunscreen to prevent skin cancer.     See your dentist twice a year for an exam and cleaning.    See your eye doctor every 1 to 2 years to screen for conditions such as glaucoma, macular degeneration and cataracts.    Personalized Prevention Plan  You are due for the preventive services outlined below.  Your care team is available to assist you in scheduling these services.  If you have already completed any of these items, please share that information with your care team to update in your medical record.  Health Maintenance   Topic Date Due     ANNUAL REVIEW OF HM ORDERS  Never done     EYE EXAM  10/01/2021     DIABETIC FOOT EXAM  02/19/2022     FALL RISK ASSESSMENT  02/19/2022     HEMOGLOBIN  06/23/2022     A1C  05/12/2022     LIPID  06/23/2022     BMP  11/12/2022     MICROALBUMIN  11/12/2022     MEDICARE ANNUAL WELLNESS VISIT  04/27/2023     ADVANCE CARE PLANNING  02/19/2026     DTAP/TDAP/TD IMMUNIZATION (3 - Td or Tdap) 06/18/2031     PHQ-2 (once per calendar year)  Completed     INFLUENZA VACCINE  Completed     Pneumococcal Vaccine: 65+ Years  Completed     URINALYSIS  Completed     ZOSTER IMMUNIZATION  Completed     COVID-19 Vaccine  Completed     IPV IMMUNIZATION  Aged Out     MENINGITIS IMMUNIZATION  Aged Out

## 2022-04-29 ENCOUNTER — ANCILLARY PROCEDURE (OUTPATIENT)
Dept: MAMMOGRAPHY | Facility: CLINIC | Age: 87
End: 2022-04-29
Attending: NURSE PRACTITIONER
Payer: COMMERCIAL

## 2022-04-29 DIAGNOSIS — Z12.31 ENCOUNTER FOR SCREENING MAMMOGRAM FOR BREAST CANCER: ICD-10-CM

## 2022-04-29 PROCEDURE — 77067 SCR MAMMO BI INCL CAD: CPT

## 2022-05-26 ENCOUNTER — TELEPHONE (OUTPATIENT)
Dept: INTERNAL MEDICINE | Facility: CLINIC | Age: 87
End: 2022-05-26
Payer: COMMERCIAL

## 2022-05-26 NOTE — TELEPHONE ENCOUNTER
Refill team is not able to assist with this request- please forward to provider for additional assistance.     Isha Griffith RN BSN 5/26/2022 11:38 AM

## 2022-05-26 NOTE — TELEPHONE ENCOUNTER
Patient is requesting her after visit summary for 04/27/2022.    Per patient is states that she has Type II Diabetes without insulin and then it states Type II Diabetes long term with insulin     Per patient she does not use insulin. Requesting record is updated and corrected after visit summary is mailed to her address of record.

## 2022-06-03 ENCOUNTER — OFFICE VISIT (OUTPATIENT)
Dept: CARDIOLOGY | Facility: CLINIC | Age: 87
End: 2022-06-03
Payer: COMMERCIAL

## 2022-06-03 ENCOUNTER — TELEPHONE (OUTPATIENT)
Dept: ENDOCRINOLOGY | Facility: CLINIC | Age: 87
End: 2022-06-03

## 2022-06-03 VITALS
BODY MASS INDEX: 24.06 KG/M2 | HEART RATE: 104 BPM | DIASTOLIC BLOOD PRESSURE: 68 MMHG | SYSTOLIC BLOOD PRESSURE: 170 MMHG | RESPIRATION RATE: 24 BRPM | WEIGHT: 107.3 LBS

## 2022-06-03 DIAGNOSIS — J84.112 IPF (IDIOPATHIC PULMONARY FIBROSIS) (H): ICD-10-CM

## 2022-06-03 DIAGNOSIS — M85.80 OSTEOPENIA: ICD-10-CM

## 2022-06-03 DIAGNOSIS — M85.80 OSTEOPENIA, UNSPECIFIED LOCATION: Primary | ICD-10-CM

## 2022-06-03 DIAGNOSIS — I10 ESSENTIAL HYPERTENSION: ICD-10-CM

## 2022-06-03 DIAGNOSIS — E78.2 MIXED HYPERLIPIDEMIA: ICD-10-CM

## 2022-06-03 DIAGNOSIS — Z92.29 PERSONAL HISTORY OF OTHER DRUG THERAPY: ICD-10-CM

## 2022-06-03 DIAGNOSIS — I25.10 CORONARY ARTERY DISEASE INVOLVING NATIVE CORONARY ARTERY OF NATIVE HEART WITHOUT ANGINA PECTORIS: Primary | ICD-10-CM

## 2022-06-03 PROCEDURE — 99214 OFFICE O/P EST MOD 30 MIN: CPT | Performed by: INTERNAL MEDICINE

## 2022-06-03 NOTE — LETTER
6/3/2022    Vidhi Pearl NP  [address] [facility] [address]    RE: Patricia Bose       Dear Colleague,     I had the pleasure of seeing Patricia Bose in the [facility].      Thank you, Vidhi Pearl NP, for asking the [facility] team to see Ms. Patricia Bose to follow-up on coronary artery disease, essential hypertension, hypercholesterolemia.    Assessment/Recommendations   Assessment:    1.  Coronary artery disease, moderate by angiography in 2015 following abnormal CTA.  She reports no current symptoms of exertional chest discomfort or dyspnea despite exercising most days of the week including walking a mile.  At this point would favor continuation of medical therapy and risk factor management.  She inquired as to whether she needs to remain on baby aspirin and I encouraged its continuation as she does have coronary artery disease by angiography.  2.  Essential hypertension, previously controlled.  Her blood pressure was quite elevated today on 2 separate readings although she was directed to the wrong clinic initially and had to quickly make her way to the correct clinic location.  When I review her blood pressures over the last year, they have been well controlled so I feel this is most likely an isolated incident.  For that reason I will make no change in medications.  3.  Hypercholesterolemia, on moderate dose atorvastatin with most recent lipid profile demonstrating an LDL of 81 which is acceptable.  4.  Pulmonary fibrosis, stable    Plan:  1.  Continue current medications  2.  Follow-up with me in 1 year or sooner if new symptoms       History of Present Illness    Ms. Patricia Bose is a 89 year old female with history of coronary artery disease with moderate stenosis in the proximal LAD and mild disease in the left circumflex and right coronary arteries on direct angiography in 2015, essential hypertension,  hypercholesterolemia and pulmonary fibrosis who presents to the office today for a follow-up visit.  I actually have not seen her for the past 2 years due to the COVID pandemic.  She has been doing well over this time with no decline in exercise capacity or new symptoms of exertional chest discomfort or dyspnea.  Denies lightheadedness or near syncope.  Her blood pressure is quite elevated here today but she tells me she was directed to the wrong clinic and quickly had to come to the correct location to make it in time.  When I review her blood pressures over the past year, they have been well controlled.    ECG (personally reviewed): No ECG today    Cardiac Imaging Studies (personally reviewed): No recent cardiac imaging     Physical Examination Review of Systems   BP (!) 170/68 (BP Location: Right arm, Patient Position: Sitting, Cuff Size: Adult Regular)   Pulse 104   Resp 24   Wt 48.7 kg (107 lb 4.8 oz)   BMI 24.06 kg/m    Body mass index is 24.06 kg/m .  Wt Readings from Last 3 Encounters:   06/03/22 48.7 kg (107 lb 4.8 oz)   04/27/22 49.5 kg (109 lb 3.2 oz)   09/13/21 50.1 kg (110 lb 8 oz)     General Appearance:   Awake, Alert, No acute distress.   HEENT:  No scleral icterus; the mucous membranes were pink and moist.   Neck: No cervical bruits or jugular venous distention    Chest: The spine was straight. The chest was symmetric.   Lungs:   Respirations unlabored; the lungs are clear to auscultation. No wheezing   Cardiovascular:    Regular rate and rhythm.  S1, S2 normal.  1/6 systolic ejection murmur heard at the left upper sternal border.   Abdomen:  No organomegaly, masses, bruits, or tenderness. Bowels sounds are present   Extremities:  No peripheral edema bilaterally   Skin: No xanthelasma. Warm, Dry.   Musculoskeletal: No tenderness.   Neurologic: Mood and affect are appropriate.    Enc Vitals  BP: (!) 170/68; repeat blood pressure 152/68  Pulse: 104  Resp: 24  Weight: 48.7 kg (107 lb 4.8 oz)                                          Medical History  Surgical History Family History Social History   Past Medical History:   Diagnosis Date     Carpal tunnel syndrome      Diabetes mellitus (H)      Hyperlipidemia      Hypertension      Upper respiratory infection     Past Surgical History:   Procedure Laterality Date     ARTHROSCOPY SHOULDER ROTATOR CUFF REPAIR  10 years ago    left shoulder      BIOPSY BREAST Left     benign     BIOPSY BREAST Left     before     benign     CATARACT EXTRACTION       HC REMOVAL OF TONSILS,<13 Y/O      Description: Tonsillectomy;  Recorded: 2014;     HYSTERECTOMY      benign     OOPHORECTOMY      benign     ZZC TOTAL ABDOM HYSTERECTOMY      Description: Hysterectomy;  Recorded: 2014;    Family History   Problem Relation Age of Onset     Breast Cancer Mother 52.00     Parkinsonism Mother      Breast Cancer Sister 72.00     Coronary Artery Disease Brother      Coronary Artery Disease Brother 78.00     Diabetes Brother      Coronary Artery Disease Father 67.00     Cancer Father         bladder    Social History     Socioeconomic History     Marital status:      Spouse name: Not on file     Number of children: Not on file     Years of education: Not on file     Highest education level: Not on file   Occupational History     Not on file   Tobacco Use     Smoking status: Never Smoker     Smokeless tobacco: Never Used   Vaping Use     Vaping Use: Never used   Substance and Sexual Activity     Alcohol use: Not Currently     Alcohol/week: 4.0 standard drinks     Drug use: No     Sexual activity: Not Currently   Other Topics Concern     Not on file   Social History Narrative    . Dated a gentlemen for 35 years who  .  Has 1 son, 2 grandchildren and 3 great grandchildren.  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  She is very social getting together with friends often, she is part of a book c [employer], she enjoys yoga and  swimming at the [employer].  She used to golf.     Social Determinants of Health     Financial Resource Strain: Not on file   Food Insecurity: Not on file   Transportation Needs: Not on file   Physical Activity: Not on file   Stress: Not on file   Social Connections: Not on file   Intimate Partner Violence: Not on file   Housing Stability: Not on file          Medications  Allergies   Current Outpatient Medications   Medication Sig Dispense Refill     amoxicillin (AMOXIL) 500 MG capsule take 4 capsules prior to dental procedure 4 capsule 3     aspirin 81 MG EC tablet [ASPIRIN 81 MG EC TABLET] Take 81 mg by mouth daily.       atorvastatin (LIPITOR) 40 MG tablet Take 1 tablet (40 mg) by mouth daily 90 tablet 3     blood glucose (ACCU-CHEK AMIRA PLUS) test strip 1 strip by In Vitro route 3 times daily Use to test blood sugar 3 times daily or as directed. 100 strip 1     blood glucose (NO BRAND SPECIFIED) lancets standard Use to test blood sugar three times daily or as directed. 300 lancet 1     blood glucose monitoring (NO BRAND SPECIFIED) meter device kit by In Vitro route daily 1 kit 0     cholecalciferol, vitamin D3, (VITAMIN D3) 1,000 unit capsule [CHOLECALCIFEROL, VITAMIN D3, (VITAMIN D3) 1,000 UNIT CAPSULE] Take 1,000 Units by mouth daily.       denosumab (PROLIA) 60 MG/ML SOSY injection Inject 1 mL (60 mg) Subcutaneous once for 1 dose 1 mL 0     DENTA 5000 PLUS 1.1 % Crea [DENTA 5000 PLUS 1.1 % CREA]        DENTAGEL 1.1 % GEL topical gel        generic lancets (ACCU-CHEK FASTCLIX LANCING DEV) [GENERIC LANCETS (ACCU-CHEK FASTCLIX LANCING DEV)] Use 1 each As Directed daily. 200 each 2     glucosamine-chondroitin 500-400 mg cap [GLUCOSAMINE-CHONDROITIN 500-400 MG CAP] Take 2 capsules by mouth daily.       guaiFENesin-codeine (ROBITUSSIN AC) 100-10 MG/5ML solution Take 5-10 mLs by mouth 4 times daily as needed for cough 240 mL 0     hydrochlorothiazide (HYDRODIURIL) 25 MG tablet Take 1 tablet (25 mg) by mouth daily 90  tablet 3     losartan (COZAAR) 25 MG tablet Take 1 tablet (25 mg) by mouth daily 90 tablet 3     magnesium 250 mg Tab [MAGNESIUM 250 MG TAB] Take 1 tablet by mouth daily.       MELATONIN ORAL [MELATONIN ORAL] Take 10 mg by mouth daily.        multivitamin therapeutic (THERAGRAN) tablet [MULTIVITAMIN THERAPEUTIC (THERAGRAN) TABLET] Take 1 tablet by mouth daily.       vit C/E/Zn/coppr/lutein/zeaxan (PRESERVISION AREDS-2 ORAL) [VIT C/E/ZN/COPPR/LUTEIN/ZEAXAN (PRESERVISION AREDS-2 ORAL)] Take 1 tablet by mouth 2 (two) times a day.       denosumab (PROLIA) 60 MG/ML SOSY injection Inject 1 mL (60 mg) Subcutaneous once for 1 dose 1 mL 1      Allergies   Allergen Reactions     Ibuprofen Shortness Of Breath         Lab Results    Chemistry/lipid CBC Cardiac Enzymes/BNP/TSH/INR   Recent Labs   Lab Test 04/27/22  0937   TRIG 111   LDL 81   BUN 29*      CO2 30    Recent Labs   Lab Test 04/27/22  0937   WBC 8.8   HGB 11.5*   HCT 34.7*   MCV 93       Recent Labs   Lab Test 06/12/19  1039   TSH 1.95        A total of 43 minutes was spent reviewing patient's medical records, obtaining history and performing examination, as well as discussing diagnoses/ recommendations with patient and answering all questions.                    Thank you for allowing me to participate in the care of your patient.      Sincerely,     Garima Krueger MD     [facility]  cc:   Referred Self,

## 2022-06-03 NOTE — PROGRESS NOTES
Thank you, Vidhi Pearl NP, for asking the [facility] team to see Ms. Patricia Bose to follow-up on coronary artery disease, essential hypertension, hypercholesterolemia.    Assessment/Recommendations   Assessment:    1.  Coronary artery disease, moderate by angiography in 2015 following abnormal CTA.  She reports no current symptoms of exertional chest discomfort or dyspnea despite exercising most days of the week including walking a mile.  At this point would favor continuation of medical therapy and risk factor management.  She inquired as to whether she needs to remain on baby aspirin and I encouraged its continuation as she does have coronary artery disease by angiography.  2.  Essential hypertension, previously controlled.  Her blood pressure was quite elevated today on 2 separate readings although she was directed to the wrong clinic initially and had to quickly make her way to the correct clinic location.  When I review her blood pressures over the last year, they have been well controlled so I feel this is most likely an isolated incident.  For that reason I will make no change in medications.  3.  Hypercholesterolemia, on moderate dose atorvastatin with most recent lipid profile demonstrating an LDL of 81 which is acceptable.  4.  Pulmonary fibrosis, stable    Plan:  1.  Continue current medications  2.  Follow-up with me in 1 year or sooner if new symptoms       History of Present Illness    Ms. Patricia Bose is a 89 year old female with history of coronary artery disease with moderate stenosis in the proximal LAD and mild disease in the left circumflex and right coronary arteries on direct angiography in 2015, essential hypertension, hypercholesterolemia and pulmonary fibrosis who presents to the office today for a follow-up visit.  I actually have not seen her for the past 2 years due to the COVID pandemic.  She has been doing well over this time with no decline in  exercise capacity or new symptoms of exertional chest discomfort or dyspnea.  Denies lightheadedness or near syncope.  Her blood pressure is quite elevated here today but she tells me she was directed to the wrong clinic and quickly had to come to the correct location to make it in time.  When I review her blood pressures over the past year, they have been well controlled.    ECG (personally reviewed): No ECG today    Cardiac Imaging Studies (personally reviewed): No recent cardiac imaging     Physical Examination Review of Systems   BP (!) 170/68 (BP Location: Right arm, Patient Position: Sitting, Cuff Size: Adult Regular)   Pulse 104   Resp 24   Wt 48.7 kg (107 lb 4.8 oz)   BMI 24.06 kg/m    Body mass index is 24.06 kg/m .  Wt Readings from Last 3 Encounters:   06/03/22 48.7 kg (107 lb 4.8 oz)   04/27/22 49.5 kg (109 lb 3.2 oz)   09/13/21 50.1 kg (110 lb 8 oz)     General Appearance:   Awake, Alert, No acute distress.   HEENT:  No scleral icterus; the mucous membranes were pink and moist.   Neck: No cervical bruits or jugular venous distention    Chest: The spine was straight. The chest was symmetric.   Lungs:   Respirations unlabored; the lungs are clear to auscultation. No wheezing   Cardiovascular:    Regular rate and rhythm.  S1, S2 normal.  1/6 systolic ejection murmur heard at the left upper sternal border.   Abdomen:  No organomegaly, masses, bruits, or tenderness. Bowels sounds are present   Extremities:  No peripheral edema bilaterally   Skin: No xanthelasma. Warm, Dry.   Musculoskeletal: No tenderness.   Neurologic: Mood and affect are appropriate.    Enc Vitals  BP: (!) 170/68; repeat blood pressure 152/68  Pulse: 104  Resp: 24  Weight: 48.7 kg (107 lb 4.8 oz)                                         Medical History  Surgical History Family History Social History   Past Medical History:   Diagnosis Date     Carpal tunnel syndrome      Diabetes mellitus (H)      Hyperlipidemia      Hypertension       Upper respiratory infection     Past Surgical History:   Procedure Laterality Date     ARTHROSCOPY SHOULDER ROTATOR CUFF REPAIR  10 years ago    left shoulder      BIOPSY BREAST Left     benign     BIOPSY BREAST Left     before     benign     CATARACT EXTRACTION       HC REMOVAL OF TONSILS,<11 Y/O      Description: Tonsillectomy;  Recorded: 2014;     HYSTERECTOMY      benign     OOPHORECTOMY      benign     ZZC TOTAL ABDOM HYSTERECTOMY      Description: Hysterectomy;  Recorded: 2014;    Family History   Problem Relation Age of Onset     Breast Cancer Mother 52.00     Parkinsonism Mother      Breast Cancer Sister 72.00     Coronary Artery Disease Brother      Coronary Artery Disease Brother 78.00     Diabetes Brother      Coronary Artery Disease Father 67.00     Cancer Father         bladder    Social History     Socioeconomic History     Marital status:      Spouse name: Not on file     Number of children: Not on file     Years of education: Not on file     Highest education level: Not on file   Occupational History     Not on file   Tobacco Use     Smoking status: Never Smoker     Smokeless tobacco: Never Used   Vaping Use     Vaping Use: Never used   Substance and Sexual Activity     Alcohol use: Not Currently     Alcohol/week: 4.0 standard drinks     Drug use: No     Sexual activity: Not Currently   Other Topics Concern     Not on file   Social History Narrative    . Dated a gentlemen for 35 years who  .  Has 1 son, 2 grandchildren and 3 great grandchildren.  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  She is very social getting together with friends often, she is part of a book c [employer], she enjoys yoga and swimming [place] [address].  She used to golf.     Social Determinants of Health     Financial Resource Strain: Not on file   Food Insecurity: Not on file   Transportation Needs: Not on file   Physical Activity: Not on file   Stress: Not on file    Social Connections: Not on file   Intimate Partner Violence: Not on file   Housing Stability: Not on file          Medications  Allergies   Current Outpatient Medications   Medication Sig Dispense Refill     amoxicillin (AMOXIL) 500 MG capsule take 4 capsules prior to dental procedure 4 capsule 3     aspirin 81 MG EC tablet [ASPIRIN 81 MG EC TABLET] Take 81 mg by mouth daily.       atorvastatin (LIPITOR) 40 MG tablet Take 1 tablet (40 mg) by mouth daily 90 tablet 3     blood glucose (ACCU-CHEK AMIRA PLUS) test strip 1 strip by In Vitro route 3 times daily Use to test blood sugar 3 times daily or as directed. 100 strip 1     blood glucose (NO BRAND SPECIFIED) lancets standard Use to test blood sugar three times daily or as directed. 300 lancet 1     blood glucose monitoring (NO BRAND SPECIFIED) meter device kit by In Vitro route daily 1 kit 0     cholecalciferol, vitamin D3, (VITAMIN D3) 1,000 unit capsule [CHOLECALCIFEROL, VITAMIN D3, (VITAMIN D3) 1,000 UNIT CAPSULE] Take 1,000 Units by mouth daily.       denosumab (PROLIA) 60 MG/ML SOSY injection Inject 1 mL (60 mg) Subcutaneous once for 1 dose 1 mL 0     DENTA 5000 PLUS 1.1 % Crea [DENTA 5000 PLUS 1.1 % CREA]        DENTAGEL 1.1 % GEL topical gel        generic lancets (ACCU-CHEK FASTCLIX LANCING DEV) [GENERIC LANCETS (ACCU-CHEK FASTCLIX LANCING DEV)] Use 1 each As Directed daily. 200 each 2     glucosamine-chondroitin 500-400 mg cap [GLUCOSAMINE-CHONDROITIN 500-400 MG CAP] Take 2 capsules by mouth daily.       guaiFENesin-codeine (ROBITUSSIN AC) 100-10 MG/5ML solution Take 5-10 mLs by mouth 4 times daily as needed for cough 240 mL 0     hydrochlorothiazide (HYDRODIURIL) 25 MG tablet Take 1 tablet (25 mg) by mouth daily 90 tablet 3     losartan (COZAAR) 25 MG tablet Take 1 tablet (25 mg) by mouth daily 90 tablet 3     magnesium 250 mg Tab [MAGNESIUM 250 MG TAB] Take 1 tablet by mouth daily.       MELATONIN ORAL [MELATONIN ORAL] Take 10 mg by mouth daily.         multivitamin therapeutic (THERAGRAN) tablet [MULTIVITAMIN THERAPEUTIC (THERAGRAN) TABLET] Take 1 tablet by mouth daily.       vit C/E/Zn/coppr/lutein/zeaxan (PRESERVISION AREDS-2 ORAL) [VIT C/E/ZN/COPPR/LUTEIN/ZEAXAN (PRESERVISION AREDS-2 ORAL)] Take 1 tablet by mouth 2 (two) times a day.       denosumab (PROLIA) 60 MG/ML SOSY injection Inject 1 mL (60 mg) Subcutaneous once for 1 dose 1 mL 1      Allergies   Allergen Reactions     Ibuprofen Shortness Of Breath         Lab Results    Chemistry/lipid CBC Cardiac Enzymes/BNP/TSH/INR   Recent Labs   Lab Test 04/27/22  0937   TRIG 111   LDL 81   BUN 29*      CO2 30    Recent Labs   Lab Test 04/27/22  0937   WBC 8.8   HGB 11.5*   HCT 34.7*   MCV 93       Recent Labs   Lab Test 06/12/19  1039   TSH 1.95        A total of 43 minutes was spent reviewing patient's medical records, obtaining history and performing examination, as well as discussing diagnoses/ recommendations with patient and answering all questions.

## 2022-06-08 ENCOUNTER — TELEPHONE (OUTPATIENT)
Dept: ENDOCRINOLOGY | Facility: CLINIC | Age: 87
End: 2022-06-08
Payer: COMMERCIAL

## 2022-06-08 DIAGNOSIS — Z92.29 PERSONAL HISTORY OF OTHER DRUG THERAPY: ICD-10-CM

## 2022-06-08 DIAGNOSIS — M85.80 OSTEOPENIA: ICD-10-CM

## 2022-06-13 ENCOUNTER — TRANSFERRED RECORDS (OUTPATIENT)
Dept: HEALTH INFORMATION MANAGEMENT | Facility: CLINIC | Age: 87
End: 2022-06-13
Payer: COMMERCIAL

## 2022-06-21 ENCOUNTER — TELEPHONE (OUTPATIENT)
Dept: ENDOCRINOLOGY | Facility: CLINIC | Age: 87
End: 2022-06-21

## 2022-06-21 NOTE — TELEPHONE ENCOUNTER
[facility] Call Center    Phone Message    May a detailed message be left on voicemail: no     Reason for Call: Other: Aura from [facility] needing to speak with someone on delivery of the pt's prolia injection to the clinic

## 2022-06-22 ENCOUNTER — TRANSFERRED RECORDS (OUTPATIENT)
Dept: HEALTH INFORMATION MANAGEMENT | Facility: CLINIC | Age: 87
End: 2022-06-22

## 2022-07-19 ENCOUNTER — ALLIED HEALTH/NURSE VISIT (OUTPATIENT)
Dept: ENDOCRINOLOGY | Facility: CLINIC | Age: 87
End: 2022-07-19
Payer: COMMERCIAL

## 2022-07-19 DIAGNOSIS — M85.80 OSTEOPENIA, UNSPECIFIED LOCATION: Primary | ICD-10-CM

## 2022-07-19 PROCEDURE — 99207 PR NO CHARGE NURSE ONLY: CPT

## 2022-07-19 NOTE — PROGRESS NOTES
"Prolia Injection Phone Screen      Screening questions have been asked 2-3 days prior to administration visit for Prolia. If any questions are answered with \"Yes,\" this phone encounter were will routed to ordering provider for further evaluation.     1.  When was the last injection?  12/15/21    2.  Has insurance for this injection been verified?  Yes    3.  Did you experience any new onset achiness or rashes that lasted for over a month with your previous Prolia injection?   No    4.  Do you have a fever over 101?F or a new deep cough that is unusual for you today? No    5.  Have you started any new medications in the last 6 months that you were told could affect your immune system? These may have been prescribed by oncologist, transplant, rheumatology, or dermatology.   No    6.  In the last 6 months have you have gastric bypass or parathyroid surgery?   No    7.  Do you plan dental work requiring drilling into the bone such as implants/extractions or oral surgery in the next 2-3 months?   No    8. Do you have new insurance since the last injection?    Patient informed if symptoms discussed above present prior to their administration appointment, they are to notify clinic immediately.     Catina Guidry RN          The following steps were completed to comply with the REMS program for Prolia:  1. Ordering provider has previously reviewed information in the Medication Guide and Patient Counseling Chart, including the serious risks of Prolia  and the symptoms of each risk and have been advised to seek prompt medical attention if they have signs or symptoms of any of the serious risks.  2. Provided each patient a copy of the Medication Guide and Patient Brochure.  See MAR for administration details.   Indication: Prolia  (denosumab) is a prescription medicine used to treat osteoporosis in patients who:   Are at high risk for fracture, meaning patients who have had a fracture related to osteoporosis, or who have " multiple risk factors for fracture; Cannot use another osteoporosis medicine or other osteoporosis medicines did not work well.   The timeline for early/late injections would be 4 weeks early and any time after the 6 month grupo. If a patient receives their injection late, then the subsequent injection would be 6 months from the date that they actually received the injection    Have the screening questions been asked prior to this administration? Yes       Pt here for prolia injection teaching. Pt was provided education on how to administer the injection. Pt was able to successfully administer the injection in the lower L abd. Pt supplied the medication.  Lot:[id number], EXP:07/2024

## 2022-07-29 ENCOUNTER — TRANSFERRED RECORDS (OUTPATIENT)
Dept: HEALTH INFORMATION MANAGEMENT | Facility: CLINIC | Age: 87
End: 2022-07-29

## 2022-08-05 ENCOUNTER — TELEPHONE (OUTPATIENT)
Dept: PULMONOLOGY | Facility: OTHER | Age: 87
End: 2022-08-05

## 2022-08-05 DIAGNOSIS — R05.3 CHRONIC COUGH: ICD-10-CM

## 2022-08-05 RX ORDER — CODEINE PHOSPHATE AND GUAIFENESIN 10; 100 MG/5ML; MG/5ML
5-10 SOLUTION ORAL 4 TIMES DAILY PRN
Qty: 240 ML | Refills: 0 | Status: SHIPPED | OUTPATIENT
Start: 2022-08-05 | End: 2022-12-27

## 2022-08-05 NOTE — TELEPHONE ENCOUNTER
Fern called to request a refill of her guaifenesin-codeine cough syrup.  Per Dr Morse's notes, she had taken this reliably. Last refill was 1/26/22. Follow up yearly appt scheduled for 10/10/22. Dr. Morse is out of office today. Will send refill request to doctor of the day, Dr. Bui.

## 2022-08-30 ENCOUNTER — TELEPHONE (OUTPATIENT)
Dept: CARDIOLOGY | Facility: CLINIC | Age: 87
End: 2022-08-30

## 2022-08-30 NOTE — TELEPHONE ENCOUNTER
[facility] Call Center    Phone Message    May a detailed message be left on voicemail: yes     Reason for Call: Symptoms or Concerns     If patient has red-flag symptoms, warm transfer to triage line    Current symptom or concern: dizziness    Symptoms have been present for: 3 hour(s)    Has patient previously been seen for this? No          Action Taken: Other: routd to cardiology    Travel Screening: Not Applicable

## 2022-08-30 NOTE — TELEPHONE ENCOUNTER
Patient calling to see if she can get in with her provider today. Provider is out today and will return tomorrow.    Patient gave her cardiologist her symptoms below.    -If the covering provider sees the symptoms presented and the patient should be seen right away or should go to [facility] call patient back and try to schedule an appt today.    If patient can wait try to see if provider would see this patient in person in a virtual with permission from the provider.    Patient will call back at 11-12 to check in.    Patient has dizziness    Call Back   [phone]

## 2022-08-30 NOTE — TELEPHONE ENCOUNTER
"Return call to patient who stated she awakened at 0300 this am, went to BR and when she returned to bed her \"head felt funny\" - patient was able to return to sleep - dizziness returned around 0600 when she tilted her head back to put eye gtts in and then bent over to  something.    Patient denied any associated sx of CP, dyspnea, nausea - BP @ 0700 was 159/67, HR 89 and regular, O2sats 96% on RA, BS - 139 - patient denied hx of vertigo but does have hearing aides.    Informed patient that Dr. Krueger is out of the office but would be updated upon return - instructed her to contact PCP for urgent evaluation of sx and not to drive until seen - reassured her that she would be contacted with any new recommendations and that PCP can determine need for RAC visit - patient verbalized understanding and agreed with plan.  mg  "

## 2022-08-30 NOTE — PROGRESS NOTES
Assessment & Plan   Problem List Items Addressed This Visit    None  Visit Diagnoses     Dizziness    -  Primary    Relevant Orders    CBC with platelets and differential (Completed)    Basic metabolic panel (Completed)    UA Macro with Reflex to Micro and Culture - lab collect (Completed)    Urine Microscopic Exam (Completed)    Urine Culture (Completed)    Acute cystitis without hematuria                        MEDICATIONS:   Orders Placed This Encounter   Medications     ciprofloxacin (CIPRO) 250 MG tablet     Sig: Take 1 tablet (250 mg) by mouth 2 times daily for 5 days     Dispense:  10 tablet     Refill:  0     Medications Discontinued During This Encounter   Medication Reason     denosumab (PROLIA) 60 MG/ML SOSY injection      denosumab (PROLIA) injection 60 mg      blood glucose (NO BRAND SPECIFIED) lancets standard           - Continue other medications without change    No follow-ups on file.    Vidhi Pearl NP  [facility] TOMMIE Gomez is a 89 year old accompanied by her son, presenting for the following health issues:  Dizziness (Dizzy spells--yesterday for about 5 hours--woke up in middle of night at 2 am to use bathroom and felt woozy)      HPI     Dizziness  Onset/Duration: one day ago  Description:   Do you feel faint: No  Does it feel like the surroundings (bed, room) are moving: No  Unsteady/off balance: No  Have you passed out or fallen: No  Intensity: mild  Progression of Symptoms: improving  Accompanying Signs & Symptoms:  Heart palpitations or chest pain: No  Nausea, vomiting: No  Weakness or lack of coordination in arms or legs: No  Vision or speech changes: No  Numbness or tingling: No  Ringing in ears (Tinnitus): No  Hearing Loss: No  History:   Head trauma/concussion history: No  Previous similar symptoms: No  Recent bleeding history: No  Any new medications (BP?): No  Precipitating factors:   Worse with activity: No  Worse with head movement:  "YES  Alleviating factors:   Does staying in a fixed position give relief: YES  Therapies tried and outcome: None        Review of Systems   Unremarkable other than listed above and below         Objective    /72 (BP Location: Right arm, Patient Position: Sitting, Cuff Size: Adult Regular)   Pulse 80   Temp 97.8  F (36.6  C) (Temporal)   Resp 18   Ht 1.422 m (4' 8\")   Wt 47.6 kg (105 lb)   BMI 23.54 kg/m    Body mass index is 23.54 kg/m .  Physical Exam   GENERAL: healthy, alert and no distress  EYES: Eyes grossly normal to inspection, PERRL and conjunctivae and sclerae normal  HENT: ear canals and TM's normal, nose and mouth without ulcers or lesions  NECK: no adenopathy  RESP: lungs clear to auscultation - no rales, rhonchi or wheezes  CV: regular rate and rhythm, normal S1 S2 no peripheral edema and peripheral pulses strong  MS: no gross musculoskeletal defects noted, no edema  SKIN: no suspicious lesions or rashes  NEURO: Normal strength and tone, mentation intact and speech normal  PSYCH: mentation appears normal, affect normal/bright  LYMPH: no cervical, supraclavicularadenopathy          .  ..  "

## 2022-08-30 NOTE — TELEPHONE ENCOUNTER
Please review patient update r.e. sx of dizziness - yrly follow-up pending 6/2023 - any new orders at this time?  mg

## 2022-08-31 ENCOUNTER — OFFICE VISIT (OUTPATIENT)
Dept: INTERNAL MEDICINE | Facility: CLINIC | Age: 87
End: 2022-08-31
Payer: COMMERCIAL

## 2022-08-31 VITALS
HEART RATE: 80 BPM | RESPIRATION RATE: 18 BRPM | DIASTOLIC BLOOD PRESSURE: 72 MMHG | TEMPERATURE: 97.8 F | HEIGHT: 56 IN | SYSTOLIC BLOOD PRESSURE: 128 MMHG | BODY MASS INDEX: 23.62 KG/M2 | WEIGHT: 105 LBS

## 2022-08-31 DIAGNOSIS — R42 DIZZINESS: Primary | ICD-10-CM

## 2022-08-31 DIAGNOSIS — N30.00 ACUTE CYSTITIS WITHOUT HEMATURIA: ICD-10-CM

## 2022-08-31 LAB
ALBUMIN UR-MCNC: NEGATIVE MG/DL
ANION GAP SERPL CALCULATED.3IONS-SCNC: 10 MMOL/L (ref 5–18)
APPEARANCE UR: ABNORMAL
BACTERIA #/AREA URNS HPF: ABNORMAL /HPF
BASOPHILS # BLD AUTO: 0 10E3/UL (ref 0–0.2)
BASOPHILS NFR BLD AUTO: 0 %
BILIRUB UR QL STRIP: NEGATIVE
BUN SERPL-MCNC: 28 MG/DL (ref 8–28)
CALCIUM SERPL-MCNC: 10.1 MG/DL (ref 8.5–10.5)
CHLORIDE BLD-SCNC: 100 MMOL/L (ref 98–107)
CO2 SERPL-SCNC: 26 MMOL/L (ref 22–31)
COLOR UR AUTO: YELLOW
CREAT SERPL-MCNC: 0.97 MG/DL (ref 0.6–1.1)
EOSINOPHIL # BLD AUTO: 0.2 10E3/UL (ref 0–0.7)
EOSINOPHIL NFR BLD AUTO: 2 %
ERYTHROCYTE [DISTWIDTH] IN BLOOD BY AUTOMATED COUNT: 13.7 % (ref 10–15)
GFR SERPL CREATININE-BSD FRML MDRD: 53 ML/MIN/1.73M2
GLUCOSE BLD-MCNC: 101 MG/DL (ref 70–125)
GLUCOSE UR STRIP-MCNC: NEGATIVE MG/DL
HCT VFR BLD AUTO: 35.4 % (ref 35–47)
HGB BLD-MCNC: 11.7 G/DL (ref 11.7–15.7)
HGB UR QL STRIP: ABNORMAL
HYALINE CASTS #/AREA URNS LPF: ABNORMAL /LPF
IMM GRANULOCYTES # BLD: 0 10E3/UL
IMM GRANULOCYTES NFR BLD: 0 %
KETONES UR STRIP-MCNC: NEGATIVE MG/DL
LEUKOCYTE ESTERASE UR QL STRIP: ABNORMAL
LYMPHOCYTES # BLD AUTO: 2.4 10E3/UL (ref 0.8–5.3)
LYMPHOCYTES NFR BLD AUTO: 23 %
MCH RBC QN AUTO: 30 PG (ref 26.5–33)
MCHC RBC AUTO-ENTMCNC: 33.1 G/DL (ref 31.5–36.5)
MCV RBC AUTO: 91 FL (ref 78–100)
MONOCYTES # BLD AUTO: 0.7 10E3/UL (ref 0–1.3)
MONOCYTES NFR BLD AUTO: 7 %
NEUTROPHILS # BLD AUTO: 7 10E3/UL (ref 1.6–8.3)
NEUTROPHILS NFR BLD AUTO: 68 %
NITRATE UR QL: POSITIVE
PH UR STRIP: 5.5 [PH] (ref 5–8)
PLATELET # BLD AUTO: 227 10E3/UL (ref 150–450)
POTASSIUM BLD-SCNC: 4.7 MMOL/L (ref 3.5–5)
RBC # BLD AUTO: 3.9 10E6/UL (ref 3.8–5.2)
RBC #/AREA URNS AUTO: ABNORMAL /HPF
SODIUM SERPL-SCNC: 136 MMOL/L (ref 136–145)
SP GR UR STRIP: 1.02 (ref 1–1.03)
SQUAMOUS #/AREA URNS AUTO: ABNORMAL /LPF
UROBILINOGEN UR STRIP-ACNC: 0.2 E.U./DL
WBC # BLD AUTO: 10.3 10E3/UL (ref 4–11)
WBC #/AREA URNS AUTO: >100 /HPF
WBC CLUMPS #/AREA URNS HPF: PRESENT /HPF

## 2022-08-31 PROCEDURE — 99214 OFFICE O/P EST MOD 30 MIN: CPT | Performed by: NURSE PRACTITIONER

## 2022-08-31 PROCEDURE — 81001 URINALYSIS AUTO W/SCOPE: CPT | Performed by: NURSE PRACTITIONER

## 2022-08-31 PROCEDURE — 87186 SC STD MICRODIL/AGAR DIL: CPT | Performed by: NURSE PRACTITIONER

## 2022-08-31 PROCEDURE — 85025 COMPLETE CBC W/AUTO DIFF WBC: CPT | Performed by: NURSE PRACTITIONER

## 2022-08-31 PROCEDURE — 87086 URINE CULTURE/COLONY COUNT: CPT | Performed by: NURSE PRACTITIONER

## 2022-08-31 PROCEDURE — 36415 COLL VENOUS BLD VENIPUNCTURE: CPT | Performed by: NURSE PRACTITIONER

## 2022-08-31 PROCEDURE — 80048 BASIC METABOLIC PNL TOTAL CA: CPT | Performed by: NURSE PRACTITIONER

## 2022-08-31 ASSESSMENT — PAIN SCALES - GENERAL: PAINLEVEL: NO PAIN (0)

## 2022-08-31 NOTE — TELEPHONE ENCOUNTER
Return call to patient - informed her of Dr. Krueger's response/recommendations - patient verbalized understanding, reported that she has had no more sx and has follow-up with PCP this afternoon - no further questions/concerns offered at this time.  mg

## 2022-08-31 NOTE — TELEPHONE ENCOUNTER
Ms 8-30-22 @ 1522:  Garima Krueger MD Gorshe, Maureen, RN  Sounds like it could be vertigo or VBI. Hopefully, she can get in with her provider tomorrow.     JAYDON

## 2022-09-01 ENCOUNTER — TELEPHONE (OUTPATIENT)
Dept: CARDIOLOGY | Facility: CLINIC | Age: 87
End: 2022-09-01

## 2022-09-01 ENCOUNTER — TELEPHONE (OUTPATIENT)
Dept: INTERNAL MEDICINE | Facility: CLINIC | Age: 87
End: 2022-09-01

## 2022-09-01 LAB — BACTERIA UR CULT: ABNORMAL

## 2022-09-01 RX ORDER — CIPROFLOXACIN 250 MG/1
250 TABLET, FILM COATED ORAL 2 TIMES DAILY
Qty: 10 TABLET | Refills: 0 | Status: SHIPPED | OUTPATIENT
Start: 2022-09-01 | End: 2022-09-06

## 2022-09-01 NOTE — TELEPHONE ENCOUNTER
[facility] Call Center    Phone Message    May a detailed message be left on voicemail: yes     Reason for Call: Other: Patricia calling to follow up with Dr. Krueger regarding her dizziness.  She was able to see her PCP yesterday for this who advised her to further discuss this with Dr. Krueger.  Patricia is scheduled for an appointment on 11/4 (first available) and she is on the wait list.  Please call her back to further discuss her dizziness    Action Taken: Message routed to:  Other: Cardiology    Travel Screening: Not Applicable

## 2022-09-01 NOTE — TELEPHONE ENCOUNTER
FYI - Status Update    Who is Calling: patient    Update: Just picked up RX from [facility] for ciprofloxacin.  She has done some research and medication has a warning for people who have peripheral neuropathy.    Patient would like to know if PCP would prescribe something else - patient also states she is allergic to ibuprofen.    Patient is wondering if there is any response to her earlier call regarding appointment urgency with  - does Vidhi Pearl feel she needs to be seen sooner?    Does caller want a call/response back: Yes     Could we send this information to you in [employer] or would you prefer to receive a phone call?:   Patient would prefer a phone call   Okay to leave a detailed message?: Yes at Home number on file [phone] (home)

## 2022-09-01 NOTE — TELEPHONE ENCOUNTER
Reason for Call:  Appointment Request and Lab Results    Patient requesting this type of appt:  Cardiology    Requested provider: Dr. Evans Gerber    Reason patient unable to be scheduled: N/A    When does patient want to be seen/preferred time: Dr. Krueger's office is wondering if PCP Vidhi Pearl feels patient needs to see Dr. Krueger sooner than 11/4/22 scheduled appointment.   Patient is requesting call back from Vidhi Pearl's team     Comments: Reviewed 8/31/22 lab results and advised patient RX has been sent to pharmacy.    Patient is also requesting lab results are mailed to address of record.     Could we send this information to you in [employer] or would you prefer to receive a phone call?:   Patient would prefer a phone call   Okay to leave a detailed message?: Yes at Cell number on file:    Telephone Information:   Mobile [phone]       Call taken on 9/1/2022 at 11:38 AM by Nehal Canseco

## 2022-09-01 NOTE — TELEPHONE ENCOUNTER
"Return call to patient who reported that she was contacted by her PCP and she has a UTI, so Rx was sent to her pharmacy - patient questioned if Dr. Krueger would want to see her sooner than 11-4-22 and explained that she is on the \"wait list\".    Reassured patient that PCP can determine need for sooner follow-up after she completes tx for UTI and sx don't resolve, or she develops new sx - patient verbalized understanding.  mg  "

## 2022-09-01 NOTE — TELEPHONE ENCOUNTER
Return call to patient who confirmed she had seen her PCP yesterday and lab work was completed but she has not rec'd results - patient reported that her BP was 128/72.    Reassured patient that BP was good and instructed her to contact PCP for results / recommendations after noting UA (+) - reassured her that PCP can determine need for sooner follow-up with Dr. Krueger based on recent evaluation - patient verbalized understanding and agreed with plan.  mg

## 2022-09-01 NOTE — TELEPHONE ENCOUNTER
[facility] Call Center    Phone Message    May a detailed message be left on voicemail: yes     Reason for Call: Other: Patient returning a call for her care team. Please call patient back to further discuss, thank you.     Action Taken: Message routed to:  Other: Cardiology    Travel Screening: Not Applicable

## 2022-09-02 NOTE — TELEPHONE ENCOUNTER
Vidhi Pearl NP is out of the office until September 15th.    She should take the ciprofloxacin as ordered.  It is a low dose and short duration.    I cannot answer the question about the cardiologist as I do not know Vidhi Pearl's opinion on this.    Lalito Marquez MD  General Internal Medicine  [facility]  9/2/2022, 9:11 AM

## 2022-09-02 NOTE — TELEPHONE ENCOUNTER
Patient asking if a different antibiotic can be sent to pharmacy on file as she has peripheral neuropathy & does not want to take cipro

## 2022-09-02 NOTE — TELEPHONE ENCOUNTER
Spoke to patient- relayed information  Pt states she will think about taking the cipro & speak to pharmacist

## 2022-09-13 ENCOUNTER — HOSPITAL ENCOUNTER (OUTPATIENT)
Dept: CT IMAGING | Facility: HOSPITAL | Age: 87
Discharge: HOME | End: 2022-09-13
Attending: INTERNAL MEDICINE | Admitting: INTERNAL MEDICINE
Payer: COMMERCIAL

## 2022-09-13 DIAGNOSIS — J84.112 IPF (IDIOPATHIC PULMONARY FIBROSIS) (H): ICD-10-CM

## 2022-09-13 PROCEDURE — 71250 CT THORAX DX C-: CPT

## 2022-10-10 ENCOUNTER — OFFICE VISIT (OUTPATIENT)
Dept: PULMONOLOGY | Facility: OTHER | Age: 87
End: 2022-10-10
Payer: COMMERCIAL

## 2022-10-10 VITALS
SYSTOLIC BLOOD PRESSURE: 112 MMHG | OXYGEN SATURATION: 98 % | BODY MASS INDEX: 23.59 KG/M2 | HEART RATE: 94 BPM | WEIGHT: 105.2 LBS | DIASTOLIC BLOOD PRESSURE: 62 MMHG

## 2022-10-10 DIAGNOSIS — J84.112 IPF (IDIOPATHIC PULMONARY FIBROSIS) (H): Primary | ICD-10-CM

## 2022-10-10 PROCEDURE — 99213 OFFICE O/P EST LOW 20 MIN: CPT | Performed by: INTERNAL MEDICINE

## 2022-10-10 NOTE — PROGRESS NOTES
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   Cough is likely related to IPF, controlled with codeine      Plan:   Discussed very minimal worsening of IPF. In 2016 she had normal FVC and TLC, therefore she was not initiated on anti-thrombotic therapy. Repeat CT shows a very small increase in fibrosis and patient's symptoms have been stable. She prefers to not repeat PFTs. We will hold on antithrombotics for now. She will contact the clinic with worsened shortness of breath and will need CT chest for initial workup.      Subjective:    Cc: follow up IPF    HPI: 89 year old female with history of DMII, hypertension, hyperlipidemia and very mild IPF presents for follow up. She is a former patient of Dr. Garces.     Only dyspnea is going up basement steps. Lives with her nephew who is 79. Able to do her ADLs without breathing issues.     Past Medical History:   Diagnosis Date     Carpal tunnel syndrome      Diabetes mellitus (H)      Hyperlipidemia      Hypertension      Upper respiratory infection        Social History     Tobacco Use     Smoking status: Never     Smokeless tobacco: Never   Substance Use Topics     Alcohol use: Not Currently     Alcohol/week: 4.0 standard drinks       Family History   Problem Relation Age of Onset     Breast Cancer Mother 52.00     Parkinsonism Mother      Breast Cancer Sister 72.00     Coronary Artery Disease Brother      Coronary Artery Disease Brother 78.00     Diabetes Brother      Coronary Artery Disease Father 67.00     Cancer Father         bladder       Current Outpatient Medications   Medication     amoxicillin (AMOXIL) 500 MG capsule     aspirin 81 MG EC tablet     atorvastatin (LIPITOR) 40 MG tablet     blood glucose (ACCU-CHEK AMIRA PLUS) test strip     blood glucose monitoring (NO BRAND SPECIFIED) meter device kit     cholecalciferol, vitamin D3, (VITAMIN D3) 1,000 unit capsule     denosumab (PROLIA) 60 MG/ML SOSY injection      DENTA 5000 PLUS 1.1 % Crea     generic lancets (ACCU-CHEK FASTCLIX LANCING DEV)     glucosamine-chondroitin 500-400 mg cap     guaiFENesin-codeine (ROBITUSSIN AC) 100-10 MG/5ML solution     hydrochlorothiazide (HYDRODIURIL) 25 MG tablet     losartan (COZAAR) 25 MG tablet     magnesium 250 mg Tab     MELATONIN ORAL     multivitamin therapeutic (THERAGRAN) tablet     vit C/E/Zn/coppr/lutein/zeaxan (PRESERVISION AREDS-2 ORAL)     DENTAGEL 1.1 % GEL topical gel     No current facility-administered medications for this visit.         Review of Systems: Reviewed and negative aside from that noted in HPI.    Objective:    Vital signs:  /62 (BP Location: Right arm, Patient Position: Sitting, Cuff Size: Adult Regular)   Pulse 94   Wt 47.7 kg (105 lb 3.2 oz)   SpO2 98%   BMI 23.59 kg/m      General appearance: alert, appears stated age and cooperative  Neck: no adenopathy and supple, symmetrical, trachea midline  Lungs: rales bilaterally  Heart: RRR, S1 and S2  Abdomen: soft, non-tender; bowel sounds normal; no masses,  no organomegaly  Extremities: No edema or cyanosis  Skin: Skin color, texture, turgor normal. No rashes or lesions  Neurologic: Grossly normal        Data    CT chest 2022: personally reviewed  EXAM: CT CHEST W/O CONTRAST  LOCATION: [facility]  DATE/TIME: 9/13/2022 11:40 AM     INDICATION: Interstitial lung disease  COMPARISON: CT chest 7/25/2019 and 7/14/2015  TECHNIQUE: CT chest without IV contrast. Multiplanar reformats were obtained. Dose reduction techniques were used.  CONTRAST: None.     FINDINGS:   LUNGS AND PLEURA: Minimal tracheal secretions. Mild interval progression of mild to moderate basilar predominant fibrotic changes including subpleural reticulation, architectural distortion and traction bronchiectasis. Minimal subpleural honeycombing. No   focal consolidation or pleural effusion.  Scattered areas of mild endobronchial mucoid impaction.  Stable benign 3 mm  left major fissural nodule image 163 series 4. No follow-up is indicated.     MEDIASTINUM/AXILLAE: No thoracic adenopathy. Normal heart size and no pericardial effusion. Small hiatal hernia.     CORONARY ARTERY CALCIFICATION: Severe.     UPPER ABDOMEN: No significant finding.     MUSCULOSKELETAL: Incidental T8 vertebral body osseous hemangioma.                                                                      IMPRESSION:   1.  Mild interval progression of mild to moderate UIP pattern interstitial lung disease.  2.  Bilateral scattered endobronchial mucoid impaction consistent with bronchiolitis.    PFT: personally reviewed  FEV1/FVC  is normal  FEV1 is normal  FVC is normal  There was no improvement in spirometry after a single inhaled dose of bronchodilator  TLC is normal  DLCO is reduced to 55 when it is corrected for hemoglobin.     Impression:    Full Pulmonary Function Test is abnormal.    There is isolated reduction in diffusion capacity.      Laboratory:  Results for orders placed or performed in visit on 08/31/22   Basic metabolic panel   Result Value Ref Range    Sodium 136 136 - 145 mmol/L    Potassium 4.7 3.5 - 5.0 mmol/L    Chloride 100 98 - 107 mmol/L    Carbon Dioxide (CO2) 26 22 - 31 mmol/L    Anion Gap 10 5 - 18 mmol/L    Urea Nitrogen 28 8 - 28 mg/dL    Creatinine 0.97 0.60 - 1.10 mg/dL    Calcium 10.1 8.5 - 10.5 mg/dL    Glucose 101 70 - 125 mg/dL    GFR Estimate 53 (L) >60 mL/min/1.73m2     Lab Results   Component Value Date    WBC 10.3 08/31/2022    HGB 11.7 08/31/2022    HCT 35.4 08/31/2022    MCV 91 08/31/2022     08/31/2022

## 2022-10-10 NOTE — PATIENT INSTRUCTIONS
Your CT scan showed VERY little progression or worsening of your scarring (pulmonary fibrosis). It is still mild and therefore I do not recommend starting a medication that prevents this from worsening as its cost is very high, it requires pulmonary function tests annually and you are not having symptoms from this currently.    Please call the lung clinic if breathing worsens prior to next years follow up.  [phone]

## 2022-11-04 ENCOUNTER — OFFICE VISIT (OUTPATIENT)
Dept: CARDIOLOGY | Facility: CLINIC | Age: 87
End: 2022-11-04
Payer: COMMERCIAL

## 2022-11-04 VITALS
WEIGHT: 106.4 LBS | DIASTOLIC BLOOD PRESSURE: 50 MMHG | SYSTOLIC BLOOD PRESSURE: 146 MMHG | HEIGHT: 56 IN | RESPIRATION RATE: 14 BRPM | HEART RATE: 88 BPM | BODY MASS INDEX: 23.93 KG/M2

## 2022-11-04 DIAGNOSIS — I10 ESSENTIAL HYPERTENSION: ICD-10-CM

## 2022-11-04 DIAGNOSIS — E78.2 MIXED HYPERLIPIDEMIA: ICD-10-CM

## 2022-11-04 DIAGNOSIS — R42 DIZZINESS: Primary | ICD-10-CM

## 2022-11-04 DIAGNOSIS — I25.10 CORONARY ARTERY DISEASE INVOLVING NATIVE CORONARY ARTERY OF NATIVE HEART WITHOUT ANGINA PECTORIS: ICD-10-CM

## 2022-11-04 PROCEDURE — 99213 OFFICE O/P EST LOW 20 MIN: CPT | Performed by: INTERNAL MEDICINE

## 2022-11-04 NOTE — LETTER
11/4/2022    Vidhi Pearl NP  [address] [facility] [address]    RE: Patricia Bose       Dear Colleague,     I had the pleasure of seeing Patricia Bose in the [facility].      Thank you, Vidhi Pearl NP, for asking the [facility] team to see Ms. Patricia Bose to follow-up on dizzy spells.    Assessment/Recommendations   Assessment:    1.  Dizzy spells, now resolved.  At the time these were occurring, she was diagnosed with urinary tract infection and subsequently treated.  She has had no further recurrence.  Her dizzy spells occurred when she would lay down in bed on the right side.  Did not note similar symptoms when she was up moving.  2.  Coronary artery disease, moderate by angiography in 2015 following abnormal CTA.  She continues to deny any symptoms of exertional chest discomfort or increase in her chronic exertional dyspnea due to pulmonary fibrosis.  At this point continue medical management and risk factor modification.  3.  Essential hypertension, controlled.  Repeat blood pressure in the clinic today was 130/60.  4.  Hypercholesterolemia, well controlled on atorvastatin    Plan:  1.  Continue current medications  2.  Follow-up with me in 1 year or sooner if new symptoms       History of Present Illness    Ms. Patricia Bose is a 89 year old female with history of moderate coronary artery disease by angiography in 2015, essential hypertension and hypercholesterolemia who returns to the office today for a follow-up visit.  I last saw her in June where she was doing well with no complaints of exertional chest discomfort or dyspnea.  In early September, she contacted our office complaining of some episodes of dizziness.  It appears that this occurred most prominently when she would lay down on the right side.  Described as somewhat of a spinning feeling.  Did not notice any similar symptoms when she was sitting up or walking.   "Encouraged her to follow through with primary care and she was subsequently diagnosed with a urinary tract infection and treated.  Her symptoms of dizziness resolved.  Over the last 1 to 2 months, she has been feeling quite well.  Continues to walk at the gym on a regular basis.  Reports no decline in her functional capacity.  Has been seen by her pulmonologist to states her pulmonary fibrosis remains stable.    ECG (personally reviewed): No ECG today    Cardiac Imaging Studies (personally reviewed): No new cardiac imaging     Physical Examination Review of Systems   BP (!) 146/50 (BP Location: Right arm, Patient Position: Sitting, Cuff Size: Adult Small)   Pulse 88   Resp 14   Ht 1.422 m (4' 8\")   Wt 48.3 kg (106 lb 6.4 oz)   BMI 23.85 kg/m    Body mass index is 23.85 kg/m .  Wt Readings from Last 3 Encounters:   11/04/22 48.3 kg (106 lb 6.4 oz)   10/10/22 47.7 kg (105 lb 3.2 oz)   08/31/22 47.6 kg (105 lb)     General Appearance:   Awake, Alert, No acute distress.   HEENT:  No scleral icterus; the mucous membranes were pink and moist.   Neck: No cervical bruits or jugular venous distention    Chest: The spine was straight. The chest was symmetric.   Lungs:   Respirations unlabored; the lungs are clear to auscultation. No wheezing   Cardiovascular:    Regular rate and rhythm.  S1, S2 normal.  No murmur or gallop   Abdomen:  No organomegaly, masses, bruits, or tenderness. Bowels sounds are present   Extremities:  No peripheral edema bilaterally   Skin: No xanthelasma. Warm, Dry.   Musculoskeletal: No tenderness.   Neurologic: Mood and affect are appropriate.    Enc Vitals  BP: (!) 146/50  Pulse: 88  Resp: 14  Weight: 48.3 kg (106 lb 6.4 oz)  Height: 142.2 cm (4' 8\")                                         Medical History  Surgical History Family History Social History   Past Medical History:   Diagnosis Date     Carpal tunnel syndrome      Diabetes mellitus (H)      Hyperlipidemia      Hypertension      Upper " respiratory infection     Past Surgical History:   Procedure Laterality Date     ARTHROSCOPY SHOULDER ROTATOR CUFF REPAIR  10 years ago    left shoulder      BIOPSY BREAST Left     benign     BIOPSY BREAST Left     before     benign     CATARACT EXTRACTION       HC REMOVAL OF TONSILS,<13 Y/O      Description: Tonsillectomy;  Recorded: 2014;     HYSTERECTOMY      benign     OOPHORECTOMY      benign     ZZC TOTAL ABDOM HYSTERECTOMY      Description: Hysterectomy;  Recorded: 2014;    Family History   Problem Relation Age of Onset     Breast Cancer Mother 52.00     Parkinsonism Mother      Breast Cancer Sister 72.00     Coronary Artery Disease Brother      Coronary Artery Disease Brother 78.00     Diabetes Brother      Coronary Artery Disease Father 67.00     Cancer Father         bladder    Social History     Socioeconomic History     Marital status:      Spouse name: Not on file     Number of children: Not on file     Years of education: Not on file     Highest education level: Not on file   Occupational History     Not on file   Tobacco Use     Smoking status: Never     Smokeless tobacco: Never   Vaping Use     Vaping Use: Never used   Substance and Sexual Activity     Alcohol use: Not Currently     Alcohol/week: 4.0 standard drinks     Drug use: No     Sexual activity: Not Currently   Other Topics Concern     Not on file   Social History Narrative    . Dated a gentlemen for 35 years who  .  Has 1 son, 2 grandchildren and 3 great grandchildren.  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  She is very social getting together with friends often, she is part of a book [employer], she enjoys yoga and swimming at the [employer].  She used to golf.     Social Determinants of Health     Financial Resource Strain: Not on file   Food Insecurity: Not on file   Transportation Needs: Not on file   Physical Activity: Not on file   Stress: Not on file   Social  Connections: Not on file   Intimate Partner Violence: Not on file   Housing Stability: Not on file          Medications  Allergies   Current Outpatient Medications   Medication Sig Dispense Refill     amoxicillin (AMOXIL) 500 MG capsule take 4 capsules prior to dental procedure 4 capsule 3     aspirin 81 MG EC tablet [ASPIRIN 81 MG EC TABLET] Take 81 mg by mouth daily.       atorvastatin (LIPITOR) 40 MG tablet Take 1 tablet (40 mg) by mouth daily 90 tablet 3     blood glucose (ACCU-CHEK AMIRA PLUS) test strip 1 strip by [employer] route 3 times daily Use to test blood sugar 3 times daily or as directed. 100 strip 1     blood glucose monitoring (NO BRAND SPECIFIED) meter device kit by [employer] route daily 1 kit 0     cholecalciferol, vitamin D3, (VITAMIN D3) 1,000 unit capsule [CHOLECALCIFEROL, VITAMIN D3, (VITAMIN D3) 1,000 UNIT CAPSULE] Take 1,000 Units by mouth daily.       denosumab (PROLIA) 60 MG/ML SOSY injection Inject 1 mL (60 mg) Subcutaneous every 6 months Please mail to the clinic for appointment 6/15/22 1 mL 0     DENTA 5000 PLUS 1.1 % Crea [DENTA 5000 PLUS 1.1 % CREA]        generic lancets (ACCU-CHEK FASTCLIX LANCING DEV) [GENERIC LANCETS (ACCU-CHEK FASTCLIX LANCING DEV)] Use 1 each As Directed daily. 200 each 2     glucosamine-chondroitin 500-400 mg cap [GLUCOSAMINE-CHONDROITIN 500-400 MG CAP] Take 2 capsules by mouth daily.       guaiFENesin-codeine (ROBITUSSIN AC) 100-10 MG/5ML solution Take 5-10 mLs by mouth 4 times daily as needed for cough 240 mL 0     hydrochlorothiazide (HYDRODIURIL) 25 MG tablet Take 1 tablet (25 mg) by mouth daily 90 tablet 3     losartan (COZAAR) 25 MG tablet Take 1 tablet (25 mg) by mouth daily 90 tablet 3     magnesium 250 mg Tab [MAGNESIUM 250 MG TAB] Take 1 tablet by mouth daily.       MELATONIN ORAL [MELATONIN ORAL] Take 10 mg by mouth daily.        multivitamin therapeutic (THERAGRAN) tablet [MULTIVITAMIN THERAPEUTIC (THERAGRAN) TABLET] Take 1 tablet by mouth daily.        vit C/E/Zn/coppr/lutein/zeaxan (PRESERVISION AREDS-2 ORAL) [VIT C/E/ZN/COPPR/LUTEIN/ZEAXAN (PRESERVISION AREDS-2 ORAL)] Take 1 tablet by mouth 2 (two) times a day.        Allergies   Allergen Reactions     Ibuprofen Shortness Of Breath         Lab Results    Chemistry/lipid CBC Cardiac Enzymes/BNP/TSH/INR   Recent Labs   Lab Test 08/31/22  1355 04/27/22  0937   TRIG  --  111   LDL  --  81   BUN 28 29*    140   CO2 26 30    Recent Labs   Lab Test 08/31/22  1355   WBC 10.3   HGB 11.7   HCT 35.4   MCV 91       Recent Labs   Lab Test 06/12/19  1039   TSH 1.95        A total of 24 minutes was spent reviewing patient's medical records, obtaining history and performing examination, as well as discussing diagnoses/ recommendations with patient and answering all questions.                    Thank you for allowing me to participate in the care of your patient.      Sincerely,     Garima Krueger MD     [facility]  cc:   Referred Self,

## 2022-11-04 NOTE — PROGRESS NOTES
Thank you, Vidhi Pearl NP, for asking the [facility] team to see Ms. Patricia Bose to follow-up on dizzy spells.    Assessment/Recommendations   Assessment:    1.  Dizzy spells, now resolved.  At the time these were occurring, she was diagnosed with urinary tract infection and subsequently treated.  She has had no further recurrence.  Her dizzy spells occurred when she would lay down in bed on the right side.  Did not note similar symptoms when she was up moving.  2.  Coronary artery disease, moderate by angiography in 2015 following abnormal CTA.  She continues to deny any symptoms of exertional chest discomfort or increase in her chronic exertional dyspnea due to pulmonary fibrosis.  At this point continue medical management and risk factor modification.  3.  Essential hypertension, controlled.  Repeat blood pressure in the clinic today was 130/60.  4.  Hypercholesterolemia, well controlled on atorvastatin    Plan:  1.  Continue current medications  2.  Follow-up with me in 1 year or sooner if new symptoms       History of Present Illness    Ms. Patricia Bose is a 89 year old female with history of moderate coronary artery disease by angiography in 2015, essential hypertension and hypercholesterolemia who returns to the office today for a follow-up visit.  I last saw her in June where she was doing well with no complaints of exertional chest discomfort or dyspnea.  In early September, she contacted our office complaining of some episodes of dizziness.  It appears that this occurred most prominently when she would lay down on the right side.  Described as somewhat of a spinning feeling.  Did not notice any similar symptoms when she was sitting up or walking.  Encouraged her to follow through with primary care and she was subsequently diagnosed with a urinary tract infection and treated.  Her symptoms of dizziness resolved.  Over the last 1 to 2 months, she has been feeling quite  "well.  Continues to walk at the gym on a regular basis.  Reports no decline in her functional capacity.  Has been seen by her pulmonologist to states her pulmonary fibrosis remains stable.    ECG (personally reviewed): No ECG today    Cardiac Imaging Studies (personally reviewed): No new cardiac imaging     Physical Examination Review of Systems   BP (!) 146/50 (BP Location: Right arm, Patient Position: Sitting, Cuff Size: Adult Small)   Pulse 88   Resp 14   Ht 1.422 m (4' 8\")   Wt 48.3 kg (106 lb 6.4 oz)   BMI 23.85 kg/m    Body mass index is 23.85 kg/m .  Wt Readings from Last 3 Encounters:   11/04/22 48.3 kg (106 lb 6.4 oz)   10/10/22 47.7 kg (105 lb 3.2 oz)   08/31/22 47.6 kg (105 lb)     General Appearance:   Awake, Alert, No acute distress.   HEENT:  No scleral icterus; the mucous membranes were pink and moist.   Neck: No cervical bruits or jugular venous distention    Chest: The spine was straight. The chest was symmetric.   Lungs:   Respirations unlabored; the lungs are clear to auscultation. No wheezing   Cardiovascular:    Regular rate and rhythm.  S1, S2 normal.  No murmur or gallop   Abdomen:  No organomegaly, masses, bruits, or tenderness. Bowels sounds are present   Extremities:  No peripheral edema bilaterally   Skin: No xanthelasma. Warm, Dry.   Musculoskeletal: No tenderness.   Neurologic: Mood and affect are appropriate.    Enc Vitals  BP: (!) 146/50  Pulse: 88  Resp: 14  Weight: 48.3 kg (106 lb 6.4 oz)  Height: 142.2 cm (4' 8\")                                         Medical History  Surgical History Family History Social History   Past Medical History:   Diagnosis Date     Carpal tunnel syndrome      Diabetes mellitus (H)      Hyperlipidemia      Hypertension      Upper respiratory infection     Past Surgical History:   Procedure Laterality Date     ARTHROSCOPY SHOULDER ROTATOR CUFF REPAIR  10 years ago    left shoulder      BIOPSY BREAST Left 1980    benign     BIOPSY BREAST Left     before "     benign     CATARACT EXTRACTION       HC REMOVAL OF TONSILS,<13 Y/O      Description: Tonsillectomy;  Recorded: 2014;     HYSTERECTOMY      benign     OOPHORECTOMY      benign     ZZC TOTAL ABDOM HYSTERECTOMY      Description: Hysterectomy;  Recorded: 2014;    Family History   Problem Relation Age of Onset     Breast Cancer Mother 52.00     Parkinsonism Mother      Breast Cancer Sister 72.00     Coronary Artery Disease Brother      Coronary Artery Disease Brother 78.00     Diabetes Brother      Coronary Artery Disease Father 67.00     Cancer Father         bladder    Social History     Socioeconomic History     Marital status:      Spouse name: Not on file     Number of children: Not on file     Years of education: Not on file     Highest education level: Not on file   Occupational History     Not on file   Tobacco Use     Smoking status: Never     Smokeless tobacco: Never   Vaping Use     Vaping Use: Never used   Substance and Sexual Activity     Alcohol use: Not Currently     Alcohol/week: 4.0 standard drinks     Drug use: No     Sexual activity: Not Currently   Other Topics Concern     Not on file   Social History Narrative    . Dated a gentlemen for 35 years who  .  Has 1 son, 2 grandchildren and 3 great grandchildren.  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  She is very social getting together with friends often, she is part of a book [employer], she enjoys yoga and swimming at the [facility].  She used to golf.     Social Determinants of Health     Financial Resource Strain: Not on file   Food Insecurity: Not on file   Transportation Needs: Not on file   Physical Activity: Not on file   Stress: Not on file   Social Connections: Not on file   Intimate Partner Violence: Not on file   Housing Stability: Not on file          Medications  Allergies   Current Outpatient Medications   Medication Sig Dispense Refill     amoxicillin (AMOXIL) 500 MG capsule  take 4 capsules prior to dental procedure 4 capsule 3     aspirin 81 MG EC tablet [ASPIRIN 81 MG EC TABLET] Take 81 mg by mouth daily.       atorvastatin (LIPITOR) 40 MG tablet Take 1 tablet (40 mg) by mouth daily 90 tablet 3     blood glucose (ACCU-CHEK AMIRA PLUS) test strip 1 strip by In Vitro route 3 times daily Use to test blood sugar 3 times daily or as directed. 100 strip 1     blood glucose monitoring (NO BRAND SPECIFIED) meter device kit by In Vitro route daily 1 kit 0     cholecalciferol, vitamin D3, (VITAMIN D3) 1,000 unit capsule [CHOLECALCIFEROL, VITAMIN D3, (VITAMIN D3) 1,000 UNIT CAPSULE] Take 1,000 Units by mouth daily.       denosumab (PROLIA) 60 MG/ML SOSY injection Inject 1 mL (60 mg) Subcutaneous every 6 months Please mail to the clinic for appointment 6/15/22 1 mL 0     DENTA 5000 PLUS 1.1 % Crea [DENTA 5000 PLUS 1.1 % CREA]        generic lancets (ACCU-CHEK FASTCLIX LANCING DEV) [GENERIC LANCETS (ACCU-CHEK FASTCLIX LANCING DEV)] Use 1 each As Directed daily. 200 each 2     glucosamine-chondroitin 500-400 mg cap [GLUCOSAMINE-CHONDROITIN 500-400 MG CAP] Take 2 capsules by mouth daily.       guaiFENesin-codeine (ROBITUSSIN AC) 100-10 MG/5ML solution Take 5-10 mLs by mouth 4 times daily as needed for cough 240 mL 0     hydrochlorothiazide (HYDRODIURIL) 25 MG tablet Take 1 tablet (25 mg) by mouth daily 90 tablet 3     losartan (COZAAR) 25 MG tablet Take 1 tablet (25 mg) by mouth daily 90 tablet 3     magnesium 250 mg Tab [MAGNESIUM 250 MG TAB] Take 1 tablet by mouth daily.       MELATONIN ORAL [MELATONIN ORAL] Take 10 mg by mouth daily.        multivitamin therapeutic (THERAGRAN) tablet [MULTIVITAMIN THERAPEUTIC (THERAGRAN) TABLET] Take 1 tablet by mouth daily.       vit C/E/Zn/coppr/lutein/zeaxan (PRESERVISION AREDS-2 ORAL) [VIT C/E/ZN/COPPR/LUTEIN/ZEAXAN (PRESERVISION AREDS-2 ORAL)] Take 1 tablet by mouth 2 (two) times a day.        Allergies   Allergen Reactions     Ibuprofen Shortness Of Breath          Lab Results    Chemistry/lipid CBC Cardiac Enzymes/BNP/TSH/INR   Recent Labs   Lab Test 08/31/22  1355 04/27/22  0937   TRIG  --  111   LDL  --  81   BUN 28 29*    140   CO2 26 30    Recent Labs   Lab Test 08/31/22  1355   WBC 10.3   HGB 11.7   HCT 35.4   MCV 91       Recent Labs   Lab Test 06/12/19  1039   TSH 1.95        A total of 24 minutes was spent reviewing patient's medical records, obtaining history and performing examination, as well as discussing diagnoses/ recommendations with patient and answering all questions.

## 2022-11-21 ENCOUNTER — TELEPHONE (OUTPATIENT)
Dept: INTERNAL MEDICINE | Facility: CLINIC | Age: 87
End: 2022-11-21

## 2022-11-21 NOTE — TELEPHONE ENCOUNTER
Patient is looking for a ear cleaning appt.    Patient is looking for a order for an praveen cleaning.

## 2022-11-22 ENCOUNTER — HOSPITAL ENCOUNTER (OUTPATIENT)
Dept: BONE DENSITY | Facility: HOSPITAL | Age: 87
Discharge: HOME | End: 2022-11-22
Attending: NURSE PRACTITIONER | Admitting: NURSE PRACTITIONER
Payer: COMMERCIAL

## 2022-11-22 PROCEDURE — 77080 DXA BONE DENSITY AXIAL: CPT

## 2022-11-23 ENCOUNTER — DOCUMENTATION ONLY (OUTPATIENT)
Dept: LAB | Facility: CLINIC | Age: 87
End: 2022-11-23

## 2022-11-23 DIAGNOSIS — M94.9 DISORDER OF BONE AND CARTILAGE: ICD-10-CM

## 2022-11-23 DIAGNOSIS — M89.9 DISORDER OF BONE AND CARTILAGE: ICD-10-CM

## 2022-11-23 DIAGNOSIS — E11.65 TYPE 2 DIABETES MELLITUS WITH HYPERGLYCEMIA, WITHOUT LONG-TERM CURRENT USE OF INSULIN (H): Primary | ICD-10-CM

## 2022-11-23 NOTE — PROGRESS NOTES
Patricia Bose has an upcoming appointment for Endocrinology labs.  No orders are in the chart.    Please place orders or advise patient.    Thanks,     Avelina Tellez

## 2022-12-05 DIAGNOSIS — Z92.29 PERSONAL HISTORY OF OTHER DRUG THERAPY: ICD-10-CM

## 2022-12-05 DIAGNOSIS — M85.80 OSTEOPENIA: ICD-10-CM

## 2022-12-06 RX ORDER — DENOSUMAB 60 MG/ML
INJECTION SUBCUTANEOUS
Qty: 1 ML | Refills: 0 | Status: SHIPPED | OUTPATIENT
Start: 2022-12-06 | End: 2023-06-05

## 2022-12-07 ENCOUNTER — LAB (OUTPATIENT)
Dept: LAB | Facility: CLINIC | Age: 87
End: 2022-12-07
Payer: COMMERCIAL

## 2022-12-07 ENCOUNTER — OFFICE VISIT (OUTPATIENT)
Dept: FAMILY MEDICINE | Facility: CLINIC | Age: 87
End: 2022-12-07
Payer: COMMERCIAL

## 2022-12-07 VITALS
HEART RATE: 83 BPM | DIASTOLIC BLOOD PRESSURE: 72 MMHG | BODY MASS INDEX: 22.91 KG/M2 | TEMPERATURE: 97.4 F | SYSTOLIC BLOOD PRESSURE: 130 MMHG | HEIGHT: 57 IN | OXYGEN SATURATION: 100 % | WEIGHT: 106.2 LBS

## 2022-12-07 DIAGNOSIS — H61.23 BILATERAL IMPACTED CERUMEN: Primary | ICD-10-CM

## 2022-12-07 DIAGNOSIS — E11.65 TYPE 2 DIABETES MELLITUS WITH HYPERGLYCEMIA, WITHOUT LONG-TERM CURRENT USE OF INSULIN (H): ICD-10-CM

## 2022-12-07 DIAGNOSIS — M94.9 DISORDER OF BONE AND CARTILAGE: ICD-10-CM

## 2022-12-07 DIAGNOSIS — N18.30 CHRONIC KIDNEY DISEASE, STAGE 3 (H): ICD-10-CM

## 2022-12-07 DIAGNOSIS — M89.9 DISORDER OF BONE AND CARTILAGE: ICD-10-CM

## 2022-12-07 LAB
ANION GAP SERPL CALCULATED.3IONS-SCNC: 14 MMOL/L (ref 7–15)
BUN SERPL-MCNC: 36.1 MG/DL (ref 8–23)
CALCIUM SERPL-MCNC: 9.5 MG/DL (ref 8.2–9.6)
CHLORIDE SERPL-SCNC: 98 MMOL/L (ref 98–107)
CREAT SERPL-MCNC: 1.11 MG/DL (ref 0.51–0.95)
CREAT UR-MCNC: 39 MG/DL
DEPRECATED CALCIDIOL+CALCIFEROL SERPL-MC: 52 UG/L (ref 20–75)
DEPRECATED HCO3 PLAS-SCNC: 25 MMOL/L (ref 22–29)
GFR SERPL CREATININE-BSD FRML MDRD: 45 ML/MIN/1.73M2
GLUCOSE SERPL-MCNC: 149 MG/DL (ref 70–99)
HBA1C MFR BLD: 6.5 % (ref 0–5.6)
MICROALBUMIN UR-MCNC: 166 MG/L
MICROALBUMIN/CREAT UR: 425.64 MG/G CR (ref 0–25)
POTASSIUM SERPL-SCNC: 4 MMOL/L (ref 3.4–5.3)
SODIUM SERPL-SCNC: 137 MMOL/L (ref 136–145)

## 2022-12-07 PROCEDURE — 80048 BASIC METABOLIC PNL TOTAL CA: CPT

## 2022-12-07 PROCEDURE — 36415 COLL VENOUS BLD VENIPUNCTURE: CPT

## 2022-12-07 PROCEDURE — 83036 HEMOGLOBIN GLYCOSYLATED A1C: CPT

## 2022-12-07 PROCEDURE — 69209 REMOVE IMPACTED EAR WAX UNI: CPT | Mod: 50 | Performed by: FAMILY MEDICINE

## 2022-12-07 PROCEDURE — 82043 UR ALBUMIN QUANTITATIVE: CPT

## 2022-12-07 PROCEDURE — 82306 VITAMIN D 25 HYDROXY: CPT

## 2022-12-07 ASSESSMENT — PAIN SCALES - GENERAL: PAINLEVEL: NO PAIN (0)

## 2022-12-07 NOTE — PROGRESS NOTES
"  Assessment & Plan     Bilateral impacted cerumen  Lavaged and cleared  Follow-up as needed                   No follow-ups on file.    Ismael Bocanegra MD  [facility] JUANITABELKYS Gomez is a 89 year old, presenting for the ear lavage.  She is having her hearing aid changed and been told to have earwax buildup in both ears.      Review of Systems         Objective    /72 (BP Location: Right arm, Patient Position: Right side, Cuff Size: Adult Small)   Pulse 83   Temp 97.4  F (36.3  C) (Temporal)   Ht 1.441 m (4' 8.75\")   Wt 48.2 kg (106 lb 3.2 oz)   SpO2 100%   Breastfeeding No   BMI 23.18 kg/m    Body mass index is 23.18 kg/m .       Physical Exam   Ears: bilateral ear wax, lavaged.                     "

## 2022-12-14 ENCOUNTER — TELEPHONE (OUTPATIENT)
Dept: ENDOCRINOLOGY | Facility: CLINIC | Age: 87
End: 2022-12-14

## 2022-12-14 NOTE — TELEPHONE ENCOUNTER
Please add her in a JJ and an appt for Prolia injection teaching, pt will supply the medication for teaching.

## 2022-12-14 NOTE — TELEPHONE ENCOUNTER
Patient missed her appointment with Rosemary and is due for her prolia injection now. [facility] does not have any in clinic openings until after April in [place]. Please advise on how to proceed with injection and follow up appointment.

## 2022-12-15 NOTE — TELEPHONE ENCOUNTER
Patient scheduled Prolia injection for Tues 12/20. Pt said that she has the medication sent to the clinic and that this was not for teaching. Wants to make sure that the med will be there for appt.

## 2022-12-16 NOTE — TELEPHONE ENCOUNTER
I called the pt and discussed. I called [employer], they will have the medication to our office 12/19 to teach her how to inject on 12/20.

## 2022-12-19 ENCOUNTER — TELEPHONE (OUTPATIENT)
Dept: ENDOCRINOLOGY | Facility: CLINIC | Age: 87
End: 2022-12-19

## 2022-12-19 NOTE — TELEPHONE ENCOUNTER
[facility] Call Center    Phone Message    May a detailed message be left on voicemail: yes     Reason for Call: Other: Patient wants to ensure Prolia is approved by insurance. Please call patient to discuss. Appt in on 12/20     Action Taken: Other: Endo     Travel Screening: Not Applicable

## 2022-12-20 ENCOUNTER — ALLIED HEALTH/NURSE VISIT (OUTPATIENT)
Dept: ENDOCRINOLOGY | Facility: CLINIC | Age: 87
End: 2022-12-20
Payer: COMMERCIAL

## 2022-12-20 DIAGNOSIS — N18.30 STAGE 3 CHRONIC KIDNEY DISEASE, UNSPECIFIED WHETHER STAGE 3A OR 3B CKD (H): ICD-10-CM

## 2022-12-20 DIAGNOSIS — M85.80 OSTEOPENIA, UNSPECIFIED LOCATION: Primary | ICD-10-CM

## 2022-12-20 PROCEDURE — 99207 PR NO CHARGE NURSE ONLY: CPT

## 2022-12-20 NOTE — PROGRESS NOTES
Pt here for Prolia injection teaching. Pt supplied her own medication. Prolia 60 mg, lot [id number], exp 04/30/2025, Manufac: [employer]. Pt was able to successfully inject prolia into th lower left abd with ease.

## 2022-12-26 DIAGNOSIS — I10 HYPERTENSION, UNSPECIFIED TYPE: ICD-10-CM

## 2022-12-27 DIAGNOSIS — R05.3 CHRONIC COUGH: ICD-10-CM

## 2022-12-27 RX ORDER — HYDROCHLOROTHIAZIDE 25 MG/1
25 TABLET ORAL DAILY
Qty: 90 TABLET | Refills: 3 | OUTPATIENT
Start: 2022-12-27

## 2022-12-27 RX ORDER — CODEINE PHOSPHATE AND GUAIFENESIN 10; 100 MG/5ML; MG/5ML
5-10 SOLUTION ORAL 4 TIMES DAILY PRN
Qty: 240 ML | Refills: 0 | Status: SHIPPED | OUTPATIENT
Start: 2022-12-27 | End: 2023-04-23

## 2023-01-03 ENCOUNTER — TELEPHONE (OUTPATIENT)
Dept: INTERNAL MEDICINE | Facility: CLINIC | Age: 88
End: 2023-01-03

## 2023-01-03 NOTE — LETTER
January 3, 2023      Patricia Bose  [address]  [place] [address]        Dear ,    We are writing to inform you of your test results.      Recent Results (from the past 720 hour(s))   Basic metabolic panel  (Ca, Cl, CO2, Creat, Gluc, K, Na, BUN)    Collection Time: 12/07/22  9:26 AM   Result Value Ref Range    Sodium 137 136 - 145 mmol/L    Potassium 4.0 3.4 - 5.3 mmol/L    Chloride 98 98 - 107 mmol/L    Carbon Dioxide (CO2) 25 22 - 29 mmol/L    Anion Gap 14 7 - 15 mmol/L    Urea Nitrogen 36.1 (H) 8.0 - 23.0 mg/dL    Creatinine 1.11 (H) 0.51 - 0.95 mg/dL    Calcium 9.5 8.2 - 9.6 mg/dL    Glucose 149 (H) 70 - 99 mg/dL    GFR Estimate 45 (L) >60 mL/min/1.73m2   Vitamin D Deficiency    Collection Time: 12/07/22  9:26 AM   Result Value Ref Range    Vitamin D, Total (25-Hydroxy) 52 20 - 75 ug/L   Albumin Random Urine Quantitative with Creat Ratio    Collection Time: 12/07/22  9:26 AM   Result Value Ref Range    Albumin Urine mg/L 166.0 mg/L    Albumin Urine mg/g Cr 425.64 (H) 0.00 - 25.00 mg/g Cr    Creatinine Urine mg/dL 39.0 mg/dL   HEMOGLOBIN A1C    Collection Time: 12/07/22  9:26 AM   Result Value Ref Range    Hemoglobin A1C 6.5 (H) 0.0 - 5.6 %         If you have any questions or concerns, please call the clinic at the number listed above    Sincerely,      Vidhi Pearl's office

## 2023-01-03 NOTE — TELEPHONE ENCOUNTER
Test Results    Contacts       Type Contact Phone/Fax    01/03/2023 09:32 AM CST Phone (Incoming) Patricia Bose (Self) [phone] (M)          Who ordered the test:  Vidhi Pearl and Verónica Lopez    Type of test: Lab    Date of test:  12/7/2022    Where was the test performed:  [facility]     What are your questions/concerns?:  Would like results letter mailed to address of record

## 2023-02-01 ENCOUNTER — OFFICE VISIT (OUTPATIENT)
Dept: ENDOCRINOLOGY | Facility: CLINIC | Age: 88
End: 2023-02-01
Payer: COMMERCIAL

## 2023-02-01 VITALS
BODY MASS INDEX: 23.14 KG/M2 | HEART RATE: 76 BPM | SYSTOLIC BLOOD PRESSURE: 162 MMHG | DIASTOLIC BLOOD PRESSURE: 70 MMHG | WEIGHT: 106 LBS

## 2023-02-01 DIAGNOSIS — E11.29 TYPE 2 DIABETES MELLITUS WITH MICROALBUMINURIA, WITHOUT LONG-TERM CURRENT USE OF INSULIN (H): ICD-10-CM

## 2023-02-01 DIAGNOSIS — R80.9 TYPE 2 DIABETES MELLITUS WITH MICROALBUMINURIA, WITHOUT LONG-TERM CURRENT USE OF INSULIN (H): ICD-10-CM

## 2023-02-01 DIAGNOSIS — M85.80 OSTEOPENIA, UNSPECIFIED LOCATION: Primary | ICD-10-CM

## 2023-02-01 PROCEDURE — 99213 OFFICE O/P EST LOW 20 MIN: CPT | Performed by: NURSE PRACTITIONER

## 2023-02-01 RX ORDER — SODIUM FLUORIDE 1.1 G/100G
GEL ORAL 2 TIMES DAILY
COMMUNITY
Start: 2022-12-27

## 2023-02-01 RX ORDER — CYCLOSPORINE 0.5 MG/ML
1 EMULSION OPHTHALMIC 2 TIMES DAILY
COMMUNITY
Start: 2023-01-10

## 2023-02-01 NOTE — PROGRESS NOTES
"[facility] ENDOCRINOLOGY    Endocrine Note 2023    Patricia Bose, 1926, [id number]          Reason for visit      1. Osteopenia, unspecified location    2. Type 2 diabetes mellitus with microalbuminuria, without long-term current use of insulin (H)        History     Patricia Bose is a very pleasant 89 year old old female who presents for follow up.  SUMMARY:    Patricia is here today in f/u for Osteopenia and Diabetes Type 2.  She continues with Prolia injections without any difficulties. She has had no falls in the last year.  She continues to walk regularly, and reports that she does \"chair Yoga\" twice a week at the [facility].  Her most recent Dexa Scan of a year ago showed: COMPARISON: There has been a 2.7% increase in lumbar spine BMD. There has been a -0.8% decrease in bilateral hip BMD. Both changes were considered to be non-statistically significant, so she continues to be stable on treatment.      Current A1c is 6.5 and excellent. She is managing by diet and movement only. She does have Microalbuminuria and slightly elevated Creatinine. GFR is 45.            Blood sugar lo23  F 113    22  F 124      F 103      F 117      Past Medical History     Patient Active Problem List   Diagnosis     Osteopenia     Mixed hyperlipidemia     Hypertension     Insomnia     CAD (coronary artery disease)     Type 2 diabetes mellitus with hyperglycemia, without long-term current use of insulin (H)     Chronic kidney disease, stage 3 (H)     Other chronic pancreatitis (H)     IPF (idiopathic pulmonary fibrosis) (H)     Hypercalcemia     Osteopenia     Protein in urine       Family History       family history includes Breast Cancer (age of onset: 52.00) in her mother; Breast Cancer (age of onset: 72.00) in her sister; Cancer in her father; Coronary Artery Disease in her brother; Coronary Artery Disease (age of onset: 67.00) in her father; Coronary Artery Disease (age of onset: 78.00) in " her brother; Diabetes in her brother; Parkinsonism in her mother.    Social History      reports that she has never smoked. She has never used smokeless tobacco. She reports that she does not currently use alcohol after a past usage of about 4.0 standard drinks per week. She reports that she does not use drugs.      Review of Systems     Patient has no polyuria or polydipsia, no chest pain, dyspnea or TIA's, no numbness, tingling or pain in extremities  Remainder negative except as noted in HPI.      Vital Signs     BP (!) 162/70 (BP Location: Right arm, Patient Position: Sitting, Cuff Size: Adult Regular)   Pulse 76   Wt 48.1 kg (106 lb)   BMI 23.14 kg/m    Wt Readings from Last 3 Encounters:   02/01/23 48.1 kg (106 lb)   12/07/22 48.2 kg (106 lb 3.2 oz)   11/04/22 48.3 kg (106 lb 6.4 oz)       Physical Exam     GENERAL:  Normal, NIRD  EYES:  Pupils equal, round and reactive to light; no proptosis, lid lag or  periorbital edema.  THYROID:  Thyroid is normal.  No tenderness or bruit  NECK: No lymph nodes  MUSCULOSKELETAL: No joint abnormalities, FROM in all four extremities. No kyphosis. Muscle strength grossly normal without evidence of wasting.  HEART:  Regular rate and rhythm without murmur.  LUNGS:  Clear to auscultation.  ABDOMEN:Soft, non-tender, no masses or organomegaly  NEURO:  Patella Reflexes were normal.No tremors  SKIN:  No acanthosis nigricans or vitiligo          Assessment     1. Osteopenia, unspecified location    2. Type 2 diabetes mellitus with microalbuminuria, without long-term current use of insulin (H)        Plan     Patricia is doing well on the Prolia and we will continue therapy.     No changes warranted to her current DM management plan.    F/u with me in 1 year.       Verónica Lopez NP  [facility]  2/2/2023  6:15 AM      Lab Results   Lab Results: personally reviewed.   Lab on 12/07/2022   Component Date Value     Sodium 12/07/2022 137      Potassium 12/07/2022 4.0      Chloride  12/07/2022 98      Carbon Dioxide (CO2) 12/07/2022 25      Anion Gap 12/07/2022 14      Urea Nitrogen 12/07/2022 36.1 (H)      Creatinine 12/07/2022 1.11 (H)      Calcium 12/07/2022 9.5      Glucose 12/07/2022 149 (H)      GFR Estimate 12/07/2022 45 (L)      Vitamin D, Total (25-Hyd* 12/07/2022 52      Albumin Urine mg/L 12/07/2022 166.0      Albumin Urine mg/g Cr 12/07/2022 425.64 (H)      Creatinine Urine mg/dL 12/07/2022 39.0      Hemoglobin A1C 12/07/2022 6.5 (H)        Fingerstick Blood Glucose: @[id number](POCGLUFGR:10)@    Last Hbg A1C: No results found for: HGBA1C   Recent Labs   Lab Test 06/12/19  1039   TSH 1.95     Invalid input(s): CORTPRE, SOCG91SGH, [id number]  @LABRCNTIP(NA,K,CL,co2,bun,creatinine,calcium,labalbu,prot,bilitot,alkphos,alt,ast,glucose)@        No results found for: TESTOSTERONE  Untested Testosterone, Free  No components found for: LABTEST    Current Medications     Outpatient Medications Prior to Visit   Medication Sig Dispense Refill     amoxicillin (AMOXIL) 500 MG capsule take 4 capsules prior to dental procedure 4 capsule 3     aspirin 81 MG EC tablet [ASPIRIN 81 MG EC TABLET] Take 81 mg by mouth daily.       atorvastatin (LIPITOR) 40 MG tablet Take 1 tablet (40 mg) by mouth daily 90 tablet 3     blood glucose (ACCU-CHEK AMIRA PLUS) test strip 1 strip by In Vitro route 3 times daily Use to test blood sugar 3 times daily or as directed. 100 strip 1     blood glucose monitoring (NO BRAND SPECIFIED) meter device kit by In Vitro route daily 1 kit 0     cholecalciferol, vitamin D3, (VITAMIN D3) 1,000 unit capsule [CHOLECALCIFEROL, VITAMIN D3, (VITAMIN D3) 1,000 UNIT CAPSULE] Take 1,000 Units by mouth daily.       cycloSPORINE (RESTASIS) 0.05 % ophthalmic emulsion Place 1 drop into both eyes 2 times daily       DENTA 5000 PLUS 1.1 % Crea [DENTA 5000 PLUS 1.1 % CREA]        DENTAGEL 1.1 % GEL topical gel BRUSH ON TEETH 2 TIMES DAILY       generic lancets (ACCU-CHEK FASTCLIX LANCING DEV)  [GENERIC LANCETS (ACCU-CHEK FASTCLIX LANCING DEV)] Use 1 each As Directed daily. 200 each 2     glucosamine-chondroitin 500-400 mg cap [GLUCOSAMINE-CHONDROITIN 500-400 MG CAP] Take 2 capsules by mouth daily.       guaiFENesin-codeine (ROBITUSSIN AC) 100-10 MG/5ML solution Take 5-10 mLs by mouth 4 times daily as needed for cough 240 mL 0     hydrochlorothiazide (HYDRODIURIL) 25 MG tablet Take 1 tablet (25 mg) by mouth daily 90 tablet 3     losartan (COZAAR) 25 MG tablet Take 1 tablet (25 mg) by mouth daily 90 tablet 3     magnesium 250 mg Tab [MAGNESIUM 250 MG TAB] Take 1 tablet by mouth daily.       MELATONIN ORAL [MELATONIN ORAL] Take 10 mg by mouth daily.        multivitamin therapeutic (THERAGRAN) tablet [MULTIVITAMIN THERAPEUTIC (THERAGRAN) TABLET] Take 1 tablet by mouth daily.       PROLIA 60 MG/ML SOSY injection INJECT 60MG SUBCUTANEOUSLY  EVERY 6 MONTHS 1 mL 0     vit C/E/Zn/coppr/lutein/zeaxan (PRESERVISION AREDS-2 ORAL) [VIT C/E/ZN/COPPR/LUTEIN/ZEAXAN (PRESERVISION AREDS-2 ORAL)] Take 1 tablet by mouth 2 (two) times a day.       No facility-administered medications prior to visit.       Study Result    Narrative & Impression   EXAM: DX HIP/PELVIS/SPINE  LOCATION: [facility]  DATE/TIME: 11/23/2022 11:18 AM     INDICATION: Postmenopausal status.  COMPARISON: 9/30/2020  TECHNIQUE: Dual-energy x-ray absorptiometry performed with routine technique.     FINDINGS:     Lumbar Spine: L2-L3: BMD: 1.225 g/cm2. T-score: 0.2. Z-score: 2.3  RIGHT Hip Total: BMD: 0.913 g/cm2. T-score: -0.8. Z-score: 2.1  RIGHT Hip Femoral neck: BMD: 0.817 g/cm2. T-score: -1.6. Z-score: 1.3  LEFT Hip Total: BMD: 0.938 g/cm2. T-score: -0.5. Z-score: 2.3  LEFT Hip Femoral neck: BMD: 0.842 g/cm2. T-score: -1.4. Z-score: 1.5     WHO Criteria:  Normal: T-score at or above -1 SD  Osteopenia: T-score between -1 and -2.5 SD  Osteoporosis: T-score at or below -2.5 SD     COMPARISON: There has been a 2.7% increase in lumbar  spine BMD. There has been a -0.8% decrease in bilateral hip BMD.     FRAX Results: Not applicable secondary to patient's age.                                                                      IMPRESSION: Low bone density (OSTEOPENIA). T-score meets the World Health Organization (WHO) criteria for low bone density (osteopenia) at one or more measured sites. The risk of osteoporotic fracture increased approximately two-fold for each SD decrease   in T-score.

## 2023-02-01 NOTE — LETTER
"    2023         RE: Patricia Bose   [address]  [place] [address]        Dear Colleague,    Thank you for referring your patient, Patricia Bose, to the [facility]. Please see a copy of my visit note below.    [facility] ENDOCRINOLOGY    Endocrine Note 2023    Patricia Bose, 1926, [id number]          Reason for visit      1. Osteopenia, unspecified location    2. Type 2 diabetes mellitus with microalbuminuria, without long-term current use of insulin (H)        History     Patricia Bose is a very pleasant 89 year old old female who presents for follow up.  SUMMARY:    Patricia is here today in f/u for Osteopenia and Diabetes Type 2.  She continues with Prolia injections without any difficulties. She has had no falls in the last year.  She continues to walk regularly, and reports that she does \"chair Yoga\" twice a week at the [facility].  Her most recent Dexa Scan of a year ago showed: COMPARISON: There has been a 2.7% increase in lumbar spine BMD. There has been a -0.8% decrease in bilateral hip BMD. Both changes were considered to be non-statistically significant, so she continues to be stable on treatment.      Current A1c is 6.5 and excellent. She is managing by diet and movement only. She does have Microalbuminuria and slightly elevated Creatinine. GFR is 45.            Blood sugar lo23  F 113    22  F 124      F 103      F 117      Past Medical History     Patient Active Problem List   Diagnosis     Osteopenia     Mixed hyperlipidemia     Hypertension     Insomnia     CAD (coronary artery disease)     Type 2 diabetes mellitus with hyperglycemia, without long-term current use of insulin (H)     Chronic kidney disease, stage 3 (H)     Other chronic pancreatitis (H)     IPF (idiopathic pulmonary fibrosis) (H)     Hypercalcemia     Osteopenia     Protein in urine       Family History       family history includes Breast Cancer (age of " onset: 52.00) in her mother; Breast Cancer (age of onset: 72.00) in her sister; Cancer in her father; Coronary Artery Disease in her brother; Coronary Artery Disease (age of onset: 67.00) in her father; Coronary Artery Disease (age of onset: 78.00) in her brother; Diabetes in her brother; Parkinsonism in her mother.    Social History      reports that she has never smoked. She has never used smokeless tobacco. She reports that she does not currently use alcohol after a past usage of about 4.0 standard drinks per week. She reports that she does not use drugs.      Review of Systems     Patient has no polyuria or polydipsia, no chest pain, dyspnea or TIA's, no numbness, tingling or pain in extremities  Remainder negative except as noted in HPI.      Vital Signs     BP (!) 162/70 (BP Location: Right arm, Patient Position: Sitting, Cuff Size: Adult Regular)   Pulse 76   Wt 48.1 kg (106 lb)   BMI 23.14 kg/m    Wt Readings from Last 3 Encounters:   02/01/23 48.1 kg (106 lb)   12/07/22 48.2 kg (106 lb 3.2 oz)   11/04/22 48.3 kg (106 lb 6.4 oz)       Physical Exam     GENERAL:  Normal, NIRD  EYES:  Pupils equal, round and reactive to light; no proptosis, lid lag or  periorbital edema.  THYROID:  Thyroid is normal.  No tenderness or bruit  NECK: No lymph nodes  MUSCULOSKELETAL: No joint abnormalities, FROM in all four extremities. No kyphosis. Muscle strength grossly normal without evidence of wasting.  HEART:  Regular rate and rhythm without murmur.  LUNGS:  Clear to auscultation.  ABDOMEN:Soft, non-tender, no masses or organomegaly  NEURO:  Patella Reflexes were normal.No tremors  SKIN:  No acanthosis nigricans or vitiligo          Assessment     1. Osteopenia, unspecified location    2. Type 2 diabetes mellitus with microalbuminuria, without long-term current use of insulin (H)        Ismael Gomez is doing well on the Prolia and we will continue therapy.     No changes warranted to her current DM management  plan.    F/u with me in 1 year.       Verónica Lopez NP  [facility]  2/2/2023  6:15 AM      Lab Results   Lab Results: personally reviewed.   Lab on 12/07/2022   Component Date Value     Sodium 12/07/2022 137      Potassium 12/07/2022 4.0      Chloride 12/07/2022 98      Carbon Dioxide (CO2) 12/07/2022 25      Anion Gap 12/07/2022 14      Urea Nitrogen 12/07/2022 36.1 (H)      Creatinine 12/07/2022 1.11 (H)      Calcium 12/07/2022 9.5      Glucose 12/07/2022 149 (H)      GFR Estimate 12/07/2022 45 (L)      Vitamin D, Total (25-Hyd* 12/07/2022 52      Albumin Urine mg/L 12/07/2022 166.0      Albumin Urine mg/g Cr 12/07/2022 425.64 (H)      Creatinine Urine mg/dL 12/07/2022 39.0      Hemoglobin A1C 12/07/2022 6.5 (H)        Fingerstick Blood Glucose: @SCGNBBN51RRH(POCGLUFGR:10)@    Last Hbg A1C: No results found for: HGBA1C   Recent Labs   Lab Test 06/12/19  1039   TSH 1.95     Invalid input(s): CORTPRE, ECEB88MVM, UFDX57AQI  @LABRCNTIP(NA,K,CL,co2,bun,creatinine,calcium,labalbu,prot,bilitot,alkphos,alt,ast,glucose)@        No results found for: TESTOSTERONE  Untested Testosterone, Free  No components found for: LABTEST    Current Medications     Outpatient Medications Prior to Visit   Medication Sig Dispense Refill     amoxicillin (AMOXIL) 500 MG capsule take 4 capsules prior to dental procedure 4 capsule 3     aspirin 81 MG EC tablet [ASPIRIN 81 MG EC TABLET] Take 81 mg by mouth daily.       atorvastatin (LIPITOR) 40 MG tablet Take 1 tablet (40 mg) by mouth daily 90 tablet 3     blood glucose (ACCU-CHEK AMIRA PLUS) test strip 1 strip by In Vitro route 3 times daily Use to test blood sugar 3 times daily or as directed. 100 strip 1     blood glucose monitoring (NO BRAND SPECIFIED) meter device kit by In Vitro route daily 1 kit 0     cholecalciferol, vitamin D3, (VITAMIN D3) 1,000 unit capsule [CHOLECALCIFEROL, VITAMIN D3, (VITAMIN D3) 1,000 UNIT CAPSULE] Take 1,000 Units by mouth daily.       cycloSPORINE  (RESTASIS) 0.05 % ophthalmic emulsion Place 1 drop into both eyes 2 times daily       DENTA 5000 PLUS 1.1 % Crea [DENTA 5000 PLUS 1.1 % CREA]        DENTAGEL 1.1 % GEL topical gel BRUSH ON TEETH 2 TIMES DAILY       generic lancets (ACCU-CHEK FASTCLIX LANCING DEV) [GENERIC LANCETS (ACCU-CHEK FASTCLIX LANCING DEV)] Use 1 each As Directed daily. 200 each 2     glucosamine-chondroitin 500-400 mg cap [GLUCOSAMINE-CHONDROITIN 500-400 MG CAP] Take 2 capsules by mouth daily.       guaiFENesin-codeine (ROBITUSSIN AC) 100-10 MG/5ML solution Take 5-10 mLs by mouth 4 times daily as needed for cough 240 mL 0     hydrochlorothiazide (HYDRODIURIL) 25 MG tablet Take 1 tablet (25 mg) by mouth daily 90 tablet 3     losartan (COZAAR) 25 MG tablet Take 1 tablet (25 mg) by mouth daily 90 tablet 3     magnesium 250 mg Tab [MAGNESIUM 250 MG TAB] Take 1 tablet by mouth daily.       MELATONIN ORAL [MELATONIN ORAL] Take 10 mg by mouth daily.        multivitamin therapeutic (THERAGRAN) tablet [MULTIVITAMIN THERAPEUTIC (THERAGRAN) TABLET] Take 1 tablet by mouth daily.       PROLIA 60 MG/ML SOSY injection INJECT 60MG SUBCUTANEOUSLY  EVERY 6 MONTHS 1 mL 0     vit C/E/Zn/coppr/lutein/zeaxan (PRESERVISION AREDS-2 ORAL) [VIT C/E/ZN/COPPR/LUTEIN/ZEAXAN (PRESERVISION AREDS-2 ORAL)] Take 1 tablet by mouth 2 (two) times a day.       No facility-administered medications prior to visit.       Study Result    Narrative & Impression   EXAM: DX HIP/PELVIS/SPINE  LOCATION: [facility]  DATE/TIME: 11/23/2022 11:18 AM     INDICATION: Postmenopausal status.  COMPARISON: 9/30/2020  TECHNIQUE: Dual-energy x-ray absorptiometry performed with routine technique.     FINDINGS:     Lumbar Spine: L2-L3: BMD: 1.225 g/cm2. T-score: 0.2. Z-score: 2.3  RIGHT Hip Total: BMD: 0.913 g/cm2. T-score: -0.8. Z-score: 2.1  RIGHT Hip Femoral neck: BMD: 0.817 g/cm2. T-score: -1.6. Z-score: 1.3  LEFT Hip Total: BMD: 0.938 g/cm2. T-score: -0.5. Z-score:  2.3  LEFT Hip Femoral neck: BMD: 0.842 g/cm2. T-score: -1.4. Z-score: 1.5     WHO Criteria:  Normal: T-score at or above -1 SD  Osteopenia: T-score between -1 and -2.5 SD  Osteoporosis: T-score at or below -2.5 SD     COMPARISON: There has been a 2.7% increase in lumbar spine BMD. There has been a -0.8% decrease in bilateral hip BMD.     FRAX Results: Not applicable secondary to patient's age.                                                                      IMPRESSION: Low bone density (OSTEOPENIA). T-score meets the World Health Organization (WHO) criteria for low bone density (osteopenia) at one or more measured sites. The risk of osteoporotic fracture increased approximately two-fold for each SD decrease   in T-score.                                                       Again, thank you for allowing me to participate in the care of your patient.        Sincerely,        Verónica Lopez NP

## 2023-03-22 DIAGNOSIS — I25.10 CORONARY ARTERY DISEASE INVOLVING NATIVE CORONARY ARTERY OF NATIVE HEART WITHOUT ANGINA PECTORIS: ICD-10-CM

## 2023-03-23 RX ORDER — ATORVASTATIN CALCIUM 40 MG/1
40 TABLET, FILM COATED ORAL DAILY
Qty: 90 TABLET | Refills: 3 | Status: SHIPPED | OUTPATIENT
Start: 2023-03-23 | End: 2024-04-03

## 2023-03-23 NOTE — TELEPHONE ENCOUNTER
"Last Written Prescription Date:  4/27/2022  Last Fill Quantity: 90,  # refills: 3   Last office visit provider:  12/7/2022     Requested Prescriptions   Pending Prescriptions Disp Refills     atorvastatin (LIPITOR) 40 MG tablet 90 tablet 3     Sig: Take 1 tablet (40 mg) by mouth daily       Statins Protocol Passed - 3/22/2023  3:28 PM        Passed - LDL on file in past 12 months     Recent Labs   Lab Test 04/27/22  0937   LDL 81             Passed - No abnormal creatine kinase in past 12 months     No lab results found.             Passed - Recent (12 mo) or future (30 days) visit within the authorizing provider's specialty     Patient has had an office visit with the authorizing provider or a provider within the authorizing providers department within the previous 12 mos or has a future within next 30 days. See \"Patient Info\" tab in inbasket, or \"Choose Columns\" in Meds & Orders section of the refill encounter.              Passed - Medication is active on med list        Passed - Patient is age 18 or older        Passed - No active pregnancy on record        Passed - No positive pregnancy test in past 12 months             Angeles Duran RN 03/23/23 4:20 PM  "

## 2023-04-23 ENCOUNTER — NURSE TRIAGE (OUTPATIENT)
Dept: NURSING | Facility: CLINIC | Age: 88
End: 2023-04-23
Payer: COMMERCIAL

## 2023-04-23 ENCOUNTER — APPOINTMENT (OUTPATIENT)
Dept: RADIOLOGY | Facility: HOSPITAL | Age: 88
End: 2023-04-23
Attending: EMERGENCY MEDICINE
Payer: COMMERCIAL

## 2023-04-23 ENCOUNTER — HOSPITAL ENCOUNTER (OUTPATIENT)
Facility: HOSPITAL | Age: 88
Setting detail: OBSERVATION
Discharge: HOME | End: 2023-04-24
Attending: EMERGENCY MEDICINE | Admitting: INTERNAL MEDICINE
Payer: COMMERCIAL

## 2023-04-23 ENCOUNTER — APPOINTMENT (OUTPATIENT)
Dept: CT IMAGING | Facility: HOSPITAL | Age: 88
End: 2023-04-23
Attending: EMERGENCY MEDICINE
Payer: COMMERCIAL

## 2023-04-23 DIAGNOSIS — Z86.79 HISTORY OF CORONARY ARTERY DISEASE: ICD-10-CM

## 2023-04-23 DIAGNOSIS — Z86.39 HISTORY OF DIABETES MELLITUS: ICD-10-CM

## 2023-04-23 DIAGNOSIS — Z87.09 HISTORY OF PULMONARY FIBROSIS: ICD-10-CM

## 2023-04-23 DIAGNOSIS — N18.9 CHRONIC RENAL IMPAIRMENT, UNSPECIFIED CKD STAGE: ICD-10-CM

## 2023-04-23 DIAGNOSIS — I10 BENIGN ESSENTIAL HYPERTENSION: Primary | ICD-10-CM

## 2023-04-23 DIAGNOSIS — Z86.79 HISTORY OF HYPERTENSION: ICD-10-CM

## 2023-04-23 DIAGNOSIS — R07.89 CHEST PRESSURE: ICD-10-CM

## 2023-04-23 DIAGNOSIS — I44.7 BUNDLE BRANCH BLOCK, LEFT: ICD-10-CM

## 2023-04-23 LAB
ANION GAP SERPL CALCULATED.3IONS-SCNC: 8 MMOL/L (ref 7–15)
BASOPHILS # BLD AUTO: 0.1 10E3/UL (ref 0–0.2)
BASOPHILS NFR BLD AUTO: 1 %
BUN SERPL-MCNC: 33.5 MG/DL (ref 8–23)
CALCIUM SERPL-MCNC: 9.9 MG/DL (ref 8.2–9.6)
CHLORIDE SERPL-SCNC: 97 MMOL/L (ref 98–107)
CREAT SERPL-MCNC: 1.05 MG/DL (ref 0.51–0.95)
DEPRECATED HCO3 PLAS-SCNC: 29 MMOL/L (ref 22–29)
EOSINOPHIL # BLD AUTO: 0.2 10E3/UL (ref 0–0.7)
EOSINOPHIL NFR BLD AUTO: 2 %
ERYTHROCYTE [DISTWIDTH] IN BLOOD BY AUTOMATED COUNT: 14 % (ref 10–15)
GFR SERPL CREATININE-BSD FRML MDRD: 48 ML/MIN/1.73M2
GLUCOSE SERPL-MCNC: 212 MG/DL (ref 70–99)
HCT VFR BLD AUTO: 35.1 % (ref 35–47)
HGB BLD-MCNC: 11.7 G/DL (ref 11.7–15.7)
IMM GRANULOCYTES # BLD: 0 10E3/UL
IMM GRANULOCYTES NFR BLD: 0 %
LYMPHOCYTES # BLD AUTO: 1 10E3/UL (ref 0.8–5.3)
LYMPHOCYTES NFR BLD AUTO: 13 %
MAGNESIUM SERPL-MCNC: 1.9 MG/DL (ref 1.7–2.3)
MCH RBC QN AUTO: 30.4 PG (ref 26.5–33)
MCHC RBC AUTO-ENTMCNC: 33.3 G/DL (ref 31.5–36.5)
MCV RBC AUTO: 91 FL (ref 78–100)
MONOCYTES # BLD AUTO: 0.5 10E3/UL (ref 0–1.3)
MONOCYTES NFR BLD AUTO: 6 %
NEUTROPHILS # BLD AUTO: 6.3 10E3/UL (ref 1.6–8.3)
NEUTROPHILS NFR BLD AUTO: 78 %
NRBC # BLD AUTO: 0 10E3/UL
NRBC BLD AUTO-RTO: 0 /100
NT-PROBNP SERPL-MCNC: 340 PG/ML (ref 0–1800)
PLATELET # BLD AUTO: 213 10E3/UL (ref 150–450)
POTASSIUM SERPL-SCNC: 3.7 MMOL/L (ref 3.4–5.3)
RBC # BLD AUTO: 3.85 10E6/UL (ref 3.8–5.2)
SARS-COV-2 RNA RESP QL NAA+PROBE: NEGATIVE
SODIUM SERPL-SCNC: 134 MMOL/L (ref 136–145)
TROPONIN T SERPL HS-MCNC: 19 NG/L
TROPONIN T SERPL HS-MCNC: 19 NG/L
WBC # BLD AUTO: 8.1 10E3/UL (ref 4–11)

## 2023-04-23 PROCEDURE — C9803 HOPD COVID-19 SPEC COLLECT: HCPCS

## 2023-04-23 PROCEDURE — 80048 BASIC METABOLIC PNL TOTAL CA: CPT | Performed by: EMERGENCY MEDICINE

## 2023-04-23 PROCEDURE — 93005 ELECTROCARDIOGRAM TRACING: CPT | Performed by: EMERGENCY MEDICINE

## 2023-04-23 PROCEDURE — G0378 HOSPITAL OBSERVATION PER HR: HCPCS

## 2023-04-23 PROCEDURE — 99285 EMERGENCY DEPT VISIT HI MDM: CPT | Mod: 25

## 2023-04-23 PROCEDURE — 84484 ASSAY OF TROPONIN QUANT: CPT | Performed by: EMERGENCY MEDICINE

## 2023-04-23 PROCEDURE — 71046 X-RAY EXAM CHEST 2 VIEWS: CPT

## 2023-04-23 PROCEDURE — 83735 ASSAY OF MAGNESIUM: CPT | Performed by: EMERGENCY MEDICINE

## 2023-04-23 PROCEDURE — 83880 ASSAY OF NATRIURETIC PEPTIDE: CPT | Performed by: EMERGENCY MEDICINE

## 2023-04-23 PROCEDURE — 250N000013 HC RX MED GY IP 250 OP 250 PS 637: Performed by: INTERNAL MEDICINE

## 2023-04-23 PROCEDURE — 99222 1ST HOSP IP/OBS MODERATE 55: CPT | Mod: AI | Performed by: INTERNAL MEDICINE

## 2023-04-23 PROCEDURE — 85025 COMPLETE CBC W/AUTO DIFF WBC: CPT | Performed by: EMERGENCY MEDICINE

## 2023-04-23 PROCEDURE — 250N000011 HC RX IP 250 OP 636: Performed by: INTERNAL MEDICINE

## 2023-04-23 PROCEDURE — 36415 COLL VENOUS BLD VENIPUNCTURE: CPT | Performed by: EMERGENCY MEDICINE

## 2023-04-23 PROCEDURE — 96372 THER/PROPH/DIAG INJ SC/IM: CPT | Performed by: INTERNAL MEDICINE

## 2023-04-23 PROCEDURE — U0003 INFECTIOUS AGENT DETECTION BY NUCLEIC ACID (DNA OR RNA); SEVERE ACUTE RESPIRATORY SYNDROME CORONAVIRUS 2 (SARS-COV-2) (CORONAVIRUS DISEASE [COVID-19]), AMPLIFIED PROBE TECHNIQUE, MAKING USE OF HIGH THROUGHPUT TECHNOLOGIES AS DESCRIBED BY CMS-2020-01-R: HCPCS | Performed by: INTERNAL MEDICINE

## 2023-04-23 PROCEDURE — 70450 CT HEAD/BRAIN W/O DYE: CPT

## 2023-04-23 RX ORDER — ENOXAPARIN SODIUM 100 MG/ML
30 INJECTION SUBCUTANEOUS EVERY 24 HOURS
Status: DISCONTINUED | OUTPATIENT
Start: 2023-04-23 | End: 2023-04-24 | Stop reason: HOSPADM

## 2023-04-23 RX ORDER — ATORVASTATIN CALCIUM 40 MG/1
40 TABLET, FILM COATED ORAL DAILY
Status: DISCONTINUED | OUTPATIENT
Start: 2023-04-23 | End: 2023-04-24 | Stop reason: HOSPADM

## 2023-04-23 RX ORDER — ACETAMINOPHEN 325 MG/1
650 TABLET ORAL EVERY 6 HOURS PRN
Status: DISCONTINUED | OUTPATIENT
Start: 2023-04-23 | End: 2023-04-24 | Stop reason: HOSPADM

## 2023-04-23 RX ORDER — MAGNESIUM 30 MG
30 TABLET ORAL DAILY
Status: DISCONTINUED | OUTPATIENT
Start: 2023-04-23 | End: 2023-04-23

## 2023-04-23 RX ORDER — LABETALOL HYDROCHLORIDE 5 MG/ML
10 INJECTION, SOLUTION INTRAVENOUS EVERY 6 HOURS PRN
Status: DISCONTINUED | OUTPATIENT
Start: 2023-04-23 | End: 2023-04-24 | Stop reason: HOSPADM

## 2023-04-23 RX ORDER — ACETAMINOPHEN 650 MG/1
650 SUPPOSITORY RECTAL EVERY 6 HOURS PRN
Status: DISCONTINUED | OUTPATIENT
Start: 2023-04-23 | End: 2023-04-24 | Stop reason: HOSPADM

## 2023-04-23 RX ORDER — PROCHLORPERAZINE MALEATE 5 MG
5 TABLET ORAL EVERY 6 HOURS PRN
Status: DISCONTINUED | OUTPATIENT
Start: 2023-04-23 | End: 2023-04-24 | Stop reason: HOSPADM

## 2023-04-23 RX ORDER — CYCLOSPORINE 0.5 MG/ML
1 EMULSION OPHTHALMIC 2 TIMES DAILY
Status: DISCONTINUED | OUTPATIENT
Start: 2023-04-23 | End: 2023-04-24

## 2023-04-23 RX ORDER — AMOXICILLIN 250 MG
1 CAPSULE ORAL 2 TIMES DAILY PRN
Status: DISCONTINUED | OUTPATIENT
Start: 2023-04-23 | End: 2023-04-24 | Stop reason: HOSPADM

## 2023-04-23 RX ORDER — CALCIUM CARBONATE 500 MG/1
1000 TABLET, CHEWABLE ORAL 4 TIMES DAILY PRN
Status: DISCONTINUED | OUTPATIENT
Start: 2023-04-23 | End: 2023-04-24 | Stop reason: HOSPADM

## 2023-04-23 RX ORDER — LOSARTAN POTASSIUM 25 MG/1
25 TABLET ORAL DAILY
Status: DISCONTINUED | OUTPATIENT
Start: 2023-04-23 | End: 2023-04-24

## 2023-04-23 RX ORDER — ASPIRIN 81 MG/1
81 TABLET ORAL DAILY
Status: DISCONTINUED | OUTPATIENT
Start: 2023-04-23 | End: 2023-04-24 | Stop reason: HOSPADM

## 2023-04-23 RX ORDER — VITAMIN B COMPLEX
25 TABLET ORAL DAILY
Status: DISCONTINUED | OUTPATIENT
Start: 2023-04-24 | End: 2023-04-24 | Stop reason: HOSPADM

## 2023-04-23 RX ORDER — PROCHLORPERAZINE 25 MG
12.5 SUPPOSITORY, RECTAL RECTAL EVERY 12 HOURS PRN
Status: DISCONTINUED | OUTPATIENT
Start: 2023-04-23 | End: 2023-04-24 | Stop reason: HOSPADM

## 2023-04-23 RX ORDER — SODIUM FLUORIDE 5 MG/G
GEL, DENTIFRICE DENTAL DAILY
Status: DISCONTINUED | OUTPATIENT
Start: 2023-04-23 | End: 2023-04-23

## 2023-04-23 RX ORDER — LIDOCAINE 40 MG/G
CREAM TOPICAL
Status: DISCONTINUED | OUTPATIENT
Start: 2023-04-23 | End: 2023-04-24 | Stop reason: HOSPADM

## 2023-04-23 RX ORDER — AMOXICILLIN 250 MG
2 CAPSULE ORAL 2 TIMES DAILY PRN
Status: DISCONTINUED | OUTPATIENT
Start: 2023-04-23 | End: 2023-04-24 | Stop reason: HOSPADM

## 2023-04-23 RX ADMIN — ENOXAPARIN SODIUM 30 MG: 30 INJECTION SUBCUTANEOUS at 18:12

## 2023-04-23 RX ADMIN — ASPIRIN 81 MG: 81 TABLET, COATED ORAL at 18:09

## 2023-04-23 RX ADMIN — LOSARTAN POTASSIUM 25 MG: 25 TABLET, FILM COATED ORAL at 18:10

## 2023-04-23 RX ADMIN — ATORVASTATIN CALCIUM 40 MG: 40 TABLET, FILM COATED ORAL at 18:09

## 2023-04-23 ASSESSMENT — ENCOUNTER SYMPTOMS
SHORTNESS OF BREATH: 1
SPEECH DIFFICULTY: 0
BLOOD IN STOOL: 1
HEADACHES: 0
FACIAL ASYMMETRY: 0
LIGHT-HEADEDNESS: 1
COUGH: 1
HEMATURIA: 0
CHEST TIGHTNESS: 1

## 2023-04-23 ASSESSMENT — ACTIVITIES OF DAILY LIVING (ADL)
ADLS_ACUITY_SCORE: 35

## 2023-04-23 NOTE — PROGRESS NOTES
Met with patient and son Rey to review role of care management, progression of care and possible need for services at discharge, including OP services, home care, or skilled nursing care. Patient alert, oriented and engaged in the conversation.     Assessed, lives w/nephew Nicolas, no svcs and independent at baseline. Discussed MOON. Requested copy of HCD.

## 2023-04-23 NOTE — ED TRIAGE NOTES
Pt presents with intermittent lightheadedness, palpitations, shortness of breath. Pt spoke with triage phone RN and was told to come to ED for eval.      Triage Assessment     Row Name 04/23/23 1019       Triage Assessment (Adult)    Airway WDL WDL       Respiratory WDL    Respiratory WDL rhythm/pattern    Rhythm/Pattern, Respiratory shortness of breath       Skin Circulation/Temperature WDL    Skin Circulation/Temperature WDL WDL       Cardiac WDL    Cardiac WDL X;rhythm    Pulse Rate & Regularity tachycardic       Peripheral/Neurovascular WDL    Peripheral Neurovascular WDL WDL       Cognitive/Neuro/Behavioral WDL    Cognitive/Neuro/Behavioral WDL WDL

## 2023-04-23 NOTE — PHARMACY-ADMISSION MEDICATION HISTORY
Pharmacist Admission Medication History    Admission medication history is complete. The information provided in this note is only as accurate as the sources available at the time of the update.    Medication reconciliation/reorder completed by provider prior to medication history? No    Information Source(s): Patient and CareEverywhere/SureScripts via in-person    Changes made to PTA medication list:    Added: None    Deleted: None    Changed: None    Medication Affordability:  Not including over the counter (OTC) medications, was there a time in the past 12 months when you did not take your medications as prescribed because of cost?: No    Allergies reviewed with patient and updates made in EHR: yes    Medication History Completed By: TANA WHITTAKER RPH 4/23/2023 2:28 PM    Prior to Admission medications    Medication Sig Last Dose Taking? Auth Provider Long Term End Date   amoxicillin (AMOXIL) 500 MG capsule take 4 capsules prior to dental procedure More than a month at Dental Yes Vidhi Pearl, NP     aspirin 81 MG EC tablet [ASPIRIN 81 MG EC TABLET] Take 81 mg by mouth daily. 4/22/2023 at PM Yes Provider, Historical     atorvastatin (LIPITOR) 40 MG tablet Take 1 tablet (40 mg) by mouth daily 4/22/2023 at PM Yes Vidhi Pearl, NP Yes    cholecalciferol, vitamin D3, (VITAMIN D3) 1,000 unit capsule [CHOLECALCIFEROL, VITAMIN D3, (VITAMIN D3) 1,000 UNIT CAPSULE] Take 1,000 Units by mouth daily. 4/23/2023 at AM Yes Provider, Historical     cycloSPORINE (RESTASIS) 0.05 % ophthalmic emulsion Place 1 drop into both eyes 2 times daily 4/23/2023 at AM Yes Reported, Patient     DENTAGEL 1.1 % GEL topical gel BRUSH ON TEETH 2 TIMES DAILY 4/23/2023 at AM Yes Reported, Patient     glucosamine-chondroitin 500-400 mg cap [GLUCOSAMINE-CHONDROITIN 500-400 MG CAP] Take 2 capsules by mouth daily. 4/23/2023 at AM Yes Provider, Historical     hydrochlorothiazide (HYDRODIURIL) 25 MG tablet Take 1 tablet (25 mg) by mouth daily  4/23/2023 at AM Yes Vidhi Pearl NP Yes    losartan (COZAAR) 25 MG tablet Take 1 tablet (25 mg) by mouth daily 4/22/2023 at PM Yes Vidhi Pearl NP Yes    magnesium 250 mg Tab [MAGNESIUM 250 MG TAB] Take 1 tablet by mouth daily. 4/23/2023 at AM Yes Provider, Historical     MELATONIN ORAL Take 10 mg by mouth nightly as needed (Sleep) Past Week at Bedtime Yes Provider, Historical     multivitamin therapeutic (THERAGRAN) tablet [MULTIVITAMIN THERAPEUTIC (THERAGRAN) TABLET] Take 1 tablet by mouth daily. 4/23/2023 at AM Yes Provider, Historical     PROLIA 60 MG/ML SOSY injection INJECT 60MG SUBCUTANEOUSLY  EVERY 6 MONTHS 12/6/2022 Yes Verónica Lopez NP Yes    vit C/E/Zn/coppr/lutein/zeaxan (PRESERVISION AREDS-2 ORAL) [VIT C/E/ZN/COPPR/LUTEIN/ZEAXAN (PRESERVISION AREDS-2 ORAL)] Take 1 tablet by mouth 2 (two) times a day. 4/23/2023 at AM Yes Provider, Historical     blood glucose (ACCU-CHEK AMIRA PLUS) test strip 1 strip by In Vitro route 3 times daily Use to test blood sugar 3 times daily or as directed.   Vidhi Pearl NP     blood glucose monitoring (NO BRAND SPECIFIED) meter device kit by In Vitro route daily   Vidhi Pearl NP     generic lancets (ACCU-CHEK FASTCLIX LANCING DEV) [GENERIC LANCETS (ACCU-CHEK FASTCLIX LANCING DEV)] Use 1 each As Directed daily.   Verónica Lopez NP

## 2023-04-23 NOTE — ED PROVIDER NOTES
EMERGENCY DEPARTMENT ENCOUNTER      NAME: Patricia Bose  AGE: 89 year old female  YOB: 1926  MRN: [id number]  EVALUATION DATE & TIME: No admission date for patient encounter.    PCP: Vidhi Pearl    ED PROVIDER: Veena Hill M.D.      CHIEF COMPLAINT     Chief Complaint   Patient presents with     Dizziness     Shortness of Breath     Palpitations         FINAL IMPRESSION:     1. Chest pressure    2. Bundle branch block, left    3. History of pulmonary fibrosis    4. Chronic renal impairment, unspecified CKD stage    5. History of coronary artery disease    6. History of diabetes mellitus    7. History of hypertension          MEDICAL DECISION MAKING:       Pertinent Labs & Imaging studies reviewed. (See chart for details)    89 year old female presents to the Emergency Department for evaluation of lightheadedness, dizziness, chest pressure, fluttering.  This of breath.    ED Course as of 04/23/23 1549   Sun Apr 23, 2023   1116 Mrs. Bose 6-year-old female who presents here feeling lightheaded dizzy and having exertional shortness of breath and feeling fluttering in her chest.   1117 Symptoms started yesterday.  She feels like she needs to sit down.  She says she is afraid of both falling and passing out.   1117 She denies any headache no double vision no difficulty talking son denies any facial drooping.   1117 Patient has exertional shortness of breath feel like her heart is fluttering no abdominal pain no vomiting no diarrhea she has chronic kidney disease and she said sometimes her legs swell.  She thinks hydrochlorothiazide.   1117 On examination she is younger than stated age cooperative and pleasant.  She is tachycardic and had frequent a PACs   1117 She has crackles bibasilar.  Patient has history of idiopathic pulmonary fibrosis.  Trace if any lower extremity edema she is oriented x3.   1118 Differential diagnosis for exertional shortness of breath palpitation fluttering sensation  in a patient that is tachycardia with left bundle branch block and PACs include but not limited to congestive heart failure acute coronary syndrome sepsis electrolyte abnormalities PE CVA I readmittance among others.   1119 Patient placed in cardiac pulse ox and blood pressure monitors.  She remains tachycardic sinus.  PACs.   1119 EKG read by me reveals sinus tachycardia PACs left bundle branch block poor anterior progression inverted T waves in 1 aVL.  No previous EKGs available for comparison.   1120 I Reviewed of previous records.  Patient had an echocardiogram in 2020 that was normal.   1146 Labs creatinine 1.05 patient with known chronic renal insufficiency normal white blood cell count hemoglobin and magnesium.   1332 Labs unremarkable except for the chronic renal insufficiency normal BMP mild elevation of the troponin at 19 we will recheck.  I was notified by nurse that patient had had still an irregular rhythm and her telemetry   1333 It is noted that patient has had very short pauses prior to her premature atrial contraction .  Very short-lived we were unable to obtain an EKG.  Unknown if she is symptomatic during then.  Her tachycardia did resolve.   1333 Given her age and lightheadedness is possible that her symptoms are related to this process plan to admit her to cardiac telemetry.  Spoke with hospitalist who accepts patient.  It is noted that on unable to verify if patient had a previous left bundle branch block.   1335 Clinical impression and decision making  89-year-old female known history of chronic kidney disease coronary disease hypertension and diabetes presents here feeling lightheaded with chest pressure and fluttering.  Noted to be tachycardic with PACs left bundle branch block.  No Sgarbossa criteria.  Troponin 19 upper limits of normal for gender.  Renal insufficiency is chronic.  Crackles on auscultation no evidence of volume overload.  Does have a history of pulmonary fibrosis.  Small  pauses on monitor.  Will admit to telemetry for further evaluation   1545 Anion Gap: 8       Vital Signs: Hypertension  EKG: Sinus rhythm poor anterior progression left bundle branch block inverted T waves in 1 aVL PACs.  Imaging: CT head no acute chest x-ray pulmonary fibrosis  Home Meds:  ED meds/abx:  Fluids: None TKO oral    Labs  K 3.7  Cr 1.05  Wbc 8.1  Hgb 11.7  Platelets 213      Medical Decision Making    History:  Supplemental history from: Family Member/Significant Other  External Record(s) reviewed: Outpatient Record: prior records    Work Up:  Chart documentation includes differential considered and any EKGs or imaging independently interpreted by provider, where specified.  In additional to work up documented, I considered the following work up: Documented in chart, if applicable.    External consultation:  Discussion of management with another provider: Hospitalist    Complicating factors:  Care impacted by chronic illness: Diabetes, Heart Disease and Hypertension  Care affected by social determinants of health: N/A    Disposition considerations: Admit.          Review of Previous Records  History of coronary artery disease, hypertension, T2DM, osteopenia, and chronic cough.  Is not on insulin   On Lipitor   Echo 2020  Narrative & Impression  1. Normal left ventricular size and systolic performance with a visually estimated ejection fraction of 60%.   2. No significant valvular heart disease is identified on this study.   3. Normal right ventricular size and systolic performance.       Consults  Jeromy Price     ED COURSE     10:33 AM Met with and introduced myself to the patient. Discussed history and plan of care.  1:16 PM Rechecked and updated patient. Discussed plan for admission.  1:26 PM Spoke with Dr. Ornelas Hospitalist, regarding plan for admission. Plan to admit to Cardiac Tele observation.     At the conclusion of the encounter I discussed the results of all of the tests and  the disposition. The questions were answered. The patient and son acknowledged understanding and was agreeable with the care plan.     MEDICATIONS GIVEN IN THE EMERGENCY:     Medications   atorvastatin (LIPITOR) tablet 40 mg (has no administration in time range)   cycloSPORINE (RESTASIS) 0.05 % ophthalmic emulsion 1 drop (has no administration in time range)   losartan (COZAAR) tablet 25 mg (has no administration in time range)   aspirin EC tablet 81 mg (has no administration in time range)   Vitamin D3 (CHOLECALCIFEROL) tablet 25 mcg (has no administration in time range)   magnesium oxide (MAG-OX) half-tab 200 mg (has no administration in time range)   lidocaine 1 % 0.1-1 mL (has no administration in time range)   lidocaine (LMX4) cream (has no administration in time range)   sodium chloride (PF) 0.9% PF flush 3 mL (has no administration in time range)   sodium chloride (PF) 0.9% PF flush 3 mL (has no administration in time range)   melatonin tablet 1 mg (has no administration in time range)   enoxaparin ANTICOAGULANT (LOVENOX) injection 30 mg (has no administration in time range)   acetaminophen (TYLENOL) tablet 650 mg (has no administration in time range)     Or   acetaminophen (TYLENOL) Suppository 650 mg (has no administration in time range)   senna-docusate (SENOKOT-S/PERICOLACE) 8.6-50 MG per tablet 1 tablet (has no administration in time range)     Or   senna-docusate (SENOKOT-S/PERICOLACE) 8.6-50 MG per tablet 2 tablet (has no administration in time range)   prochlorperazine (COMPAZINE) injection 5 mg (has no administration in time range)     Or   prochlorperazine (COMPAZINE) tablet 5 mg (has no administration in time range)     Or   prochlorperazine (COMPAZINE) suppository 12.5 mg (has no administration in time range)   calcium carbonate (TUMS) chewable tablet 1,000 mg (has no administration in time range)   labetalol (NORMODYNE/TRANDATE) injection 10 mg (has no administration in time range)       NEW  PRESCRIPTIONS STARTED AT TODAY'S ER VISIT     New Prescriptions    No medications on file          =================================================================    HPI     Patient information was obtained from: Patient    Use of : N/A       Patricia Bose is a 89 year old female who presents by private car for evaluation of lightheadedness.    The patient reports lightheadedness that began yesterday (4/22). States that she needed to sit down to prevent falling. She does not endorse any headache or vision changes at this time. Her symptoms worsened this morning when she developed fluttering in her chest while doing laundry. No chest pressure reported. She does endorse some exertional shortness of breath as well as a dry cough containing no blood. The patient notes a history of fibrosis. She also notes a history of kidney disease with associated mild edema, but states this has not worsened. She reports no decreased urine or hematuria. No recent falls or travel. She is not on any blood thinning medication. No recent medication changes. The patient currently resides in her house. Denies abdominal pain or blood in stool.    The patient's son reports that the patient called him this morning with complaints of lightheadedness and chest tightness. He called the [facility] who recommended they come to the ED. He does not note any aphasia, slurred speech, or facial drooping.     REVIEW OF SYSTEMS   Review of Systems   Eyes: Negative for visual disturbance.   Respiratory: Positive for cough (no blood), chest tightness and shortness of breath.    Cardiovascular:        Positive for chest flutter. Negative for chest pressure.   Gastrointestinal: Positive for blood in stool.   Genitourinary: Negative for decreased urine volume and hematuria.   Neurological: Positive for light-headedness. Negative for facial asymmetry, speech difficulty and headaches.   All other systems reviewed and are negative.     PAST  MEDICAL HISTORY:     Past Medical History:   Diagnosis Date     Carpal tunnel syndrome      Diabetes mellitus (H)      Hyperlipidemia      Hypertension      Upper respiratory infection        PAST SURGICAL HISTORY:     Past Surgical History:   Procedure Laterality Date     ARTHROSCOPY SHOULDER ROTATOR CUFF REPAIR  10 years ago    left shoulder      BIOPSY BREAST Left 1980    benign     BIOPSY BREAST Left     before 1980    benign     CATARACT EXTRACTION       HC REMOVAL OF TONSILS,<11 Y/O      Description: Tonsillectomy;  Recorded: 04/29/2014;     HYSTERECTOMY  1957    benign     OOPHORECTOMY  1957    benign     ZZC TOTAL ABDOM HYSTERECTOMY      Description: Hysterectomy;  Recorded: 04/29/2014;         CURRENT MEDICATIONS:   amoxicillin (AMOXIL) 500 MG capsule  aspirin 81 MG EC tablet  atorvastatin (LIPITOR) 40 MG tablet  cholecalciferol, vitamin D3, (VITAMIN D3) 1,000 unit capsule  cycloSPORINE (RESTASIS) 0.05 % ophthalmic emulsion  DENTAGEL 1.1 % GEL topical gel  glucosamine-chondroitin 500-400 mg cap  hydrochlorothiazide (HYDRODIURIL) 25 MG tablet  losartan (COZAAR) 25 MG tablet  magnesium 250 mg Tab  MELATONIN ORAL  multivitamin therapeutic (THERAGRAN) tablet  PROLIA 60 MG/ML SOSY injection  vit C/E/Zn/coppr/lutein/zeaxan (PRESERVISION AREDS-2 ORAL)  blood glucose (ACCU-CHEK AMIRA PLUS) test strip  blood glucose monitoring (NO BRAND SPECIFIED) meter device kit  generic lancets (ACCU-CHEK FASTCLIX LANCING DEV)         ALLERGIES:     Allergies   Allergen Reactions     Ibuprofen Shortness Of Breath       FAMILY HISTORY:     Family History   Problem Relation Age of Onset     Breast Cancer Mother 52.00     Parkinsonism Mother      Breast Cancer Sister 72.00     Coronary Artery Disease Brother      Coronary Artery Disease Brother 78.00     Diabetes Brother      Coronary Artery Disease Father 67.00     Cancer Father         bladder       SOCIAL HISTORY:     Social History     Socioeconomic History     Marital status:  "   Tobacco Use     Smoking status: Never     Smokeless tobacco: Never   Vaping Use     Vaping status: Never Used   Substance and Sexual Activity     Alcohol use: Not Currently     Alcohol/week: 4.0 standard drinks of alcohol     Drug use: No     Sexual activity: Not Currently   Social History Narrative    . Dated a gentlemen for 35 years who  .  Has 1 son, 2 grandchildren and 3 great grandchildren.  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  She is very social getting together with friends often, she is part of a book [employer] [address], she enjoys yoga and swimming at the [employer].  She used to golf.       VITALS:   BP (!) 160/68   Pulse 85   Temp 97.4  F (36.3  C) (Temporal)   Resp 18   Ht 1.461 m (4' 9.5\")   Wt 51.7 kg (114 lb)   SpO2 98%   BMI 24.24 kg/m      PHYSICAL EXAM     Physical Exam  Vitals and nursing note reviewed.   Constitutional:       General: She is not in acute distress.     Appearance: She is well-developed. She is not ill-appearing, toxic-appearing or diaphoretic.   Cardiovascular:      Rate and Rhythm: Tachycardia present. Rhythm irregular.   Pulmonary:      Breath sounds: Examination of the right-lower field reveals rales. Examination of the left-lower field reveals rales. Rales present.   Musculoskeletal:      Right lower leg: No edema.      Left lower leg: No edema.   Skin:     General: Skin is warm.   Neurological:      Mental Status: She is alert and oriented to person, place, and time.         Physical Exam   Constitutional: Younger appearing gestation.     Head: Atraumatic.     Nose: Nose normal.     Mouth/Throat: Oropharynx is clear and moist.     Eyes: EOM are normal. Pupils are equal, round, and reactive to light.     Ears: Bilateral hearing aids.    Neck: Normal range of motion. Neck supple.     Cardiovascular: Normal rate and normal heart sounds. Irregular rhythm.     Pulmonary/Chest: Normal effort. Diffuse bilateral crackles.     Abdominal: soft "     Musculoskeletal: Normal range of motion.     Neurological: Alert and oriented x3.    Lymphatics: No edema    : Normal female anatomy.    Skin: Skin is warm and dry.     Psychiatric: Normal mood and affect. Behavior is normal.       LAB:     All pertinent labs reviewed and interpreted.  Labs Ordered and Resulted from Time of ED Arrival to Time of ED Departure   BASIC METABOLIC PANEL - Abnormal       Result Value    Sodium 134 (*)     Potassium 3.7      Chloride 97 (*)     Carbon Dioxide (CO2) 29      Anion Gap 8      Urea Nitrogen 33.5 (*)     Creatinine 1.05 (*)     Calcium 9.9 (*)     Glucose 212 (*)     GFR Estimate 48 (*)    TROPONIN T, HIGH SENSITIVITY - Abnormal    Troponin T, High Sensitivity 19 (*)    MAGNESIUM - Normal    Magnesium 1.9     NT PROBNP INPATIENT - Normal    N terminal Pro BNP Inpatient 340     CBC WITH PLATELETS AND DIFFERENTIAL    WBC Count 8.1      RBC Count 3.85      Hemoglobin 11.7      Hematocrit 35.1      MCV 91      MCH 30.4      MCHC 33.3      RDW 14.0      Platelet Count 213      % Neutrophils 78      % Lymphocytes 13      % Monocytes 6      % Eosinophils 2      % Basophils 1      % Immature Granulocytes 0      NRBCs per 100 WBC 0      Absolute Neutrophils 6.3      Absolute Lymphocytes 1.0      Absolute Monocytes 0.5      Absolute Eosinophils 0.2      Absolute Basophils 0.1      Absolute Immature Granulocytes 0.0      Absolute NRBCs 0.0     TROPONIN T, HIGH SENSITIVITY   COVID-19 VIRUS (CORONAVIRUS) BY PCR        RADIOLOGY:     Reviewed all pertinent imaging. Please see official radiology report.  Chest XR,  PA & LAT   Final Result   IMPRESSION: Heart size and vascularity are normal. Mid and lower lung predominant subpleural reticulation overall similar, compatible with interstitial fibrosis.. No focal consolidation, pneumothorax nor pleural effusion.      Head CT w/o contrast   Final Result   IMPRESSION:   1.  No acute intracranial hemorrhage, mass, or herniation.   2.  Mild  nonspecific white matter changes most likely due to chronic microvascular ischemic disease.      Echocardiogram Complete    (Results Pending)        EKG:     EKG #1  Sinus tachycardia poor anterior progression normal axis left bundle branch block PACs.  No Sgarbossa criteria    Time:[id number]    Ventricular rate 103 bmp  Axis normal  MN interval 174 ms  QRS duration 136 ms  QT//500 ms    Compared to previous EKG on previous available in epic for comparison  I have independently reviewed and interpreted the EKG(s) documented above.      PROCEDURES:     Procedures      I, Mitra Sylvester, am serving as a scribe to document services personally performed by Dr. Hill based on my observation and the provider's statements to me. I, Veena Hill MD attest that Mitra Sylvester is acting in a scribe capacity, has observed my performance of the services and has documented them in accordance with my direction.    Veena Hill M.D.  Emergency Medicine  [facility] EMERGENCY DEPARTMENT  [facility] [facility] [id number] [phone]  Dept: [phone]     Veena Hill MD  04/23/23 0192

## 2023-04-23 NOTE — H&P
[facility]    History and Physical - Hospitalist Service       Date of Admission:  4/23/2023    Assessment & Plan   Patricia Bose is a 89 year old female who with history of hyperlipidemia, hypertension, CAD, type 2 diabetes, stage III kidney disease and interstitial lung disease admitted on 4/23/2023 due to lightheadedness and sensation of pressure and fluttering on the chest.    EKG showed LBBB with sinus pauses and PACs.  Echocardiogram performed 11/6/2020 revealed normal left ventricular systolic function with EF of 60%.  Posted cardiologist to assist with further evaluation of symptomatic arrhythmia.    Lightheadedness  Sinus pauses and PACs on EKG  Prolonged QTc  LBBB-unclear whether this is new or old; no prior EKG to compare  Will consider starting beta-blocker  Continue PTA magnesium oxide  Monitor electrolytes and replace as needed  Avoid QTc prolonging medication  Continue telemetry  Follow-up echocardiogram  Follow-up cardiology consult    Elevated troponin  Resume PTA aspirin and atorvastatin  Follow-up repeat high-sensitivity troponin  Follow-up echocardiogram    Hypertension  Hold PTA hydrochlorothiazide  Resume PTA losartan  Will consider starting beta-blocker  As needed labetalol for SBP above 180    Interstitial lung disease  Diagnosed with lung fibrosis about 3 years ago.  Not requiring supplemental oxygen at baseline.  Chest radiograph showed mid and lower lung predominant subpleural reticulation overall similar, compatible with interstitial fibrosis.  Breathing comfortably on room air  Supplemental oxygen as needed      Diet: Combination Diet Low Saturated Fat Na <2400mg Diet, No Caffeine Diet  DVT Prophylaxis: Enoxaparin (Lovenox) SQ  Lassiter Catheter: Not present  Lines: None     Cardiac Monitoring: ACTIVE order. Indication: Chest pain/ ACS rule out (24 hours)  Code Status: Full Code    Clinically Significant Risk Factors Present on Admission                  #  Hypertension: home medication list includes antihypertensive(s)     # DMII: A1C = N/A within past 6 months            Disposition Plan      Expected Discharge Date: 04/24/2023                  Johan Ornelas MD  Hospitalist Service  [facility]  Securely message with uniRow (more info)  Text page via [facility] Paging/Directory     ______________________________________________________________________    Chief Complaint   Lightheadedness and sensation of pressure and fluttering on the chest.    History is obtained from the patient    History of Present Illness   Patricia Bose is a 89 year old female who with history of hyperlipidemia, hypertension, CAD, type 2 diabetes, stage III kidney disease and interstitial lung disease who presents to the ER due to lightheadedness and sensation of pressure and fluttering on the chest.    She was in her usual state of health until today when she developed lightheadedness and sensation of pressure and fluttering on the chest.  She endorsed exertional dyspnea; states she becomes short of breath while climbing stairs.  She denies chest pain, cough or leg swelling.    She has a history of lung fibrosis diagnosed about 3 years ago.  She lives with her nephew.  She does not use any assistive device at baseline.    EKG showed LBBB with sinus pauses and PACs.  Echocardiogram performed 11/6/2020 revealed normal left ventricular systolic function with EF of 60%.    Initial blood pressure was 191/65, pulse 103, respiratory 20, temperature 97.4  F and oxygen saturation of 99% on room air.  Notable laboratory findings include sodium 134, chloride 97, creatinine 1.05, calcium 9.9, and high-sensitivity troponin 19.  CBC was within normal limit.  Chest radiograph showed mid and lower lung predominant subpleural reticulation overall similar, compatible with interstitial fibrosis.  Head CT showed no acute intracranial process.    She wants to be full code.  She is  agreeable to invasive procedures if indicated.    Past Medical History    Past Medical History:   Diagnosis Date     Carpal tunnel syndrome      Diabetes mellitus (H)      Hyperlipidemia      Hypertension      Upper respiratory infection        Past Surgical History   Past Surgical History:   Procedure Laterality Date     ARTHROSCOPY SHOULDER ROTATOR CUFF REPAIR  10 years ago    left shoulder      BIOPSY BREAST Left     benign     BIOPSY BREAST Left     before     benign     CATARACT EXTRACTION       HC REMOVAL OF TONSILS,<13 Y/O      Description: Tonsillectomy;  Recorded: 2014;     HYSTERECTOMY      benign     OOPHORECTOMY      benign     ZZC TOTAL ABDOM HYSTERECTOMY      Description: Hysterectomy;  Recorded: 2014;       Prior to Admission Medications   Prior to Admission Medications   Prescriptions Last Dose Informant Patient Reported? Taking?   DENTAGEL 1.1 % GEL topical gel 2023 at AM  Yes Yes   Sig: BRUSH ON TEETH 2 TIMES DAILY   MELATONIN ORAL Past Week at Bedtime  Yes Yes   Sig: Take 10 mg by mouth nightly as needed (Sleep)   PROLIA 60 MG/ML SOSY injection 2022  No Yes   Sig: INJECT 60MG SUBCUTANEOUSLY  EVERY 6 MONTHS   amoxicillin (AMOXIL) 500 MG capsule More than a month at Dental  No Yes   Sig: take 4 capsules prior to dental procedure   aspirin 81 MG EC tablet 2023 at PM  Yes Yes   Sig: [ASPIRIN 81 MG EC TABLET] Take 81 mg by mouth daily.   atorvastatin (LIPITOR) 40 MG tablet 2023 at PM  No Yes   Sig: Take 1 tablet (40 mg) by mouth daily   blood glucose (ACCU-CHEK AMIRA PLUS) test strip   No No   Si strip by In Vitro route 3 times daily Use to test blood sugar 3 times daily or as directed.   blood glucose monitoring (NO BRAND SPECIFIED) meter device kit   No No   Sig: by In Vitro route daily   cholecalciferol, vitamin D3, (VITAMIN D3) 1,000 unit capsule 2023 at AM  Yes Yes   Sig: [CHOLECALCIFEROL, VITAMIN D3, (VITAMIN D3) 1,000 UNIT CAPSULE] Take  1,000 Units by mouth daily.   cycloSPORINE (RESTASIS) 0.05 % ophthalmic emulsion 4/23/2023 at AM  Yes Yes   Sig: Place 1 drop into both eyes 2 times daily   generic lancets (ACCU-CHEK FASTCLIX LANCING DEV)   No No   Sig: [GENERIC LANCETS (ACCU-CHEK FASTCLIX LANCING DEV)] Use 1 each As Directed daily.   glucosamine-chondroitin 500-400 mg cap 4/23/2023 at AM  Yes Yes   Sig: [GLUCOSAMINE-CHONDROITIN 500-400 MG CAP] Take 2 capsules by mouth daily.   hydrochlorothiazide (HYDRODIURIL) 25 MG tablet 4/23/2023 at AM  No Yes   Sig: Take 1 tablet (25 mg) by mouth daily   losartan (COZAAR) 25 MG tablet 4/22/2023 at PM  No Yes   Sig: Take 1 tablet (25 mg) by mouth daily   magnesium 250 mg Tab 4/23/2023 at AM  Yes Yes   Sig: [MAGNESIUM 250 MG TAB] Take 1 tablet by mouth daily.   multivitamin therapeutic (THERAGRAN) tablet 4/23/2023 at AM  Yes Yes   Sig: [MULTIVITAMIN THERAPEUTIC (THERAGRAN) TABLET] Take 1 tablet by mouth daily.   vit C/E/Zn/coppr/lutein/zeaxan (PRESERVISION AREDS-2 ORAL) 4/23/2023 at AM  Yes Yes   Sig: [VIT C/E/ZN/COPPR/LUTEIN/ZEAXAN (PRESERVISION AREDS-2 ORAL)] Take 1 tablet by mouth 2 (two) times a day.      Facility-Administered Medications: None        Review of Systems    The 10 point Review of Systems is negative other than noted in the HPI or here.   Physical Exam   Vital Signs: Temp: 97.4  F (36.3  C) Temp src: Temporal BP: (!) 160/68 Pulse: 85   Resp: 18 SpO2: 98 % O2 Device: None (Room air)    Weight: 114 lbs 0 oz    General appearance: Awake, Alert, Cooperative, not in any obvious distress and appears stated age   HEENT: Normocephalic, atraumatic, conjunctiva clear without icterus and ears without discharge  Lungs: Clear to auscultation bilaterally, no wheezing, diminished entry bilaterally, normal work of breathing  Cardiovascular: Tachycardic with irregular rythm, normal apical impulse, normal S1 and S2, no lower extremity edema bilaterally  Abdomen: Soft, non-tender and Non-distended, active bowel  sounds  Skin: Skin color, texture normal and bruising or bleeding. No rashes or lesions over face, neck, arms and legs, turgor normal.  Musculoskeletal: No bony deformities or joint tenderness. Normal ROM upon flexion & extension.   Neurologic: Alert & Oriented X 3, Facial symmetry preserved and upper & lower extremities moving well with symmetry  Psychiatric: Calm, normal eye contact and normal affect      Medical Decision Making       60 MINUTES SPENT BY ME on the date of service doing chart review, history, exam, documentation & further activities per the note.      Data     I have personally reviewed the following data over the past 24 hrs:    8.1  \   11.7   / 213     134 (L) 97 (L) 33.5 (H) /  212 (H)   3.7 29 1.05 (H) \       Trop: 19 (H) BNP: 340

## 2023-04-23 NOTE — TELEPHONE ENCOUNTER
"Nurse Triage SBAR    Is this a 2nd Level Triage? NO    Situation:     Patient reporting feeling \"lightheaded.\"    Background:     Type 2 Diabetic  HTN    Assessment:     Patient reporting symptoms starting 4 hours ago with feeling of \"lightheadedness.\"  Stating her pulse has been skipping \"every 3rd beat.\"  Denies chest pain or pressure.  Denies shortness of breath.  Pulse rate during triage 72 per minute.       Protocol Recommended Disposition:   See in ED/Patient will have son transport her to [facility] ED now.  Reason for Disposition    Dizziness, lightheadedness, or weakness    Additional Information    Negative: Passed out (i.e., lost consciousness, collapsed and was not responding)    Negative: Shock suspected (e.g., cold/pale/clammy skin, too weak to stand, low BP, rapid pulse)    Negative: Difficult to awaken or acting confused (e.g., disoriented, slurred speech)    Negative: Visible sweat on face or sweat dripping down face    Negative: Unable to walk, or can only walk with assistance (e.g., requires support)    Negative: [1] Received SHOCK from implantable cardiac defibrillator AND [2] persisting symptoms (i.e., palpitations, lightheadedness)    Negative: [1] Dizziness, lightheadedness, or weakness AND [2] heart beating very rapidly (e.g., > 140 / minute)    Negative: [1] Dizziness, lightheadedness, or weakness AND [2] heart beating very slowly (e.g., < 50 / minute)    Negative: Sounds like a life-threatening emergency to the triager    Negative: Chest pain    Negative: Implantable Cardiac Defibrillator (ICD) or a pacemaker symptoms or questions    Negative: Difficulty breathing    Protocols used: HEART RATE AND HEARTBEAT [id number]      "

## 2023-04-24 ENCOUNTER — APPOINTMENT (OUTPATIENT)
Dept: CARDIOLOGY | Facility: HOSPITAL | Age: 88
End: 2023-04-24
Attending: INTERNAL MEDICINE
Payer: COMMERCIAL

## 2023-04-24 ENCOUNTER — APPOINTMENT (OUTPATIENT)
Dept: OCCUPATIONAL THERAPY | Facility: HOSPITAL | Age: 88
End: 2023-04-24
Attending: INTERNAL MEDICINE
Payer: COMMERCIAL

## 2023-04-24 ENCOUNTER — TELEPHONE (OUTPATIENT)
Dept: CARDIOLOGY | Facility: CLINIC | Age: 88
End: 2023-04-24

## 2023-04-24 VITALS
SYSTOLIC BLOOD PRESSURE: 181 MMHG | OXYGEN SATURATION: 98 % | WEIGHT: 114 LBS | BODY MASS INDEX: 23.93 KG/M2 | RESPIRATION RATE: 26 BRPM | HEIGHT: 58 IN | HEART RATE: 75 BPM | TEMPERATURE: 97.8 F | DIASTOLIC BLOOD PRESSURE: 79 MMHG

## 2023-04-24 DIAGNOSIS — I25.10 CORONARY ARTERY DISEASE INVOLVING NATIVE CORONARY ARTERY OF NATIVE HEART WITHOUT ANGINA PECTORIS: ICD-10-CM

## 2023-04-24 DIAGNOSIS — R42 DIZZINESS: Primary | ICD-10-CM

## 2023-04-24 LAB
ANION GAP SERPL CALCULATED.3IONS-SCNC: 8 MMOL/L (ref 7–15)
ATRIAL RATE - MUSE: 103 BPM
BUN SERPL-MCNC: 29.1 MG/DL (ref 8–23)
CALCIUM SERPL-MCNC: 9.4 MG/DL (ref 8.2–9.6)
CHLORIDE SERPL-SCNC: 103 MMOL/L (ref 98–107)
CREAT SERPL-MCNC: 0.92 MG/DL (ref 0.51–0.95)
DEPRECATED HCO3 PLAS-SCNC: 27 MMOL/L (ref 22–29)
DIASTOLIC BLOOD PRESSURE - MUSE: NORMAL MMHG
GFR SERPL CREATININE-BSD FRML MDRD: 57 ML/MIN/1.73M2
GLUCOSE BLDC GLUCOMTR-MCNC: 157 MG/DL (ref 70–99)
GLUCOSE SERPL-MCNC: 123 MG/DL (ref 70–99)
HOLD SPECIMEN: NORMAL
INTERPRETATION ECG - MUSE: NORMAL
LVEF ECHO: NORMAL
P AXIS - MUSE: 57 DEGREES
POTASSIUM SERPL-SCNC: 4 MMOL/L (ref 3.4–5.3)
PR INTERVAL - MUSE: 174 MS
QRS DURATION - MUSE: 136 MS
QT - MUSE: 382 MS
QTC - MUSE: 500 MS
R AXIS - MUSE: -19 DEGREES
SODIUM SERPL-SCNC: 138 MMOL/L (ref 136–145)
SYSTOLIC BLOOD PRESSURE - MUSE: NORMAL MMHG
T AXIS - MUSE: 126 DEGREES
VENTRICULAR RATE- MUSE: 103 BPM

## 2023-04-24 PROCEDURE — G0378 HOSPITAL OBSERVATION PER HR: HCPCS

## 2023-04-24 PROCEDURE — 97165 OT EVAL LOW COMPLEX 30 MIN: CPT | Mod: GO

## 2023-04-24 PROCEDURE — 80048 BASIC METABOLIC PNL TOTAL CA: CPT | Performed by: INTERNAL MEDICINE

## 2023-04-24 PROCEDURE — 99222 1ST HOSP IP/OBS MODERATE 55: CPT | Mod: 25 | Performed by: INTERNAL MEDICINE

## 2023-04-24 PROCEDURE — 93306 TTE W/DOPPLER COMPLETE: CPT | Mod: 26 | Performed by: INTERNAL MEDICINE

## 2023-04-24 PROCEDURE — 97535 SELF CARE MNGMENT TRAINING: CPT | Mod: GO

## 2023-04-24 PROCEDURE — 82962 GLUCOSE BLOOD TEST: CPT

## 2023-04-24 PROCEDURE — 250N000013 HC RX MED GY IP 250 OP 250 PS 637: Performed by: INTERNAL MEDICINE

## 2023-04-24 PROCEDURE — 93306 TTE W/DOPPLER COMPLETE: CPT

## 2023-04-24 PROCEDURE — 250N000013 HC RX MED GY IP 250 OP 250 PS 637: Performed by: EMERGENCY MEDICINE

## 2023-04-24 PROCEDURE — 250N000012 HC RX MED GY IP 250 OP 636 PS 637: Performed by: INTERNAL MEDICINE

## 2023-04-24 PROCEDURE — 99239 HOSP IP/OBS DSCHRG MGMT >30: CPT | Performed by: INTERNAL MEDICINE

## 2023-04-24 PROCEDURE — 36415 COLL VENOUS BLD VENIPUNCTURE: CPT | Performed by: INTERNAL MEDICINE

## 2023-04-24 RX ORDER — LOSARTAN POTASSIUM 50 MG/1
50 TABLET ORAL DAILY
Status: DISCONTINUED | OUTPATIENT
Start: 2023-04-25 | End: 2023-04-24 | Stop reason: HOSPADM

## 2023-04-24 RX ORDER — LOSARTAN POTASSIUM 50 MG/1
50 TABLET ORAL DAILY
Qty: 30 TABLET | Refills: 0 | Status: SHIPPED | OUTPATIENT
Start: 2023-04-25 | End: 2023-06-04

## 2023-04-24 RX ORDER — DEXTROSE MONOHYDRATE 25 G/50ML
25-50 INJECTION, SOLUTION INTRAVENOUS
Status: DISCONTINUED | OUTPATIENT
Start: 2023-04-24 | End: 2023-04-24 | Stop reason: HOSPADM

## 2023-04-24 RX ORDER — NICOTINE POLACRILEX 4 MG
15-30 LOZENGE BUCCAL
Status: DISCONTINUED | OUTPATIENT
Start: 2023-04-24 | End: 2023-04-24 | Stop reason: HOSPADM

## 2023-04-24 RX ORDER — CARBOXYMETHYLCELLULOSE SODIUM 10 MG/ML
1 GEL OPHTHALMIC 2 TIMES DAILY
Status: DISCONTINUED | OUTPATIENT
Start: 2023-04-24 | End: 2023-04-24 | Stop reason: HOSPADM

## 2023-04-24 RX ADMIN — Medication 25 MCG: at 09:25

## 2023-04-24 RX ADMIN — ASPIRIN 81 MG: 81 TABLET, COATED ORAL at 09:25

## 2023-04-24 RX ADMIN — CARBOXYMETHYLCELLULOSE SODIUM 1 DROP: 10 GEL OPHTHALMIC at 09:26

## 2023-04-24 RX ADMIN — LOSARTAN POTASSIUM 25 MG: 25 TABLET, FILM COATED ORAL at 09:25

## 2023-04-24 RX ADMIN — INSULIN ASPART 1 UNITS: 100 INJECTION, SOLUTION INTRAVENOUS; SUBCUTANEOUS at 12:08

## 2023-04-24 RX ADMIN — Medication 200 MG: at 09:27

## 2023-04-24 ASSESSMENT — ACTIVITIES OF DAILY LIVING (ADL)
ADLS_ACUITY_SCORE: 35

## 2023-04-24 NOTE — PLAN OF CARE
Physical Therapy    MD discharging the patient to home, no PT session completed.    Yocasta Soler, PT, DPT  4/24/2023

## 2023-04-24 NOTE — PROGRESS NOTES
Occupational Therapy      04/24/23 1320   Appointment Info   Signing Clinician's Name / Credentials (OT) Grace Samantha OTR/L   Quick Adds   Quick Adds Certification   Living Environment   People in Home other (see comments)  (Pt nephew lives w/ her)   Current Living Arrangements house   Home Accessibility stairs to enter home;stairs within home   Number of Stairs, Main Entrance 2;3   Stair Railings, Main Entrance railings safe and in good condition   Number of Stairs, Within Home, Primary other (see comments)  (full flight to basement)   Stair Railings, Within Home, Primary railings safe and in good condition   Transportation Anticipated family or friend will provide   Living Environment Comments Tub/Shower w/ GB pt also has a W/I shower   Self-Care   Usual Activity Tolerance good   Current Activity Tolerance moderate   Regular Exercise No   Equipment Currently Used at Home none   Fall history within last six months no   Activity/Exercise/Self-Care Comment Ind. w/ ADL routine   Instrumental Activities of Daily Living (IADL)   Previous Responsibilities driving;medication management;laundry;meal prep;housekeeping   General Information   Onset of Illness/Injury or Date of Surgery 04/23/23   Referring Physician Johan Ornelas MD   Additional Occupational Profile Info/Pertinent History of Current Problem 89 year old female who with history of hyperlipidemia, hypertension, CAD, type 2 diabetes, stage III kidney disease and interstitial lung disease admitted on 4/23/2023 due to lightheadedness and sensation of pressure and fluttering on the chest   Existing Precautions/Restrictions fall   Cognitive Status Examination   Orientation Status orientation to person, place and time   Range of Motion Comprehensive   General Range of Motion no range of motion deficits identified   Bed Mobility   Bed Mobility supine-sit   Supine-Sit [place] (Bed Mobility) supervision   Transfers   Transfers sit-stand transfer   Sit-Stand  Transfer   Sit-Stand [place] (Transfers) contact guard;verbal cues;supervision   Clinical Impression   Criteria for Skilled Therapeutic Interventions Met (OT) Yes, treatment indicated   OT Diagnosis decreased act. tolerance   OT Problem List-Impairments impacting ADL problems related to;activity tolerance impaired   Assessment of Occupational Performance 1-3 Performance Deficits   Planned Therapy Interventions (OT) ADL retraining;strengthening;transfer training   Clinical Decision Making Complexity (OT) low complexity   Risk & Benefits of therapy have been explained evaluation/treatment results reviewed;patient   OT Total Evaluation Time   OT Eval, Low Complexity Minutes (73930) 8   Therapy Certification   Medical Diagnosis bundle branch block Left   Start of Care Date 04/24/23   Certification date from 04/24/23   Certification date to 04/24/23   OT Goals   Therapy Frequency (OT) Daily   OT Predicted Duration/Target Date for Goal Attainment 04/28/23   OT Goals Hygiene/Grooming;Toilet Transfer/Toileting;Transfers;Aerobic Activity   OT: Hygiene/Grooming modified independent;while standing   OT: Transfer Modified independent;with assistive device   OT: Toilet Transfer/Toileting Modified independent;using adaptive equipment   OT: Perform aerobic activity with stable cardiovascular response continuous activity;15 minutes   Interventions   Interventions Quick Adds Self-Care/Home Management   Self-Care/Home Management   Self-Care/Home Mgmt/ADL, Compensatory, Meal Prep Minutes (39172) 12   Treatment Detail/Skilled Intervention Supervision Sup>EOB, pt STS w/ CGA from ED stretcher d/t elevated height of bed- Pt ambulated w/ SBA w/o device but required CGA during ambulation d/t 2 slight LOB able to self correct. Pt ambulated ~100ft CGA no device to monitor endurance/act.tolerance. Pt BP slightly elevated at end of session ~  160s/70spt reports BP is normally slightly high HR ~95   OT Discharge Planning   OT Plan  endurance/act. tolerance, toileting, LB dressing   OT Discharge Recommendation (DC Rec) home with assist   OT Rationale for DC Rec Pt ambulating well w/ SBA/occasional CGA w/o device pt had 2 slight LOB w/ hallway walk able to self correct cues for tech/safety. Pt lives in home w/ nephew pt largely Ind. w/ ADL routine- expect pt to progress enough for safe d/c home w/ support from family.   OT Brief overview of current status CGA/SBA no device   Total Session Time   Timed Code Treatment Minutes 12   Total Session Time (sum of timed and untimed services) 20        [facility]  OUTPATIENT OCCUPATIONAL THERAPY  EVALUATION  PLAN OF TREATMENT FOR OUTPATIENT REHABILITATION  (COMPLETE FOR INITIAL CLAIMS ONLY)  Patient's Last Name, First Name, M.I.  YOB: 1926  Patricia Bose                          Provider's Name  [facility] Medical Record No.  [id number]                             Onset Date:  04/23/23   Start of Care Date:  04/24/23   Type:     ___PT   _X_OT   ___SLP Medical Diagnosis:  bundle branch block Left                    OT Diagnosis:  decreased act. tolerance Visits from SOC:  1     See note for plan of treatment, functional goals and certification details    I CERTIFY THE NEED FOR THESE SERVICES FURNISHED UNDER        THIS PLAN OF TREATMENT AND WHILE UNDER MY CARE     (Physician co-signature of this document indicates review and certification of the therapy plan).

## 2023-04-24 NOTE — DISCHARGE SUMMARY
[facility] MEDICINE  DISCHARGE SUMMARY     Primary Care Physician: Vidhi Pearl  Admission Date: 4/23/2023   Discharge Provider: Emilio De Jesus DO,  Discharge Date: 4/24/2023   Diet:   Active Diet and Nourishment Order   Procedures     Combination Diet Moderate Consistent Carb (60 g CHO per Meal) Diet; No Caffeine Diet     Diet       Code Status: Full Code   Activity: DCACTIVITY: Activity as tolerated        Condition at Discharge: Stable     REASON FOR PRESENTATION(See Admission Note for Details)   Refer to h&p    PRINCIPAL & ACTIVE DISCHARGE DIAGNOSES     Principal Problem:    Bundle branch block, left  Active Problems:    Chest pressure      PENDING LABS     Unresulted Labs Ordered in the Past 30 Days of this Admission     No orders found for last 31 day(s).            PROCEDURES ( this hospitalization only)          RECOMMENDATIONS TO OUTPATIENT PROVIDER FOR F/U VISIT     Follow-up Appointments     Follow-up and recommended labs and tests       Follow up with primary care provider, Vidhi Pearl, within 7 days for   hospital follow- up.  No follow up labs or test are needed.    Dr. Krueger, Cardiology follow-up as scheduled                 DISPOSITION     Home    SUMMARY OF HOSPITAL COURSE:    Presyncope: resolved. CT head negative for acute process.  Evaluated by Cardiology and TTE performed that showed preserved LVEF, mild MS, mild MR and mod TR. LBBB on EKG w/o anginal pain.  Unable to proceed with outpatient event monitor due to not familiar smart phone technology.  F/U Dr. Krueger as scheduled.    HTN:  -Losartan increased to 50mg BID    Discharge Medications with Med changes:     Current Discharge Medication List      CONTINUE these medications which have CHANGED    Details   losartan (COZAAR) 50 MG tablet Take 1 tablet (50 mg) by mouth daily  Qty: 30 tablet, Refills: 0    Associated Diagnoses: Benign essential hypertension         CONTINUE these medications  which have NOT CHANGED    Details   amoxicillin (AMOXIL) 500 MG capsule take 4 capsules prior to dental procedure  Qty: 4 capsule, Refills: 3    Associated Diagnoses: Encounter for medication refill      aspirin 81 MG EC tablet [ASPIRIN 81 MG EC TABLET] Take 81 mg by mouth daily.      atorvastatin (LIPITOR) 40 MG tablet Take 1 tablet (40 mg) by mouth daily  Qty: 90 tablet, Refills: 3    Associated Diagnoses: Coronary artery disease involving native coronary artery of native heart without angina pectoris      cholecalciferol, vitamin D3, (VITAMIN D3) 1,000 unit capsule [CHOLECALCIFEROL, VITAMIN D3, (VITAMIN D3) 1,000 UNIT CAPSULE] Take 1,000 Units by mouth daily.      cycloSPORINE (RESTASIS) 0.05 % ophthalmic emulsion Place 1 drop into both eyes 2 times daily      DENTAGEL 1.1 % GEL topical gel BRUSH ON TEETH 2 TIMES DAILY      glucosamine-chondroitin 500-400 mg cap [GLUCOSAMINE-CHONDROITIN 500-400 MG CAP] Take 2 capsules by mouth daily.      magnesium 250 mg Tab [MAGNESIUM 250 MG TAB] Take 1 tablet by mouth daily.      MELATONIN ORAL Take 10 mg by mouth nightly as needed (Sleep)      multivitamin therapeutic (THERAGRAN) tablet [MULTIVITAMIN THERAPEUTIC (THERAGRAN) TABLET] Take 1 tablet by mouth daily.      PROLIA 60 MG/ML SOSY injection INJECT 60MG SUBCUTANEOUSLY  EVERY 6 MONTHS  Qty: 1 mL, Refills: 0    Associated Diagnoses: Personal history of other drug therapy; Osteopenia      vit C/E/Zn/coppr/lutein/zeaxan (PRESERVISION AREDS-2 ORAL) [VIT C/E/ZN/COPPR/LUTEIN/ZEAXAN (PRESERVISION AREDS-2 ORAL)] Take 1 tablet by mouth 2 (two) times a day.      blood glucose (ACCU-CHEK AMIRA PLUS) test strip 1 strip by In Vitro route 3 times daily Use to test blood sugar 3 times daily or as directed.  Qty: 100 strip, Refills: 1      blood glucose monitoring (NO BRAND SPECIFIED) meter device kit by In Vitro route daily  Qty: 1 kit, Refills: 0    Associated Diagnoses: Hyperglycemia; Type 2 diabetes mellitus with hyperglycemia, with  long-term current use of insulin (H)      generic lancets (ACCU-CHEK FASTCLIX LANCING DEV) [GENERIC LANCETS (ACCU-CHEK FASTCLIX LANCING DEV)] Use 1 each As Directed daily.  Qty: 200 each, Refills: 2    Associated Diagnoses: Hyperglycemia; Type 2 diabetes mellitus with hyperglycemia, with long-term current use of insulin (H)         STOP taking these medications       hydrochlorothiazide (HYDRODIURIL) 25 MG tablet Comments:   Reason for Stopping:                     Rationale for medication changes:              Consults       CARDIOLOGY IP CONSULT  PHYSICAL THERAPY ADULT IP CONSULT  OCCUPATIONAL THERAPY ADULT IP CONSULT  CARDIOLOGY IP CONSULT    Immunizations given this encounter     Most Recent Immunizations   Administered Date(s) Administered     COVID-19 Vaccine 12+ (Pfizer 2022) 04/26/2022     COVID-19 Vaccine 12+ (Pfizer) 10/11/2021     COVID-19 Vaccine Bivalent Booster 12+ (Pfizer) 09/14/2022     DT (PEDS <7y) 11/02/2004     Flu, Unspecified 09/10/2019     Influenza (High Dose) 3 valent vaccine 08/24/2022     Influenza (IIV3) PF 09/07/2020     Influenza Vaccine 65+ (Fluzone HD) 08/23/2022     Pneumo Conj 13-V (2010&after) 10/28/2014     Pneumococcal (PCV 7) 10/28/2014     Pneumococcal 23 valent 11/02/2004     Pneumococcal, Unspecified 10/28/2014     TDAP (Adacel,Boostrix) 10/28/2014     TDAP Vaccine (Boostrix) 06/18/2021     Td (Adult), Adsorbed 11/02/2004     Td,adult,historic,unspecified 11/02/2004     Zoster recombinant adjuvanted (SHINGRIX) 05/23/2019     Zoster vaccine, live 11/12/2009           Anticoagulation Information      Recent INR results: No results for input(s): INR in the last 168 hours.  Warfarin doses (if applicable) or name of other anticoagulant:       SIGNIFICANT IMAGING FINDINGS     Results for orders placed or performed during the hospital encounter of 04/23/23   Head CT w/o contrast    Impression    IMPRESSION:  1.  No acute intracranial hemorrhage, mass, or herniation.  2.  Mild  nonspecific white matter changes most likely due to chronic microvascular ischemic disease.   Chest XR,  PA & LAT    Impression    IMPRESSION: Heart size and vascularity are normal. Mid and lower lung predominant subpleural reticulation overall similar, compatible with interstitial fibrosis.. No focal consolidation, pneumothorax nor pleural effusion.   Echocardiogram Complete   Result Value Ref Range    LVEF  55-60%        SIGNIFICANT LABORATORY FINDINGS         Discharge Orders        Reason for your hospital stay    Refer to h&p     Follow-up and recommended labs and tests     Follow up with primary care provider, Vidhi Pearl, within 7 days for hospital follow- up.  No follow up labs or test are needed.    Dr. Krueger, Cardiology follow-up as scheduled     Activity    Your activity upon discharge: activity as tolerated     Diet    Follow this diet upon discharge: Orders Placed This Encounter      Combination Diet Moderate Consistent Carb (60 g CHO per Meal) Diet; No Caffeine Diet       Examination   Physical Exam   Temp:  [97.8  F (36.6  C)] 97.8  F (36.6  C)  Pulse:  [70-85] 77  Resp:  [11-32] 26  BP: (135-184)/(64-82) 169/75  SpO2:  [96 %-98 %] 97 %  Wt Readings from Last 1 Encounters:   04/23/23 51.7 kg (114 lb)       gen nad  cv rrr  Lungs cta  abd bs+, nttp  Neuro nonfocal    Emilio De Jesus DO,   [facility]    CC:Vidhi Pearl

## 2023-04-24 NOTE — CONSULTS
"  HEART CARE CONSULTATON NOTE        Assessment/Recommendations   Assessment:  1.  Presyncope: Presyncopal symptoms now essentially resolved.  She was afraid she would pass out yesterday.  Lightheadedness was worse with certain head movements.  Unclear if potentially vertigo.  Hydrochlorothiazide was held and this seemed to help with symptoms.  Proceeding with echocardiogram and will also consider outpatient event monitor.  2.  Coronary artery disease: Moderate nonobstructive disease on angiogram in 2015.  Denies any chest pain.  3.  Hypertension: Poorly controlled  4.  Diabetes mellitus type 2  5.  Interstitial lung disease      Plan:  1.  Echocardiogram pending  2.  Hydrochlorothiazide held due to her lightheadedness.  Will increase losartan to 50 mg a day for better control of her hypertension.  3.  Depending on above results could consider outpatient event monitor for further evaluation         History of Present Illness/Subjective    HPI: Patricia Bose is a 89 year old female with history of moderate nonobstructive coronary artery disease on coronary angiogram 2015, interstitial lung disease, diabetes mellitus type 2, hypertension, dyslipidemia who presented to the hospital with complaints of lightheadedness.  She noticed lightheadedness the day of admission.  The lightheadedness was worse with moving around and moving her head.  She was worried about losing consciousness and falling.  She has chronic dyspnea on exertion which is unchanged.  She also complained that her heart felt like it was fluttering at times.  Any chest discomfort at rest or with exertion.  Twelve-lead EKG demonstrates sinus tachycardia at 103 bpm with left bundle branch block and PACs.  No significant findings on telemetry.     Physical Examination  Review of Systems   VITALS: BP (!) 162/72   Pulse 80   Temp 97.8  F (36.6  C) (Oral)   Resp 21   Ht 1.461 m (4' 9.5\")   Wt 51.7 kg (114 lb)   SpO2 98%   BMI 24.24 kg/m    BMI: Body " mass index is 24.24 kg/m .  Wt Readings from Last 3 Encounters:   04/23/23 51.7 kg (114 lb)   02/01/23 48.1 kg (106 lb)   12/07/22 48.2 kg (106 lb 3.2 oz)       Intake/Output Summary (Last 24 hours) at 4/24/2023 1057  Last data filed at 4/24/2023 0927  Gross per 24 hour   Intake 3 ml   Output --   Net 3 ml     General Appearance:   no distress, normal body habitus   ENT/Mouth: membranes moist, no oral lesions or bleeding gums.      EYES:  no scleral icterus, normal conjunctivae   Neck: no carotid bruits or thyromegaly   Chest/Lungs:   lungs are clear to auscultation   Cardiovascular:   Regular. Normal first and second heart sounds with no murmurs  no edema bilaterally    Abdomen:  no organomegaly, masses, bruits, or tenderness; bowel sounds are present   Extremities: no cyanosis or clubbing   Skin: no xanthelasma, warm.    Neurologic: normal  bilateral, no tremors     Psychiatric: alert and oriented x3, calm     Review Of Systems  Skin: negative  Eyes: negative  Ears/Nose/Throat: negative  Respiratory: No shortness of breath, dyspnea on exertion, cough, or hemoptysis  Cardiovascular: negative  Gastrointestinal: negative  Genitourinary: negative  Musculoskeletal: negative  Neurologic: negative  Psychiatric: negative  Hematologic/Lymphatic/Immunologic: negative  Endocrine: negative          Lab Results    Chemistry/lipid CBC Cardiac Enzymes/BNP/TSH/INR   Recent Labs   Lab Test 04/27/22  0937   CHOL 162   HDL 59   LDL 81   TRIG 111     Recent Labs   Lab Test 04/27/22  0937 06/23/21  0834 02/19/21  1055   LDL 81 86 85     Recent Labs   Lab Test 04/24/23  0723      POTASSIUM 4.0   CHLORIDE 103   CO2 27   *   BUN 29.1*   CR 0.92   GFRESTIMATED 57*   MARISOL 9.4     Recent Labs   Lab Test 04/24/23  0723 04/23/23  1056 12/07/22  0926   CR 0.92 1.05* 1.11*     Recent Labs   Lab Test 12/07/22  0926 04/27/22  0937 11/12/21  1023   A1C 6.5* 6.7* 6.6*          Recent Labs   Lab Test 04/23/23  1056   WBC 8.1   HGB  11.7   HCT 35.1   MCV 91        Recent Labs   Lab Test 04/23/23  1056 08/31/22  1355 04/27/22  0937   HGB 11.7 11.7 11.5*    No results for input(s): TROPONINI in the last 48826 hours.  Recent Labs   Lab Test 04/23/23  1056   NTBNPI 340     Recent Labs   Lab Test 06/12/19  1039   TSH 1.95     No results for input(s): INR in the last 43419 hours.     Medical History  Surgical History Family History Social History   Past Medical History:   Diagnosis Date     Carpal tunnel syndrome      Diabetes mellitus (H)      Hyperlipidemia      Hypertension      Upper respiratory infection      Past Surgical History:   Procedure Laterality Date     ARTHROSCOPY SHOULDER ROTATOR CUFF REPAIR  10 years ago    left shoulder      BIOPSY BREAST Left 1980    benign     BIOPSY BREAST Left     before 1980    benign     CATARACT EXTRACTION       HC REMOVAL OF TONSILS,<13 Y/O      Description: Tonsillectomy;  Recorded: 04/29/2014;     HYSTERECTOMY  1957    benign     OOPHORECTOMY  1957    benign     ZZC TOTAL ABDOM HYSTERECTOMY      Description: Hysterectomy;  Recorded: 04/29/2014;     Family History   Problem Relation Age of Onset     Breast Cancer Mother 52.00     Parkinsonism Mother      Breast Cancer Sister 72.00     Coronary Artery Disease Brother      Coronary Artery Disease Brother 78.00     Diabetes Brother      Coronary Artery Disease Father 67.00     Cancer Father         bladder        Social History     Socioeconomic History     Marital status:      Spouse name: Not on file     Number of children: Not on file     Years of education: Not on file     Highest education level: Not on file   Occupational History     Not on file   Tobacco Use     Smoking status: Never     Smokeless tobacco: Never   Vaping Use     Vaping status: Never Used   Substance and Sexual Activity     Alcohol use: Not Currently     Alcohol/week: 4.0 standard drinks of alcohol     Drug use: No     Sexual activity: Not Currently   Other Topics Concern      Not on file   Social History Narrative    . Dated a gentlemen for 35 years who  .  Has 1 son, 2 grandchildren and 3 great grandchildren.  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  She is very social getting together with friends often, she is part of a book c lub, she enjoys yoga and swimming at the [employer].  She used to golf.     Social Determinants of Health     Financial Resource Strain: Not on file   Food Insecurity: Not on file   Transportation Needs: Not on file   Physical Activity: Not on file   Stress: Not on file   Social Connections: Not on file   Intimate Partner Violence: Not on file   Housing Stability: Not on file         Medications  Allergies   Current Outpatient Medications   Medication Sig Dispense Refill     amoxicillin (AMOXIL) 500 MG capsule take 4 capsules prior to dental procedure 4 capsule 3     aspirin 81 MG EC tablet [ASPIRIN 81 MG EC TABLET] Take 81 mg by mouth daily.       atorvastatin (LIPITOR) 40 MG tablet Take 1 tablet (40 mg) by mouth daily 90 tablet 3     cholecalciferol, vitamin D3, (VITAMIN D3) 1,000 unit capsule [CHOLECALCIFEROL, VITAMIN D3, (VITAMIN D3) 1,000 UNIT CAPSULE] Take 1,000 Units by mouth daily.       cycloSPORINE (RESTASIS) 0.05 % ophthalmic emulsion Place 1 drop into both eyes 2 times daily       DENTAGEL 1.1 % GEL topical gel BRUSH ON TEETH 2 TIMES DAILY       glucosamine-chondroitin 500-400 mg cap [GLUCOSAMINE-CHONDROITIN 500-400 MG CAP] Take 2 capsules by mouth daily.       hydrochlorothiazide (HYDRODIURIL) 25 MG tablet Take 1 tablet (25 mg) by mouth daily 90 tablet 3     losartan (COZAAR) 25 MG tablet Take 1 tablet (25 mg) by mouth daily 90 tablet 3     magnesium 250 mg Tab [MAGNESIUM 250 MG TAB] Take 1 tablet by mouth daily.       MELATONIN ORAL Take 10 mg by mouth nightly as needed (Sleep)       multivitamin therapeutic (THERAGRAN) tablet [MULTIVITAMIN THERAPEUTIC (THERAGRAN) TABLET] Take 1 tablet by mouth daily.        PROLIA 60 MG/ML SOSY injection INJECT 60MG SUBCUTANEOUSLY  EVERY 6 MONTHS 1 mL 0     vit C/E/Zn/coppr/lutein/zeaxan (PRESERVISION AREDS-2 ORAL) [VIT C/E/ZN/COPPR/LUTEIN/ZEAXAN (PRESERVISION AREDS-2 ORAL)] Take 1 tablet by mouth 2 (two) times a day.       blood glucose (ACCU-CHEK AMIRA PLUS) test strip 1 strip by In Vitro route 3 times daily Use to test blood sugar 3 times daily or as directed. 100 strip 1     blood glucose monitoring (NO BRAND SPECIFIED) meter device kit by In Vitro route daily 1 kit 0     generic lancets (ACCU-CHEK FASTCLIX LANCING DEV) [GENERIC LANCETS (ACCU-CHEK FASTCLIX LANCING DEV)] Use 1 each As Directed daily. 200 each 2        Allergies   Allergen Reactions     Ibuprofen Shortness Of Breath         Sharon Moreno MD

## 2023-04-24 NOTE — PLAN OF CARE
Occupational Therapy Discharge Summary    Reason for therapy discharge:    Discharged to home with home therapy.    Progress towards therapy goal(s). See goals on Care Plan in [facility] electronic health record for goal details.  Goals partially met.  Barriers to achieving goals:   discharge from facility.    Therapy recommendation(s):    Continued therapy is recommended.  Rationale/Recommendations:  Pt to dc home w/ support from family- OT recommending home w/ home therapy. .    Goal Outcome Evaluation:

## 2023-04-24 NOTE — ED NOTES
Pt/family does not feel safe going home with the cardiac event monitor using smart phone. Dr. De Jesus at bedside. Informed [facility].

## 2023-04-24 NOTE — TELEPHONE ENCOUNTER
----- Message from Sharon Moreno MD sent at 4/24/2023  2:25 PM CDT -----  She is being discharged today from the ED.  Needs follow-up with Dr. Krueger in 1 to 2 months.  In the interim she should have a 14-day event monitor.

## 2023-04-25 ENCOUNTER — PATIENT OUTREACH (OUTPATIENT)
Dept: CARE COORDINATION | Facility: CLINIC | Age: 88
End: 2023-04-25
Payer: COMMERCIAL

## 2023-04-25 NOTE — PROGRESS NOTES
"Clinic Care Coordination Contact  [facility]: Post-Discharge Note  SITUATION                                                      Admission:    Admission Date: 04/23/23   Reason for Admission: Dizziness    Shortness of Breath    Palpitations  Discharge:   Discharge Date: 04/24/23  Discharge Diagnosis: 1. Chest pressure   2. Bundle branch block, left   3. History of pulmonary fibrosis   4. Chronic renal impairment, unspecified CKD stage   5. History of coronary artery disease   6. History of diabetes mellitus   7. History of hypertension    BACKGROUND                                                      Per hospital discharge summary and inpatient provider notes:    Patricia Bose is a 89 year old female who presents by private car for evaluation of lightheadedness.     The patient reports lightheadedness that began yesterday (4/22). States that she needed to sit down to prevent falling. She does not endorse any headache or vision changes at this time. Her symptoms worsened this morning when she developed fluttering in her chest while doing laundry. No chest pressure reported. She does endorse some exertional shortness of breath as well as a dry cough containing no blood. The patient notes a history of fibrosis. She also notes a history of kidney disease with associated mild edema, but states this has not worsened. She reports no decreased urine or hematuria. No recent falls or travel. She is not on any blood thinning medication. No recent medication changes. The patient currently resides in her house. Denies abdominal pain or blood in stool.    The patient's son reports that the patient called him this morning with complaints of lightheadedness and chest tightness. He called the [facility] who recommended they come to the ED. He does not note any aphasia, slurred speech, or facial drooping.     ASSESSMENT           Discharge Assessment  How are you doing now that you are home?: Pt states \"I'm doing " "alright, had the best night's sleep last night and slept 9 hours last night; doing normal activities around the house.\"  Waiting to set up an appt for a heart monitor, will set up appt after speaking with her son who drives her to appts.  Will start increased dose of losartan tonight.  Stopped HCTZ as directed but has questions as to what she should take for a diuretic, and will discuss with PCP at [facility] tomorrow.  How are your symptoms? (Red Flag symptoms escalate to triage hotline per guidelines): Improved  Do you feel your condition is stable enough to be safe at home until your provider visit?: Yes  Does the patient have their discharge instructions? : Yes  Does the patient have questions regarding their discharge instructions? : No  Were you started on any new medications or were there changes to any of your previous medications? : Yes  Does the patient have all of their medications?: Yes  Do you have questions regarding any of your medications? : Yes (see comment) (HCTZ stopped, questioning what diuretic she should take in place of it so she doesn't retain fluid.  Pt will discuss with PCP at [facility] tomorrow.)  Discharge follow-up appointment scheduled within 14 calendar days? : Yes  Discharge Follow Up Appointment Date: 04/26/23  Discharge Follow Up Appointment Scheduled with?: Primary Care Provider         Post-op (Clinicians Only)  Did the patient have surgery or a procedure: No      PLAN                                                      Outpatient Plan:      Follow up with primary care provider, Vidhi Pearl, within 7 days for hospital follow- up. No follow up labs or test are needed.  Dr. Krueger, Cardiology follow-up as scheduled    Future Appointments   Date Time Provider Department [place]   4/26/2023 12:40 PM Vidhi Pearl NP MDINTM [facility]   3/21/2024 10:00 AM [facility]   4/2/2024 11:00 AM Verónica Lopez NP MDENDO [facility]         For any urgent concerns, please contact our 24 " hour nurse triage line: [phone] ([phone])         Jahaira Jamil RN

## 2023-04-26 ENCOUNTER — OFFICE VISIT (OUTPATIENT)
Dept: INTERNAL MEDICINE | Facility: CLINIC | Age: 88
End: 2023-04-26
Payer: COMMERCIAL

## 2023-04-26 VITALS
OXYGEN SATURATION: 98 % | HEART RATE: 85 BPM | WEIGHT: 105.3 LBS | DIASTOLIC BLOOD PRESSURE: 60 MMHG | SYSTOLIC BLOOD PRESSURE: 154 MMHG | BODY MASS INDEX: 22.1 KG/M2 | HEIGHT: 58 IN | TEMPERATURE: 98.1 F

## 2023-04-26 DIAGNOSIS — Z09 HOSPITAL DISCHARGE FOLLOW-UP: Primary | ICD-10-CM

## 2023-04-26 DIAGNOSIS — R55 PRE-SYNCOPE: ICD-10-CM

## 2023-04-26 DIAGNOSIS — I10 HYPERTENSION, UNSPECIFIED TYPE: ICD-10-CM

## 2023-04-26 PROCEDURE — 99213 OFFICE O/P EST LOW 20 MIN: CPT | Performed by: NURSE PRACTITIONER

## 2023-04-26 ASSESSMENT — PAIN SCALES - GENERAL: PAINLEVEL: NO PAIN (0)

## 2023-04-26 NOTE — PROGRESS NOTES
"  Assessment & Plan   Problem List Items Addressed This Visit    None  Visit Diagnoses     Hospital discharge follow-up    -  Primary    Pre-syncope        Relevant Orders    Adult Cardiac Event Monitor    Hypertension, unspecified type        Relevant Orders    Home Blood Pressure Monitor Order for DME - ONLY FOR DME                  MED REC REQUIRED  Post Medication Reconciliation Status:       Vidhi Pearl NP  [facility] TOMMIE Gomez is a 89 year old, presenting for the following health issues:  Hospital F/U        4/26/2023    12:30 PM   Additional Questions   Roomed by Yissel STRONG MA     [facility]         4/25/2023     4:36 PM   Post Discharge Outreach   Admission Date 4/23/2023   Reason for Admission Dizziness    Shortness of Breath    Palpitations   Discharge Date 4/24/2023   Discharge Diagnosis 1. Chest pressure   2. Bundle branch block, left   3. History of pulmonary fibrosis   4. Chronic renal impairment, unspecified CKD stage   5. History of coronary artery disease   6. History of diabetes mellitus   7. History of hypertension   How are you doing now that you are home? Pt states \"I'm doing alright, had the best night's sleep last night and slept 9 hours last night; doing normal activities around the house.\"  Waiting to set up an appt for a heart monitor, will set up appt after speaking with her son who drives her to appts.  Will start increased dose of losartan tonight.  Stopped HCTZ as directed but has questions as to what she should take for a diuretic, and will discuss with PCP at appt tomorrow.   How are your symptoms? (Red Flag symptoms escalate to triage hotline per guidelines) Improved   Do you feel your condition is stable enough to be safe at home until your provider visit? Yes   Does the patient have their discharge instructions?  Yes   Does the patient have questions regarding their discharge instructions?  No   Were you started on any new medications or were " "there changes to any of your previous medications?  Yes   Does the patient have all of their medications? Yes   Do you have questions regarding any of your medications?  Yes (see comment)   Discharge follow-up appointment scheduled within 14 calendar days?  Yes   Discharge Follow Up Appointment Date 4/26/2023   Discharge Follow Up Appointment Scheduled with? Primary Care Provider     Hospital Follow-up Visit:    Hospital/Nursing Home/IP Rehab Facility: [facility]  Date of Admission: 04/23/2023  Date of Discharge: 04/24/2023  Reason(s) for Admission: Dizziness    Was your hospitalization related to COVID-19? No   Problems taking medications regularly:  None  Medication changes since discharge: None  Problems adhering to non-medication therapy:  None    Summary of hospitalization:  [facility] discharge summary reviewed  Diagnostic Tests/Treatments reviewed.  Follow up needed: cardiology consult and event monitor   Other Healthcare Providers Involved in Patient s Care:         None  Update since discharge: stable.         Plan of care communicated with patient and family                 Review of Systems   Unremarkable other than listed above and below         Objective    BP (!) 154/60 (BP Location: Right arm, Patient Position: Sitting, Cuff Size: Adult Small)   Pulse 85   Temp 98.1  F (36.7  C) (Oral)   Ht 1.461 m (4' 9.5\")   Wt 47.8 kg (105 lb 4.8 oz)   SpO2 98%   BMI 22.39 kg/m    Body mass index is 22.39 kg/m .  Physical Exam   GENERAL: healthy, alert and no distress  EYES: Eyes grossly normal to inspection, conjunctivae and sclerae normal  NECK: no adenopathy  RESP: lungs clear to auscultation - no rales, rhonchi or wheezes  CV: regular rate and rhythm, normal S1 S2,  no peripheral edema and peripheral pulses strong  MS: no gross musculoskeletal defects noted, no edema  PSYCH: mentation appears normal, affect normal/bright                    "

## 2023-05-03 ENCOUNTER — OFFICE VISIT (OUTPATIENT)
Dept: INTERNAL MEDICINE | Facility: CLINIC | Age: 88
End: 2023-05-03
Payer: COMMERCIAL

## 2023-05-03 ENCOUNTER — MYC MEDICAL ADVICE (OUTPATIENT)
Dept: INTERNAL MEDICINE | Facility: CLINIC | Age: 88
End: 2023-05-03

## 2023-05-03 VITALS
RESPIRATION RATE: 20 BRPM | OXYGEN SATURATION: 98 % | TEMPERATURE: 97.8 F | HEIGHT: 58 IN | BODY MASS INDEX: 22.13 KG/M2 | DIASTOLIC BLOOD PRESSURE: 56 MMHG | HEART RATE: 108 BPM | WEIGHT: 105.4 LBS | SYSTOLIC BLOOD PRESSURE: 150 MMHG

## 2023-05-03 DIAGNOSIS — I25.10 CORONARY ARTERY DISEASE INVOLVING NATIVE CORONARY ARTERY OF NATIVE HEART WITHOUT ANGINA PECTORIS: Primary | ICD-10-CM

## 2023-05-03 LAB
ATRIAL RATE - MUSE: 81 BPM
DIASTOLIC BLOOD PRESSURE - MUSE: NORMAL MMHG
INTERPRETATION ECG - MUSE: NORMAL
P AXIS - MUSE: 55 DEGREES
PR INTERVAL - MUSE: 190 MS
QRS DURATION - MUSE: 120 MS
QT - MUSE: 384 MS
QTC - MUSE: 446 MS
R AXIS - MUSE: -18 DEGREES
SYSTOLIC BLOOD PRESSURE - MUSE: NORMAL MMHG
T AXIS - MUSE: 119 DEGREES
VENTRICULAR RATE- MUSE: 81 BPM

## 2023-05-03 PROCEDURE — 93005 ELECTROCARDIOGRAM TRACING: CPT | Performed by: NURSE PRACTITIONER

## 2023-05-03 PROCEDURE — 93010 ELECTROCARDIOGRAM REPORT: CPT | Performed by: INTERNAL MEDICINE

## 2023-05-03 PROCEDURE — 99213 OFFICE O/P EST LOW 20 MIN: CPT | Performed by: NURSE PRACTITIONER

## 2023-05-03 NOTE — LETTER
May 10, 2023    To whom it may concern;    Patricia Bose is my patient and recently experienced a sudden heart condition, Bundle Branch Block Left  a very serious condition that will require your son and daughter-in-law, Dustin and Dean Bose to cancel all travel plans as of 4/23/23, including their [place] trip of 5/4/23-5/8/23 on [employer].  They need to be with you, assist in your care, and take  you to your multiple medical appointments.  They should report this to the travel insurance agency in which they have purchased insurance, [employer] Policy #[id number].     Vidhi Pearl NP

## 2023-05-03 NOTE — PROGRESS NOTES
"  Assessment & Plan   Problem List Items Addressed This Visit        Circulatory    CAD (coronary artery disease) - Primary    Relevant Orders    EKG 12-lead, tracing only (Completed)    6am coffee and then at 7am \"had an episode\"  157/77, poor sleep the night before. Patient states she felt lightheaded          MED REC REQUIRED  Post Medication Reconciliation Status: discharge medications reconciled, continue medications without change  FURTHER TESTING:       - holter monitor     Vidhi Pearl NP  [facility] JUANITABELKYS Gomez is a 89 year old, presenting for the following health issues:  Follow Up and Letter Request        5/3/2023     9:40 AM   Additional Questions   Roomed by Adali FARRELL CMA   Accompanied by son and daughter-in-law     History of Present Illness       Reason for visit:  Follow up    She eats 2-3 servings of fruits and vegetables daily.She consumes 0 sweetened beverage(s) daily.She exercises with enough effort to increase her heart rate 9 or less minutes per day.  She exercises with enough effort to increase her heart rate 3 or less days per week.   She is taking medications regularly.        Hypertension Follow-up      Do you check your blood pressure regularly outside of the clinic? No     Are you following a low salt diet? No, patient states does not use much salt    Are your blood pressures ever more than 140 on the top number (systolic) OR more   than 90 on the bottom number (diastolic), for example 140/90? Yes      BP Readings from Last 6 Encounters:   05/03/23 (!) 150/56   04/26/23 (!) 154/60   04/24/23 (!) 181/79   02/01/23 (!) 162/70   12/07/22 130/72   11/04/22 (!) 146/50       Review of Systems   Unremarkable other than listed above and below         Objective    BP (!) 150/56 (BP Location: Right arm, Patient Position: Sitting, Cuff Size: Adult Regular)   Pulse 108   Temp 97.8  F (36.6  C) (Oral)   Resp 20   Ht 1.461 m (4' 9.5\")   Wt 47.8 kg (105 lb " 6.4 oz)   LMP  (LMP Unknown)   SpO2 98%   BMI 22.41 kg/m    Body mass index is 22.41 kg/m .  Physical Exam   GENERAL: healthy, alert and no distress  EYES: Eyes grossly normal to inspection,  conjunctivae and sclerae normal  NECK: no adenopathy  RESP: lungs clear to auscultation - no rales, rhonchi or wheezes  CV: regular rate and rhythm, normal S1 S2,no peripheral edema and peripheral pulses strong  MS: no gross musculoskeletal defects noted, no edema  NEURO: Normal strength and tone, mentation intact and speech normal  PSYCH: mentation appears normal, affect normal/bright    Results for orders placed or performed in visit on 05/03/23   EKG 12-lead, tracing only     Status: None   Result Value Ref Range    Systolic Blood Pressure  mmHg    Diastolic Blood Pressure  mmHg    Ventricular Rate 81 BPM    Atrial Rate 81 BPM    NV Interval 190 ms    QRS Duration 120 ms     ms    QTc 446 ms    P Axis 55 degrees    R AXIS -18 degrees    T Axis 119 degrees    Interpretation ECG       Sinus rhythm  Left bundle branch block  Abnormal ECG  When compared with ECG of 23-APR-2023 10:30,  Premature atrial complexes are no longer Present    Confirmed by KEENAN VANCE MD LOC:[facility] ([id number]) on 5/3/2023 2:39:19 PM       No results found for this or any previous visit (from the past 24 hour(s)).

## 2023-05-04 NOTE — TELEPHONE ENCOUNTER
Noted patient declined 5-4-23 event monitor appt - no follow-up sched - msg sent to sched team.  mg

## 2023-05-05 DIAGNOSIS — R55 PRE-SYNCOPE: Primary | ICD-10-CM

## 2023-05-05 NOTE — TELEPHONE ENCOUNTER
Vidhi Pearl, NP  [facility] 3 hours ago (7:28 AM)     SL  A [facility] was [facility] this [facility]  If patient is symptomatic recommend presentation to the emergency department.

## 2023-05-05 NOTE — TELEPHONE ENCOUNTER
Clinic Action Needed:Yes, please call hamzah Le  if no answer call .    Reason for Call:  Arylis patient's daughter in law (consent to communicate on file) calling stating patient was too have a cardiac monitor placed yesterday and was unable to,  as it was the kind patient was to hook up to a phone.   [facility] cardiology advised they would place a new order for another type of monitor, stating they were told it could be up to a week to have it placed.  See order 5/5/2023 Adult Holter Monitor.  Oseas is concerned about patient waiting for monitor and is requesting to update Vidhi Pearl NP.  Patient is not present to triage. Denies any known change in symptoms.    Erika Whitehead, RN  [facility]

## 2023-05-05 NOTE — TELEPHONE ENCOUNTER
Contacted Rey and informed new order has been placed and to contact kathe for placement.  Also if patient is symptomatic to go to the ER for evaluation.

## 2023-05-08 ENCOUNTER — HOSPITAL ENCOUNTER (OUTPATIENT)
Dept: CARDIOLOGY | Facility: CLINIC | Age: 88
Discharge: HOME | End: 2023-05-08
Attending: NURSE PRACTITIONER | Admitting: NURSE PRACTITIONER
Payer: COMMERCIAL

## 2023-05-08 DIAGNOSIS — R55 PRE-SYNCOPE: ICD-10-CM

## 2023-05-08 PROCEDURE — 93226 XTRNL ECG REC<48 HR SCAN A/R: CPT

## 2023-05-08 PROCEDURE — 93225 XTRNL ECG REC<48 HRS REC: CPT

## 2023-05-12 PROCEDURE — 93227 XTRNL ECG REC<48 HR R&I: CPT | Performed by: INTERNAL MEDICINE

## 2023-05-18 ENCOUNTER — MYC MEDICAL ADVICE (OUTPATIENT)
Dept: INTERNAL MEDICINE | Facility: CLINIC | Age: 88
End: 2023-05-18
Payer: COMMERCIAL

## 2023-05-19 NOTE — TELEPHONE ENCOUNTER
5/15/2023 Vidhi Pearl NP  [facility]  Holter monitor indicates no A fib. Recommend keeping upcoming appt with Cardiology

## 2023-06-03 ENCOUNTER — TELEPHONE (OUTPATIENT)
Dept: INTERNAL MEDICINE | Facility: CLINIC | Age: 88
End: 2023-06-03
Payer: COMMERCIAL

## 2023-06-03 NOTE — TELEPHONE ENCOUNTER
..  Patient Returning Call    Reason for call:  Med refill    Information relayed to patient:  te    Patient has additional questions:  No      Could we send this information to you in [employer] or would you prefer to receive a phone call?:   No preference   Okay to leave a detailed message?: no Other phone number:  Garbiela  From [facility]  [phone]

## 2023-06-04 ENCOUNTER — HEALTH MAINTENANCE LETTER (OUTPATIENT)
Age: 88
End: 2023-06-04

## 2023-06-04 ENCOUNTER — TELEPHONE (OUTPATIENT)
Dept: ENDOCRINOLOGY | Facility: CLINIC | Age: 88
End: 2023-06-04
Payer: COMMERCIAL

## 2023-06-04 DIAGNOSIS — M85.80 OSTEOPENIA: ICD-10-CM

## 2023-06-04 DIAGNOSIS — Z92.29 PERSONAL HISTORY OF OTHER DRUG THERAPY: ICD-10-CM

## 2023-06-04 DIAGNOSIS — I10 BENIGN ESSENTIAL HYPERTENSION: ICD-10-CM

## 2023-06-05 RX ORDER — DENOSUMAB 60 MG/ML
INJECTION SUBCUTANEOUS
Qty: 1 ML | Refills: 0 | Status: SHIPPED | OUTPATIENT
Start: 2023-06-05 | End: 2023-11-22

## 2023-06-05 NOTE — TELEPHONE ENCOUNTER
Requested Prescriptions   Pending Prescriptions Disp Refills     PROLIA 60 MG/ML SOSY injection [Pharmacy Med Name: Prolia 60 MG/ML Subcutaneous Solution Prefilled Syringe] 1 mL 0     Sig: INJECT 60MG SUBCUTANEOUSLY EVERY 6 MONTHS       There is no refill protocol information for this order

## 2023-06-07 ENCOUNTER — OFFICE VISIT (OUTPATIENT)
Dept: CARDIOLOGY | Facility: CLINIC | Age: 88
End: 2023-06-07
Attending: INTERNAL MEDICINE
Payer: COMMERCIAL

## 2023-06-07 VITALS
WEIGHT: 106.1 LBS | BODY MASS INDEX: 22.27 KG/M2 | SYSTOLIC BLOOD PRESSURE: 178 MMHG | HEART RATE: 68 BPM | HEIGHT: 58 IN | DIASTOLIC BLOOD PRESSURE: 60 MMHG

## 2023-06-07 DIAGNOSIS — J84.112 IPF (IDIOPATHIC PULMONARY FIBROSIS) (H): ICD-10-CM

## 2023-06-07 DIAGNOSIS — I25.10 CORONARY ARTERY DISEASE INVOLVING NATIVE CORONARY ARTERY OF NATIVE HEART WITHOUT ANGINA PECTORIS: ICD-10-CM

## 2023-06-07 DIAGNOSIS — I10 ESSENTIAL HYPERTENSION: ICD-10-CM

## 2023-06-07 DIAGNOSIS — R06.09 DYSPNEA ON EXERTION: ICD-10-CM

## 2023-06-07 DIAGNOSIS — R42 DIZZINESS: Primary | ICD-10-CM

## 2023-06-07 PROCEDURE — 99214 OFFICE O/P EST MOD 30 MIN: CPT | Performed by: INTERNAL MEDICINE

## 2023-06-07 RX ORDER — LOSARTAN POTASSIUM 50 MG/1
50 TABLET ORAL DAILY
Qty: 90 TABLET | Refills: 1 | Status: SHIPPED | OUTPATIENT
Start: 2023-06-07 | End: 2023-06-28

## 2023-06-07 NOTE — PROGRESS NOTES
Thank you, Vidhi Pearl NP, for asking the [facility] team to see Ms. Patricia Bose to follow-up on lightheadedness, essential hypertension, coronary artery disease.    Assessment/Recommendations   Assessment:    1.  Lightheadedness, resolved.  Etiology remains unclear as subsequent event monitor demonstrated no evidence of tachy or bradyarrhythmias.  She did have similar episode back in the fall 2022 with work-up at that time also being unrevealing.  At that point, was taken off hydrochlorothiazide due to concerns of dehydration in the setting of UTI.  At this point we will continue to monitor going forward.  2.  Essential hypertension, poorly controlled.  When I last saw her in November 2022, I did recommend an increase in losartan to 50 mg daily.  It appears her blood pressures have been high intermittently since that time and are high again today.  Suggested we increase losartan to 75 mg daily.  Will recommend follow-up with primary care.  Also encouraged her to check her blood pressures at home, especially if she has recurrent lightheadedness.  3.  Coronary artery disease, moderate by coronary angiography in 2015.  Denies any exertional chest discomfort but does report chronic exertional dyspnea which has not really changed.  She does also have a history of interstitial lung disease.  4.  Dyspnea on exertion, likely multifactorial.  Suspect this may be due to her poorly controlled hypertension, interstitial lung disease.  Cannot exclude element of ischemia although again no chest tightness.  Suggested we see if the increased dose on losartan helps with blood pressure and her exertional dyspnea.  If so, would continue with current medical management.    Plan:  1.  Increase losartan to 75 mg daily  2.  Follow-up with primary care for reassessment of blood pressure.  Also encouraged her to check her blood pressures at home  3.  Consider stress testing if continued symptoms of exertional  "dyspnea despite better blood pressure control       History of Present Illness    Ms. Patricia Bose is a 89 year old female with history of moderate coronary artery disease by angiography in 2015, essential hypertension, interstitial lung disease who presents to the office today for a follow-up visit regarding recent hospitalization for lightheadedness.  Patient was admitted following day of lightheadedness.  Her ECG at that time demonstrated sinus tachycardia with left bundle branch block pattern which appears to be new since 2015.  Denied any chest discomfort.  Did rule out for myocardial infarction and was seen by my colleague Dr. Moreno in consultation.  Echocardiogram during that hospitalization demonstrated no obvious cardiac cause.  She was ultimately discharged home with an outpatient event monitor which has since showed no evidence of profound bradycardia, pauses or tacky arrhythmia.  Now here for follow-up.  States she has had no further episodes of lightheadedness since being hospitalized.  Does have chronic exertional dyspnea which has not changed significantly.  Denies orthopnea, PND or lower extremity edema.  No exertional chest tightness.    ECG (personally reviewed): No ECG today    Cardiac Imaging Studies (personally reviewed): No new imaging     Physical Examination Review of Systems   BP (!) 178/60 (BP Location: Left arm, Patient Position: Sitting, Cuff Size: Adult Regular)   Pulse 68   Ht 1.461 m (4' 9.5\")   Wt 48.1 kg (106 lb 1.6 oz)   LMP  (LMP Unknown)   BMI 22.56 kg/m    Body mass index is 22.56 kg/m .  Wt Readings from Last 3 Encounters:   06/07/23 48.1 kg (106 lb 1.6 oz)   05/03/23 47.8 kg (105 lb 6.4 oz)   04/26/23 47.8 kg (105 lb 4.8 oz)     General Appearance:   Awake, Alert, No acute distress.   HEENT:  No scleral icterus; the mucous membranes were pink and moist.   Neck: No cervical bruits or jugular venous distention    Chest: The spine was straight. The chest was symmetric. " "  Lungs:   Respirations unlabored; the lungs are clear to auscultation. No wheezing   Cardiovascular:    Regular rate and rhythm.  S1, S2 normal.  No murmur or gallop    Abdomen:  No organomegaly, masses, bruits, or tenderness. Bowels sounds are present   Extremities:  No peripheral edema bilaterally   Skin: No xanthelasma. Warm, Dry.   Musculoskeletal: No tenderness.   Neurologic: Mood and affect are appropriate.    Enc Vitals  BP: (!) 178/60  Pulse: 68  Weight: 48.1 kg (106 lb 1.6 oz)  Height: 146.1 cm (4' 9.5\")                                         Medical History  Surgical History Family History Social History   Past Medical History:   Diagnosis Date     Carpal tunnel syndrome      Diabetes mellitus (H)      Hyperlipidemia      Hypertension      Upper respiratory infection     Past Surgical History:   Procedure Laterality Date     ARTHROSCOPY SHOULDER ROTATOR CUFF REPAIR  10 years ago    left shoulder      BIOPSY BREAST Left 1980    benign     BIOPSY BREAST Left     before 1980    benign     CATARACT EXTRACTION       HC REMOVAL OF TONSILS,<11 Y/O      Description: Tonsillectomy;  Recorded: 04/29/2014;     HYSTERECTOMY  1957    benign     OOPHORECTOMY  1957    benign     ZZC TOTAL ABDOM HYSTERECTOMY      Description: Hysterectomy;  Recorded: 04/29/2014;    Family History   Problem Relation Age of Onset     Breast Cancer Mother 52.00     Parkinsonism Mother      Breast Cancer Sister 72.00     Coronary Artery Disease Brother      Coronary Artery Disease Brother 78.00     Diabetes Brother      Coronary Artery Disease Father 67.00     Cancer Father         bladder    Social History     Socioeconomic History     Marital status:      Spouse name: Not on file     Number of children: Not on file     Years of education: Not on file     Highest education level: Not on file   Occupational History     Not on file   Tobacco Use     Smoking status: Never     Smokeless tobacco: Never   Vaping Use     Vaping status: " Never Used   Substance and Sexual Activity     Alcohol use: Not Currently     Alcohol/week: 4.0 standard drinks of alcohol     Drug use: No     Sexual activity: Not Currently   Other Topics Concern     Not on file   Social History Narrative    . Dated a gentlemen for 35 years who  .  Has 1 son, 2 grandchildren and 3 great grandchildren.  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  She is very social getting together with friends often, she is part of a [employer], she enjoys yoga and swimming at the [place].  She used to golf.     Social Determinants of Health     Financial Resource Strain: Not on file   Food Insecurity: Not on file   Transportation Needs: Not on file   Physical Activity: Not on file   Stress: Not on file   Social Connections: Not on file   Intimate Partner Violence: Not on file   Housing Stability: Not on file          Medications  Allergies   Current Outpatient Medications   Medication Sig Dispense Refill     amoxicillin (AMOXIL) 500 MG capsule take 4 capsules prior to dental procedure 4 capsule 3     aspirin 81 MG EC tablet [ASPIRIN 81 MG EC TABLET] Take 81 mg by mouth daily.       atorvastatin (LIPITOR) 40 MG tablet Take 1 tablet (40 mg) by mouth daily 90 tablet 3     blood glucose (ACCU-CHEK AMIRA PLUS) test strip 1 strip by In Vitro route 3 times daily Use to test blood sugar 3 times daily or as directed. 100 strip 1     blood glucose monitoring (NO BRAND SPECIFIED) meter device kit by In Vitro route daily 1 kit 0     cholecalciferol, vitamin D3, (VITAMIN D3) 1,000 unit capsule [CHOLECALCIFEROL, VITAMIN D3, (VITAMIN D3) 1,000 UNIT CAPSULE] Take 1,000 Units by mouth daily.       cycloSPORINE (RESTASIS) 0.05 % ophthalmic emulsion Place 1 drop into both eyes 2 times daily       DENTAGEL 1.1 % GEL topical gel BRUSH ON TEETH 2 TIMES DAILY       generic lancets (ACCU-CHEK FASTCLIX LANCING DEV) [GENERIC LANCETS (ACCU-CHEK FASTCLIX LANCING DEV)] Use 1 each As Directed  daily. 200 each 2     glucosamine-chondroitin 500-400 mg cap [GLUCOSAMINE-CHONDROITIN 500-400 MG CAP] Take 2 capsules by mouth daily.       losartan (COZAAR) 50 MG tablet Take 1 tablet (50 mg) by mouth daily 90 tablet 1     magnesium 250 mg Tab [MAGNESIUM 250 MG TAB] Take 1 tablet by mouth daily.       MELATONIN ORAL Take 10 mg by mouth nightly as needed (Sleep)       multivitamin therapeutic (THERAGRAN) tablet [MULTIVITAMIN THERAPEUTIC (THERAGRAN) TABLET] Take 1 tablet by mouth daily.       PROLIA 60 MG/ML SOSY injection INJECT 60MG SUBCUTANEOUSLY EVERY 6 MONTHS 1 mL 0     vit C/E/Zn/coppr/lutein/zeaxan (PRESERVISION AREDS-2 ORAL) [VIT C/E/ZN/COPPR/LUTEIN/ZEAXAN (PRESERVISION AREDS-2 ORAL)] Take 1 tablet by mouth 2 (two) times a day.        Allergies   Allergen Reactions     Ibuprofen Shortness Of Breath         Lab Results    Chemistry/lipid CBC Cardiac Enzymes/BNP/TSH/INR   Recent Labs   Lab Test 04/24/23  0723 08/31/22  1355 04/27/22  0937   TRIG  --   --  111   LDL  --   --  81   BUN 29.1*   < > 29*      < > 140   CO2 27   < > 30    < > = values in this interval not displayed.    Recent Labs   Lab Test 04/23/23  1056   WBC 8.1   HGB 11.7   HCT 35.1   MCV 91       Recent Labs   Lab Test 06/12/19  1039   TSH 1.95        A total of 50 minutes was spent reviewing patient's medical records, obtaining history and performing examination, as well as discussing diagnoses/ recommendations with patient and answering all questions.

## 2023-06-07 NOTE — LETTER
6/7/2023    Vidhi Pearl NP  [address] [address]    RE: Patricia Bose       Dear Colleague,     I had the pleasure of seeing Patricia Bose in the [facility].      Thank you, Vidhi Pearl NP, for asking the [facility] team to see Ms. Patricia Bose to follow-up on lightheadedness, essential hypertension, coronary artery disease.    Assessment/Recommendations   Assessment:    1.  Lightheadedness, resolved.  Etiology remains unclear as subsequent event monitor demonstrated no evidence of tachy or bradyarrhythmias.  She did have similar episode back in the fall 2022 with work-up at that time also being unrevealing.  At that point, was taken off hydrochlorothiazide due to concerns of dehydration in the setting of UTI.  At this point we will continue to monitor going forward.  2.  Essential hypertension, poorly controlled.  When I last saw her in November 2022, I did recommend an increase in losartan to 50 mg daily.  It appears her blood pressures have been high intermittently since that time and are high again today.  Suggested we increase losartan to 75 mg daily.  Will recommend follow-up with primary care.  Also encouraged her to check her blood pressures at home, especially if she has recurrent lightheadedness.  3.  Coronary artery disease, moderate by coronary angiography in 2015.  Denies any exertional chest discomfort but does report chronic exertional dyspnea which has not really changed.  She does also have a history of interstitial lung disease.  4.  Dyspnea on exertion, likely multifactorial.  Suspect this may be due to her poorly controlled hypertension, interstitial lung disease.  Cannot exclude element of ischemia although again no chest tightness.  Suggested we see if the increased dose on losartan helps with blood pressure and her exertional dyspnea.  If so, would continue with current medical  "management.    Plan:  1.  Increase losartan to 75 mg daily  2.  Follow-up with primary care for reassessment of blood pressure.  Also encouraged her to check her blood pressures at home  3.  Consider stress testing if continued symptoms of exertional dyspnea despite better blood pressure control       History of Present Illness    Ms. Patricia Bose is a 89 year old female with history of moderate coronary artery disease by angiography in 2015, essential hypertension, interstitial lung disease who presents to the office today for a follow-up visit regarding recent hospitalization for lightheadedness.  Patient was admitted following day of lightheadedness.  Her ECG at that time demonstrated sinus tachycardia with left bundle branch block pattern which appears to be new since 2015.  Denied any chest discomfort.  Did rule out for myocardial infarction and was seen by my colleague Dr. Moreno in consultation.  Echocardiogram during that hospitalization demonstrated no obvious cardiac cause.  She was ultimately discharged home with an outpatient event monitor which has since showed no evidence of profound bradycardia, pauses or tacky arrhythmia.  Now here for follow-up.  States she has had no further episodes of lightheadedness since being hospitalized.  Does have chronic exertional dyspnea which has not changed significantly.  Denies orthopnea, PND or lower extremity edema.  No exertional chest tightness.    ECG (personally reviewed): No ECG today    Cardiac Imaging Studies (personally reviewed): No new imaging     Physical Examination Review of Systems   BP (!) 178/60 (BP Location: Left arm, Patient Position: Sitting, Cuff Size: Adult Regular)   Pulse 68   Ht 1.461 m (4' 9.5\")   Wt 48.1 kg (106 lb 1.6 oz)   LMP  (LMP Unknown)   BMI 22.56 kg/m    Body mass index is 22.56 kg/m .  Wt Readings from Last 3 Encounters:   06/07/23 48.1 kg (106 lb 1.6 oz)   05/03/23 47.8 kg (105 lb 6.4 oz)   04/26/23 47.8 kg (105 lb 4.8 " "oz)     General Appearance:   Awake, Alert, No acute distress.   HEENT:  No scleral icterus; the mucous membranes were pink and moist.   Neck: No cervical bruits or jugular venous distention    Chest: The spine was straight. The chest was symmetric.   Lungs:   Respirations unlabored; the lungs are clear to auscultation. No wheezing   Cardiovascular:    Regular rate and rhythm.  S1, S2 normal.  No murmur or gallop    Abdomen:  No organomegaly, masses, bruits, or tenderness. Bowels sounds are present   Extremities:  No peripheral edema bilaterally   Skin: No xanthelasma. Warm, Dry.   Musculoskeletal: No tenderness.   Neurologic: Mood and affect are appropriate.    Enc Vitals  BP: (!) 178/60  Pulse: 68  Weight: 48.1 kg (106 lb 1.6 oz)  Height: 146.1 cm (4' 9.5\")                                         Medical History  Surgical History Family History Social History   Past Medical History:   Diagnosis Date    Carpal tunnel syndrome     Diabetes mellitus (H)     Hyperlipidemia     Hypertension     Upper respiratory infection     Past Surgical History:   Procedure Laterality Date    ARTHROSCOPY SHOULDER ROTATOR CUFF REPAIR  10 years ago    left shoulder     BIOPSY BREAST Left 1980    benign    BIOPSY BREAST Left     before 1980    benign    CATARACT EXTRACTION      HC REMOVAL OF TONSILS,<13 Y/O      Description: Tonsillectomy;  Recorded: 04/29/2014;    HYSTERECTOMY  1957    benign    OOPHORECTOMY  1957    benign    ZZC TOTAL ABDOM HYSTERECTOMY      Description: Hysterectomy;  Recorded: 04/29/2014;    Family History   Problem Relation Age of Onset    Breast Cancer Mother 52.00    Parkinsonism Mother     Breast Cancer Sister 72.00    Coronary Artery Disease Brother     Coronary Artery Disease Brother 78.00    Diabetes Brother     Coronary Artery Disease Father 67.00    Cancer Father         bladder    Social History     Socioeconomic History    Marital status:      Spouse name: Not on file    Number of children: Not " on file    Years of education: Not on file    Highest education level: Not on file   Occupational History    Not on file   Tobacco Use    Smoking status: Never    Smokeless tobacco: Never   Vaping Use    Vaping status: Never Used   Substance and Sexual Activity    Alcohol use: Not Currently     Alcohol/week: 4.0 standard drinks of alcohol    Drug use: No    Sexual activity: Not Currently   Other Topics Concern    Not on file   Social History Narrative    . Dated a gentlemen for 35 years who  .  Has 1 son, 2 grandchildren and 3 great grandchildren.  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  She is very social getting together with friends often, she is part of a [employer], she enjoys yoga and swimming at the [employer].  She used to golf.     Social Determinants of Health     Financial Resource Strain: Not on file   Food Insecurity: Not on file   Transportation Needs: Not on file   Physical Activity: Not on file   Stress: Not on file   Social Connections: Not on file   Intimate Partner Violence: Not on file   Housing Stability: Not on file          Medications  Allergies   Current Outpatient Medications   Medication Sig Dispense Refill    amoxicillin (AMOXIL) 500 MG capsule take 4 capsules prior to dental procedure 4 capsule 3    aspirin 81 MG EC tablet [ASPIRIN 81 MG EC TABLET] Take 81 mg by mouth daily.      atorvastatin (LIPITOR) 40 MG tablet Take 1 tablet (40 mg) by mouth daily 90 tablet 3    blood glucose (ACCU-CHEK AMIRA PLUS) test strip 1 strip by In Vitro route 3 times daily Use to test blood sugar 3 times daily or as directed. 100 strip 1    blood glucose monitoring (NO BRAND SPECIFIED) meter device kit by In Vitro route daily 1 kit 0    cholecalciferol, vitamin D3, (VITAMIN D3) 1,000 unit capsule [CHOLECALCIFEROL, VITAMIN D3, (VITAMIN D3) 1,000 UNIT CAPSULE] Take 1,000 Units by mouth daily.      cycloSPORINE (RESTASIS) 0.05 % ophthalmic emulsion Place 1 drop into both eyes 2  times daily      DENTAGEL 1.1 % GEL topical gel BRUSH ON TEETH 2 TIMES DAILY      generic lancets (ACCU-CHEK FASTCLIX LANCING DEV) [GENERIC LANCETS (ACCU-CHEK FASTCLIX LANCING DEV)] Use 1 each As Directed daily. 200 each 2    glucosamine-chondroitin 500-400 mg cap [GLUCOSAMINE-CHONDROITIN 500-400 MG CAP] Take 2 capsules by mouth daily.      losartan (COZAAR) 50 MG tablet Take 1 tablet (50 mg) by mouth daily 90 tablet 1    magnesium 250 mg Tab [MAGNESIUM 250 MG TAB] Take 1 tablet by mouth daily.      MELATONIN ORAL Take 10 mg by mouth nightly as needed (Sleep)      multivitamin therapeutic (THERAGRAN) tablet [MULTIVITAMIN THERAPEUTIC (THERAGRAN) TABLET] Take 1 tablet by mouth daily.      PROLIA 60 MG/ML SOSY injection INJECT 60MG SUBCUTANEOUSLY EVERY 6 MONTHS 1 mL 0    vit C/E/Zn/coppr/lutein/zeaxan (PRESERVISION AREDS-2 ORAL) [VIT C/E/ZN/COPPR/LUTEIN/ZEAXAN (PRESERVISION AREDS-2 ORAL)] Take 1 tablet by mouth 2 (two) times a day.        Allergies   Allergen Reactions    Ibuprofen Shortness Of Breath         Lab Results    Chemistry/lipid CBC Cardiac Enzymes/BNP/TSH/INR   Recent Labs   Lab Test 04/24/23  0723 08/31/22  1355 04/27/22  0937   TRIG  --   --  111   LDL  --   --  81   BUN 29.1*   < > 29*      < > 140   CO2 27   < > 30    < > = values in this interval not displayed.    Recent Labs   Lab Test 04/23/23  1056   WBC 8.1   HGB 11.7   HCT 35.1   MCV 91       Recent Labs   Lab Test 06/12/19  1039   TSH 1.95        A total of 50 minutes was spent reviewing patient's medical records, obtaining history and performing examination, as well as discussing diagnoses/ recommendations with patient and answering all questions.                          Thank you for allowing me to participate in the care of your patient.      Sincerely,     Garima Krueger MD     [facility]  cc:   Sharon Moreno MD  [address]  [address]  [place] [address]

## 2023-06-07 NOTE — PATIENT INSTRUCTIONS
Increase Losartan to 75 mg daily to help lower blood pressure further  Follow up with Vidhi Pearl on blood pressure and check blood pressures at home as well.  Consider stress test if no improvement in shortness of breath despite better blood pressures.

## 2023-06-13 ENCOUNTER — TELEPHONE (OUTPATIENT)
Dept: ENDOCRINOLOGY | Facility: CLINIC | Age: 88
End: 2023-06-13
Payer: COMMERCIAL

## 2023-06-13 NOTE — TELEPHONE ENCOUNTER
[facility] Call Center    Phone Message    May a detailed message be left on voicemail: yes     Reason for Call: Other: Patient calling to find out when her next prolia shot should be and then she would like to schedule it      Action Taken: Other: Endo    Travel Screening: Not Applicable

## 2023-06-22 ENCOUNTER — ALLIED HEALTH/NURSE VISIT (OUTPATIENT)
Dept: ENDOCRINOLOGY | Facility: CLINIC | Age: 88
End: 2023-06-22
Payer: COMMERCIAL

## 2023-06-22 DIAGNOSIS — M85.80 OSTEOPENIA: Primary | ICD-10-CM

## 2023-06-22 PROCEDURE — 99207 PR NO CHARGE NURSE ONLY: CPT

## 2023-06-22 NOTE — PROGRESS NOTES
Pt is here for an injection teaching. Pt was able to administer prolia into her lower abd. LOT [id number] exp. 8/31/2025    Pt denies any dental work in the last 4 weeks and denies any dental work upcoming in the next 4 weeks.

## 2023-06-28 ENCOUNTER — OFFICE VISIT (OUTPATIENT)
Dept: INTERNAL MEDICINE | Facility: CLINIC | Age: 88
End: 2023-06-28
Payer: COMMERCIAL

## 2023-06-28 VITALS
SYSTOLIC BLOOD PRESSURE: 152 MMHG | TEMPERATURE: 97.4 F | BODY MASS INDEX: 23.93 KG/M2 | HEART RATE: 72 BPM | RESPIRATION RATE: 18 BRPM | WEIGHT: 106.4 LBS | OXYGEN SATURATION: 99 % | DIASTOLIC BLOOD PRESSURE: 72 MMHG | HEIGHT: 56 IN

## 2023-06-28 DIAGNOSIS — I10 BENIGN ESSENTIAL HYPERTENSION: ICD-10-CM

## 2023-06-28 DIAGNOSIS — R73.9 HYPERGLYCEMIA: ICD-10-CM

## 2023-06-28 DIAGNOSIS — E11.65 TYPE 2 DIABETES MELLITUS WITH HYPERGLYCEMIA, WITHOUT LONG-TERM CURRENT USE OF INSULIN (H): ICD-10-CM

## 2023-06-28 DIAGNOSIS — Z76.0 ENCOUNTER FOR MEDICATION REFILL: ICD-10-CM

## 2023-06-28 DIAGNOSIS — M85.80 OSTEOPENIA, UNSPECIFIED LOCATION: ICD-10-CM

## 2023-06-28 DIAGNOSIS — Z78.0 POSTMENOPAUSAL STATUS: ICD-10-CM

## 2023-06-28 DIAGNOSIS — Z79.4 TYPE 2 DIABETES MELLITUS WITH HYPERGLYCEMIA, WITH LONG-TERM CURRENT USE OF INSULIN (H): ICD-10-CM

## 2023-06-28 DIAGNOSIS — Z13.220 SCREENING FOR HYPERLIPIDEMIA: ICD-10-CM

## 2023-06-28 DIAGNOSIS — J84.112 IPF (IDIOPATHIC PULMONARY FIBROSIS) (H): ICD-10-CM

## 2023-06-28 DIAGNOSIS — E11.65 TYPE 2 DIABETES MELLITUS WITH HYPERGLYCEMIA, WITH LONG-TERM CURRENT USE OF INSULIN (H): ICD-10-CM

## 2023-06-28 DIAGNOSIS — N18.30 STAGE 3 CHRONIC KIDNEY DISEASE, UNSPECIFIED WHETHER STAGE 3A OR 3B CKD (H): ICD-10-CM

## 2023-06-28 DIAGNOSIS — Z71.89 ADVANCED CARE PLANNING/COUNSELING DISCUSSION: ICD-10-CM

## 2023-06-28 DIAGNOSIS — Z12.31 ENCOUNTER FOR SCREENING MAMMOGRAM FOR BREAST CANCER: ICD-10-CM

## 2023-06-28 DIAGNOSIS — Z00.00 ENCOUNTER FOR MEDICARE ANNUAL WELLNESS EXAM: Primary | ICD-10-CM

## 2023-06-28 LAB
ALBUMIN SERPL BCG-MCNC: 4.6 G/DL (ref 3.5–5.2)
ALP SERPL-CCNC: 53 U/L (ref 35–104)
ALT SERPL W P-5'-P-CCNC: 25 U/L (ref 0–50)
ANION GAP SERPL CALCULATED.3IONS-SCNC: 13 MMOL/L (ref 7–15)
AST SERPL W P-5'-P-CCNC: 36 U/L (ref 0–45)
BILIRUB SERPL-MCNC: 0.4 MG/DL
BUN SERPL-MCNC: 27.8 MG/DL (ref 8–23)
CALCIUM SERPL-MCNC: 10.1 MG/DL (ref 8.2–9.6)
CHLORIDE SERPL-SCNC: 109 MMOL/L (ref 98–107)
CHOLEST SERPL-MCNC: 169 MG/DL
CREAT SERPL-MCNC: 0.86 MG/DL (ref 0.51–0.95)
CREAT UR-MCNC: 40.6 MG/DL
DEPRECATED HCO3 PLAS-SCNC: 26 MMOL/L (ref 22–29)
GFR SERPL CREATININE-BSD FRML MDRD: 61 ML/MIN/1.73M2
GLUCOSE SERPL-MCNC: 99 MG/DL (ref 70–99)
HBA1C MFR BLD: 6.4 % (ref 0–5.6)
HDLC SERPL-MCNC: 71 MG/DL
LDLC SERPL CALC-MCNC: 74 MG/DL
MICROALBUMIN UR-MCNC: 252 MG/L
MICROALBUMIN/CREAT UR: 620.69 MG/G CR (ref 0–25)
NONHDLC SERPL-MCNC: 98 MG/DL
POTASSIUM SERPL-SCNC: 4.8 MMOL/L (ref 3.4–5.3)
PROT SERPL-MCNC: 7.7 G/DL (ref 6.4–8.3)
SODIUM SERPL-SCNC: 148 MMOL/L (ref 136–145)
TRIGL SERPL-MCNC: 121 MG/DL
VIT B12 SERPL-MCNC: 1144 PG/ML (ref 232–1245)

## 2023-06-28 PROCEDURE — 36415 COLL VENOUS BLD VENIPUNCTURE: CPT | Performed by: NURSE PRACTITIONER

## 2023-06-28 PROCEDURE — G0439 PPPS, SUBSEQ VISIT: HCPCS | Performed by: NURSE PRACTITIONER

## 2023-06-28 PROCEDURE — 82607 VITAMIN B-12: CPT | Performed by: NURSE PRACTITIONER

## 2023-06-28 PROCEDURE — 80053 COMPREHEN METABOLIC PANEL: CPT | Performed by: NURSE PRACTITIONER

## 2023-06-28 PROCEDURE — 82306 VITAMIN D 25 HYDROXY: CPT | Performed by: NURSE PRACTITIONER

## 2023-06-28 PROCEDURE — 80061 LIPID PANEL: CPT | Performed by: NURSE PRACTITIONER

## 2023-06-28 PROCEDURE — 83036 HEMOGLOBIN GLYCOSYLATED A1C: CPT | Performed by: NURSE PRACTITIONER

## 2023-06-28 PROCEDURE — 82043 UR ALBUMIN QUANTITATIVE: CPT | Performed by: NURSE PRACTITIONER

## 2023-06-28 PROCEDURE — 82570 ASSAY OF URINE CREATININE: CPT | Performed by: NURSE PRACTITIONER

## 2023-06-28 RX ORDER — BLOOD SUGAR DIAGNOSTIC
1 STRIP MISCELLANEOUS 3 TIMES DAILY
Qty: 100 STRIP | Refills: 1 | Status: SHIPPED | OUTPATIENT
Start: 2023-06-28 | End: 2024-01-23

## 2023-06-28 RX ORDER — LOSARTAN POTASSIUM 50 MG/1
75 TABLET ORAL DAILY
Qty: 135 TABLET | Refills: 3 | Status: SHIPPED | OUTPATIENT
Start: 2023-06-28 | End: 2024-07-16

## 2023-06-28 RX ORDER — AMOXICILLIN 500 MG/1
CAPSULE ORAL
Qty: 4 CAPSULE | Refills: 3 | Status: SHIPPED | OUTPATIENT
Start: 2023-06-28 | End: 2023-07-18

## 2023-06-28 ASSESSMENT — ENCOUNTER SYMPTOMS
ABDOMINAL PAIN: 0
HEMATURIA: 0
CHILLS: 0
HEMATOCHEZIA: 0
COUGH: 1
CONSTIPATION: 0

## 2023-06-28 ASSESSMENT — ACTIVITIES OF DAILY LIVING (ADL): CURRENT_FUNCTION: NO ASSISTANCE NEEDED

## 2023-06-28 ASSESSMENT — PAIN SCALES - GENERAL: PAINLEVEL: NO PAIN (0)

## 2023-06-28 NOTE — PATIENT INSTRUCTIONS
Patient Education   Personalized Prevention Plan  You are due for the preventive services outlined below.  Your care team is available to assist you in scheduling these services.  If you have already completed any of these items, please share that information with your care team to update in your medical record.  Health Maintenance Due   Topic Date Due    Eye Exam  10/01/2021    Diabetic Foot Exam  02/19/2022    Cholesterol Lab  04/27/2023    A1C Lab  06/07/2023

## 2023-06-28 NOTE — PROGRESS NOTES
"SUBJECTIVE:   Patricia is a 89 year old who presents for Preventive Visit.      6/28/2023    11:21 AM   Additional Questions   Roomed by GIN         6/28/2023    11:21 AM   Patient Reported Additional Medications   Patient reports taking the following new medications No     Are you in the first 12 months of your Medicare coverage?  No    Healthy Habits:     In general, how would you rate your overall health?  Good    Frequency of exercise:  2-3 days/week    Duration of exercise:  15-30 minutes    Do you usually eat at least 4 servings of fruit and vegetables a day, include whole grains    & fiber and avoid regularly eating high fat or \"junk\" foods?  Yes    Taking medications regularly:  Yes    Medication side effects:  Not applicable    Ability to successfully perform activities of daily living:  No assistance needed    Home Safety:  Throw rugs in the hallway    Hearing Impairment:  No hearing concerns    In the past 6 months, have you been bothered by leaking of urine?  No    In general, how would you rate your overall mental or emotional health?  Good      PHQ-2 Total Score: 0    Additional concerns today:  Yes        Have you ever done Advance Care Planning? (For example, a Health Directive, POLST, or a discussion with a medical provider or your loved ones about your wishes): Yes, patient states has an Advance Care Planning document and will bring a copy to the clinic.       Fall risk  Fallen 2 or more times in the past year?: No  Any fall with injury in the past year?: No    Cognitive Screening   1) Repeat 3 items (Leader, Season, Table)    2) Clock draw: NORMAL  3) 3 item recall: Recalls 3 objects  Results: 3 items recalled: COGNITIVE IMPAIRMENT LESS LIKELY    Mini-CogTM Copyright HUSSAIN Williamson. Licensed by the author for use in [facility]; reprinted with permission (tennille@.[facility]). All rights reserved.      Do you have sleep apnea, excessive snoring or daytime drowsiness?: no    Reviewed and updated as " needed this visit by clinical staff   Tobacco  Allergies  Meds   Med Hx  Surg Hx  Fam Hx  Soc Hx        Reviewed and updated as needed this visit by Provider                 Social History     Tobacco Use     Smoking status: Never     Smokeless tobacco: Never   Substance Use Topics     Alcohol use: Yes     Alcohol/week: 1.0 standard drink of alcohol     Types: 1 Standard drinks or equivalent per week     Comment: 2 glasses of wine monthly             6/28/2023    11:18 AM   Alcohol Use   Prescreen: >3 drinks/day or >7 drinks/week? No          No data to display              Do you have a current opioid prescription? No  Do you use any other controlled substances or medications that are not prescribed by a provider? Alcohol          Current providers sharing in care for this patient include:   Patient Care Team:  Vidhi Pearl NP as PCP - General  Vidhi Pearl NP as Assigned PCP  Verónica Lopez NP as Nurse Practitioner  Eufemia Morse MD as Assigned Pulmonology Provider  Garima Krueger MD as Assigned Heart and Vascular Provider    The following health maintenance items are reviewed in Epic and correct as of today:  Health Maintenance   Topic Date Due     EYE EXAM  10/01/2021     DIABETIC FOOT EXAM  02/19/2022     LIPID  04/27/2023     A1C  06/07/2023     MICROALBUMIN  12/07/2023     HEMOGLOBIN  04/23/2024     BMP  04/24/2024     MEDICARE ANNUAL WELLNESS VISIT  06/28/2024     ANNUAL REVIEW OF HM ORDERS  06/28/2024     FALL RISK ASSESSMENT  06/28/2024     ADVANCE CARE PLANNING  06/28/2028     DTAP/TDAP/TD IMMUNIZATION (3 - Td or Tdap) 06/18/2031     PHQ-2 (once per calendar year)  Completed     INFLUENZA VACCINE  Completed     Pneumococcal Vaccine: 65+ Years  Completed     URINALYSIS  Completed     ZOSTER IMMUNIZATION  Completed     COVID-19 Vaccine  Completed     IPV IMMUNIZATION  Aged Out     MENINGITIS IMMUNIZATION  Aged Out     Lab work is in process  Mammogram Screening: Mammogram Screening -  "Patient over age 75, has elected to discontinue screenings.      Pertinent mammograms are reviewed under the imaging tab.    Review of Systems   Constitutional: Negative for chills.   HENT: Negative for congestion.    Respiratory: Positive for cough.    Cardiovascular: Negative for chest pain.   Gastrointestinal: Negative for abdominal pain, constipation and hematochezia.   Genitourinary: Negative for hematuria.     Constitutional, HEENT, cardiovascular, pulmonary, gi and gu systems are negative, except as otherwise noted.    OBJECTIVE:   BP (!) 191/72 (BP Location: Right arm, Patient Position: Sitting, Cuff Size: Adult Regular)   Pulse 72   Temp 97.4  F (36.3  C) (Temporal)   Resp 18   Ht 1.422 m (4' 8\")   Wt 48.3 kg (106 lb 6.4 oz)   LMP  (LMP Unknown)   SpO2 99%   BMI 23.85 kg/m   Estimated body mass index is 23.85 kg/m  as calculated from the following:    Height as of this encounter: 1.422 m (4' 8\").    Weight as of this encounter: 48.3 kg (106 lb 6.4 oz).  Physical Exam  GENERAL: healthy, alert and no distress    Diagnostic Test Results:  Labs reviewed in Epic    ASSESSMENT / PLAN:   Patricia was seen today for physical.    Diagnoses and all orders for this visit:    Encounter for Medicare annual wellness exam  -     PRIMARY CARE FOLLOW-UP SCHEDULING; Future  -     REVIEW OF HEALTH MAINTENANCE PROTOCOL ORDERS  -     Vitamin B12; Future    Benign essential hypertension  -     losartan (COZAAR) 50 MG tablet; Take 1.5 tablets (75 mg) by mouth daily  -     Comprehensive metabolic panel (BMP + Alb, Alk Phos, ALT, AST, Total. Bili, TP); Future    Hyperglycemia    Type 2 diabetes mellitus with hyperglycemia, with long-term current use of insulin (H)    Advanced care planning/counseling discussion    Type 2 diabetes mellitus with hyperglycemia, without long-term current use of insulin (H)  -     blood glucose (ACCU-CHEK AMIRA PLUS) test strip; 1 strip by In Vitro route 3 times daily Use to test blood sugar 3 " times daily or as directed.  -     Hemoglobin A1c; Future  -     Albumin Random Urine Quantitative with Creat Ratio; Future    Osteopenia, unspecified location  -     Vitamin D Deficiency; Future    Postmenopausal status  -     Vitamin D Deficiency; Future    Stage 3 chronic kidney disease, unspecified whether stage 3a or 3b CKD (H)  -     Albumin Random Urine Quantitative with Creat Ratio; Future  -     Comprehensive metabolic panel (BMP + Alb, Alk Phos, ALT, AST, Total. Bili, TP); Future    Encounter for screening mammogram for breast cancer  -     MA Screen Bilateral w/Pierre; Future    Encounter for medication refill  -     amoxicillin (AMOXIL) 500 MG capsule; take 4 capsules prior to dental procedure    IPF (idiopathic pulmonary fibrosis) (H)    Screening for hyperlipidemia  -     Lipid Profile (Chol, Trig, HDL, LDL calc); Future        Patient has been advised of split billing requirements and indicates understanding: Yes      COUNSELING:  Reviewed preventive health counseling, as reflected in patient instructions        She reports that she has never smoked. She has never used smokeless tobacco.      Appropriate preventive services were discussed with this patient, including applicable screening as appropriate for cardiovascular disease, diabetes, osteopenia/osteoporosis, and glaucoma.  As appropriate for age/gender, discussed screening for colorectal cancer, prostate cancer, breast cancer, and cervical cancer. Checklist reviewing preventive services available has been given to the patient.    Reviewed patients plan of care and provided an AVS. The Basic Care Plan (routine screening as documented in Health Maintenance) for Patricia meets the Care Plan requirement. This Care Plan has been established and reviewed with the Patient.          Vidhi Pearl NP  [facility]    Identified Health Risks:    I have reviewed Opioid Use Disorder and Substance Use Disorder risk factors and made any  needed referrals.      Cardiac

## 2023-06-29 LAB — DEPRECATED CALCIDIOL+CALCIFEROL SERPL-MC: 49 UG/L (ref 20–75)

## 2023-07-10 ENCOUNTER — TELEPHONE (OUTPATIENT)
Dept: INTERNAL MEDICINE | Facility: CLINIC | Age: 88
End: 2023-07-10
Payer: COMMERCIAL

## 2023-07-11 ENCOUNTER — DOCUMENTATION ONLY (OUTPATIENT)
Dept: OTHER | Facility: CLINIC | Age: 88
End: 2023-07-11
Payer: COMMERCIAL

## 2023-07-12 ENCOUNTER — OFFICE VISIT (OUTPATIENT)
Dept: FAMILY MEDICINE | Facility: CLINIC | Age: 88
End: 2023-07-12
Payer: COMMERCIAL

## 2023-07-12 VITALS
OXYGEN SATURATION: 99 % | WEIGHT: 106 LBS | DIASTOLIC BLOOD PRESSURE: 90 MMHG | TEMPERATURE: 98.1 F | HEART RATE: 86 BPM | SYSTOLIC BLOOD PRESSURE: 150 MMHG | RESPIRATION RATE: 14 BRPM | HEIGHT: 56 IN | BODY MASS INDEX: 23.84 KG/M2

## 2023-07-12 DIAGNOSIS — R30.0 DYSURIA: Primary | ICD-10-CM

## 2023-07-12 LAB
ALBUMIN UR-MCNC: 100 MG/DL
APPEARANCE UR: CLEAR
BACTERIA #/AREA URNS HPF: ABNORMAL /HPF
BILIRUB UR QL STRIP: NEGATIVE
COLOR UR AUTO: YELLOW
GLUCOSE UR STRIP-MCNC: NEGATIVE MG/DL
HGB UR QL STRIP: ABNORMAL
KETONES UR STRIP-MCNC: NEGATIVE MG/DL
LEUKOCYTE ESTERASE UR QL STRIP: ABNORMAL
NITRATE UR QL: NEGATIVE
PH UR STRIP: 7 [PH] (ref 5–8)
RBC #/AREA URNS AUTO: ABNORMAL /HPF
SP GR UR STRIP: 1.01 (ref 1–1.03)
SQUAMOUS #/AREA URNS AUTO: ABNORMAL /LPF
UROBILINOGEN UR STRIP-ACNC: 0.2 E.U./DL
WBC #/AREA URNS AUTO: ABNORMAL /HPF
WBC CLUMPS #/AREA URNS HPF: PRESENT /HPF

## 2023-07-12 PROCEDURE — 87086 URINE CULTURE/COLONY COUNT: CPT | Performed by: FAMILY MEDICINE

## 2023-07-12 PROCEDURE — 99213 OFFICE O/P EST LOW 20 MIN: CPT | Performed by: FAMILY MEDICINE

## 2023-07-12 PROCEDURE — 81001 URINALYSIS AUTO W/SCOPE: CPT | Performed by: FAMILY MEDICINE

## 2023-07-12 PROCEDURE — 87186 SC STD MICRODIL/AGAR DIL: CPT | Performed by: FAMILY MEDICINE

## 2023-07-12 PROCEDURE — 87088 URINE BACTERIA CULTURE: CPT | Performed by: FAMILY MEDICINE

## 2023-07-12 RX ORDER — CIPROFLOXACIN 250 MG/1
250 TABLET, FILM COATED ORAL 2 TIMES DAILY
Qty: 14 TABLET | Refills: 0 | Status: SHIPPED | OUTPATIENT
Start: 2023-07-12 | End: 2023-10-25

## 2023-07-12 NOTE — PROGRESS NOTES
Assessment & Plan     Dysuria  Small amount of urine and will run UA if possible.  Reviewed previous notes and will do cipro and if no improvement by Monday then pt to call and will check culture.  - UA Macroscopic with reflex to Microscopic and Culture - Clinic Collect  - ciprofloxacin (CIPRO) 250 MG tablet  Dispense: 14 tablet; Refill: 0                   Tina Rodgers MD  [facility] TOMMIE Gomez is a 89 year old, presenting for the following health issues:  UTI        7/12/2023    11:10 AM   Additional Questions   Roomed by Adali FARRELL CMA     UTI    History of Present Illness       Reason for visit:  Uti  Symptom onset:  1-3 days ago    She eats 2-3 servings of fruits and vegetables daily.She consumes 1 sweetened beverage(s) daily.She exercises with enough effort to increase her heart rate 9 or less minutes per day.  She exercises with enough effort to increase her heart rate 3 or less days per week.   She is taking medications regularly.       Genitourinary - Female  Onset/Duration: 07/09/2023  Description:   Painful urination (Dysuria): No           Frequency: No  Blood in urine (Hematuria): No  Delay in urine (Hesitency): No  Intensity: moderate  Progression of Symptoms:  same  Accompanying Signs & Symptoms:  Fever/chills: No  Flank pain: No  Nausea and vomiting: No  Vaginal symptoms: none  Abdominal/Pelvic Pain: No  History:   History of frequent UTI s: YES  History of kidney stones: No  Sexually Active: N/A  Possibility of pregnancy: No  Precipitating or alleviating factors: prescription  Therapies tried and outcome:  None         Cloudy urine and abnormal urine.  Feels an abnormal sensation up in the upper chest when urinating.  No fevers or chills, no blood in urine.  Last UTI was last August.  Dizziness in the past but not had it since then.  Feels like this is similar to how she felt on last UTI.     Review of Systems   Constitutional, HEENT, cardiovascular,  "pulmonary, gi and gu systems are negative, except as otherwise noted.      Objective    BP (!) 150/90 (BP Location: Right arm, Patient Position: Sitting, Cuff Size: Adult Regular)   Pulse 86   Temp 98.1  F (36.7  C) (Oral)   Resp 14   Ht 1.422 m (4' 8\")   Wt 48.1 kg (106 lb)   LMP  (LMP Unknown)   SpO2 99%   BMI 23.76 kg/m    Body mass index is 23.76 kg/m .  Physical Exam   GENERAL: healthy, alert and no distress  NECK: no adenopathy, no asymmetry, masses, or scars and thyroid normal to palpation  MS: no gross musculoskeletal defects noted, no edema                    "

## 2023-07-13 LAB — BACTERIA UR CULT: ABNORMAL

## 2023-07-18 ENCOUNTER — TELEPHONE (OUTPATIENT)
Dept: INTERNAL MEDICINE | Facility: CLINIC | Age: 88
End: 2023-07-18
Payer: COMMERCIAL

## 2023-07-18 NOTE — TELEPHONE ENCOUNTER
Left message for son to call back. When he calls back please see if patient is symptomatic since her urine culture is positive.

## 2023-07-18 NOTE — TELEPHONE ENCOUNTER
----- Message from Isah Bone DO sent at 7/17/2023  4:48 PM CDT -----  Covering for PCP     Attempted to call to see if patient is symptomatic but one picked up. Could we try t to call again.

## 2023-07-19 NOTE — TELEPHONE ENCOUNTER
Son calling back in regards to VM below.  Message from provider relayed.  Son states he does not believe patient to be symptomatic at this time as she started an rx a few days ago.  He will call her to verify and call back if she is having symptoms.  If she is not, he states no further action is needed.

## 2023-09-29 NOTE — TELEPHONE ENCOUNTER
Test Results    Contacts       Type Contact Phone/Fax    07/10/2023 03:17 PM CDT Phone (Incoming) Patricia Bose (Self) [phone] (M)          Who ordered the test:  PCP Vidhi Pearl NP    Type of test: Lab    Date of test:  06/28/2023    Where was the test performed:  [facility]    What are your questions/concerns?:  Requesting lab results mailed to address of record.  Her son has a computer but he is out of town patient states she can not access [employer] unless son is at her home.      
Lab result letter printed & placed in outgoing mail to be sent to home address    
Duration Of Freeze Thaw-Cycle (Seconds): 3
Application Tool (Optional): Cry-AC
Show Applicator Variable?: Yes
Detail Level: Detailed
Consent: The patient's consent was obtained including but not limited to risks of crusting, scabbing, blistering, scarring, darker or lighter pigmentary change, recurrence, incomplete removal and infection.
Render Post-Care Instructions In Note?: no
Number Of Freeze-Thaw Cycles: 2 freeze-thaw cycles
Post-Care Instructions: I reviewed with the patient in detail post-care instructions. Patient is to wear sunprotection, and avoid picking at any of the treated lesions. Pt may apply Vaseline to crusted or scabbing areas.

## 2023-10-25 ENCOUNTER — HOSPITAL ENCOUNTER (OUTPATIENT)
Dept: GENERAL RADIOLOGY | Facility: HOSPITAL | Age: 88
Discharge: HOME | End: 2023-10-25
Attending: NURSE PRACTITIONER
Payer: COMMERCIAL

## 2023-10-25 ENCOUNTER — OFFICE VISIT (OUTPATIENT)
Dept: INTERNAL MEDICINE | Facility: CLINIC | Age: 88
End: 2023-10-25
Payer: COMMERCIAL

## 2023-10-25 VITALS
WEIGHT: 103.5 LBS | TEMPERATURE: 97.8 F | HEART RATE: 84 BPM | BODY MASS INDEX: 23.28 KG/M2 | RESPIRATION RATE: 16 BRPM | HEIGHT: 56 IN | DIASTOLIC BLOOD PRESSURE: 62 MMHG | OXYGEN SATURATION: 97 % | SYSTOLIC BLOOD PRESSURE: 168 MMHG
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
## 2023-10-25 DIAGNOSIS — M25.511 ACUTE PAIN OF RIGHT SHOULDER: ICD-10-CM

## 2023-10-25 PROCEDURE — 73502 X-RAY EXAM HIP UNI 2-3 VIEWS: CPT

## 2023-10-25 PROCEDURE — 73030 X-RAY EXAM OF SHOULDER: CPT | Mod: LT

## 2023-10-25 PROCEDURE — 99213 OFFICE O/P EST LOW 20 MIN: CPT | Performed by: NURSE PRACTITIONER

## 2023-10-25 ASSESSMENT — PAIN SCALES - GENERAL: PAINLEVEL: SEVERE PAIN (7)

## 2023-10-25 NOTE — PROGRESS NOTES
"  Assessment & Plan   Problem List Items Addressed This Visit    None  Visit Diagnoses     Fall, initial encounter    -  Primary    Relevant Orders    XR Shoulder Left G/E 3 Views (Completed)    XR Hip Right 2-3 Views (Completed)    Acute pain of right shoulder        Relevant Orders    Orthopedic  Referral       89-year-old female patient presents to clinic today accompanied by family member chief concern for ongoing left shoulder and right hip pain which occurred 3 weeks ago.  Patient reports that she was outside her home watering her flowers when she lost her balance falling onto the concrete.  She reports ongoing lateral left shoulder and bicep discomfort as well as right lateral hip pain..  No numbness tingling or focal weakness.           Vidhi Pearl NP  [facility] TOMMIE Jacobs  from a fall  Patricia is a 89 year old, presenting for the following health issues:  Fall      HPI       Concern - Fall 3wks ago   Onset: 3wks ago   Description: Fell a couple weeks ago. Left shoulder and right thigh bone is hurting.   Intensity: 7/10  Progression of Symptoms:  same  Accompanying Signs & Symptoms: NA  Previous history of similar problem: NA  Precipitating factors:        Worsened by: Movement   Alleviating factors:        Improved by: Rest   Therapies tried and outcome: tylenol         Review of Systems   Constitutional, HEENT, cardiovascular, pulmonary, GI, , musculoskeletal, neuro, skin, endocrine and psych systems are negative, except as otherwise noted.      Objective    BP (!) 168/62 (BP Location: Right arm, Patient Position: Sitting, Cuff Size: Adult Regular)   Pulse 84   Temp 97.8  F (36.6  C) (Oral)   Resp 16   Ht 1.422 m (4' 8\")   Wt 46.9 kg (103 lb 8 oz)   LMP  (LMP Unknown)   SpO2 97%   Breastfeeding No   BMI 23.20 kg/m    Body mass index is 23.2 kg/m .  Physical Exam   GENERAL: healthy, alert and no distress  EYES: Eyes grossly normal to inspection,  " conjunctivae and sclerae normal  RESP: Respirations regular nonlabored  MS: Left shoulder patient has anterior left shoulder pain into the left bicep with reduced range of motion most prominent with rotation internally as well as with abduction pulses intact strength is equal throughout.  Patient has right lateral hip pain with small amount of swelling which patient indicates has significantly improved over the last couple of weeks.  SKIN: no suspicious lesions or rashes  PSYCH: mentation appears normal, affect normal/bright          Current Outpatient Medications:      aspirin 81 MG EC tablet, [ASPIRIN 81 MG EC TABLET] Take 81 mg by mouth daily., Disp: , Rfl:      atorvastatin (LIPITOR) 40 MG tablet, Take 1 tablet (40 mg) by mouth daily, Disp: 90 tablet, Rfl: 3     blood glucose (ACCU-CHEK AMIRA PLUS) test strip, 1 strip by In Vitro route 3 times daily Use to test blood sugar 3 times daily or as directed., Disp: 100 strip, Rfl: 1     blood glucose monitoring (NO BRAND SPECIFIED) meter device kit, by In Vitro route daily, Disp: 1 kit, Rfl: 0     cholecalciferol, vitamin D3, (VITAMIN D3) 1,000 unit capsule, [CHOLECALCIFEROL, VITAMIN D3, (VITAMIN D3) 1,000 UNIT CAPSULE] Take 1,000 Units by mouth daily., Disp: , Rfl:      cycloSPORINE (RESTASIS) 0.05 % ophthalmic emulsion, Place 1 drop into both eyes 2 times daily, Disp: , Rfl:      DENTAGEL 1.1 % GEL topical gel, BRUSH ON TEETH 2 TIMES DAILY, Disp: , Rfl:      generic lancets (ACCU-CHEK FASTCLIX LANCING DEV), [GENERIC LANCETS (ACCU-CHEK FASTCLIX LANCING DEV)] Use 1 each As Directed daily., Disp: 200 each, Rfl: 2     glucosamine-chondroitin 500-400 mg cap, [GLUCOSAMINE-CHONDROITIN 500-400 MG CAP] Take 2 capsules by mouth daily., Disp: , Rfl:      losartan (COZAAR) 50 MG tablet, Take 1.5 tablets (75 mg) by mouth daily, Disp: 135 tablet, Rfl: 3     magnesium 250 mg Tab, [MAGNESIUM 250 MG TAB] Take 1 tablet by mouth daily., Disp: , Rfl:      MELATONIN ORAL, Take 10 mg by  mouth nightly as needed (Sleep), Disp: , Rfl:      multivitamin therapeutic (THERAGRAN) tablet, [MULTIVITAMIN THERAPEUTIC (THERAGRAN) TABLET] Take 1 tablet by mouth daily., Disp: , Rfl:      PROLIA 60 MG/ML SOSY injection, INJECT 60MG SUBCUTANEOUSLY EVERY 6 MONTHS, Disp: 1 mL, Rfl: 0     vit C/E/Zn/coppr/lutein/zeaxan (PRESERVISION AREDS-2 ORAL), [VIT C/E/ZN/COPPR/LUTEIN/ZEAXAN (PRESERVISION AREDS-2 ORAL)] Take 1 tablet by mouth 2 (two) times a day., Disp: , Rfl:

## 2023-10-26 ENCOUNTER — MYC MEDICAL ADVICE (OUTPATIENT)
Dept: INTERNAL MEDICINE | Facility: CLINIC | Age: 88
End: 2023-10-26
Payer: COMMERCIAL

## 2023-11-01 ENCOUNTER — TELEPHONE (OUTPATIENT)
Dept: INTERNAL MEDICINE | Facility: CLINIC | Age: 88
End: 2023-11-01
Payer: COMMERCIAL

## 2023-11-01 DIAGNOSIS — M25.511 ACUTE PAIN OF RIGHT SHOULDER: Primary | ICD-10-CM

## 2023-11-01 NOTE — TELEPHONE ENCOUNTER
insurance needs a referral for home health service for physcial therapy to be placed    Looking for ASAP if possible

## 2023-11-06 NOTE — TELEPHONE ENCOUNTER
Placed care coordination referral to assist patient in finding home care as [facility] unable to accept it due to capacity.

## 2023-11-07 ENCOUNTER — PATIENT OUTREACH (OUTPATIENT)
Dept: CARE COORDINATION | Facility: CLINIC | Age: 88
End: 2023-11-07
Payer: COMMERCIAL

## 2023-11-07 NOTE — PROGRESS NOTES
Contact   Chart Review     Situation: Patient chart reviewed by .    Background: needs home care    Assessment: Sent email to [facility] [employer]    Plan/Recommendations: Will look for home care.  [facility] referral. No further outreach.     Bev Bales,   [facility]  [phone]

## 2023-11-22 ENCOUNTER — OFFICE VISIT (OUTPATIENT)
Dept: PULMONOLOGY | Facility: CLINIC | Age: 88
End: 2023-11-22
Payer: COMMERCIAL

## 2023-11-22 VITALS
DIASTOLIC BLOOD PRESSURE: 78 MMHG | SYSTOLIC BLOOD PRESSURE: 126 MMHG | WEIGHT: 104 LBS | HEART RATE: 88 BPM | OXYGEN SATURATION: 98 % | BODY MASS INDEX: 23.32 KG/M2

## 2023-11-22 DIAGNOSIS — J84.112 IPF (IDIOPATHIC PULMONARY FIBROSIS) (H): Primary | ICD-10-CM

## 2023-11-22 PROCEDURE — 99213 OFFICE O/P EST LOW 20 MIN: CPT | Performed by: INTERNAL MEDICINE

## 2023-11-22 RX ORDER — CODEINE PHOSPHATE AND GUAIFENESIN 10; 100 MG/5ML; MG/5ML
5-10 SOLUTION ORAL EVERY 4 HOURS PRN
Qty: 473 ML | Refills: 3 | Status: SHIPPED | OUTPATIENT
Start: 2023-11-22

## 2023-11-22 RX ORDER — CODEINE PHOSPHATE AND GUAIFENESIN 10; 100 MG/5ML; MG/5ML
5-10 SOLUTION ORAL EVERY 4 HOURS PRN
COMMUNITY
End: 2023-11-22

## 2023-11-22 NOTE — PROGRESS NOTES
Pulmonary Clinic Follow-up Visit    Assessment:  IPF that has been overall stable. Cough well controlled with nightly codeine use. At this time, low utility in repeating PFTs or further imaging as treatment as this time may bring along more risks than benefit given how well she is doing.   Edema -- mild pitting edema to bilateral ankles. EF in April normal. Mild valvular disease seen on echo. Discussed following up with PCP if not getting better.        Plan:  -refill robitussin  -monitor with home O2, if <90% or increased shortness of breath she will call our clinic   -follow-up in a year      Grace Mcguire MD  [facility] Resident. PGY3    ----------------------------    CCx: Follow-up IPF    HPI: 89 year old female with history of DMII, hypertension, hyperlipidemia and very mild IPF presents for follow up. She is a former patient of Dr. Garces.    Last seen on 10/10/2022 -- at that visit discussed very minimal worsening of IPF. Offered repeat PFTs which she preferred not to do. Symptoms had been stable and well controlled with very intermittent codeine use.     Today, stable with coughing. Just taking codeine at night. Likes coming in to listen to lungs to see if things have changed even though she feels things have been stable.     Also endorsing mild ankle swelling. Unsure when it began.       Current Meds:  Current Outpatient Medications   Medication Sig Dispense Refill    aspirin 81 MG EC tablet [ASPIRIN 81 MG EC TABLET] Take 81 mg by mouth daily.      atorvastatin (LIPITOR) 40 MG tablet Take 1 tablet (40 mg) by mouth daily 90 tablet 3    blood glucose (ACCU-CHEK AMIRA PLUS) test strip 1 strip by In Vitro route 3 times daily Use to test blood sugar 3 times daily or as directed. 100 strip 1    blood glucose monitoring (NO BRAND SPECIFIED) meter device kit by In Vitro route daily 1 kit 0    cholecalciferol, vitamin D3, (VITAMIN D3) 1,000 unit capsule [CHOLECALCIFEROL, VITAMIN D3, (VITAMIN D3)  1,000 UNIT CAPSULE] Take 1,000 Units by mouth daily.      cycloSPORINE (RESTASIS) 0.05 % ophthalmic emulsion Place 1 drop into both eyes 2 times daily      DENTAGEL 1.1 % GEL topical gel BRUSH ON TEETH 2 TIMES DAILY      generic lancets (ACCU-CHEK FASTCLIX LANCING DEV) [GENERIC LANCETS (ACCU-CHEK FASTCLIX LANCING DEV)] Use 1 each As Directed daily. 200 each 2    glucosamine-chondroitin 500-400 mg cap [GLUCOSAMINE-CHONDROITIN 500-400 MG CAP] Take 2 capsules by mouth daily.      guaiFENesin-codeine (ROBITUSSIN AC) 100-10 MG/5ML solution Take 5-10 mLs by mouth every 4 hours as needed for cough      losartan (COZAAR) 50 MG tablet Take 1.5 tablets (75 mg) by mouth daily 135 tablet 3    magnesium 250 mg Tab [MAGNESIUM 250 MG TAB] Take 1 tablet by mouth daily.      MELATONIN ORAL Take 10 mg by mouth nightly as needed (Sleep)      multivitamin therapeutic (THERAGRAN) tablet [MULTIVITAMIN THERAPEUTIC (THERAGRAN) TABLET] Take 1 tablet by mouth daily.      PROLIA 60 MG/ML SOSY injection INJECT 60MG SUBCUTANEOUSLY EVERY 6 MONTHS 1 mL 0    vit C/E/Zn/coppr/lutein/zeaxan (PRESERVISION AREDS-2 ORAL) [VIT C/E/ZN/COPPR/LUTEIN/ZEAXAN (PRESERVISION AREDS-2 ORAL)] Take 1 tablet by mouth 2 (two) times a day.         Physical Exam:  /78 (BP Location: Left arm, Patient Position: Chair, Cuff Size: Adult Small)   Pulse 88   Wt 47.2 kg (104 lb)   LMP  (LMP Unknown)   SpO2 98%   BMI 23.32 kg/m    Gen: alert, oriented, no distress  HEENT: no lymphadenopathy  Neck: Trachea midline, no accessory muscle use  CV: RRR. Mild systolic murmur  Resp: crackles bilaterally, predominantly in bases  Abd: soft, nontender, no palpable organomegaly  Skin: no apparent rashes  Ext: no cyanosis, clubbing. Trace edema to bilateral ankles.  Neuro: alert, nonfocal    Labs:  CBC RESULTS:   Recent Labs   Lab Test 04/23/23  1056   WBC 8.1   RBC 3.85   HGB 11.7   HCT 35.1   MCV 91   MCH 30.4   MCHC 33.3   RDW 14.0        Sodium   Date Value Ref Range  Status   06/28/2023 148 (H) 136 - 145 mmol/L Final     Potassium   Date Value Ref Range Status   06/28/2023 4.8 3.4 - 5.3 mmol/L Final   08/31/2022 4.7 3.5 - 5.0 mmol/L Final     Chloride   Date Value Ref Range Status   06/28/2023 109 (H) 98 - 107 mmol/L Final   08/31/2022 100 98 - 107 mmol/L Final     Carbon Dioxide (CO2)   Date Value Ref Range Status   06/28/2023 26 22 - 29 mmol/L Final   08/31/2022 26 22 - 31 mmol/L Final     Anion Gap   Date Value Ref Range Status   06/28/2023 13 7 - 15 mmol/L Final   08/31/2022 10 5 - 18 mmol/L Final     Glucose   Date Value Ref Range Status   06/28/2023 99 70 - 99 mg/dL Final   08/31/2022 101 70 - 125 mg/dL Final     GLUCOSE BY METER POCT   Date Value Ref Range Status   04/24/2023 157 (H) 70 - 99 mg/dL Final     Urea Nitrogen   Date Value Ref Range Status   06/28/2023 27.8 (H) 8.0 - 23.0 mg/dL Final   08/31/2022 28 8 - 28 mg/dL Final     Creatinine   Date Value Ref Range Status   06/28/2023 0.86 0.51 - 0.95 mg/dL Final     GFR Estimate   Date Value Ref Range Status   06/28/2023 61 >60 mL/min/1.73m2 Final   06/23/2021 53 (L) >60 mL/min/1.73m2 Final     Calcium   Date Value Ref Range Status   06/28/2023 10.1 (H) 8.2 - 9.6 mg/dL Final         Imaging studies: No new imaging  EXAM: CT CHEST W/O CONTRAST  LOCATION: [facility]  DATE/TIME: 9/13/2022 11:40 AM     INDICATION: Interstitial lung disease  COMPARISON: CT chest 7/25/2019 and 7/14/2015  TECHNIQUE: CT chest without IV contrast. Multiplanar reformats were obtained. Dose reduction techniques were used.  CONTRAST: None.     FINDINGS:   LUNGS AND PLEURA: Minimal tracheal secretions. Mild interval progression of mild to moderate basilar predominant fibrotic changes including subpleural reticulation, architectural distortion and traction bronchiectasis. Minimal subpleural honeycombing. No   focal consolidation or pleural effusion.  Scattered areas of mild endobronchial mucoid impaction.  Stable benign 3 mm  left major fissural nodule image 163 series 4. No follow-up is indicated.     MEDIASTINUM/AXILLAE: No thoracic adenopathy. Normal heart size and no pericardial effusion. Small hiatal hernia.     CORONARY ARTERY CALCIFICATION: Severe.     UPPER ABDOMEN: No significant finding.     MUSCULOSKELETAL: Incidental T8 vertebral body osseous hemangioma.                                                                      IMPRESSION:   1.  Mild interval progression of mild to moderate UIP pattern interstitial lung disease.  2.  Bilateral scattered endobronchial mucoid impaction consistent with bronchiolitis.        PFT's from 2018  FEV1/FVC  is normal  FEV1 is normal  FVC is normal  There was no improvement in spirometry after a single inhaled dose of bronchodilator  TLC is normal  DLCO is reduced to 55 when it is corrected for hemoglobin.

## 2023-12-30 ENCOUNTER — HEALTH MAINTENANCE LETTER (OUTPATIENT)
Age: 88
End: 2023-12-30

## 2024-01-08 ENCOUNTER — LAB (OUTPATIENT)
Dept: LAB | Facility: CLINIC | Age: 89
End: 2024-01-08
Payer: COMMERCIAL

## 2024-01-08 DIAGNOSIS — M85.80 OSTEOPENIA: ICD-10-CM

## 2024-01-08 LAB
CALCIUM SERPL-MCNC: 10.3 MG/DL (ref 8.2–9.6)
CREAT SERPL-MCNC: 0.84 MG/DL (ref 0.51–0.95)
EGFRCR SERPLBLD CKD-EPI 2021: 63 ML/MIN/1.73M2
VIT D+METAB SERPL-MCNC: 48 NG/ML (ref 20–50)

## 2024-01-08 PROCEDURE — 82565 ASSAY OF CREATININE: CPT

## 2024-01-08 PROCEDURE — 36415 COLL VENOUS BLD VENIPUNCTURE: CPT

## 2024-01-08 PROCEDURE — 82306 VITAMIN D 25 HYDROXY: CPT

## 2024-01-08 PROCEDURE — 82310 ASSAY OF CALCIUM: CPT

## 2024-01-11 ENCOUNTER — HOSPITAL ENCOUNTER (OUTPATIENT)
Facility: HOSPITAL | Age: 89
Setting detail: OBSERVATION
Discharge: HOME | End: 2024-01-13
Attending: EMERGENCY MEDICINE | Admitting: EMERGENCY MEDICINE
Payer: COMMERCIAL

## 2024-01-11 DIAGNOSIS — E11.65 TYPE 2 DIABETES MELLITUS WITH HYPERGLYCEMIA, WITHOUT LONG-TERM CURRENT USE OF INSULIN (H): ICD-10-CM

## 2024-01-11 DIAGNOSIS — R55 POSTURAL DIZZINESS WITH NEAR SYNCOPE: Primary | ICD-10-CM

## 2024-01-11 DIAGNOSIS — R42 POSTURAL DIZZINESS WITH NEAR SYNCOPE: Primary | ICD-10-CM

## 2024-01-11 DIAGNOSIS — R07.89 CHEST PRESSURE: ICD-10-CM

## 2024-01-11 DIAGNOSIS — I44.7 BUNDLE BRANCH BLOCK, LEFT: ICD-10-CM

## 2024-01-11 DIAGNOSIS — R55 SYNCOPE, UNSPECIFIED SYNCOPE TYPE: ICD-10-CM

## 2024-01-11 DIAGNOSIS — I10 ESSENTIAL HYPERTENSION: ICD-10-CM

## 2024-01-11 LAB
ANION GAP SERPL CALCULATED.3IONS-SCNC: 8 MMOL/L (ref 7–15)
BUN SERPL-MCNC: 31.1 MG/DL (ref 8–23)
CALCIUM SERPL-MCNC: 9.7 MG/DL (ref 8.2–9.6)
CHLORIDE SERPL-SCNC: 101 MMOL/L (ref 98–107)
CREAT SERPL-MCNC: 1 MG/DL (ref 0.51–0.95)
DEPRECATED HCO3 PLAS-SCNC: 26 MMOL/L (ref 22–29)
EGFRCR SERPLBLD CKD-EPI 2021: 51 ML/MIN/1.73M2
GLUCOSE SERPL-MCNC: 180 MG/DL (ref 70–99)
HOLD SPECIMEN: NORMAL
MAGNESIUM SERPL-MCNC: 1.9 MG/DL (ref 1.7–2.3)
NT-PROBNP SERPL-MCNC: 731 PG/ML (ref 0–1800)
POTASSIUM SERPL-SCNC: 3.7 MMOL/L (ref 3.4–5.3)
SODIUM SERPL-SCNC: 135 MMOL/L (ref 135–145)
TROPONIN T SERPL HS-MCNC: 23 NG/L

## 2024-01-11 PROCEDURE — 85027 COMPLETE CBC AUTOMATED: CPT | Performed by: EMERGENCY MEDICINE

## 2024-01-11 PROCEDURE — 84484 ASSAY OF TROPONIN QUANT: CPT | Performed by: EMERGENCY MEDICINE

## 2024-01-11 PROCEDURE — 36415 COLL VENOUS BLD VENIPUNCTURE: CPT | Performed by: EMERGENCY MEDICINE

## 2024-01-11 PROCEDURE — 80048 BASIC METABOLIC PNL TOTAL CA: CPT | Performed by: EMERGENCY MEDICINE

## 2024-01-11 PROCEDURE — 83735 ASSAY OF MAGNESIUM: CPT | Performed by: EMERGENCY MEDICINE

## 2024-01-11 PROCEDURE — 93005 ELECTROCARDIOGRAM TRACING: CPT | Performed by: STUDENT IN AN ORGANIZED HEALTH CARE EDUCATION/TRAINING PROGRAM

## 2024-01-11 PROCEDURE — 99285 EMERGENCY DEPT VISIT HI MDM: CPT | Mod: 25

## 2024-01-11 PROCEDURE — 83880 ASSAY OF NATRIURETIC PEPTIDE: CPT | Performed by: EMERGENCY MEDICINE

## 2024-01-11 PROCEDURE — 83036 HEMOGLOBIN GLYCOSYLATED A1C: CPT | Performed by: HOSPITALIST

## 2024-01-11 PROCEDURE — 93005 ELECTROCARDIOGRAM TRACING: CPT | Performed by: EMERGENCY MEDICINE

## 2024-01-11 ASSESSMENT — ACTIVITIES OF DAILY LIVING (ADL): ADLS_ACUITY_SCORE: 33

## 2024-01-12 ENCOUNTER — APPOINTMENT (OUTPATIENT)
Dept: CARDIOLOGY | Facility: HOSPITAL | Age: 89
End: 2024-01-12
Attending: HOSPITALIST
Payer: COMMERCIAL

## 2024-01-12 ENCOUNTER — APPOINTMENT (OUTPATIENT)
Dept: PHYSICAL THERAPY | Facility: HOSPITAL | Age: 89
End: 2024-01-12
Attending: INTERNAL MEDICINE
Payer: COMMERCIAL

## 2024-01-12 ENCOUNTER — APPOINTMENT (OUTPATIENT)
Dept: OCCUPATIONAL THERAPY | Facility: HOSPITAL | Age: 89
End: 2024-01-12
Attending: INTERNAL MEDICINE
Payer: COMMERCIAL

## 2024-01-12 PROBLEM — R42 POSTURAL DIZZINESS WITH NEAR SYNCOPE: Status: ACTIVE | Noted: 2024-01-11

## 2024-01-12 PROBLEM — N18.30 CHRONIC KIDNEY DISEASE, STAGE 3 (H): Status: ACTIVE | Noted: 2021-02-19

## 2024-01-12 PROBLEM — I44.7 BUNDLE BRANCH BLOCK, LEFT: Status: ACTIVE | Noted: 2023-04-23

## 2024-01-12 LAB
ALBUMIN UR-MCNC: 70 MG/DL
APPEARANCE UR: CLEAR
ATRIAL RATE - MUSE: 84 BPM
BILIRUB UR QL STRIP: NEGATIVE
COLOR UR AUTO: ABNORMAL
DIASTOLIC BLOOD PRESSURE - MUSE: NORMAL MMHG
ERYTHROCYTE [DISTWIDTH] IN BLOOD BY AUTOMATED COUNT: 13.7 % (ref 10–15)
GLUCOSE BLDC GLUCOMTR-MCNC: 103 MG/DL (ref 70–99)
GLUCOSE BLDC GLUCOMTR-MCNC: 126 MG/DL (ref 70–99)
GLUCOSE BLDC GLUCOMTR-MCNC: 145 MG/DL (ref 70–99)
GLUCOSE BLDC GLUCOMTR-MCNC: 154 MG/DL (ref 70–99)
GLUCOSE UR STRIP-MCNC: NEGATIVE MG/DL
HBA1C MFR BLD: 6.4 %
HCT VFR BLD AUTO: 35.2 % (ref 35–47)
HGB BLD-MCNC: 11.7 G/DL (ref 11.7–15.7)
HGB UR QL STRIP: NEGATIVE
INTERPRETATION ECG - MUSE: NORMAL
KETONES UR STRIP-MCNC: NEGATIVE MG/DL
LEUKOCYTE ESTERASE UR QL STRIP: ABNORMAL
LVEF ECHO: NORMAL
MCH RBC QN AUTO: 29.8 PG (ref 26.5–33)
MCHC RBC AUTO-ENTMCNC: 33.2 G/DL (ref 31.5–36.5)
MCV RBC AUTO: 90 FL (ref 78–100)
MUCOUS THREADS #/AREA URNS LPF: PRESENT /LPF
NITRATE UR QL: NEGATIVE
P AXIS - MUSE: 36 DEGREES
PH UR STRIP: 5.5 [PH] (ref 5–7)
PLATELET # BLD AUTO: 230 10E3/UL (ref 150–450)
PR INTERVAL - MUSE: 178 MS
QRS DURATION - MUSE: 136 MS
QT - MUSE: 398 MS
QTC - MUSE: 470 MS
R AXIS - MUSE: -21 DEGREES
RBC # BLD AUTO: 3.93 10E6/UL (ref 3.8–5.2)
RBC URINE: 2 /HPF
SP GR UR STRIP: 1.02 (ref 1–1.03)
SYSTOLIC BLOOD PRESSURE - MUSE: NORMAL MMHG
T AXIS - MUSE: 112 DEGREES
TROPONIN T SERPL HS-MCNC: 22 NG/L
UROBILINOGEN UR STRIP-MCNC: <2 MG/DL
VENTRICULAR RATE- MUSE: 84 BPM
WBC # BLD AUTO: 9.2 10E3/UL (ref 4–11)
WBC URINE: 11 /HPF

## 2024-01-12 PROCEDURE — 84484 ASSAY OF TROPONIN QUANT: CPT | Performed by: HOSPITALIST

## 2024-01-12 PROCEDURE — 258N000003 HC RX IP 258 OP 636

## 2024-01-12 PROCEDURE — 255N000002 HC RX 255 OP 636: Performed by: INTERNAL MEDICINE

## 2024-01-12 PROCEDURE — 250N000012 HC RX MED GY IP 250 OP 636 PS 637: Performed by: HOSPITALIST

## 2024-01-12 PROCEDURE — 250N000013 HC RX MED GY IP 250 OP 250 PS 637

## 2024-01-12 PROCEDURE — 250N000013 HC RX MED GY IP 250 OP 250 PS 637: Performed by: INTERNAL MEDICINE

## 2024-01-12 PROCEDURE — 87086 URINE CULTURE/COLONY COUNT: CPT | Performed by: EMERGENCY MEDICINE

## 2024-01-12 PROCEDURE — 97535 SELF CARE MNGMENT TRAINING: CPT | Mod: GO

## 2024-01-12 PROCEDURE — 99207 PR NO BILLABLE SERVICE THIS VISIT: CPT | Performed by: INTERNAL MEDICINE

## 2024-01-12 PROCEDURE — 96361 HYDRATE IV INFUSION ADD-ON: CPT

## 2024-01-12 PROCEDURE — 96374 THER/PROPH/DIAG INJ IV PUSH: CPT | Mod: 59

## 2024-01-12 PROCEDURE — 99222 1ST HOSP IP/OBS MODERATE 55: CPT | Performed by: INTERNAL MEDICINE

## 2024-01-12 PROCEDURE — 82962 GLUCOSE BLOOD TEST: CPT

## 2024-01-12 PROCEDURE — 97116 GAIT TRAINING THERAPY: CPT | Mod: GP

## 2024-01-12 PROCEDURE — G0378 HOSPITAL OBSERVATION PER HR: HCPCS

## 2024-01-12 PROCEDURE — 97161 PT EVAL LOW COMPLEX 20 MIN: CPT | Mod: GP

## 2024-01-12 PROCEDURE — 99207 PR NO BILLABLE SERVICE THIS VISIT: CPT

## 2024-01-12 PROCEDURE — 81001 URINALYSIS AUTO W/SCOPE: CPT | Performed by: EMERGENCY MEDICINE

## 2024-01-12 PROCEDURE — 250N000011 HC RX IP 250 OP 636: Performed by: HOSPITALIST

## 2024-01-12 PROCEDURE — 97165 OT EVAL LOW COMPLEX 30 MIN: CPT | Mod: GO

## 2024-01-12 PROCEDURE — 99223 1ST HOSP IP/OBS HIGH 75: CPT | Mod: AI | Performed by: HOSPITALIST

## 2024-01-12 PROCEDURE — 999N000208 ECHOCARDIOGRAM COMPLETE

## 2024-01-12 PROCEDURE — 99418 PROLNG IP/OBS E/M EA 15 MIN: CPT | Performed by: HOSPITALIST

## 2024-01-12 PROCEDURE — 36415 COLL VENOUS BLD VENIPUNCTURE: CPT | Performed by: HOSPITALIST

## 2024-01-12 PROCEDURE — 93306 TTE W/DOPPLER COMPLETE: CPT | Mod: 26 | Performed by: INTERNAL MEDICINE

## 2024-01-12 RX ORDER — ACETAMINOPHEN 650 MG/1
650 SUPPOSITORY RECTAL EVERY 4 HOURS PRN
Status: DISCONTINUED | OUTPATIENT
Start: 2024-01-12 | End: 2024-01-13 | Stop reason: HOSPADM

## 2024-01-12 RX ORDER — DEXTROSE MONOHYDRATE 25 G/50ML
25-50 INJECTION, SOLUTION INTRAVENOUS
Status: DISCONTINUED | OUTPATIENT
Start: 2024-01-12 | End: 2024-01-13 | Stop reason: HOSPADM

## 2024-01-12 RX ORDER — AMOXICILLIN 250 MG
1 CAPSULE ORAL 2 TIMES DAILY PRN
Status: DISCONTINUED | OUTPATIENT
Start: 2024-01-12 | End: 2024-01-13 | Stop reason: HOSPADM

## 2024-01-12 RX ORDER — NICOTINE POLACRILEX 4 MG
15-30 LOZENGE BUCCAL
Status: DISCONTINUED | OUTPATIENT
Start: 2024-01-12 | End: 2024-01-13 | Stop reason: HOSPADM

## 2024-01-12 RX ORDER — AMOXICILLIN 250 MG
2 CAPSULE ORAL 2 TIMES DAILY PRN
Status: DISCONTINUED | OUTPATIENT
Start: 2024-01-12 | End: 2024-01-13 | Stop reason: HOSPADM

## 2024-01-12 RX ORDER — ATORVASTATIN CALCIUM 40 MG/1
40 TABLET, FILM COATED ORAL DAILY
Status: DISCONTINUED | OUTPATIENT
Start: 2024-01-12 | End: 2024-01-13 | Stop reason: HOSPADM

## 2024-01-12 RX ORDER — ACETAMINOPHEN 325 MG/1
650 TABLET ORAL EVERY 4 HOURS PRN
Status: DISCONTINUED | OUTPATIENT
Start: 2024-01-12 | End: 2024-01-13 | Stop reason: HOSPADM

## 2024-01-12 RX ORDER — ASPIRIN 81 MG/1
81 TABLET ORAL DAILY
Status: DISCONTINUED | OUTPATIENT
Start: 2024-01-12 | End: 2024-01-13 | Stop reason: HOSPADM

## 2024-01-12 RX ORDER — AMLODIPINE BESYLATE 2.5 MG/1
2.5 TABLET ORAL DAILY
Status: DISCONTINUED | OUTPATIENT
Start: 2024-01-12 | End: 2024-01-13 | Stop reason: HOSPADM

## 2024-01-12 RX ORDER — HYDRALAZINE HYDROCHLORIDE 20 MG/ML
5 INJECTION INTRAMUSCULAR; INTRAVENOUS EVERY 6 HOURS PRN
Status: DISCONTINUED | OUTPATIENT
Start: 2024-01-12 | End: 2024-01-13 | Stop reason: HOSPADM

## 2024-01-12 RX ORDER — METOCLOPRAMIDE HYDROCHLORIDE 5 MG/ML
5 INJECTION INTRAMUSCULAR; INTRAVENOUS EVERY 6 HOURS PRN
Status: DISCONTINUED | OUTPATIENT
Start: 2024-01-12 | End: 2024-01-13 | Stop reason: HOSPADM

## 2024-01-12 RX ORDER — HYDRALAZINE HYDROCHLORIDE 20 MG/ML
10 INJECTION INTRAMUSCULAR; INTRAVENOUS EVERY 6 HOURS PRN
Status: DISCONTINUED | OUTPATIENT
Start: 2024-01-12 | End: 2024-01-12

## 2024-01-12 RX ADMIN — AMLODIPINE BESYLATE 2.5 MG: 2.5 TABLET ORAL at 16:37

## 2024-01-12 RX ADMIN — LOSARTAN POTASSIUM 75 MG: 50 TABLET, FILM COATED ORAL at 16:36

## 2024-01-12 RX ADMIN — SODIUM CHLORIDE 500 ML: 9 INJECTION, SOLUTION INTRAVENOUS at 12:09

## 2024-01-12 RX ADMIN — Medication 81 MG: at 16:36

## 2024-01-12 RX ADMIN — ATORVASTATIN CALCIUM 40 MG: 40 TABLET, FILM COATED ORAL at 16:37

## 2024-01-12 RX ADMIN — PERFLUTREN 3 ML: 6.52 INJECTION, SUSPENSION INTRAVENOUS at 09:40

## 2024-01-12 RX ADMIN — INSULIN ASPART 1 UNITS: 100 INJECTION, SOLUTION INTRAVENOUS; SUBCUTANEOUS at 17:32

## 2024-01-12 RX ADMIN — HYDRALAZINE HYDROCHLORIDE 10 MG: 20 INJECTION INTRAMUSCULAR; INTRAVENOUS at 10:55

## 2024-01-12 ASSESSMENT — ACTIVITIES OF DAILY LIVING (ADL)
ADLS_ACUITY_SCORE: 37
ADLS_ACUITY_SCORE: 37
ADLS_ACUITY_SCORE: 35
DEPENDENT_IADLS:: INDEPENDENT
ADLS_ACUITY_SCORE: 35
ADLS_ACUITY_SCORE: 37

## 2024-01-12 NOTE — CONSULTS
Care Management Initial Consult    General Information  Assessment completed with: Children,    Type of CM/SW Visit: Initial Assessment    Primary Care Provider verified and updated as needed: Yes   Readmission within the last 30 days: no previous admission in last 30 days      Reason for Consult: discharge planning  Advance Care Planning:            Communication Assessment  Patient's communication style: spoken language (English or Bilingual)             Cognitive  Cognitive/Neuro/Behavioral: WDL                      Living Environment:   People in home:  (nephew)     Current living Arrangements: house      Able to return to prior arrangements: yes       Family/Social Support:  Care provided by: self  Provides care for: no one  Marital Status:   Children (nephew)          Description of Support System: Supportive, Involved    Support Assessment: Adequate family and caregiver support    Current Resources:   Patient receiving home care services: No     Community Resources: None  Equipment currently used at home:    Supplies currently used at home: None    Employment/Financial:  Employment Status: retired        Financial Concerns: none   Referral to Financial Worker: No       Does the patient's insurance plan have a 3 day qualifying hospital stay waiver?  Yes     Which insurance plan 3 day waiver is available? Alternative insurance waiver    Will the waiver be used for post-acute placement? Undetermined at this time    Lifestyle & Psychosocial Needs:  Social Determinants of Health     Food Insecurity: Not on file   Depression: Not at risk (6/28/2023)    PHQ-2     PHQ-2 Score: 0   Housing Stability: Not on file   Tobacco Use: Low Risk  (11/22/2023)    Patient History     Smoking Tobacco Use: Never     Smokeless Tobacco Use: Never     Passive Exposure: Not on file   Financial Resource Strain: Not on file   Alcohol Use: Not on file   Transportation Needs: Not on file   Physical Activity: Not on file    Interpersonal Safety: Not on file   Stress: Not on file   Social Connections: Not on file       Functional Status:  Prior to admission patient needed assistance:   Dependent ADLs:: Independent  Dependent IADLs:: Independent       Mental Health Status:  Mental Health Status: No Current Concerns       Chemical Dependency Status:  Chemical Dependency Status: No Current Concerns             Values/Beliefs:  Spiritual, Cultural Beliefs, Hindu Practices, Values that affect care: no               Additional Information:  CM met with pts hamzah Le and IONA Moran in room to discuss discharge planning and complete initial assessment. Pt was getting a scan and was not able to participate in assessment.    Patient lives in a house with nephew. She is independent with ADL/IADLs at baseline. Nephew and son assists with transport.  Patient has no DME or home care services. Family hopeful patient can return home at discharge. Family will transport.     MUSE discussed and placed on patients chart.     CM will continue to monitor progression of care, review team recommendations and provide discharge planning assist as needed.     Yocasta Franco, BSW

## 2024-01-12 NOTE — PROGRESS NOTES
PRIMARY DIAGNOSIS: SYNCOPE/TIA  OUTPATIENT/OBSERVATION GOALS TO BE MET BEFORE DISCHARGE:  1. Orthostatic performed: No    2. Diagnostic testing complete & at baseline neurologic testing: No    3. Cleared by consultants (if involved): No    4. Interpretation of cardiac rhythm per telemetry tech: ST    5. Tolerating adequate PO diet and medications: Yes    6. Return to near baseline physical activity or neurologic status: Yes    Discharge Planner Nurse   Safe discharge environment identified: Yes  Barriers to discharge: Yes       Entered by: Yoana Monaco RN 01/12/2024 2:51 PM   Patient transferred to room 35 and under my care just 25 minutes ago. Vital signs within normal limits.   Please review provider order for any additional goals.   Nurse to notify provider when observation goals have been met and patient is ready for discharge.

## 2024-01-12 NOTE — PROGRESS NOTES
[facility]    Medicine Progress Note - Hospitalist Service    Date of Admission:  1/11/2024    Assessment & Plan    Patricia Bose is a 89 yo F with a past medical history of hyperlipidemia, CAD, HTN, T2DM, CKD3, LBBB who was admitted for evaluation of dizzy spell and fall without loss of consciousness. Resume home medications.    #Postural dizziness with near syncope  -Similar symptoms in the past with unrevealing etiology on Holter monitors last spring  -Telemetry monitoring  -Echocardiogram today results: The left ventricle is normal in size.  The visual ejection fraction is >70%.  Hyperdynamic left ventricular function  There is mild concentric left ventricular hypertrophy.  Normal right ventricle size and systolic function.  The left atrium is moderately dilated.  There is mild mitral stenosis.  Compared to prior study, there is no significant change.    Cardiology Consult: recommending to add amlodipine 2.5 mg for better BP control  - Encouraged better oral hydration   - 7 day event monitor to rule out arrhythmia induced syncope     PT/OT to evaluate for discharge due to patients fall      #Abnormal high-sensitivity troponin T  #Coronary artery disease  -Not concerning for ACS  -BNP normal  -ECG reviewed: Sinus rhythm at 84 bpm, PAC, LBBB without changes from 5/3/2023    #Essential hypertension  -Treat with hydralazine as needed if persistently elevated  -Nursing gave PRN Hydralazine 10 mg for BP > 180 at 10:55, post intervention pt became hypotensive. Bolus 500 ml has been given.    Change Hydralazine to 5 mg PRN BP >180 systolic  Resume Losartan  Cardiology added Norvasc today    #Chronic kidney disease stage IIIa  -Cr 1.1 today  -NS IV fluid bolus 500 ml  -trend BMP  -Avoid nephrotoxins    #Type 2 diabetes without long-term insulin use  -Insulin scale    #Drug-induced platelet defect  -On PTA aspirin  -Monitor for bleeding  -trend CBC    #Hyperlipidemia  -Atorvastatin        Observation Goals: -diagnostic tests and consults completed and resulted, -vital signs normal or at patient baseline, Nurse to notify provider when observation goals have been met and patient is ready for discharge.  Diet: Combination Diet Moderate Consistent Carb (60 g CHO per Meal) Diet; Low Saturated Fat Na <2400mg Diet, No Caffeine Diet    DVT Prophylaxis: Ambulate every shift  Lassiter Catheter: Not present  Lines: None     Cardiac Monitoring: ACTIVE order. Indication: Syncope- low cardiac risk (24 hours)  Code Status: Full Code      Clinically Significant Risk Factors Present on Admission                # Drug Induced Platelet Defect: home medication list includes an antiplatelet medication   # Hypertension: Noted on problem list          # Financial/Environmental Concerns: none         Disposition Plan      Expected Discharge Date: 01/12/2024                  The patient's care was discussed with the Attending Physician, Dr. Moore .    Jessy Pérez NP  Hospitalist Service  [facility]  Securely message with Vocera (more info)  Text page via [employer] Paging/Directory   ______________________________________________________________________    Interval History   Patient denies symptoms of chest pain, SOB, or dizziness at this time. Patient denies urinary symptoms. Patient states she has not had any more episodes of feeling like she is going to pass out. Patient states she has not had an episode like last night since last spring. Patient states she did not injure herself when she had near syncope, and was awake the entire time and did not hit head.     ECHO done today. Results:The left ventricle is normal in size.  The visual ejection fraction is >70%.  Hyperdynamic left ventricular function  There is mild concentric left ventricular hypertrophy.  Normal right ventricle size and systolic function.  The left atrium is moderately dilated.  There is mild mitral stenosis.  Compared to prior  study, there is no significant change.  Cardiology consult today: recommend Holter monitor x 7 days when discharge and follow up outpatient.   Nursing gave PRN Hydralazine 10 mg for BP > 180, post intervention pt became hypotensive. IV Fluid Bolus 500 ml has been given.      Physical Exam   Vital Signs: Temp: 98  F (36.7  C) Temp src: Oral BP: 121/58 Pulse: 91   Resp: 19 SpO2: 97 % O2 Device: None (Room air)    Weight: 104 lbs 0 oz    Constitutional: awake, alert, cooperative, no apparent distress, and appears stated age  Hematologic / Lymphatic: no cervical lymphadenopathy and no supraclavicular lymphadenopathy  Respiratory: No increased work of breathing, good air exchange, clear to auscultation bilaterally, no crackles or wheezing  Cardiovascular: Normal apical impulse, regular rate and rhythm, normal S1 and S2, no S3 or S4, and no murmur noted  GI: No scars, normal bowel sounds, soft, non-distended, non-tender, no masses palpated, no hepatosplenomegally  Skin: no bruising or bleeding, normal skin color, texture, turgor, no redness, warmth, or swelling, no rashes, and no lesions  Musculoskeletal: There is no redness, warmth, or swelling of the joints.  .  Neurologic: Awake, alert, oriented to name, place and time.    Neuropsychiatric: General: normal, calm, and normal eye contact    Medical Decision Making       50 MINUTES SPENT BY ME on the date of service doing chart review, history, exam, documentation & further activities per the note.      Data     I have personally reviewed the following data over the past 24 hrs:    9.2  \   11.7   / 230     135 101 31.1 (H) /  145 (H)   3.7 26 1.00 (H) \     Trop: 22 (H) BNP: 731     TSH: N/A T4: N/A A1C: 6.4 (H)       Imaging results reviewed over the past 24 hrs:   No results found for this or any previous visit (from the past 24 hour(s)).

## 2024-01-12 NOTE — ED TRIAGE NOTES
"Pt arrives to triage ambulatory escorted to wheelchair w/ son and daughter in law endorsing was getting ready for bed and bent over and got dizzy spell and fell into corner of closet no LOC, no injuries, recalls event to full extent. Pt reports she got up right after and laid head at the side of bed for about 5 minutes and stood up on own and phone call placed to family. Pt endorsing, chest \"feels like my heart is beating fast\". Pt endorsing no dizziness currently, does feel a bit \"woozy\" attempting to elaborate \"maybe headachy\". \"Definitely not feeling the dizziness I felt earlier\". HTN in triage.         "

## 2024-01-12 NOTE — PROGRESS NOTES
PRN Hydralazine 10 mg was given for BP > 180, post intervention pt became hypotensive. Bolus 500 ml has been given.

## 2024-01-12 NOTE — PROGRESS NOTES
01/12/24 1530   Appointment Info   Signing Clinician's Name / Credentials (PT) Guillermina Marks, MORRIS   Quick Adds   Quick Adds Certification   Living Environment   People in Home other (see comments)  (Nephew, is there all the time and can assist as needed.)   Current Living Arrangements house   Home Accessibility stairs to enter home;stairs within home   Number of Stairs, Main Entrance 2   Number of Stairs, Within Home, Primary greater than 10 stairs   Stair Railings, Within Home, Primary railing on right side (ascending)   Transportation Anticipated family or friend will provide   Living Environment Comments One level with laundry in the basement.   Self-Care   Current Activity Tolerance moderate   Equipment Currently Used at Home none  (reacher.)   Fall history within last six months yes   Number of times patient has fallen within last six months 2   Activity/Exercise/Self-Care Comment Tub/shower with GB, no chair.   General Information   Onset of Illness/Injury or Date of Surgery 01/11/24   Referring Physician Mir Moore MD   Patient/Family Therapy Goals Statement (PT) To go home   Pertinent History of Current Problem (include personal factors and/or comorbidities that impact the POC) Per the chart, Patricia Bose is a 89 year old female admitted on 1/11/2024. She was brought to the ED for evaluation of dizzy spell and fall without loss of consciousness     #Postural dizziness with near syncope   Existing Precautions/Restrictions fall  (sycope)   Weight-Bearing Status - LLE weight-bearing as tolerated   Weight-Bearing Status - RLE weight-bearing as tolerated   Cognition   Cognitive Status Comments Pt was oriented to name and situation and followed all commands well.   Pain Assessment   Patient Currently in Pain No  (Slight dizziness with intial sitting.)   Posture    Posture Comments good posture   Range of Motion (ROM)   Range of Motion ROM is WNL   ROM Comment bilat LEs   Strength (Manual Muscle Testing)    Strength (Manual Muscle Testing) strength is WNL   Strength Comments bilat LEs   Bed Mobility   Comment, (Bed Mobility) Supine< >sit SBA.  Some c/o dizziness with intial sitting.   Transfers   Comment, (Transfers) Sit<>stand with HHA with CGA with cues for hand placement.   Gait/Stairs (Locomotion)   [place] Level (Gait) contact guard;1 person assist   Assistive Device (Gait) walker, front-wheeled   Distance in Feet (Gait) 5'   Pattern (Gait) swing-through;step-through   Deviations/Abnormal Patterns (Gait) kaylyn decreased;gait speed decreased   Balance   Balance Comments CGA without AD   Sensory Examination   Sensory Perception WNL   Sensory Perception Comments Bilat LEs   Clinical Impression   Criteria for Skilled Therapeutic Intervention Yes, treatment indicated   PT Diagnosis (PT) Impaired functional mobility   Influenced by the following impairments dec bal, dec endurance.  Pt is not at her PLOF.   Functional limitations due to impairments bed mobility, transfers, gait and steps.   Clinical Presentation (PT Evaluation Complexity) stable   Clinical Presentation Rationale Pt presents medically diagnosed.   Clinical Decision Making (Complexity) low complexity   Planned Therapy Interventions (PT) bed mobility training;gait training;strengthening;transfer training;stair training   Risk & Benefits of therapy have been explained evaluation/treatment results reviewed;care plan/treatment goals reviewed;risks/benefits reviewed;spouse/significant other;participants voiced agreement with care plan   PT Total Evaluation Time   PT Eval, Low Complexity Minutes (90230) 13   Therapy Certification   Start of care date 01/12/24   Certification date from 01/12/24   Certification date to 01/15/24   Medical Diagnosis Postural dizziness with near syncope.   Physical Therapy Goals   PT Frequency Daily   PT Predicted Duration/Target Date for Goal Attainment 01/15/24   PT Goals Bed Mobility;Transfers;Gait;Stairs   PT: Bed  Mobility Independent;Supine to/from sit;Rolling   PT: Transfers Independent;Sit to/from stand;Bed to/from chair   PT: Gait Supervision/stand-by assist;Greater than 200 feet   PT: Stairs Supervision/stand-by assist;Greater than 10 stairs;Rail on right   Interventions   Interventions Quick Adds Gait Training   Gait Training   Gait Training Minutes (00910) 10   Symptoms Noted During/After Treatment (Gait Training) none   Treatment Detail/Skilled Intervention Pt walked without the AD  Resting HR 96 , sitting /61 with pt c/o slight dizziness, ., standing /64 iwth , No c/o dizziness, BP after mobility 140/63 with . No c/o dizziness,  (Toilet transfers CGA.)   Distance in Feet 40'   [place] Level (Gait Training) contact guard   Physical Assistance Level (Gait Training) verbal cues;1 person assist   Weight Bearing (Gait Training) weight-bearing as tolerated   Assistive Device (Gait Training) other (see comments)  (HHAx1)   Pattern Analysis (Gait Training) swing-through gait   Gait Analysis Deviations decreased kaylyn   Impairments (Gait Analysis/Training) balance impaired   PT Discharge Planning   PT Plan check vitals with moiblity, bed mobility, transfers, gait and steps. Try gait w/o AD   PT Discharge Recommendation (DC Rec) home with assist   PT Rationale for DC Rec Pt should be able to dc to home with family to assist as needed.  Pt did walk with CGA with HHA but she did feel stable walking.  AD pending progress.   PT Brief overview of current status PT eval.,  Orthostatic BPs taken..  bed mobility SBA, Transfers CGA with HHA  x1,  ambulated 40 with CGA with HHA x 1.  (Only slight dizziness with initail sitting at the EOB.)   Total Session Time   Timed Code Treatment Minutes 10   Total Session Time (sum of timed and untimed services) 23   [facility]  OUTPATIENT PHYSICAL THERAPY EVALUATION  PLAN OF TREATMENT FOR OUTPATIENT REHABILITATION  (COMPLETE FOR  INITIAL CLAIMS ONLY)  Patient's Last Name, First Name, M.I.  YOB: 1926  Patricia Bose                        Provider's Name  [facility] Medical Record No.  [id number]                             Onset Date:  01/11/24   Start of Care Date:  01/12/24   Type:     _X_PT   ___OT   ___SLP Medical Diagnosis:  Postural dizziness with near syncope.              PT Diagnosis:  Impaired functional mobility Visits from SOC:  1     See note for plan of treatment, functional goals and certification details    I CERTIFY THE NEED FOR THESE SERVICES FURNISHED UNDER        THIS PLAN OF TREATMENT AND WHILE UNDER MY CARE     (Physician co-signature of this document indicates review and certification of the therapy plan).

## 2024-01-12 NOTE — ED NOTES
"[facility] ED Handoff Report    ED Chief Complaint: dizziness    ED Diagnosis:  (R55) Syncope, unspecified syncope type  Comment:   Plan: na       PMH:    Past Medical History:   Diagnosis Date    Carpal tunnel syndrome     Diabetes mellitus (H)     Hyperlipidemia     Hypertension     Upper respiratory infection         Code Status:  Prior     Falls Risk: Yes Band: Applied    Current Living Situation/Residence: lives alone     Elimination Status: Continent: Yes     Activity Level: SBA    Patients Preferred Language:  English     Needed: No    Vital Signs:  BP (!) 178/66   Pulse 75   Temp 97.7  F (36.5  C) (Oral)   Resp 24   Ht 1.473 m (4' 10\")   Wt 47.2 kg (104 lb)   LMP  (LMP Unknown)   SpO2 99%   BMI 21.74 kg/m       Cardiac Rhythm: NSR    Pain Score: 0/10    Is the Patient Confused:  No    Last Food or Drink: 01/12/24 at 0000    Focused Assessment:  pt is alert and oriented. Denies hitting head with the fall, denies pain    Tests Performed: Done: Labs and Imaging    Treatments Provided:  LAURO    Family Dynamics/Concerns: No    Family Updated On Visitor Policy: Yes    Plan of Care Communicated to Family: No    Who Was Updated about Plan of Care: family    Belongings Checklist Done and Signed by Patient: Yes    Belongings Sent with Patient: Hearing aids. Coat, and clothes     Medications sent with patient: NA    Covid: asymptomatic , not tested     Additional Information:     RN: Phoebe Saha RN 1/12/2024 1:50 AM        "

## 2024-01-12 NOTE — CONSULTS
[facility] team is asked to see Patricia Bose in consultation to evaluate Near syncope, palpitations.      Assessment:   Near Syncope, palpitations: Cardiac telemetry showing LBBB but no other arrhythmias or pauses.  No true syncopal spell, but near syncopal prior to fall.  LV shown to be hyperdynamic consistent with S4 on exam. Echo shows stiff mitral valve consistent with age. Likely caused by volume status and age related mitral valve changes. Will order 7 day event monitor to rule out concerning arrhythmia and encouraged increase in oral hydration.   HTN difficult to control per chart review and HTN to the 180/80 range while in hospital. Will add amlodipine 2.5 mg and recommend follow up with OP cardiologist.  Given tendency towards dehydration, lightheadedness, advised avoidance of diuretics to help with blood pressure control.     Plan:   - Add amlodipine 2.5 mg for better BP control  - Encouraged better oral hydration   - 7 day event monitor to rule out arrhythmia induced syncope     Stable for hospital discharge today     Current History:     Patricia Bose is a 89 YO F with a PMH of Proteinuria, osteopenia, IPF, Chronic pancreatitis, Hyperlipidemia, CAD, HTN, T2DM, CKD3, LBBB who we are consulted for near syncope and palpitations. The Patient states that yesterday she was leaning down to grab some pillows when she began to feel dizzy, stood up, and fell backwards into a corner.  No clear loss of consciousness.  She did not endorse losing bowel or bladder function or biting her tongue. She then was able to lay on her bed and recovered. When asked to describe her dizziness, she denies visual changes, feeling hot, or being SOB. She states that she just felt very off balance and may have had her eyes closed. She states that she did feel her heart racing and fluttering during and after the episode. When asked about previous syncopal episodes, she states that she had one  previous episode in the last year that she was hospitalized in 04/2023 for. This current episode of dizziness was worse. She endorses occasional SOB on exertion when climbing a flight of stairs. Denies chest pain, N/V, Bowel or bladder changes and states she has been eating and drinking well. She does not use a walker or cane for ambulation.     On family interview, the patient has not been taking her BP as often as she should. Family believes she may need a smaller cuff to take her BP.     Upon Chart review, patient had previous event monitor that did not show any arrhythmias potentially causing syncope or lightheadedness symptoms.    Trop 23-> 22     Past Medical History:     Past Medical History:   Diagnosis Date    Carpal tunnel syndrome     Diabetes mellitus (H)     Hyperlipidemia     Hypertension     Upper respiratory infection      Sleep history: Has not been sleeping well recently  Past Surgical History:     Past Surgical History:   Procedure Laterality Date    ARTHROSCOPY SHOULDER ROTATOR CUFF REPAIR  10 years ago    left shoulder     BIOPSY BREAST Left 1980    benign    BIOPSY BREAST Left     before 1980    benign    CATARACT EXTRACTION      HC REMOVAL OF TONSILS,<13 Y/O      Description: Tonsillectomy;  Recorded: 04/29/2014;    HYSTERECTOMY  1957    benign    OOPHORECTOMY  1957    benign    ZZC TOTAL ABDOM HYSTERECTOMY      Description: Hysterectomy;  Recorded: 04/29/2014;       Family History:     Family History   Problem Relation Age of Onset    Breast Cancer Mother 52.00    Parkinsonism Mother     Breast Cancer Sister 72.00    Coronary Artery Disease Brother     Coronary Artery Disease Brother 78.00    Diabetes Brother     Coronary Artery Disease Father 67.00    Cancer Father         bladder     Family history reviewed and is not pertinent to the chief complaint or presenting problem    Social History:    reports that she has never smoked. She has never used smokeless tobacco. She reports current  alcohol use of about 1.0 standard drink of alcohol per week. She reports that she does not use drugs.  Exercise history: Ambulates around house without assistance    Meds:     Current Outpatient Medications   Medication Sig Dispense Refill    aspirin 81 MG EC tablet [ASPIRIN 81 MG EC TABLET] Take 81 mg by mouth daily.      atorvastatin (LIPITOR) 40 MG tablet Take 1 tablet (40 mg) by mouth daily 90 tablet 3    cholecalciferol, vitamin D3, (VITAMIN D3) 1,000 unit capsule [CHOLECALCIFEROL, VITAMIN D3, (VITAMIN D3) 1,000 UNIT CAPSULE] Take 1,000 Units by mouth daily.      cycloSPORINE (RESTASIS) 0.05 % ophthalmic emulsion Place 1 drop into both eyes 2 times daily      DENTAGEL 1.1 % GEL topical gel Apply to affected area 2 times daily      glucosamine-chondroitin 500-400 mg cap [GLUCOSAMINE-CHONDROITIN 500-400 MG CAP] Take 2 capsules by mouth daily.      guaiFENesin-codeine (ROBITUSSIN AC) 100-10 MG/5ML solution Take 5-10 mLs by mouth every 4 hours as needed for cough 473 mL 3    losartan (COZAAR) 50 MG tablet Take 1.5 tablets (75 mg) by mouth daily 135 tablet 3    magnesium 250 mg Tab [MAGNESIUM 250 MG TAB] Take 1 tablet by mouth daily.      MELATONIN ORAL Take 10 mg by mouth nightly as needed (Sleep)      multivitamin therapeutic (THERAGRAN) tablet [MULTIVITAMIN THERAPEUTIC (THERAGRAN) TABLET] Take 1 tablet by mouth daily.      vit C/E/Zn/coppr/lutein/zeaxan (PRESERVISION AREDS-2 ORAL) [VIT C/E/ZN/COPPR/LUTEIN/ZEAXAN (PRESERVISION AREDS-2 ORAL)] Take 1 tablet by mouth 2 (two) times a day.      blood glucose (ACCU-CHEK AMIRA PLUS) test strip 1 strip by In Vitro route 3 times daily Use to test blood sugar 3 times daily or as directed. 100 strip 1    blood glucose monitoring (NO BRAND SPECIFIED) meter device kit by In Vitro route daily 1 kit 0    generic lancets (ACCU-CHEK FASTCLIX LANCING DEV) [GENERIC LANCETS (ACCU-CHEK FASTCLIX LANCING DEV)] Use 1 each As Directed daily. 200 each 2       Allergies:  "  Ibuprofen    Review of Systems:   A 12 point comprehensive review of systems was negative except as noted in the current history.    Objective:      Physical Exam  Wt Readings from Last 3 Encounters:   01/11/24 47.2 kg (104 lb)   11/22/23 47.2 kg (104 lb)   10/25/23 46.9 kg (103 lb 8 oz)     Body mass index is 21.74 kg/m .  BP (!) 177/79   Pulse 78   Temp 97.4  F (36.3  C) (Oral)   Resp 20   Ht 1.473 m (4' 10\")   Wt 47.2 kg (104 lb)   LMP  (LMP Unknown)   SpO2 98%   BMI 21.74 kg/m      General Appearance:  Frail appearing woman in no apparent distress.  HEENT: No scleral icterus; the mucous membranes were pink and moist.  Conjunctiva not injected  Neck:  No cervical bruits, jugular venous distention, or thyromegaly.  Chest:The spine was straight. The chest was symmetric.  Lungs:  Respirations unlabored mildly diminished breath sounds but the lungs are clear to auscultation.  Cardiovascular:  Normal point of maximal impulse. Auscultation reveals normal first and second heart sounds with an S4 gallop and 2/6 systolic ejection murmur. Carotid, radial, and dorsalis pedal pulses are intact and symmetric.  Abdomen:  No organomegaly, masses, bruits, or tenderness. Bowels sounds are present  Extremities: No cyanosis, clubbing or edema  Skin:  No xanthelasma.  Neurologic: Mood and affect are appropriate. Speech is fluent.      EKG (personally reviewed): Sinus rhythm 84 bpm.  LBBB with PAC     Imaging NA      Cardiac diagnostics      TTE 01/12     Interpretation Summary     The left ventricle is normal in size.  The visual ejection fraction is >70%.  Hyperdynamic left ventricular function  There is mild concentric left ventricular hypertrophy.  Normal right ventricle size and systolic function.  The left atrium is moderately dilated.  There is mild mitral stenosis.  Compared to prior study, there is no significant change.    Lab Review     Lab Results   Component Value Date    HGB 11.7 01/11/2024     Lab Results "   Component Value Date     01/11/2024     Lab Results   Component Value Date    POTASSIUM 3.7 01/11/2024    POTASSIUM 4.7 08/31/2022     Lab Results   Component Value Date    BUN 31.1 01/11/2024    BUN 28 08/31/2022     Lab Results   Component Value Date    CR 1.00 01/11/2024     Lab Results   Component Value Date    CHOL 169 06/28/2023    HDL 71 06/28/2023    LDL 74 06/28/2023    LDL 85 10/09/2020    TRIG 121 06/28/2023           Shashi Adams, MS3  [employer] [employer]   1/12/2024    I have seen and examined the patient, reviewed the student s note, and agree with history, exam, and plan of care as we have outlined. Rojas Deleon MD      Note created using Dragon voice recognition software.  Sound alike errors may have escaped editing.    Clinically Significant Risk Factors Present on Admission                # Drug Induced Platelet Defect: home medication list includes an antiplatelet medication

## 2024-01-12 NOTE — ED PROVIDER NOTES
EMERGENCY DEPARTMENT ENCOUNTER      NAME: Patricia Bose  AGE: 89 year old female  YOB: 1926  MRN: [id number]  EVALUATION DATE & TIME: No admission date for patient encounter.    PCP: Vidhi Pearl    ED PROVIDER: Hermilo Redman M.D.      Chief Complaint   Patient presents with    Fall    Dizziness         FINAL IMPRESSION:  Syncope  Hypertension    ED COURSE & MEDICAL DECISION MAKING:    Pertinent Labs & Imaging studies reviewed. (See chart for details)  89 year old female presents to the Emergency Department for evaluation of potential injuries after syncopal event.  Patient reports she was at home preparing for bed after having a normal uneventful day.  She had bent over to place some pillows on the floor and was standing and felt her self getting suddenly very lightheaded.  The next she realizes she falls into the corner without complete loss of consciousness.  She was able to get up almost immediately thereafter.  Denies any associated injuries.  Patient reports having similar but less significant episodes previously.  Review of records indicate patient underwent Holter monitoring and echocardiogram on April 24, 2023..  Patient with mild valvular disease.  Normal ejection fraction of 55 to 60%.  Patient seen in triage area to ED overcrowding.  She has no evidence of injury.  She is alert and appropriate.  She arrives with 2 family members who provide additional information especially regarding similar symptoms back in April 2023.  Vital signs significant for slight hypertension.  Will obtain baseline blood work to assess for contributory electrolyte imbalance, anemia.  EKG obtained from triage area reveals a left bundle branch block which is unchanged from prior tracing.  Given patient's advanced age and symptomatology likely require hospitalization and monitoring.  May require cardiac consultation with prolonged event monitoring over concerns of sick sinus syndrome.  Patient appears non  toxic with stable vitals signs. Overall exam is benign.          10:21 PM I met with the patient for the initial interview and physical examination. Discussed plan for treatment and workup in the ED.    11:02 PM Rechecked and examined patient. Patient's exam is normal.  11:20 PM.  BNP is normal at 731.  Magnesium normal 1.9.  Troponin minimally elevated 23.  Patient with minimal renal insufficiency with BUN 31 creatinine 1.10.  CBC pending.  11:45 PM Spoke to Dr. Fraga, Hospitalist regarding patient's admission.  He is agreeable.  BNP normal at 731.  CBC still pending  At the conclusion of the encounter I discussed the results of all of the tests and the disposition. The questions were answered and return precautions provided. The patient or family acknowledged understanding and was agreeable with the care plan.       PPE: Provider wore paper mask     MEDICATIONS GIVEN IN THE EMERGENCY:  Medications - No data to display    NEW PRESCRIPTIONS STARTED AT TODAY'S ER VISIT  New Prescriptions    No medications on file          =================================================================    HPI    Patient information was obtained from: Patient and patient's family    Use of Intrepreter: N/A        Patricia Bose is a 89 year old female with a pertient medical history of CAD, left bundle branch block, T2DM, HTN, HLD, CKD, and IPF, who presents to the ED for evaluation of fall and dizziness.    Patient reports sudden dizzy spell and fall that occurred as she was getting ready for bed. Notes she was setting her bed and was walking across her room when she suddenly felt dizzy and fell in the corner. No LOC. She didn't feel nauseated or sick before fall. Per patient's family, she had a similar episode last May and was found to have bundle branch block and was placed on a Holter monitor. States earlier today, she didn't have diarrhea, vomit, or other sick symptoms. She has been eating well and reports no pain anywhere.  She states she hasn't been sleeping well. No other reported complaints or concerns at this time.      REVIEW OF SYSTEMS   Constitutional:  Denies fever, chills  Respiratory:  Denies productive cough or increased work of breathing  Cardiovascular:  Denies chest pain or palpitations  GI:  Denies abdominal pain, nausea, vomiting, or change in bowel or bladder habits   Musculoskeletal:  Denies any new muscle/joint swelling  Skin:  Denies rash   Neurologic:  Denies focal weakness  All systems negative except as marked.     PAST MEDICAL HISTORY:  Past Medical History:   Diagnosis Date    Carpal tunnel syndrome     Diabetes mellitus (H)     Hyperlipidemia     Hypertension     Upper respiratory infection        PAST SURGICAL HISTORY:  Past Surgical History:   Procedure Laterality Date    ARTHROSCOPY SHOULDER ROTATOR CUFF REPAIR  10 years ago    left shoulder     BIOPSY BREAST Left 1980    benign    BIOPSY BREAST Left     before 1980    benign    CATARACT EXTRACTION      HC REMOVAL OF TONSILS,<11 Y/O      Description: Tonsillectomy;  Recorded: 04/29/2014;    HYSTERECTOMY  1957    benign    OOPHORECTOMY  1957    benign    ZZC TOTAL ABDOM HYSTERECTOMY      Description: Hysterectomy;  Recorded: 04/29/2014;         CURRENT MEDICATIONS:    No current facility-administered medications for this encounter.    Current Outpatient Medications:     aspirin 81 MG EC tablet, [ASPIRIN 81 MG EC TABLET] Take 81 mg by mouth daily., Disp: , Rfl:     atorvastatin (LIPITOR) 40 MG tablet, Take 1 tablet (40 mg) by mouth daily, Disp: 90 tablet, Rfl: 3    blood glucose (ACCU-CHEK AMIRA PLUS) test strip, 1 strip by In Vitro route 3 times daily Use to test blood sugar 3 times daily or as directed., Disp: 100 strip, Rfl: 1    blood glucose monitoring (NO BRAND SPECIFIED) meter device kit, by In Vitro route daily, Disp: 1 kit, Rfl: 0    cholecalciferol, vitamin D3, (VITAMIN D3) 1,000 unit capsule, [CHOLECALCIFEROL, VITAMIN D3, (VITAMIN D3) 1,000 UNIT  CAPSULE] Take 1,000 Units by mouth daily., Disp: , Rfl:     cycloSPORINE (RESTASIS) 0.05 % ophthalmic emulsion, Place 1 drop into both eyes 2 times daily, Disp: , Rfl:     DENTAGEL 1.1 % GEL topical gel, BRUSH ON TEETH 2 TIMES DAILY, Disp: , Rfl:     generic lancets (ACCU-CHEK FASTCLIX LANCING DEV), [GENERIC LANCETS (ACCU-CHEK FASTCLIX LANCING DEV)] Use 1 each As Directed daily., Disp: 200 each, Rfl: 2    glucosamine-chondroitin 500-400 mg cap, [GLUCOSAMINE-CHONDROITIN 500-400 MG CAP] Take 2 capsules by mouth daily., Disp: , Rfl:     guaiFENesin-codeine (ROBITUSSIN AC) 100-10 MG/5ML solution, Take 5-10 mLs by mouth every 4 hours as needed for cough, Disp: 473 mL, Rfl: 3    losartan (COZAAR) 50 MG tablet, Take 1.5 tablets (75 mg) by mouth daily, Disp: 135 tablet, Rfl: 3    magnesium 250 mg Tab, [MAGNESIUM 250 MG TAB] Take 1 tablet by mouth daily., Disp: , Rfl:     MELATONIN ORAL, Take 10 mg by mouth nightly as needed (Sleep), Disp: , Rfl:     multivitamin therapeutic (THERAGRAN) tablet, [MULTIVITAMIN THERAPEUTIC (THERAGRAN) TABLET] Take 1 tablet by mouth daily., Disp: , Rfl:     vit C/E/Zn/coppr/lutein/zeaxan (PRESERVISION AREDS-2 ORAL), [VIT C/E/ZN/COPPR/LUTEIN/ZEAXAN (PRESERVISION AREDS-2 ORAL)] Take 1 tablet by mouth 2 (two) times a day., Disp: , Rfl:     ALLERGIES:  Allergies   Allergen Reactions    Ibuprofen Shortness Of Breath       FAMILY HISTORY:  Family History   Problem Relation Age of Onset    Breast Cancer Mother 52.00    Parkinsonism Mother     Breast Cancer Sister 72.00    Coronary Artery Disease Brother     Coronary Artery Disease Brother 78.00    Diabetes Brother     Coronary Artery Disease Father 67.00    Cancer Father         bladder       SOCIAL HISTORY:   Social History     Socioeconomic History    Marital status:    Tobacco Use    Smoking status: Never    Smokeless tobacco: Never   Vaping Use    Vaping Use: Never used   Substance and Sexual Activity    Alcohol use: Yes     Alcohol/week:  "1.0 standard drink of alcohol     Types: 1 Standard drinks or equivalent per week     Comment: 2 glasses of wine monthly    Drug use: No    Sexual activity: Not Currently   Social History Narrative    . Dated a gentlemen for 35 years who  .  Has 1 son, 2 grandchildren and 3 great grandchildren.  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  She is very social getting together with friends often, she is part of a book imedo, she enjoys yoga and swimming at the [employer].  She used to golf.       VITALS:  Patient Vitals for the past 24 hrs:   BP Temp Temp src Pulse Resp SpO2 Height Weight   24 2312 (!) 178/66 -- -- 92 (!) 41 -- -- --   246 -- -- -- -- -- -- 1.473 m (4' 10\") 47.2 kg (104 lb)   24 -- 97.7  F (36.5  C) Oral -- 16 -- -- --   24 2144 (!) 204/86 -- -- 89 -- 99 % -- --        PHYSICAL EXAM    Constitutional:  Awake, alert, in no apparent distress  HENT:  Normocephalic, Atraumatic. Bilateral external ears normal. Oropharynx moist. Nose normal. Neck- Normal range of motion with no guarding, No midline cervical tenderness, Supple, No stridor.   Eyes:  PERRL, EOMI with no signs of entrapment, Conjunctiva normal, No discharge.   Respiratory:  Normal breath sounds, No respiratory distress, No wheezing.    Cardiovascular:  Normal heart rate, Normal rhythm, No appreciable rubs or gallops.   GI:  Soft, No tenderness, No distension, No palpable masses  Musculoskeletal:  Intact distal pulses, No edema. Good range of motion in all major joints. No tenderness to palpation or major deformities noted.  Integument:  Warm, Dry, No erythema, No rash.   Neurologic:  Alert & oriented, Normal motor function, Normal sensory function, No focal deficits noted.   Psychiatric:  Affect normal, Judgment normal, Mood normal.     LAB:  All pertinent labs reviewed and interpreted.  Results for orders placed or performed during the hospital encounter of 24   Extra Blue Top " Tube   Result Value Ref Range    Hold Specimen JIC    Extra Red Top Tube   Result Value Ref Range    Hold Specimen JIC    Extra Green Top (Lithium Heparin) Tube   Result Value Ref Range    Hold Specimen JIC    Extra Purple Top Tube   Result Value Ref Range    Hold Specimen JIC    Basic metabolic panel   Result Value Ref Range    Sodium 135 135 - 145 mmol/L    Potassium 3.7 3.4 - 5.3 mmol/L    Chloride 101 98 - 107 mmol/L    Carbon Dioxide (CO2) 26 22 - 29 mmol/L    Anion Gap 8 7 - 15 mmol/L    Urea Nitrogen 31.1 (H) 8.0 - 23.0 mg/dL    Creatinine 1.00 (H) 0.51 - 0.95 mg/dL    GFR Estimate 51 (L) >60 mL/min/1.73m2    Calcium 9.7 (H) 8.2 - 9.6 mg/dL    Glucose 180 (H) 70 - 99 mg/dL   Result Value Ref Range    Magnesium 1.9 1.7 - 2.3 mg/dL   Result Value Ref Range    Troponin T, High Sensitivity 23 (H) <=14 ng/L   Nt probnp inpatient (BNP)   Result Value Ref Range    N terminal Pro BNP Inpatient 731 0 - 1,800 pg/mL   ECG 12-LEAD WITH MUSE (LHE)   Result Value Ref Range    Systolic Blood Pressure  mmHg    Diastolic Blood Pressure  mmHg    Ventricular Rate 84 BPM    Atrial Rate 84 BPM    AR Interval 178 ms    QRS Duration 136 ms     ms    QTc 470 ms    P Axis 36 degrees    R AXIS -21 degrees    T Axis 112 degrees    Interpretation ECG       Sinus rhythm with Premature supraventricular complexes  Left bundle branch block  Abnormal ECG  When compared with ECG of 03-MAY-2023 10:24,  Premature supraventricular complexes are now Present  Left bundle branch block is now Present  Criteria for Anteroseptal infarct are no longer Present         RADIOLOGY:  Reviewed all pertinent imaging. Please see official radiology report.  No orders to display       EKG:    Normal sinus rhythm.  Left bundle branch block morphology.  No acute ST segment abnormalities.  Essentially unchanged from May 3, 2023  I have independently reviewed and interpreted the EKG(s) documented above.           I, Milagro Her, am serving as a scribe to  document services personally performed by Hermilo Redman MD, based on my observation and the provider's statements to me. I, Hermilo Redman MD attest that Milagro Her is acting in a scribe capacity, has observed my performance of the services and has documented them in accordance with my direction.    Hermilo Redman M.D.  Emergency Medicine  [facility] EMERGENCY DEPARTMENT     Hermilo Redman MD  01/11/24 1289

## 2024-01-12 NOTE — PROGRESS NOTES
"Occupational Therapy     01/12/24 1531   Appointment Info   Signing Clinician's Name / Credentials (OT) Lupe Marsean OTR/L   Quick Adds   Quick Adds Certification   Living Environment   People in Home other (see comments)  (nephew)   Current Living Arrangements house   Home Accessibility stairs to enter home;stairs within home   Number of Stairs, Main Entrance 2   Stair Railings, Main Entrance none   Number of Stairs, Within Home, Primary greater than 10 stairs  (reports 11 stairs)   Stair Railings, Within Home, Primary railing on right side (ascending)   Living Environment Comments tub/shower combo with grab bars   Self-Care   Usual Activity Tolerance good   Current Activity Tolerance moderate   Regular Exercise Yes  (active)   Equipment Currently Used at Home none  (has a reacher)   Number of times patient has fallen within last six months 2   Activity/Exercise/Self-Care Comment Prior to admit, independent with ADLs & laundry, cooking & meds. Family avail for IADLs & nephew can assist as needed/avail.   General Information   Onset of Illness/Injury or Date of Surgery 01/11/24   Referring Physician Mir Moore   Patient/Family Therapy Goal Statement (OT) none   Existing Precautions/Restrictions fall   General Observations and Info Per chart:  \"89 year old female admitted on 1/11/2024. She was brought to the ED for evaluation of dizzy spell and fall without loss of consciousness\"   Cognitive Status Examination   Orientation Status orientation to person, place and time   Follows Commands WNL   Pain Assessment   Patient Currently in Pain No   Posture   Posture not impaired   Range of Motion Comprehensive   Comment, General Range of Motion demo functional ROM for ADLs   Strength Comprehensive (MMT)   Comment, General Manual Muscle Testing (MMT) Assessment demo functional strength for basic ADLs.   Bed Mobility   Comment (Bed Mobility) SBA   Transfers   Transfer Comments SBA/CGA   Balance   Balance Comments slightly " unsteady w/initial stand; improved with standing duration.   Clinical Impression   Criteria for Skilled Therapeutic Interventions Met (OT) Yes, treatment indicated   OT Diagnosis decreased ADL activity tolerance   OT Problem List-Impairments impacting ADL activity tolerance impaired   Assessment of Occupational Performance 1-3 Performance Deficits   Identified Performance Deficits decreased activity tolerance, functional mob   Planned Therapy Interventions (OT) progressive activity/exercise;home program guidelines;ADL retraining   Clinical Decision Making Complexity (OT) problem focused assessment/low complexity   Risk & Benefits of therapy have been explained evaluation/treatment results reviewed;care plan/treatment goals reviewed;risks/benefits reviewed;patient   Clinical Impression Comments Pt presents w/decreased activity tolerance that impacts her ability to perform all ADLs, trfs & mobility independently. Pt could benefit from continued OT to maximize safety w/ADLs.   OT Total Evaluation Time   OT Eval, Low Complexity Minutes (70751) 12   Therapy Certification   Medical Diagnosis syncope   Start of Care Date 01/12/24   Certification date from 01/12/24   Certification date to 01/17/24   OT Goals   Therapy Frequency (OT) Daily   OT Predicted Duration/Target Date for Goal Attainment 01/17/24   OT Goals Lower Body Dressing;Transfers;OT Goal 1   OT: Lower Body Dressing Modified independent   OT: Transfer Modified independent   OT: Goal 1 Pt to demo > 7 minutes standing tolerance w/SBA in prep of ADL tolerance.   Self-Care/Home Management   Self-Care/Home Mgmt/ADL, Compensatory, Meal Prep Minutes (64885) 10   Symptoms Noted During/After Treatment (Meal Preparation/Planning Training) dizziness   Treatment Detail/Skilled Intervention Increased time to monitor vitals. Initially up sitting @ EOB, pt reports slight dizziness-remained constant during session/didn't worsen. Vitals:  sitting: , 98% on RA, /61.  Standing:  , 96%, /64. Pt demo SBA sitting balance; CGA standing balance w/initially needing HHA. Amb to/from BR with CGA trialing HHA vs FWW. No overt unsteadiness; no LOB. SBA toileting including standing @ sink. Min A to reach her feet as she isn't able to perform in figure four method; typically bends to feet. Pt does have a reacher; recommend to use vs bending. After activity/sitting EOB , 99%, /63. Updated pt's nurse. Pt back in bed w/alarm activated & call light in reach.   OT Discharge Planning   OT Plan standing manuela-monitor vitals, trfs, LB drsg-able to perform w/o bending to feet?-has a reacher   OT Discharge Recommendation (DC Rec) home with assist   OT Rationale for DC Rec Pt presents w/needing SBA/CGA with trfs & basic ADLs; decreased activity tolerance w/slight symptoms when up. Recommend home w/assist as needed.   OT Brief overview of current status SBA/CGA ADLs, trfs & mob.   Total Session Time   Timed Code Treatment Minutes 10   Total Session Time (sum of timed and untimed services) 22        [facility]  OUTPATIENT OCCUPATIONAL THERAPY  EVALUATION  PLAN OF TREATMENT FOR OUTPATIENT REHABILITATION  (COMPLETE FOR INITIAL CLAIMS ONLY)  Patient's Last Name, First Name, M.I.  YOB: 1926  SailorPatricia  CHRISTIE                          Provider's Name  [facility] Medical Record No.  [id number]                             Onset Date:  01/11/24   Start of Care Date:  01/12/24   Type:     ___PT   _X_OT   ___SLP Medical Diagnosis:  syncope                    OT Diagnosis:  decreased ADL activity tolerance Visits from SOC:  1     See note for plan of treatment, functional goals and certification details    I CERTIFY THE NEED FOR THESE SERVICES FURNISHED UNDER        THIS PLAN OF TREATMENT AND WHILE UNDER MY CARE     (Physician co-signature of this document indicates review and certification of the therapy plan).

## 2024-01-12 NOTE — ED NOTES
Attempted IV in left AC, blood drawn. Noted to be pulsing in the catheter extension, writer believes that catheter was in artery. IV removed, pressure held for one minute, applied sterile 2x2 with Coban for pressure. Bandage intact after 10 minutes, no blood noted to 2x2. Family, patient educated that Coban should stay on for at least 15 minutes to continue to hold pressure.

## 2024-01-12 NOTE — PROGRESS NOTES
"PRIMARY DIAGNOSIS: \"GENERIC\" NURSING  OUTPATIENT/OBSERVATION GOALS TO BE MET BEFORE DISCHARGE:  ADLs back to baseline: Yes    Activity and level of assistance: Up with standby assistance.    Pain status: Pain free.    Return to near baseline physical activity: Yes     Discharge Planner Nurse   Safe discharge environment identified: Yes  Barriers to discharge: Yes       Entered by: Mara Hinds RN 01/12/2024 9:39 AM     Please review provider order for any additional goals.   Nurse to notify provider when observation goals have been met and patient is ready for discharge.  Pt is alert and oriented x 4,  up with stand by assist. Denies pain/ discomfort. VSS on room air. BP elevated PRN Hydralazine to keep < 180. Admitted for dizzy spell and fall, Echo pending and cardiology consult.    "

## 2024-01-12 NOTE — H&P
[facility]    History and Physical - Hospitalist Service       Date of Admission:  1/11/2024    Assessment & Plan      Patricia Bose is a 89 year old female admitted on 1/11/2024. She was brought to the ED for evaluation of dizzy spell and fall without loss of consciousness    #Postural dizziness with near syncope  -Similar symptoms in the past with unrevealing etiology on Holter monitors  -Telemetry monitoring  -Echocardiogram to evaluate structure and function  -Cardiology consult    #Abnormal high-sensitivity troponin T  #Coronary artery disease  -Not concerning for ACS  -ECG reviewed: Sinus rhythm at 84 bpm, PAC, LBBB without changes from 5/3/2023    #Essential hypertension  -Reconcile PTA medications  -Treat with hydralazine as needed if persistently elevated    #Chronic kidney disease stage IIIa  -Stable renal function  -Avoid nephrotoxins    #Type 2 diabetes without long-term insulin use  -Insulin scale    #Drug-induced platelet defect  -On PTA aspirin  -Monitor for bleeding     Observation Goals: -diagnostic tests and consults completed and resulted, -vital signs normal or at patient baseline, Nurse to notify provider when observation goals have been met and patient is ready for discharge.  Diet: Combination Diet Low Saturated Fat Na <2400mg Diet, No Caffeine Diet    DVT Prophylaxis: Ambulate every shift  Lassiter Catheter: Not present  Lines: None     Cardiac Monitoring: ACTIVE order. Indication: Syncope- low cardiac risk (24 hours)  Code Status: Full Code          Disposition Plan      Expected Discharge Date: 01/12/2024                  Roderick Fraga MD  Hospitalist Service  [facility]  Securely message with [id number] (more info)  Text page via [employer] Paging/Directory     ______________________________________________________________________    Chief Complaint   Dizziness, fall    History is obtained from the patient, electronic health record, and emergency  department physician    History of Present Illness   Patricia Bose is a 89 year old female who was brought to the ED by family for evaluation of dizzy spell followed by a fall.  Past medical history of CAD, tricuspid regurgitation, LBBB, hypertension, hyperlipidemia, type 2 diabetes, interstitial lung disease, chronic pancreatitis.  Patient was in a regular state of health when she was getting ready for bed and bent over to  objects from the floor.  After standing up straight she felt dizzy and fell forward into the corner of the closet.  She denies head trauma or loss of consciousness.  She brought herself to the edge of the bed and then alerted family.  She denies headache, vision changes, numbness, tingling, chest pain or shortness of breath.  However, she felt palpitations.  She has had similar symptoms in the past evaluated with Holter monitors which were unrevealing.  Patient lives in a house with a family member and denies tobacco, alcohol or drugs.      Past Medical History    Past Medical History:   Diagnosis Date    Carpal tunnel syndrome     Diabetes mellitus (H)     Hyperlipidemia     Hypertension     Upper respiratory infection        Past Surgical History   Past Surgical History:   Procedure Laterality Date    ARTHROSCOPY SHOULDER ROTATOR CUFF REPAIR  10 years ago    left shoulder     BIOPSY BREAST Left 1980    benign    BIOPSY BREAST Left     before 1980    benign    CATARACT EXTRACTION      HC REMOVAL OF TONSILS,<11 Y/O      Description: Tonsillectomy;  Recorded: 04/29/2014;    HYSTERECTOMY  1957    benign    OOPHORECTOMY  1957    benign    ZZC TOTAL ABDOM HYSTERECTOMY      Description: Hysterectomy;  Recorded: 04/29/2014;       Prior to Admission Medications   Prior to Admission Medications   Prescriptions Last Dose Informant Patient Reported? Taking?   DENTAGEL 1.1 % GEL topical gel   Yes No   Sig: BRUSH ON TEETH 2 TIMES DAILY   MELATONIN ORAL   Yes No   Sig: Take 10 mg by mouth nightly  as needed (Sleep)   aspirin 81 MG EC tablet   Yes No   Sig: [ASPIRIN 81 MG EC TABLET] Take 81 mg by mouth daily.   atorvastatin (LIPITOR) 40 MG tablet   No No   Sig: Take 1 tablet (40 mg) by mouth daily   blood glucose (ACCU-CHEK AMIRA PLUS) test strip   No No   Si strip by In Vitro route 3 times daily Use to test blood sugar 3 times daily or as directed.   blood glucose monitoring (NO BRAND SPECIFIED) meter device kit   No No   Sig: by In Vitro route daily   cholecalciferol, vitamin D3, (VITAMIN D3) 1,000 unit capsule   Yes No   Sig: [CHOLECALCIFEROL, VITAMIN D3, (VITAMIN D3) 1,000 UNIT CAPSULE] Take 1,000 Units by mouth daily.   cycloSPORINE (RESTASIS) 0.05 % ophthalmic emulsion   Yes No   Sig: Place 1 drop into both eyes 2 times daily   generic lancets (ACCU-CHEK FASTCLIX LANCING DEV)   No No   Sig: [GENERIC LANCETS (ACCU-CHEK FASTCLIX LANCING DEV)] Use 1 each As Directed daily.   glucosamine-chondroitin 500-400 mg cap   Yes No   Sig: [GLUCOSAMINE-CHONDROITIN 500-400 MG CAP] Take 2 capsules by mouth daily.   guaiFENesin-codeine (ROBITUSSIN AC) 100-10 MG/5ML solution   No No   Sig: Take 5-10 mLs by mouth every 4 hours as needed for cough   losartan (COZAAR) 50 MG tablet   No No   Sig: Take 1.5 tablets (75 mg) by mouth daily   magnesium 250 mg Tab   Yes No   Sig: [MAGNESIUM 250 MG TAB] Take 1 tablet by mouth daily.   multivitamin therapeutic (THERAGRAN) tablet   Yes No   Sig: [MULTIVITAMIN THERAPEUTIC (THERAGRAN) TABLET] Take 1 tablet by mouth daily.   vit C/E/Zn/coppr/lutein/zeaxan (PRESERVISION AREDS-2 ORAL)   Yes No   Sig: [VIT C/E/ZN/COPPR/LUTEIN/ZEAXAN (PRESERVISION AREDS-2 ORAL)] Take 1 tablet by mouth 2 (two) times a day.      Facility-Administered Medications: None           Physical Exam   Vital Signs: Temp: 97.7  F (36.5  C) Temp src: Oral BP: (!) 174/77 Pulse: 82   Resp: 22 SpO2: 99 % O2 Device: None (Room air)    Weight: 104 lbs 0 oz    Constitutional: awake  Respiratory: no increased work of  breathing and clear to auscultation  Cardiovascular: regularly irregular rhythm and normal S1 and S2  GI: normal bowel sounds  Skin: no bruising or bleeding  Musculoskeletal: no lower extremity pitting edema present  Neurologic: Mental Status Exam:  Orientation:   person, place, time    Medical Decision Making       55 MINUTES SPENT BY ME on the date of service doing chart review, history, exam, documentation & further activities per the note.  MANAGEMENT DISCUSSED with the following over the past 24 hours: Patient       Data     I have personally reviewed the following data over the past 24 hrs:    9.2  \   11.7   / 230     135 101 31.1 (H) /  180 (H)   3.7 26 1.00 (H) \     Trop: 22 (H) BNP: 731       Imaging results reviewed over the past 24 hrs:   No results found for this or any previous visit (from the past 24 hour(s)).

## 2024-01-12 NOTE — MEDICATION SCRIBE - ADMISSION MEDICATION HISTORY
Medication Scribe Admission Medication History    Admission medication history is complete. The information provided in this note is only as accurate as the sources available at the time of the update.    Information Source(s): Patient, Hospital records, and Prescription bottles via in-person    Pertinent Information: Patient has hard time hearing, had to speak directly into her ear but was very informing.  Her list reflects our interview for Med Hx.    Changes made to PTA medication list:  Added: None  Deleted: None  Changed: None    Medication Affordability:  Not including over the counter (OTC) medications, was there a time in the past 3 months when you did not take your medications as prescribed because of cost?: No    Allergies reviewed with patient and updates made in EHR: yes    Medication History Completed By: Aron Hyde 1/12/2024 6:27 AM  Prior to Admission medications    Medication Sig Last Dose Taking? Auth Provider Long Term End Date   aspirin 81 MG EC tablet [ASPIRIN 81 MG EC TABLET] Take 81 mg by mouth daily. 1/11/2024 at am Yes Provider, Historical     atorvastatin (LIPITOR) 40 MG tablet Take 1 tablet (40 mg) by mouth daily 1/11/2024 at am Yes Vidhi Pearl, NP Yes    cholecalciferol, vitamin D3, (VITAMIN D3) 1,000 unit capsule [CHOLECALCIFEROL, VITAMIN D3, (VITAMIN D3) 1,000 UNIT CAPSULE] Take 1,000 Units by mouth daily. 1/11/2024 at am Yes Provider, Historical     cycloSPORINE (RESTASIS) 0.05 % ophthalmic emulsion Place 1 drop into both eyes 2 times daily 1/11/2024 at x2 Yes Reported, Patient     DENTAGEL 1.1 % GEL topical gel Apply to affected area 2 times daily 1/11/2024 at x2 Yes Reported, Patient     glucosamine-chondroitin 500-400 mg cap [GLUCOSAMINE-CHONDROITIN 500-400 MG CAP] Take 2 capsules by mouth daily. 1/11/2024 at am Yes Provider, Historical     guaiFENesin-codeine (ROBITUSSIN AC) 100-10 MG/5ML solution Take 5-10 mLs by mouth every 4 hours as needed for cough 1/11/2024 at x2 Yes  Eufemia Morse MD     losartan (COZAAR) 50 MG tablet Take 1.5 tablets (75 mg) by mouth daily 1/11/2024 at am Yes Vidhi Pearl NP Yes    magnesium 250 mg Tab [MAGNESIUM 250 MG TAB] Take 1 tablet by mouth daily. Past Week at am Yes Provider, Historical     MELATONIN ORAL Take 10 mg by mouth nightly as needed (Sleep) 1/11/2024 at pm Yes Provider, Historical     multivitamin therapeutic (THERAGRAN) tablet [MULTIVITAMIN THERAPEUTIC (THERAGRAN) TABLET] Take 1 tablet by mouth daily. 1/11/2024 at am Yes Provider, Historical     vit C/E/Zn/coppr/lutein/zeaxan (PRESERVISION AREDS-2 ORAL) [VIT C/E/ZN/COPPR/LUTEIN/ZEAXAN (PRESERVISION AREDS-2 ORAL)] Take 1 tablet by mouth 2 (two) times a day. 1/11/2024 at x2 Yes Provider, Historical     blood glucose (ACCU-CHEK AMIRA PLUS) test strip 1 strip by In Vitro route 3 times daily Use to test blood sugar 3 times daily or as directed.   Vidhi Pearl NP     blood glucose monitoring (NO BRAND SPECIFIED) meter device kit by In Vitro route daily   Vidhi Pearl NP     generic lancets (ACCU-CHEK FASTCLIX LANCING DEV) [GENERIC LANCETS (ACCU-CHEK FASTCLIX LANCING DEV)] Use 1 each As Directed daily.   Verónica Lopez, BRENDA

## 2024-01-13 VITALS
HEIGHT: 58 IN | BODY MASS INDEX: 21.83 KG/M2 | RESPIRATION RATE: 16 BRPM | DIASTOLIC BLOOD PRESSURE: 67 MMHG | TEMPERATURE: 98.1 F | WEIGHT: 104 LBS | SYSTOLIC BLOOD PRESSURE: 156 MMHG | OXYGEN SATURATION: 94 % | HEART RATE: 77 BPM

## 2024-01-13 LAB
ANION GAP SERPL CALCULATED.3IONS-SCNC: 10 MMOL/L (ref 7–15)
BACTERIA UR CULT: NORMAL
BUN SERPL-MCNC: 28.3 MG/DL (ref 8–23)
CALCIUM SERPL-MCNC: 10.3 MG/DL (ref 8.2–9.6)
CHLORIDE SERPL-SCNC: 103 MMOL/L (ref 98–107)
CREAT SERPL-MCNC: 0.99 MG/DL (ref 0.51–0.95)
DEPRECATED HCO3 PLAS-SCNC: 26 MMOL/L (ref 22–29)
EGFRCR SERPLBLD CKD-EPI 2021: 52 ML/MIN/1.73M2
ERYTHROCYTE [DISTWIDTH] IN BLOOD BY AUTOMATED COUNT: 14 % (ref 10–15)
GLUCOSE BLDC GLUCOMTR-MCNC: 121 MG/DL (ref 70–99)
GLUCOSE SERPL-MCNC: 129 MG/DL (ref 70–99)
HCT VFR BLD AUTO: 37.8 % (ref 35–47)
HGB BLD-MCNC: 12.4 G/DL (ref 11.7–15.7)
MCH RBC QN AUTO: 29.7 PG (ref 26.5–33)
MCHC RBC AUTO-ENTMCNC: 32.8 G/DL (ref 31.5–36.5)
MCV RBC AUTO: 90 FL (ref 78–100)
PLATELET # BLD AUTO: 246 10E3/UL (ref 150–450)
POTASSIUM SERPL-SCNC: 4 MMOL/L (ref 3.4–5.3)
RBC # BLD AUTO: 4.18 10E6/UL (ref 3.8–5.2)
SODIUM SERPL-SCNC: 139 MMOL/L (ref 135–145)
WBC # BLD AUTO: 14.2 10E3/UL (ref 4–11)

## 2024-01-13 PROCEDURE — 82962 GLUCOSE BLOOD TEST: CPT

## 2024-01-13 PROCEDURE — 250N000013 HC RX MED GY IP 250 OP 250 PS 637

## 2024-01-13 PROCEDURE — 80048 BASIC METABOLIC PNL TOTAL CA: CPT

## 2024-01-13 PROCEDURE — 85014 HEMATOCRIT: CPT

## 2024-01-13 PROCEDURE — 250N000013 HC RX MED GY IP 250 OP 250 PS 637: Performed by: HOSPITALIST

## 2024-01-13 PROCEDURE — 36415 COLL VENOUS BLD VENIPUNCTURE: CPT

## 2024-01-13 PROCEDURE — 99239 HOSP IP/OBS DSCHRG MGMT >30: CPT | Performed by: INTERNAL MEDICINE

## 2024-01-13 PROCEDURE — 250N000013 HC RX MED GY IP 250 OP 250 PS 637: Performed by: INTERNAL MEDICINE

## 2024-01-13 PROCEDURE — G0378 HOSPITAL OBSERVATION PER HR: HCPCS

## 2024-01-13 RX ORDER — AMLODIPINE BESYLATE 2.5 MG/1
2.5 TABLET ORAL DAILY
Qty: 30 TABLET | Refills: 0 | Status: CANCELLED | OUTPATIENT
Start: 2024-01-13

## 2024-01-13 RX ORDER — AMLODIPINE BESYLATE 2.5 MG/1
2.5 TABLET ORAL DAILY
Qty: 30 TABLET | Refills: 0 | Status: SHIPPED | OUTPATIENT
Start: 2024-01-14 | End: 2024-02-01

## 2024-01-13 RX ADMIN — Medication 1 MG: at 00:05

## 2024-01-13 RX ADMIN — LOSARTAN POTASSIUM 75 MG: 50 TABLET, FILM COATED ORAL at 09:00

## 2024-01-13 RX ADMIN — ATORVASTATIN CALCIUM 40 MG: 40 TABLET, FILM COATED ORAL at 09:01

## 2024-01-13 RX ADMIN — AMLODIPINE BESYLATE 2.5 MG: 2.5 TABLET ORAL at 09:00

## 2024-01-13 RX ADMIN — Medication 81 MG: at 09:01

## 2024-01-13 ASSESSMENT — ACTIVITIES OF DAILY LIVING (ADL)
ADLS_ACUITY_SCORE: 35

## 2024-01-13 NOTE — PROGRESS NOTES
"PRIMARY DIAGNOSIS: \"GENERIC\" NURSING  OUTPATIENT/OBSERVATION GOALS TO BE MET BEFORE DISCHARGE:  ADLs back to baseline: Yes    Activity and level of assistance: Ambulating independently.    Pain status: Pain free.    Return to near baseline physical activity: Yes     Discharge Planner Nurse   Safe discharge environment identified: Yes  Barriers to discharge: No       Entered by: Yoana Monaco RN 01/13/2024 9:31 AM   Dr. Moore just came by and discharged the patient. Waiting for completion of discharge paperwork.  Please review provider order for any additional goals.   Nurse to notify provider when observation goals have been met and patient is ready for discharge.  "

## 2024-01-13 NOTE — DISCHARGE SUMMARY
[facility]  Hospitalist Discharge Summary      Date of Admission:  1/11/2024  Date of Discharge:  1/13/2024  Discharging Provider: Jessy Pérez NP  Discharge Service: Hospitalist Service    Discharge Diagnoses   Near syncope    Clinically Significant Risk Factors          Follow-ups Needed After Discharge   Follow up with Cardiology for Holter monitor x 7 days  Follow up with primary care doctor for blood pressure check. Added Norvasc for blood pressure.  Check blood pressure twice daily and bring record to primary care doctor within the week.     Unresulted Labs Ordered in the Past 30 Days of this Admission       Date and Time Order Name Status Description    1/13/2024 12:01 AM Basic metabolic panel In process         These results will be followed up by primary doctor  Follow up with your primary care doctor        Discharge Disposition   Discharged to home  Condition at discharge: Stable    Hospital Course   Patricia Bose is a 89 yo F with a past medical history of hyperlipidemia, CAD, HTN, T2DM, CKD3, LBBB who was admitted for evaluation of dizzy spell and fall without loss of consciousness. Resume home medications.    #Postural dizziness with near syncope  -Similar symptoms in the past with unrevealing etiology on Holter monitors last spring  -Telemetry monitoring  -Echocardiogram today results: The left ventricle is normal in size.  The visual ejection fraction is >70%.  Hyperdynamic left ventricular function  There is mild concentric left ventricular hypertrophy.  Normal right ventricle size and systolic function.  The left atrium is moderately dilated.  There is mild mitral stenosis.  Compared to prior study, there is no significant change.    Cardiology Consult: recommending to add amlodipine 2.5 mg for better BP control  - Encouraged better oral hydration   - 7 day event monitor to rule out arrhythmia induced syncope     PT/OT to evaluate for recommend discharge to home      #Abnormal high-sensitivity troponin T  #Coronary artery disease  -Not concerning for ACS  -BNP normal  -ECG reviewed: Sinus rhythm at 84 bpm, PAC, LBBB without changes from 5/3/2023    #Essential hypertension  Resume Losartan  Cardiology added Norvasc today    #Chronic kidney disease stage IIIa  -Cr 1.1 yesterday, 0.9 today  -NS IV fluid bolus 500 ml given yesterday    #Type 2 diabetes without long-term insulin use  -Insulin scale    #Drug-induced platelet defect  -On PTA aspirin  -Monitor for bleeding  -trend CBC    #Hyperlipidemia  -Atorvastatin    Consultations This Hospital Stay   CARE MANAGEMENT / SOCIAL WORK IP CONSULT  CARDIOLOGY IP CONSULT  PHYSICAL THERAPY ADULT IP CONSULT  OCCUPATIONAL THERAPY ADULT IP CONSULT    Code Status   Full Code    Time Spent on this Encounter   I, Jessy Pérez NP, personally saw the patient today and spent greater than 30 minutes discharging this patient.       Jessy Pérez NP  [facility] EMERGENCY DEPARTMENT  [address] [address]  Phone: [phone]  ______________________________________________________________________    Physical Exam   Vital Signs: Temp: 98.4  F (36.9  C) Temp src: Oral BP: 135/59 Pulse: 77   Resp: 18 SpO2: 94 % O2 Device: None (Room air)    Weight: 104 lbs 0 oz  Constitutional: awake, alert, cooperative, no apparent distress, and appears stated   Hematologic / Lymphatic: no cervical lymphadenopathy and no supraclavicular lymphadenopathy  Respiratory: No increased work of breathing, good air exchange, clear to auscultation bilaterally, no crackles or wheezing  Cardiovascular: Normal apical impulse, regular rate and rhythm, normal S1 and S2, no S3 or S4, and no murmur noted  GI: No scars, normal bowel sounds, soft, non-distended, non-tender, no masses palpated, no hepatosplenomegally  Skin: no bruising or bleeding, normal skin color, texture, turgor, and no redness, warmth, or swelling  Musculoskeletal: There  is no redness, warmth, or swelling of the joints.  Full range of motion noted.  Motor strength is 5 out of 5 all extremities bilaterally.  Tone is normal.  Neurologic: Awake, alert, oriented to name, place and time.  al.  Neuropsychiatric: General: normal, calm, and normal eye contact       Primary Care Physician   Vidhi Pearl    Discharge Orders   No discharge procedures on file.    Significant Results and Procedures   Most Recent 3 CBC's:  Recent Labs   Lab Test 24  0710 24  1056   WBC 14.2* 9.2 8.1   HGB 12.4 11.7 11.7   MCV 90 90 91    230 213     Most Recent 3 BMP's:  Recent Labs   Lab Test 24  2138 24  1726 24  1236 24  0754 242 24  1059 23  1242 23  1201 23  0723   NA  --   --   --   --  135  --  148*  --  138   POTASSIUM  --   --   --   --  3.7  --  4.8  --  4.0   CHLORIDE  --   --   --   --  101  --  109*  --  103   CO2  --   --   --   --  26  --  26  --  27   BUN  --   --   --   --  31.1*  --  27.8*  --  29.1*   CR  --   --   --   --  1.00* 0.84 0.86  --  0.92   ANIONGAP  --   --   --   --  8  --  13  --  8   MARISOL  --   --   --   --  9.7* 10.3* 10.1*  --  9.4   * 154* 145*   < > 180*  --  99   < > 123*    < > = values in this interval not displayed.   ,   Results for orders placed or performed during the hospital encounter of 24   Echocardiogram Complete     Value    LVEF  >70%    Narrative    [id number]  [id number]  [id number]  [id number]^NAHID^MAURO^[address] [address]     Name: KIYA CAMP  MRN: [id number]  : 1926  Study Date: 2024 08:35 AM  Age: 89 yrs  Gender: Female  Patient Location: [facility]  Reason For Study: Syncope  Ordering Physician: MAURO WHITFIELD  Performed By: GANESH     BSA: 1.4 m2  Height: 58 in  Weight: 104 lb  BP: 177/79  mmHg  ______________________________________________________________________________  Procedure  Complete Portable Echo Adult. Definity (NDC #28028-275) given intravenously.  Compared to prior study, there is no significant change.  ______________________________________________________________________________  Interpretation Summary     The left ventricle is normal in size.  The visual ejection fraction is >70%.  Hyperdynamic left ventricular function  There is mild concentric left ventricular hypertrophy.  Normal right ventricle size and systolic function.  The left atrium is moderately dilated.  There is mild mitral stenosis.  Compared to prior study, there is no significant change.  ______________________________________________________________________________  Left Ventricle  The left ventricle is normal in size. There is mild concentric left  ventricular hypertrophy. Hyperdynamic left ventricular function. The visual  ejection fraction is >70%. No regional wall motion abnormalities noted.     Right Ventricle  Normal right ventricle size and systolic function.     Atria  The left atrium is moderately dilated. Right atrial size is normal. There is  no color Doppler evidence of an atrial shunt.     Mitral Valve  The mitral valve leaflets appear thickened, but open well. There is moderate  mitral annular calcification. There is trace mitral regurgitation. There is  mild mitral stenosis.     Tricuspid Valve  Tricuspid valve leaflets appear normal. Right ventricle systolic pressure  estimate normal. This degree of valvular regurgitation is within normal  limits.     Aortic Valve  The aortic valve is trileaflet with aortic valve sclerosis. There is trace  aortic regurgitation. No hemodynamically significant valvular aortic stenosis.     Pulmonic Valve  The pulmonic valve is not well visualized. This degree of valvular  regurgitation is within normal limits.     Vessels  The aorta root is normal. Normal size ascending  aorta. IVC diameter <2.1 cm  collapsing >50% with sniff suggests a normal RA pressure of 3 mmHg.     Pericardium  There is no pericardial effusion.     ______________________________________________________________________________  MMode/2D Measurements & Calculations  IVSd: 1.4 cm  LVIDd: 3.1 cm  LVIDs: 1.8 cm  LVPWd: 1.1 cm     FS: 39.7 %  LV mass(C)d: 116.2 grams  LV mass(C)dI: 84.2 grams/m2  Ao root diam: 3.0 cm  LA dimension: 2.1 cm  asc Aorta Diam: 3.0 cm  LA/Ao: 0.70  LVOT diam: 2.0 cm  LVOT area: 3.1 cm2  Ao root diam index Ht(cm/m): 2.0  Ao root diam index BSA (cm/m2): 2.2  Asc Ao diam index BSA (cm/m2): 2.2  Asc Ao diam index Ht(cm/m): 2.0  LA Volume Indexed (AL/bp): 44.7 ml/m2     RV Base: 3.7 cm  RWT: 0.70  TAPSE: 2.0 cm     Time Measurements  MM HR: 78.0 BPM     Doppler Measurements & Calculations  MV E max asad: 82.4 cm/sec  MV A max asad: 158.3 cm/sec  MV E/A: 0.52  MV max P.4 mmHg  MV mean P.3 mmHg  MV V2 VTI: 33.5 cm  MVA(VTI): 2.0 cm2  MV dec slope: 306.3 cm/sec2  MV dec time: 0.27 sec  Ao V2 max: 129.5 cm/sec  Ao max P.0 mmHg  Ao V2 mean: 98.1 cm/sec  Ao mean P.0 mmHg  Ao V2 VTI: 31.8 cm  TATI(I,D): 2.1 cm2  TATI(V,D): 2.2 cm2  LV V1 max PG: 3.4 mmHg  LV V1 max: 92.4 cm/sec  LV V1 VTI: 21.6 cm  SV(LVOT): 67.9 ml  SI(LVOT): 49.2 ml/m2  PA V2 max: 90.2 cm/sec  PA max PG: 3.3 mmHg  PA acc time: 0.13 sec  TR max asad: 287.0 cm/sec  TR max P.9 mmHg     AV Asad Ratio (DI): 0.71  TATI Index (cm2/m2): 1.5  E/E': 18.0  E/E' av.8  Lateral E/e': 17.6  Medial E/e': 18.0  Peak E' Asad: 4.6 cm/sec  RV S Asad: 12.1 cm/sec     ______________________________________________________________________________  Report approved by: Leo Lopez 2024 02:35 PM             Discharge Medications   Current Discharge Medication List        CONTINUE these medications which have NOT CHANGED    Details   aspirin 81 MG EC tablet [ASPIRIN 81 MG EC TABLET] Take 81 mg by mouth daily.       atorvastatin (LIPITOR) 40 MG tablet Take 1 tablet (40 mg) by mouth daily  Qty: 90 tablet, Refills: 3    Associated Diagnoses: Coronary artery disease involving native coronary artery of native heart without angina pectoris      cholecalciferol, vitamin D3, (VITAMIN D3) 1,000 unit capsule [CHOLECALCIFEROL, VITAMIN D3, (VITAMIN D3) 1,000 UNIT CAPSULE] Take 1,000 Units by mouth daily.      cycloSPORINE (RESTASIS) 0.05 % ophthalmic emulsion Place 1 drop into both eyes 2 times daily      DENTAGEL 1.1 % GEL topical gel Apply to affected area 2 times daily      glucosamine-chondroitin 500-400 mg cap [GLUCOSAMINE-CHONDROITIN 500-400 MG CAP] Take 2 capsules by mouth daily.      guaiFENesin-codeine (ROBITUSSIN AC) 100-10 MG/5ML solution Take 5-10 mLs by mouth every 4 hours as needed for cough  Qty: 473 mL, Refills: 3    Associated Diagnoses: IPF (idiopathic pulmonary fibrosis) (H)      losartan (COZAAR) 50 MG tablet Take 1.5 tablets (75 mg) by mouth daily  Qty: 135 tablet, Refills: 3    Comments: Please file refills, patient will contact when needed.  Associated Diagnoses: Benign essential hypertension      magnesium 250 mg Tab [MAGNESIUM 250 MG TAB] Take 1 tablet by mouth daily.      MELATONIN ORAL Take 10 mg by mouth nightly as needed (Sleep)      multivitamin therapeutic (THERAGRAN) tablet [MULTIVITAMIN THERAPEUTIC (THERAGRAN) TABLET] Take 1 tablet by mouth daily.      vit C/E/Zn/coppr/lutein/zeaxan (PRESERVISION AREDS-2 ORAL) [VIT C/E/ZN/COPPR/LUTEIN/ZEAXAN (PRESERVISION AREDS-2 ORAL)] Take 1 tablet by mouth 2 (two) times a day.      blood glucose (ACCU-CHEK AMIRA PLUS) test strip 1 strip by In Vitro route 3 times daily Use to test blood sugar 3 times daily or as directed.  Qty: 100 strip, Refills: 1    Associated Diagnoses: Type 2 diabetes mellitus with hyperglycemia, without long-term current use of insulin (H)      blood glucose monitoring (NO BRAND SPECIFIED) meter device kit by In Vitro route daily  Qty: 1 kit,  Refills: 0    Associated Diagnoses: Hyperglycemia; Type 2 diabetes mellitus with hyperglycemia, with long-term current use of insulin (H)      generic lancets (ACCU-CHEK FASTCLIX LANCING DEV) [GENERIC LANCETS (ACCU-CHEK FASTCLIX LANCING DEV)] Use 1 each As Directed daily.  Qty: 200 each, Refills: 2    Associated Diagnoses: Hyperglycemia; Type 2 diabetes mellitus with hyperglycemia, with long-term current use of insulin (H)           Allergies   Allergies   Allergen Reactions    Ibuprofen Shortness Of Breath

## 2024-01-13 NOTE — PROGRESS NOTES
"PRIMARY DIAGNOSIS: \"GENERIC\" NURSING  OUTPATIENT/OBSERVATION GOALS TO BE MET BEFORE DISCHARGE:  ADLs back to baseline: Yes    Activity and level of assistance: Up with standby assistance.    Pain status: Pain free.    Return to near baseline physical activity: Yes     Discharge Planner Nurse   Safe discharge environment identified: Yes  Barriers to discharge: Yes       Entered by: Toñito Cuevas RN 01/12/2024 10:46 PM     A & O x 4. VSS except BP elevated at beginning of shift, on RA. Denies pain/discomfort. On tele, sinus tachycardia w/ BBB. , 126. Consistent carb 60g diet, pt tolerating well. Standby assist. R PIV SL. Family stopped by for support. Cont. B/B. Cardiology following. Nursing continue to monitor.  "

## 2024-01-13 NOTE — PLAN OF CARE
Occupational Therapy Discharge Summary    Reason for therapy discharge:    Discharged to home with home therapy.    Progress towards therapy goal(s). See goals on Care Plan in [facility] electronic health record for goal details.  Goals partially met.  Barriers to achieving goals:   discharge from facility.    Therapy recommendation(s):    Continued therapy is recommended.  Rationale/Recommendations:  discharge to home w/ support.    JENNIFER Souza, OTR/L, 1/13/2024, 2:14 PM

## 2024-01-13 NOTE — PROGRESS NOTES
"PRIMARY DIAGNOSIS: \"GENERIC\" NURSING  OUTPATIENT/OBSERVATION GOALS TO BE MET BEFORE DISCHARGE:  ADLs back to baseline: Yes    Activity and level of assistance: Up with standby assistance.    Pain status: Pain free.    Return to near baseline physical activity: Yes     Discharge Planner Nurse   Safe discharge environment identified: Yes  Barriers to discharge: Yes       Entered by: Guerita Greene RN 01/13/2024 4:29 AM     Please review provider order for any additional goals.   Nurse to notify provider when observation goals have been met and patient is ready for discharge.  "

## 2024-01-13 NOTE — PROGRESS NOTES
"PRIMARY DIAGNOSIS: \"GENERIC\" NURSING  OUTPATIENT/OBSERVATION GOALS TO BE MET BEFORE DISCHARGE:  ADLs back to baseline: Yes    Activity and level of assistance: Up with standby assistance.    Pain status: Pain free.    Return to near baseline physical activity: Yes     Discharge Planner Nurse   Safe discharge environment identified: Yes  Barriers to discharge: Yes       Entered by: Toñito Cuevas RN 01/12/2024 8:46 PM       "

## 2024-01-13 NOTE — PLAN OF CARE
Physical Therapy Discharge Summary    Reason for therapy discharge:    Discharged to home with home therapy.    Progress towards therapy goal(s). See goals on Care Plan in [facility] electronic health record for goal details.  Goals partially met.  Barriers to achieving goals:   limited tolerance for therapy and discharge from facility.    Therapy recommendation(s):    Continued therapy is recommended.  Rationale/Recommendations:  home PT.

## 2024-01-13 NOTE — DISCHARGE INSTRUCTIONS
Start taking Amlodipine every morning for high blood pressure.   Take your blood pressure in the morning and in the evening and WRITE THEM DOWN.   These numbers will be given to your primary physician when you see them in ONE WEEK or as soon as possible.   PICKUP new prescription of Amlodipine on your way home from the hospital.

## 2024-01-13 NOTE — PLAN OF CARE
"PRIMARY DIAGNOSIS: \"GENERIC\" NURSING  OUTPATIENT/OBSERVATION GOALS TO BE MET BEFORE DISCHARGE:  ADLs back to baseline: Yes    Activity and level of assistance: Up with standby assistance.    Pain status: Pain free.    Return to near baseline physical activity: Yes     Discharge Planner Nurse   Safe discharge environment identified: Yes  Barriers to discharge: Yes       Entered by: Guerita Greene RN 01/13/2024 6:36 AM     Please review provider order for any additional goals.   Nurse to notify provider when observation goals have been met and patient is ready for discharge.Goal Outcome Evaluation:     Pt is A&O. VSS in RA. Denies pain/ discomfort. On Tele with Sinus Dysrhythmia. Up with SBA. B&B continent. Had PRN melatonin, pt was able to sleep well.                         "

## 2024-01-18 ENCOUNTER — OFFICE VISIT (OUTPATIENT)
Dept: INTERNAL MEDICINE | Facility: CLINIC | Age: 89
End: 2024-01-18
Payer: COMMERCIAL

## 2024-01-18 VITALS
BODY MASS INDEX: 23.53 KG/M2 | RESPIRATION RATE: 16 BRPM | HEART RATE: 98 BPM | WEIGHT: 104.6 LBS | HEIGHT: 56 IN | SYSTOLIC BLOOD PRESSURE: 138 MMHG | OXYGEN SATURATION: 99 % | TEMPERATURE: 98 F | DIASTOLIC BLOOD PRESSURE: 66 MMHG

## 2024-01-18 DIAGNOSIS — R55 SYNCOPE, UNSPECIFIED SYNCOPE TYPE: ICD-10-CM

## 2024-01-18 DIAGNOSIS — E83.52 SERUM CALCIUM ELEVATED: ICD-10-CM

## 2024-01-18 DIAGNOSIS — Z09 HOSPITAL DISCHARGE FOLLOW-UP: Primary | ICD-10-CM

## 2024-01-18 DIAGNOSIS — I10 BENIGN ESSENTIAL HYPERTENSION: ICD-10-CM

## 2024-01-18 PROCEDURE — 99214 OFFICE O/P EST MOD 30 MIN: CPT | Mod: 25 | Performed by: NURSE PRACTITIONER

## 2024-01-18 PROCEDURE — 93242 EXT ECG>48HR<7D RECORDING: CPT | Performed by: NURSE PRACTITIONER

## 2024-01-18 ASSESSMENT — PAIN SCALES - GENERAL: PAINLEVEL: MILD PAIN (3)

## 2024-01-18 NOTE — PROGRESS NOTES
"  Assessment & Plan   Problem List Items Addressed This Visit    None  Visit Diagnoses       Hospital discharge follow-up    -  Primary    Benign essential hypertension        Relevant Orders    Adult Cardiology Eval  Referral    Syncope, unspecified syncope type        Relevant Orders    ZIO PATCH 3-7 DAYS (additional cost to patient) (Completed)    ZIO PATCH 3-7 DAYS APPLICATION    Adult Cardiology Eval  Referral    Serum calcium elevated             Recommendation to increase water intake     Recommend holding calcium supplement and multivitamin with calcium in it.  Recheck 12 weeks     Ziopatch placed and referral to Cardiology        MED REC REQUIRED  Post Medication Reconciliation Status: discharge medications reconciled, continue medications without change        Reynaldo Gomez is a 89 year old, presenting for the following health issues:  ER F/U (Fall, dizziness. Dx: Postural dizziness with near syncope.) and Hypertension (She wants her BP rechecked before she leaves. )        1/18/2024     8:59 AM   Additional Questions   Roomed by RIMMA Herman   Accompanied by Son     HPI       ED/UC Followup:    Facility:  [facility]   Date of visit: 1/11/2024-1/13/2024  Reason for visit: Fall, dizziness. Dx: Postural dizziness with near syncope   Current Status: No dizziness since the ED. She feels good.          Objective    /66 (BP Location: Right arm, Patient Position: Sitting, Cuff Size: Adult Small)   Pulse 98   Temp 98  F (36.7  C) (Oral)   Resp 16   Ht 1.417 m (4' 7.8\")   Wt 47.4 kg (104 lb 9.6 oz)   LMP  (LMP Unknown)   SpO2 99%   BMI 23.62 kg/m    Body mass index is 23.62 kg/m .    Review of Systems  Constitutional, HEENT, cardiovascular, pulmonary, GI, , musculoskeletal, neuro, skin, endocrine and psych systems are negative, except as otherwise noted.  Physical Exam   GENERAL: alert and no distress  EYES: Eyes grossly normal to inspection,  conjunctivae and sclerae " normal  RESP: lungs clear to auscultation - no rales, rhonchi or wheezes  CV: regular rate and rhythm, normal S1 S2,  no peripheral edema  PSYCH: mentation appears normal, affect normal/bright    Admission on 01/11/2024, Discharged on 01/13/2024   Component Date Value Ref Range Status    Ventricular Rate 01/11/2024 84  BPM Final    Atrial Rate 01/11/2024 84  BPM Final    AZ Interval 01/11/2024 178  ms Final    QRS Duration 01/11/2024 136  ms Final    QT 01/11/2024 398  ms Final    QTc 01/11/2024 470  ms Final    P Axis 01/11/2024 36  degrees Final    R AXIS 01/11/2024 -21  degrees Final    T [place] 01/11/2024 112  degrees Final    Interpretation ECG 01/11/2024    Final                    Value:Sinus rhythm with Premature supraventricular complexes  Left bundle branch block  Abnormal ECG  When compared with ECG of 03-MAY-2023 10:24,  Premature supraventricular complexes are now Present  Left bundle branch block is now Present  Criteria for Anteroseptal infarct are no longer Present  Confirmed by SEE ED PROVIDER NOTE FOR, ECG INTERPRETATION ([id number]),  TERRENCE LEVI ([id number]) on 1/12/2024 2:40:57 AM      Hold Specimen 01/11/2024 [facility]   Final    Hold Specimen 01/11/2024 [facility]   Final    Hold Specimen 01/11/2024 [facility]   Final    Hold Specimen 01/11/2024 [facility]   Final    Sodium 01/11/2024 135  135 - 145 mmol/L Final    Reference intervals for this test were updated on 09/26/2023 to more accurately reflect our healthy population. There may be differences in the flagging of prior results with similar values performed with this method. Interpretation of those prior results can be made in the context of the updated reference intervals.     Potassium 01/11/2024 3.7  3.4 - 5.3 mmol/L Final    Chloride 01/11/2024 101  98 - 107 mmol/L Final    Carbon Dioxide (CO2) 01/11/2024 26  22 - 29 mmol/L Final    Anion Gap 01/11/2024 8  7 - 15 mmol/L Final    Urea Nitrogen 01/11/2024 31.1 (H)  8.0 - 23.0 mg/dL Final    Creatinine 01/11/2024 1.00  (H)  0.51 - 0.95 mg/dL Final    GFR Estimate 01/11/2024 51 (L)  >60 mL/min/1.73m2 Final    Calcium 01/11/2024 9.7 (H)  8.2 - 9.6 mg/dL Final    Glucose 01/11/2024 180 (H)  70 - 99 mg/dL Final    Magnesium 01/11/2024 1.9  1.7 - 2.3 mg/dL Final    Troponin T, High Sensitivity 01/11/2024 23 (H)  <=14 ng/L Final    Either a High Sensitivity Troponin T baseline (0 hours) value = 100 ng/L, or an increase in High Sensitivity Troponin T = 7 ng/L at 2 hours compared to 0 hours (2-0 hours), suggests myocardial injury, and urgent clinical attention is required.    If the 2-0 hours increase is <7 ng/L, a High Sensitivity Troponin T result above gender-specific reference ranges warrants further evaluation.   Recommendations for further evaluation include correlation with clinical decision-making tool (e.g., HEART), a 3rd High Sensitivity Troponin T test 2 hours after the 2nd (a 20% change from baseline would represent concern), admission for observation, close PCC/cardiology follow-up, or urgent outpatient provocative testing.    N terminal Pro BNP Inpatient 01/11/2024 731  0 - 1,800 pg/mL Final    Reference range shown and results flagged as abnormal are suggested inpatient cut points for confirming diagnosis if CHF in an acute setting. Establishing a baseline value for each individual patient is useful for follow-up. An inpatient or emergency department NT-proPBNP <300 pg/mL effectively rules out acute CHF, with 99% negative predictive value.    The outpatient non-acute reference range for ruling out CHF is:  0-125 pg/mL (age 18 to less than 75)  0-450 pg/mL (age 75 yrs and older)     WBC Count 01/11/2024 9.2  4.0 - 11.0 10e3/uL Final    RBC Count 01/11/2024 3.93  3.80 - 5.20 10e6/uL Final    Hemoglobin 01/11/2024 11.7  11.7 - 15.7 g/dL Final    Hematocrit 01/11/2024 35.2  35.0 - 47.0 % Final    MCV 01/11/2024 90  78 - 100 fL Final    MCH 01/11/2024 29.8  26.5 - 33.0 pg Final    MCHC 01/11/2024 33.2  31.5 - 36.5 g/dL Final     RDW 01/11/2024 13.7  10.0 - 15.0 % Final    Platelet Count 01/11/2024 230  150 - 450 10e3/uL Final    Color Urine 01/12/2024 Light Yellow  Colorless, Straw, Light Yellow, Yellow Final    Appearance Urine 01/12/2024 Clear  Clear Final    Glucose Urine 01/12/2024 Negative  Negative mg/dL Final    Bilirubin Urine 01/12/2024 Negative  Negative Final    Ketones Urine 01/12/2024 Negative  Negative mg/dL Final    Specific Gravity Urine 01/12/2024 1.016  1.001 - 1.030 Final    Blood Urine 01/12/2024 Negative  Negative Final    pH Urine 01/12/2024 5.5  5.0 - 7.0 Final    Protein Albumin Urine 01/12/2024 70 (A)  Negative mg/dL Final    Urobilinogen Urine 01/12/2024 <2.0  <2.0 mg/dL Final    Nitrite Urine 01/12/2024 Negative  Negative Final    Leukocyte Esterase Urine 01/12/2024 75 [place]/uL (A)  Negative Final    Mucus Urine 01/12/2024 Present (A)  None Seen /LPF Final    RBC Urine 01/12/2024 2  <=2 /HPF Final    WBC Urine 01/12/2024 11 (H)  <=5 /HPF Final    Troponin T, High Sensitivity 01/12/2024 22 (H)  <=14 ng/L Final    Either a High Sensitivity Troponin T baseline (0 hours) value = 100 ng/L, or an increase in High Sensitivity Troponin T = 7 ng/L at 2 hours compared to 0 hours (2-0 hours), suggests myocardial injury, and urgent clinical attention is required.    If the 2-0 hours increase is <7 ng/L, a High Sensitivity Troponin T result above gender-specific reference ranges warrants further evaluation.   Recommendations for further evaluation include correlation with clinical decision-making tool (e.g., HEART), a 3rd High Sensitivity Troponin T test 2 hours after the 2nd (a 20% change from baseline would represent concern), admission for observation, close PCC/cardiology follow-up, or urgent outpatient provocative testing.    Hemoglobin A1C 01/11/2024 6.4 (H)  <5.7 % Final    Normal <5.7%   Prediabetes 5.7-6.4%    Diabetes 6.5% or higher     Note: Adopted from ADA consensus guidelines.    LVEF  01/12/2024 >70%   Final     Culture 01/12/2024 <10,000 CFU/mL Mixture of Urogenital Susie   Final    GLUCOSE BY METER POCT 01/12/2024 103 (H)  70 - 99 mg/dL Final    GLUCOSE BY METER POCT 01/12/2024 145 (H)  70 - 99 mg/dL Final    GLUCOSE BY METER POCT 01/12/2024 154 (H)  70 - 99 mg/dL Final    GLUCOSE BY METER POCT 01/12/2024 126 (H)  70 - 99 mg/dL Final    Sodium 01/13/2024 139  135 - 145 mmol/L Final    Reference intervals for this test were updated on 09/26/2023 to more accurately reflect our healthy population. There may be differences in the flagging of prior results with similar values performed with this method. Interpretation of those prior results can be made in the context of the updated reference intervals.     Potassium 01/13/2024 4.0  3.4 - 5.3 mmol/L Final    Chloride 01/13/2024 103  98 - 107 mmol/L Final    Carbon Dioxide (CO2) 01/13/2024 26  22 - 29 mmol/L Final    Anion Gap 01/13/2024 10  7 - 15 mmol/L Final    Urea Nitrogen 01/13/2024 28.3 (H)  8.0 - 23.0 mg/dL Final    Creatinine 01/13/2024 0.99 (H)  0.51 - 0.95 mg/dL Final    GFR Estimate 01/13/2024 52 (L)  >60 mL/min/1.73m2 Final    Calcium 01/13/2024 10.3 (H)  8.2 - 9.6 mg/dL Final    Glucose 01/13/2024 129 (H)  70 - 99 mg/dL Final    WBC Count 01/13/2024 14.2 (H)  4.0 - 11.0 10e3/uL Final    RBC Count 01/13/2024 4.18  3.80 - 5.20 10e6/uL Final    Hemoglobin 01/13/2024 12.4  11.7 - 15.7 g/dL Final    Hematocrit 01/13/2024 37.8  35.0 - 47.0 % Final    MCV 01/13/2024 90  78 - 100 fL Final    MCH 01/13/2024 29.7  26.5 - 33.0 pg Final    MCHC 01/13/2024 32.8  31.5 - 36.5 g/dL Final    RDW 01/13/2024 14.0  10.0 - 15.0 % Final    Platelet Count 01/13/2024 246  150 - 450 10e3/uL Final    GLUCOSE BY METER POCT 01/13/2024 121 (H)  70 - 99 mg/dL Final           Wt Readings from Last 5 Encounters:   01/18/24 47.4 kg (104 lb 9.6 oz)   01/11/24 47.2 kg (104 lb)   11/22/23 47.2 kg (104 lb)   10/25/23 46.9 kg (103 lb 8 oz)   07/12/23 48.1 kg (106 lb)       Current Outpatient  Medications:     aspirin 81 MG EC tablet, [ASPIRIN 81 MG EC TABLET] Take 81 mg by mouth daily., Disp: , Rfl:     atorvastatin (LIPITOR) 40 MG tablet, Take 1 tablet (40 mg) by mouth daily, Disp: 90 tablet, Rfl: 3    blood glucose monitoring (NO BRAND SPECIFIED) meter device kit, by In Vitro route daily, Disp: 1 kit, Rfl: 0    cholecalciferol, vitamin D3, (VITAMIN D3) 1,000 unit capsule, [CHOLECALCIFEROL, VITAMIN D3, (VITAMIN D3) 1,000 UNIT CAPSULE] Take 1,000 Units by mouth daily., Disp: , Rfl:     cycloSPORINE (RESTASIS) 0.05 % ophthalmic emulsion, Place 1 drop into both eyes 2 times daily, Disp: , Rfl:     DENTAGEL 1.1 % GEL topical gel, Apply to affected area 2 times daily, Disp: , Rfl:     generic lancets (ACCU-CHEK FASTCLIX LANCING DEV), [GENERIC LANCETS (ACCU-CHEK FASTCLIX LANCING DEV)] Use 1 each As Directed daily., Disp: 200 each, Rfl: 2    glucosamine-chondroitin 500-400 mg cap, [GLUCOSAMINE-CHONDROITIN 500-400 MG CAP] Take 2 capsules by mouth daily., Disp: , Rfl:     guaiFENesin-codeine (ROBITUSSIN AC) 100-10 MG/5ML solution, Take 5-10 mLs by mouth every 4 hours as needed for cough, Disp: 473 mL, Rfl: 3    losartan (COZAAR) 50 MG tablet, Take 1.5 tablets (75 mg) by mouth daily, Disp: 135 tablet, Rfl: 3    magnesium 250 mg Tab, [MAGNESIUM 250 MG TAB] Take 1 tablet by mouth daily., Disp: , Rfl:     MELATONIN ORAL, Take 10 mg by mouth nightly as needed (Sleep), Disp: , Rfl:     multivitamin therapeutic (THERAGRAN) tablet, [MULTIVITAMIN THERAPEUTIC (THERAGRAN) TABLET] Take 1 tablet by mouth daily., Disp: , Rfl:     vit C/E/Zn/coppr/lutein/zeaxan (PRESERVISION AREDS-2 ORAL), [VIT C/E/ZN/COPPR/LUTEIN/ZEAXAN (PRESERVISION AREDS-2 ORAL)] Take 1 tablet by mouth 2 (two) times a day., Disp: , Rfl:     amLODIPine (NORVASC) 2.5 MG tablet, Take 1 tablet (2.5 mg) by mouth daily, Disp: 30 tablet, Rfl: 0    blood glucose (ACCU-CHEK AMIRA PLUS) test strip, 1 strip by In Vitro route 3 times daily Use to test blood sugar 3  times daily or as directed., Disp: 100 strip, Rfl: 1      Signed Electronically by: Vidhi Pearl NP

## 2024-01-23 DIAGNOSIS — E11.65 TYPE 2 DIABETES MELLITUS WITH HYPERGLYCEMIA, WITH LONG-TERM CURRENT USE OF INSULIN (H): ICD-10-CM

## 2024-01-23 DIAGNOSIS — E11.65 TYPE 2 DIABETES MELLITUS WITH HYPERGLYCEMIA, WITHOUT LONG-TERM CURRENT USE OF INSULIN (H): ICD-10-CM

## 2024-01-23 DIAGNOSIS — Z79.4 TYPE 2 DIABETES MELLITUS WITH HYPERGLYCEMIA, WITH LONG-TERM CURRENT USE OF INSULIN (H): ICD-10-CM

## 2024-01-23 DIAGNOSIS — R73.9 HYPERGLYCEMIA: ICD-10-CM

## 2024-01-23 RX ORDER — BLOOD SUGAR DIAGNOSTIC
1 STRIP MISCELLANEOUS 3 TIMES DAILY
Qty: 100 STRIP | Refills: 1 | Status: SHIPPED | OUTPATIENT
Start: 2024-01-23

## 2024-02-01 DIAGNOSIS — R07.89 CHEST PRESSURE: ICD-10-CM

## 2024-02-01 DIAGNOSIS — I10 ESSENTIAL HYPERTENSION: ICD-10-CM

## 2024-02-01 RX ORDER — AMLODIPINE BESYLATE 2.5 MG/1
2.5 TABLET ORAL DAILY
Qty: 30 TABLET | Refills: 0 | Status: SHIPPED | OUTPATIENT
Start: 2024-02-01 | End: 2024-03-04

## 2024-02-05 PROCEDURE — 93244 EXT ECG>48HR<7D REV&INTERPJ: CPT | Performed by: INTERNAL MEDICINE

## 2024-02-08 DIAGNOSIS — I10 ESSENTIAL HYPERTENSION: ICD-10-CM

## 2024-02-08 DIAGNOSIS — R07.89 CHEST PRESSURE: ICD-10-CM

## 2024-02-08 RX ORDER — AMLODIPINE BESYLATE 2.5 MG/1
2.5 TABLET ORAL DAILY
Qty: 30 TABLET | Refills: 0 | OUTPATIENT
Start: 2024-02-08

## 2024-02-08 NOTE — TELEPHONE ENCOUNTER
Pharmacy requested duplicate. Medication refused.     Script sent to the pharmacy on 2/1/24 for 30 tablets. Patient should have a refill on file with the pharmacy     Rhea Heath RN

## 2024-03-04 DIAGNOSIS — I10 ESSENTIAL HYPERTENSION: ICD-10-CM

## 2024-03-04 DIAGNOSIS — R07.89 CHEST PRESSURE: ICD-10-CM

## 2024-03-04 RX ORDER — AMLODIPINE BESYLATE 2.5 MG/1
2.5 TABLET ORAL DAILY
Qty: 30 TABLET | Refills: 0 | Status: SHIPPED | OUTPATIENT
Start: 2024-03-04 | End: 2024-04-16

## 2024-03-21 ENCOUNTER — TELEPHONE (OUTPATIENT)
Dept: ENDOCRINOLOGY | Facility: CLINIC | Age: 89
End: 2024-03-21

## 2024-03-21 NOTE — TELEPHONE ENCOUNTER
[facility] Call Center    Phone Message    May a detailed message be left on voicemail: yes     Reason for Call: Other: Patient went in for labs this am and the lab stated they had been cancelled, can you send in new orders if you want her to do labs prior to her appt on 4/2/2024, please call and let her know if she needs to try and go in again and once the orders are placed so she can schedule again prior to appt.       Action Taken: Message routed to:  [facility] ([facility] Endo    Travel Screening: Not Applicable

## 2024-04-03 ENCOUNTER — OFFICE VISIT (OUTPATIENT)
Dept: ENDOCRINOLOGY | Facility: CLINIC | Age: 89
End: 2024-04-03
Payer: COMMERCIAL

## 2024-04-03 VITALS
HEART RATE: 86 BPM | BODY MASS INDEX: 23.55 KG/M2 | WEIGHT: 104.7 LBS | SYSTOLIC BLOOD PRESSURE: 156 MMHG | OXYGEN SATURATION: 98 % | HEIGHT: 56 IN | DIASTOLIC BLOOD PRESSURE: 72 MMHG

## 2024-04-03 DIAGNOSIS — N18.31 TYPE 2 DIABETES MELLITUS WITH STAGE 3A CHRONIC KIDNEY DISEASE, WITHOUT LONG-TERM CURRENT USE OF INSULIN (H): ICD-10-CM

## 2024-04-03 DIAGNOSIS — I25.10 CORONARY ARTERY DISEASE INVOLVING NATIVE CORONARY ARTERY OF NATIVE HEART WITHOUT ANGINA PECTORIS: ICD-10-CM

## 2024-04-03 DIAGNOSIS — E11.22 TYPE 2 DIABETES MELLITUS WITH STAGE 3A CHRONIC KIDNEY DISEASE, WITHOUT LONG-TERM CURRENT USE OF INSULIN (H): ICD-10-CM

## 2024-04-03 DIAGNOSIS — M85.80 OSTEOPENIA, UNSPECIFIED LOCATION: Primary | ICD-10-CM

## 2024-04-03 PROCEDURE — 99214 OFFICE O/P EST MOD 30 MIN: CPT | Performed by: NURSE PRACTITIONER

## 2024-04-03 RX ORDER — ATORVASTATIN CALCIUM 40 MG/1
40 TABLET, FILM COATED ORAL DAILY
Qty: 90 TABLET | Refills: 2 | Status: SHIPPED | OUTPATIENT
Start: 2024-04-03

## 2024-04-03 RX ORDER — RISEDRONATE SODIUM 150 MG/1
TABLET, FILM COATED ORAL
Qty: 3 TABLET | Refills: 3 | Status: SHIPPED | OUTPATIENT
Start: 2024-04-03

## 2024-04-03 NOTE — PROGRESS NOTES
[facility] ENDO[facility]IN[facility]    Endocrine Note 4/3/2024    Patricia Bose, 8/31/1926, [id number]          Reason for visit      1. Osteopenia, unspecified location    2. Type 2 diabetes mellitus with stage 3a chronic kidney disease, without long-term current use of insulin (H)        History     Patricia Bose is a very pleasant 89 year old old female who presents for follow up.  SUMMARY:    Patricia returns today in follow-up for Osteopenia and DM 2.     She reports today that she has been to see her Dentist regarding a back R molar that caught his attention. She brings in a Prolia fact sheet that had her quite concerned regarding this tooth. Pt has not had a Prolia injection since last June - now 10 months out.  Reassured that the medication was well out of her system and that likely that whatever was going on is not associated with the shots.      She continues to manage her DM 2 with diet only. She is independent in her ADL's but does not drive any longer. She's pretty adept for a 89 year old.     CRF remains stable at present.         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 disease, without long-term current use of insulin (H)    Chronic kidney disease, stage 3 (H)    Other chronic pancreatitis (H)    IPF (idiopathic pulmonary fibrosis) (H)    Hypercalcemia    Osteopenia    Protein in urine    Bundle branch block, left    Chest pressure    Postural dizziness with near syncope       Family History       family history includes Breast Cancer (age of onset: 52.00) in her mother; Breast Cancer (age of onset: 72.00) in her sister; Cancer in her father; Coronary Artery Disease in her brother; Coronary Artery Disease (age of onset: 67.00) in her father; Coronary Artery Disease (age of onset: 78.00) in her brother; Diabetes in her brother;  "Parkinsonism in her mother.    Social History      reports that she has never smoked. She has never used smokeless tobacco. She reports current alcohol use of about 1.0 standard drink of alcohol per week. She reports that she does not use drugs.      Review of Systems     Patient has no polyuria or polydipsia, no chest pain, dyspnea or TIA's, no numbness, tingling or pain in extremities  Remainder negative except as noted in HPI.      Vital Signs     BP (!) 156/72 (BP Location: Right arm, Patient Position: Sitting, Cuff Size: Adult Regular)   Pulse 86   Ht 1.42 m (4' 7.91\")   Wt 47.5 kg (104 lb 11.2 oz)   LMP  (LMP Unknown)   SpO2 98%   BMI 23.55 kg/m    Wt Readings from Last 3 Encounters:   04/03/24 47.5 kg (104 lb 11.2 oz)   01/18/24 47.4 kg (104 lb 9.6 oz)   01/11/24 47.2 kg (104 lb)       Physical Exam     Constitutional:  Well developed, Well nourished  HENT:  Normocephalic,   Neck: normal in appearance  Eyes:  PERRL, Conjunctiva pink  Respiratory:  No respiratory distress  Skin: No acanthosis nigricans, lipoatrophy or lipodystrophy  Neurologic:  Alert & oriented x 3, nonfocal  Psychiatric:  Affect, Mood, Insight appropriate      Assessment     1. Osteopenia, unspecified location    2. Type 2 diabetes mellitus with stage 3a chronic kidney disease, without long-term current use of insulin (H)        Plan     Will start pt on Actonel as she has hx with the medication and has no problem taking it. We are leighton that she didn't have any Vertebral fx after abruptly stopping the Prolia.  I will see her back in 1 year.     She did not make a follow-up appointment today because she didn't have a calendar/planner for next year yet.       Verónica Lopez NP  [facility]  4/3/2024  11:58 AM      Lab Results   Lab Results: personally reviewed.   Lab on 01/08/2024   Component Date Value    Calcium 01/08/2024 10.3 (H)     Creatinine 01/08/2024 0.84     GFR Estimate 01/08/2024 63     Vitamin D, Total (25-Hyd* " "01/08/2024 48        Fingerstick Blood Glucose: @[id number](POCGLUFGR:10)@    Last Hbg A1C: No results found for: \"HGBA1C\"   Recent Labs   Lab Test 06/12/19  1039   TSH 1.95     Invalid input(s): \"CORTPRE\", \"RSZU39EHC\", \"[id number]\"  @LABRCNTIP(NA,K,CL,co2,bun,creatinine,calcium,labalbu,prot,bilitot,alkphos,alt,ast,glucose)@        No results found for: \"TESTOSTERONE\"  Untested Testosterone, Free  No components found for: \"LABTEST\"    Current Medications     Outpatient Medications Prior to Visit   Medication Sig Dispense Refill    amLODIPine (NORVASC) 2.5 MG tablet Take 1 tablet (2.5 mg) by mouth daily 30 tablet 0    aspirin 81 MG EC tablet [ASPIRIN 81 MG EC TABLET] Take 81 mg by mouth daily.      atorvastatin (LIPITOR) 40 MG tablet Take 1 tablet (40 mg) by mouth daily 90 tablet 2    blood glucose (ACCU-CHEK AMIRA PLUS) test strip 1 strip by In Vitro route 3 times daily Use to test blood sugar 3 times daily or as directed. 100 strip 1    blood glucose monitoring (NO BRAND SPECIFIED) meter device kit by In Vitro route daily 1 kit 0    cholecalciferol, vitamin D3, (VITAMIN D3) 1,000 unit capsule [CHOLECALCIFEROL, VITAMIN D3, (VITAMIN D3) 1,000 UNIT CAPSULE] Take 1,000 Units by mouth daily.      cycloSPORINE (RESTASIS) 0.05 % ophthalmic emulsion Place 1 drop into both eyes 2 times daily      DENTAGEL 1.1 % GEL topical gel Apply to affected area 2 times daily      generic lancets (ACCU-CHEK FASTCLIX LANCING DEV) [GENERIC LANCETS (ACCU-CHEK FASTCLIX LANCING DEV)] Use 1 each As Directed daily. 200 each 2    glucosamine-chondroitin 500-400 mg cap [GLUCOSAMINE-CHONDROITIN 500-400 MG CAP] Take 2 capsules by mouth daily.      guaiFENesin-codeine (ROBITUSSIN AC) 100-10 MG/5ML solution Take 5-10 mLs by mouth every 4 hours as needed for cough 473 mL 3    losartan (COZAAR) 50 MG tablet Take 1.5 tablets (75 mg) by mouth daily 135 tablet 3    MELATONIN ORAL Take 10 mg by mouth nightly as needed (Sleep)      multivitamin " therapeutic (THERAGRAN) tablet [MULTIVITAMIN THERAPEUTIC (THERAGRAN) TABLET] Take 1 tablet by mouth daily.      magnesium 250 mg Tab [MAGNESIUM 250 MG TAB] Take 1 tablet by mouth daily. (Patient not taking: Reported on 4/3/2024)      vit C/E/Zn/coppr/lutein/zeaxan (PRESERVISION AREDS-2 ORAL) [VIT C/E/ZN/COPPR/LUTEIN/ZEAXAN (PRESERVISION AREDS-2 ORAL)] Take 1 tablet by mouth 2 (two) times a day. (Patient not taking: Reported on 4/3/2024)       No facility-administered medications prior to visit.

## 2024-04-03 NOTE — LETTER
4/3/2024         RE: Patricia Bose  [address]  [place] [address]        Dear Colleague,    Thank you for referring your patient, Patricia Bose, to the [facility]. Please see a copy of my visit note below.    [facility] ENDOCRINOLOGY    Endocrine Note 4/3/2024    Patricia Bose, 8/31/1926, [id number]          Reason for visit      1. Osteopenia, unspecified location    2. Type 2 diabetes mellitus with stage 3a chronic kidney disease, without long-term current use of insulin (H)        History     Patricia Bose is a very pleasant 89 year old old female who presents for follow up.  SUMMARY:    Patricia returns today in follow-up for Osteopenia and DM 2.     She reports today that she has been to see her Dentist regarding a back R molar that caught his attention. She brings in a Prolia fact sheet that had her quite concerned regarding this tooth. Pt has not had a Prolia injection since last June - now 10 months out.  Reassured that the medication was well out of her system and that likely that whatever was going on is not associated with the shots.      She continues to manage her DM 2 with diet only. She is independent in her ADL's but does not drive any longer. She's pretty adept for a 89 year old.     CRF remains stable at present.         Past Medical History     Patient Active Problem List   Diagnosis     Mixed hyperlipidemia     Hypertension     Insomnia     CAD (coronary artery disease)     Type 2 diabetes mellitus with stage 3a chronic kidney disease, without long-term current use of insulin (H)     Chronic kidney disease, stage 3 (H)     Other chronic pancreatitis (H)     IPF (idiopathic pulmonary fibrosis) (H)     Hypercalcemia     Osteopenia     Protein in urine     Bundle branch block, left     Chest pressure     Postural dizziness with near syncope       Family History       family history includes Breast Cancer (age of onset: 52.00) in her mother;  "Breast Cancer (age of onset: 72.00) in her sister; Cancer in her father; Coronary Artery Disease in her brother; Coronary Artery Disease (age of onset: 67.00) in her father; Coronary Artery Disease (age of onset: 78.00) in her brother; Diabetes in her brother; Parkinsonism in her mother.    Social History      reports that she has never smoked. She has never used smokeless tobacco. She reports current alcohol use of about 1.0 standard drink of alcohol per week. She reports that she does not use drugs.      Review of Systems     Patient has no polyuria or polydipsia, no chest pain, dyspnea or TIA's, no numbness, tingling or pain in extremities  Remainder negative except as noted in HPI.      Vital Signs     BP (!) 156/72 (BP Location: Right arm, Patient Position: Sitting, Cuff Size: Adult Regular)   Pulse 86   Ht 1.42 m (4' 7.91\")   Wt 47.5 kg (104 lb 11.2 oz)   LMP  (LMP Unknown)   SpO2 98%   BMI 23.55 kg/m    Wt Readings from Last 3 Encounters:   04/03/24 47.5 kg (104 lb 11.2 oz)   01/18/24 47.4 kg (104 lb 9.6 oz)   01/11/24 47.2 kg (104 lb)       Physical Exam     Constitutional:  Well developed, Well nourished  HENT:  Normocephalic,   Neck: normal in appearance  Eyes:  PERRL, Conjunctiva pink  Respiratory:  No respiratory distress  Skin: No acanthosis nigricans, lipoatrophy or lipodystrophy  Neurologic:  Alert & oriented x 3, nonfocal  Psychiatric:  Affect, Mood, Insight appropriate      Assessment     1. Osteopenia, unspecified location    2. Type 2 diabetes mellitus with stage 3a chronic kidney disease, without long-term current use of insulin (H)        Plan     Will start pt on Actonel as she has hx with the medication and has no problem taking it. We are leighton that she didn't have any Vertebral fx after abruptly stopping the Prolia.  I will see her back in 1 year.     She did not make a follow-up appointment today because she didn't have a calendar/planner for next year yet.       Verónica Lopez NP  HE " "Endocrinology  4/3/2024  11:58 AM      Lab Results   Lab Results: personally reviewed.   Lab on 01/08/2024   Component Date Value     Calcium 01/08/2024 10.3 (H)      Creatinine 01/08/2024 0.84      GFR Estimate 01/08/2024 63      Vitamin D, Total (25-Hyd* 01/08/2024 48        Fingerstick Blood Glucose: @[id number](POCGLUFGR:10)@    Last Hbg A1C: No results found for: \"HGBA1C\"   Recent Labs   Lab Test 06/12/19  1039   TSH 1.95     Invalid input(s): \"CORTPRE\", \"[id number]\", \"SEGR64EKM\"  @LABRCNTIP(NA,K,CL,co2,bun,creatinine,calcium,labalbu,prot,bilitot,alkphos,alt,ast,glucose)@        No results found for: \"TESTOSTERONE\"  Untested Testosterone, Free  No components found for: \"LABTEST\"    Current Medications     Outpatient Medications Prior to Visit   Medication Sig Dispense Refill     amLODIPine (NORVASC) 2.5 MG tablet Take 1 tablet (2.5 mg) by mouth daily 30 tablet 0     aspirin 81 MG EC tablet [ASPIRIN 81 MG EC TABLET] Take 81 mg by mouth daily.       atorvastatin (LIPITOR) 40 MG tablet Take 1 tablet (40 mg) by mouth daily 90 tablet 2     blood glucose (ACCU-CHEK AMIRA PLUS) test strip 1 strip by In Vitro route 3 times daily Use to test blood sugar 3 times daily or as directed. 100 strip 1     blood glucose monitoring (NO BRAND SPECIFIED) meter device kit by In Vitro route daily 1 kit 0     cholecalciferol, vitamin D3, (VITAMIN D3) 1,000 unit capsule [CHOLECALCIFEROL, VITAMIN D3, (VITAMIN D3) 1,000 UNIT CAPSULE] Take 1,000 Units by mouth daily.       cycloSPORINE (RESTASIS) 0.05 % ophthalmic emulsion Place 1 drop into both eyes 2 times daily       DENTAGEL 1.1 % GEL topical gel Apply to affected area 2 times daily       generic lancets (ACCU-CHEK FASTCLIX LANCING DEV) [GENERIC LANCETS (ACCU-CHEK FASTCLIX LANCING DEV)] Use 1 each As Directed daily. 200 each 2     glucosamine-chondroitin 500-400 mg cap [GLUCOSAMINE-CHONDROITIN 500-400 MG CAP] Take 2 capsules by mouth daily.       guaiFENesin-codeine (ROBITUSSIN AC) " 100-10 MG/5ML solution Take 5-10 mLs by mouth every 4 hours as needed for cough 473 mL 3     losartan (COZAAR) 50 MG tablet Take 1.5 tablets (75 mg) by mouth daily 135 tablet 3     MELATONIN ORAL Take 10 mg by mouth nightly as needed (Sleep)       multivitamin therapeutic (THERAGRAN) tablet [MULTIVITAMIN THERAPEUTIC (THERAGRAN) TABLET] Take 1 tablet by mouth daily.       magnesium 250 mg Tab [MAGNESIUM 250 MG TAB] Take 1 tablet by mouth daily. (Patient not taking: Reported on 4/3/2024)       vit C/E/Zn/coppr/lutein/zeaxan (PRESERVISION AREDS-2 ORAL) [VIT C/E/ZN/COPPR/LUTEIN/ZEAXAN (PRESERVISION AREDS-2 ORAL)] Take 1 tablet by mouth 2 (two) times a day. (Patient not taking: Reported on 4/3/2024)       No facility-administered medications prior to visit.                                                 Again, thank you for allowing me to participate in the care of your patient.        Sincerely,        Verónica Lopez NP

## 2024-04-15 ENCOUNTER — TELEPHONE (OUTPATIENT)
Dept: INTERNAL MEDICINE | Facility: CLINIC | Age: 89
End: 2024-04-15
Payer: COMMERCIAL

## 2024-04-15 NOTE — TELEPHONE ENCOUNTER
General Call    Contacts         Type Contact Phone/Fax    04/15/2024 03:25 PM CDT Phone (Incoming) Patricia Bose (Self) [phone] ()          Reason for Call:  speak to care team    What are your questions or concerns:  patient wants to know if she needs to come in for labs, and if she should have these a few days before her visit on April 25th- also needs to know if she should have fasting labs     Date of last appointment with provider:     Could we send this information to you in [facility] or would you prefer to receive a phone call?:   Patient would prefer a phone call   Okay to leave a detailed message?: Yes at Home number on file [phone] ([place])

## 2024-04-16 DIAGNOSIS — I10 ESSENTIAL HYPERTENSION: ICD-10-CM

## 2024-04-16 DIAGNOSIS — R07.89 CHEST PRESSURE: ICD-10-CM

## 2024-04-16 RX ORDER — AMLODIPINE BESYLATE 2.5 MG/1
2.5 TABLET ORAL DAILY
Qty: 30 TABLET | Refills: 0 | Status: SHIPPED | OUTPATIENT
Start: 2024-04-16 | End: 2024-04-22

## 2024-04-19 NOTE — TELEPHONE ENCOUNTER
Writer made the mistake of thinking that upcoming appt was an AWV, thus thinking other orders may have been needed.       Pt is scheduled for lab only appt on 04/22/24 for endo labs to be completed.       Roldan Valentino Jr., CMA on 4/19/2024 at 11:40 AM

## 2024-04-22 ENCOUNTER — APPOINTMENT (OUTPATIENT)
Dept: LAB | Facility: CLINIC | Age: 89
End: 2024-04-22
Payer: COMMERCIAL

## 2024-04-22 DIAGNOSIS — R07.89 CHEST PRESSURE: ICD-10-CM

## 2024-04-22 DIAGNOSIS — I10 ESSENTIAL HYPERTENSION: ICD-10-CM

## 2024-04-22 NOTE — TELEPHONE ENCOUNTER
Medication Question or Refill        What medication are you calling about (include dose and sig)?: Amlodipine 2.5 mg    Preferred Pharmacy:   [place] [id number] [place] [facility] [place], [place] - [address] [facility] [place] [address] [place] [address] [address]  [place] [address]  Phone: [phone] Fax: [phone]      Controlled Substance Agreement on file:   CSA -- Patient Level:    CSA: None found at the patient level.       Who prescribed the medication?: Dr. Pearl    Do you need a refill? Yes    When did you use the medication last? This morning     Patient offered an appointment? No    Do you have any questions or concerns?  Yes: Patient only has two doses left. Please refill       Could we [facility] this information to you [facility] or would you prefer to receive a phone call?:   Patient would prefer a phone call   Okay to leave a detailed message?: Yes at Home number on file [phone] (home)

## 2024-04-23 RX ORDER — AMLODIPINE BESYLATE 2.5 MG/1
2.5 TABLET ORAL DAILY
Qty: 90 TABLET | Refills: 2 | Status: SHIPPED | OUTPATIENT
Start: 2024-04-23

## 2024-05-02 ENCOUNTER — HOSPITAL ENCOUNTER (OUTPATIENT)
Dept: GENERAL RADIOLOGY | Facility: HOSPITAL | Age: 89
Discharge: HOME | End: 2024-05-02
Attending: NURSE PRACTITIONER | Admitting: NURSE PRACTITIONER
Payer: COMMERCIAL

## 2024-05-02 ENCOUNTER — OFFICE VISIT (OUTPATIENT)
Dept: INTERNAL MEDICINE | Facility: CLINIC | Age: 89
End: 2024-05-02
Payer: COMMERCIAL

## 2024-05-02 VITALS
HEIGHT: 56 IN | SYSTOLIC BLOOD PRESSURE: 170 MMHG | OXYGEN SATURATION: 99 % | HEART RATE: 85 BPM | RESPIRATION RATE: 16 BRPM | BODY MASS INDEX: 23.24 KG/M2 | DIASTOLIC BLOOD PRESSURE: 73 MMHG | WEIGHT: 103.3 LBS | TEMPERATURE: 98 F

## 2024-05-02 DIAGNOSIS — M54.42 CHRONIC BILATERAL LOW BACK PAIN WITH BILATERAL SCIATICA: ICD-10-CM

## 2024-05-02 DIAGNOSIS — G89.29 CHRONIC BILATERAL LOW BACK PAIN WITH BILATERAL SCIATICA: ICD-10-CM

## 2024-05-02 DIAGNOSIS — M25.562 LEFT KNEE PAIN, UNSPECIFIED CHRONICITY: Primary | ICD-10-CM

## 2024-05-02 DIAGNOSIS — R09.81 CONGESTION OF PARANASAL SINUS: ICD-10-CM

## 2024-05-02 DIAGNOSIS — M54.41 CHRONIC BILATERAL LOW BACK PAIN WITH BILATERAL SCIATICA: ICD-10-CM

## 2024-05-02 DIAGNOSIS — M25.562 LEFT KNEE PAIN, UNSPECIFIED CHRONICITY: ICD-10-CM

## 2024-05-02 DIAGNOSIS — M25.552 BILATERAL HIP PAIN: ICD-10-CM

## 2024-05-02 DIAGNOSIS — M25.551 BILATERAL HIP PAIN: ICD-10-CM

## 2024-05-02 PROCEDURE — 73560 X-RAY EXAM OF KNEE 1 OR 2: CPT | Mod: LT

## 2024-05-02 PROCEDURE — 99214 OFFICE O/P EST MOD 30 MIN: CPT | Performed by: NURSE PRACTITIONER

## 2024-05-02 ASSESSMENT — PAIN SCALES - GENERAL: PAINLEVEL: EXTREME PAIN (8)

## 2024-05-02 NOTE — PROGRESS NOTES
Assessment & Plan   Problem List Items Addressed This Visit    None  Visit Diagnoses       Left knee pain, unspecified chronicity    -  Primary    Relevant Orders    Orthopedic  Referral    XR Knee Left 3 Views    Ankle/Knee Bracing Supplies Order Knee Sleeve/Brace; Left; Open    Chronic bilateral low back pain with bilateral sciatica        Relevant Orders    Orthopedic  Referral    Bilateral hip pain        Relevant Orders    Orthopedic  Referral    Congestion of paranasal sinus            Patient noted to have left medial knee pain upon palpation with edema noted.  Recommend neoprene sleeve on the a.m. off in the p.m. elevate rest ice over-the-counter Tylenol referral to orthopedics will obtain x-rays today.    She also has some chronic low back pain with known arthritis into the SI joints and bilateral hip discomfort with radicular symptoms down the posterior legs indicating that she always has a sensation as though her legs may give out.  Will refer to orthopedics for further evaluation and management.    She has a fullness sensation in her ears with runny nose and congestion will recommend Flonase nasal spray 1 spray each nostril daily, Zyrtec 10 mg daily and saline sinus rinse.                 Reynaldo Gomez is a 89 year old, presenting for the following health issues:  Sore Knee and Follow Up        5/2/2024    10:49 AM   Additional Questions   Roomed by RIMMA Chirinos   Accompanied by Rey      Via the Health Maintenance questionnaire, the patient has reported the following services have been completed -Eye Exam, this information has been sent to the abstraction team.    History of Present Illness       Reason for visit:  Sore knee  Symptom onset:  1-2 weeks ago    She eats 0-1 servings of fruits and vegetables daily.She consumes 0 sweetened beverage(s) daily.She exercises with enough effort to increase her heart rate 9 or less minutes per day.  She exercises with enough  "effort to increase her heart rate 3 or less days per week.   She is taking medications regularly.                 Review of Systems  Constitutional, HEENT, cardiovascular, pulmonary, GI, , musculoskeletal, neuro, skin, endocrine and psych systems are negative, except as otherwise noted.      Objective    BP (!) 170/73 (BP Location: Right arm, Patient Position: Sitting, Cuff Size: Adult Small)   Pulse 85   Temp 98  F (36.7  C) (Oral)   Resp 16   Ht 1.42 m (4' 7.9\")   Wt 46.9 kg (103 lb 4.8 oz)   LMP  (LMP Unknown)   SpO2 99%   BMI 23.24 kg/m    Body mass index is 23.24 kg/m .  Physical Exam   GENERAL: alert and no distress  EYES: Eyes grossly normal to inspection, conjunctivae and sclerae normal  HENT: ear canals and TM's normal   RESP: Respirations regular nonlabored  MS: Upon examination of patient's knees she noted he has edema of the left knee with tenderness on palpation of the medial meniscus strength is equal throughout pulses intact    Admission on 01/11/2024, Discharged on 01/13/2024   Component Date Value Ref Range Status    Ventricular Rate 01/11/2024 84  BPM Final    Atrial Rate 01/11/2024 84  BPM Final    IN Interval 01/11/2024 178  ms Final    QRS Duration 01/11/2024 136  ms Final    QT 01/11/2024 398  ms Final    QTc 01/11/2024 470  ms Final    P Axis 01/11/2024 36  degrees Final    R AXIS 01/11/2024 -21  degrees Final    T [place] 01/11/2024 112  degrees Final    Interpretation ECG 01/11/2024    Final                    Value:Sinus rhythm with Premature supraventricular complexes  Left bundle branch block  Abnormal ECG  When compared with ECG of 03-MAY-2023 10:24,  Premature supraventricular complexes are now Present  Left bundle branch block is now Present  Criteria for Anteroseptal infarct are no longer Present  Confirmed by SEE ED PROVIDER NOTE FOR, ECG INTERPRETATION ([id number]),  TERRENCE LEVI ([id number]) on 1/12/2024 2:40:57 AM      Hold Specimen 01/11/2024 [facility]   Final    Hold Specimen " 01/11/2024 [facility]   Final    Hold Specimen 01/11/2024 [facility]   Final    Hold Specimen 01/11/2024 [facility]   Final    Sodium 01/11/2024 135  135 - 145 mmol/L Final    Reference intervals for this test were updated on 09/26/2023 to more accurately reflect our healthy population. There may be differences in the flagging of prior results with similar values performed with this method. Interpretation of those prior results can be made in the context of the updated reference intervals.     Potassium 01/11/2024 3.7  3.4 - 5.3 mmol/L Final    Chloride 01/11/2024 101  98 - 107 mmol/L Final    Carbon Dioxide (CO2) 01/11/2024 26  22 - 29 mmol/L Final    Anion Gap 01/11/2024 8  7 - 15 mmol/L Final    Urea Nitrogen 01/11/2024 31.1 (H)  8.0 - 23.0 mg/dL Final    Creatinine 01/11/2024 1.00 (H)  0.51 - 0.95 mg/dL Final    GFR Estimate 01/11/2024 51 (L)  >60 mL/min/1.73m2 Final    Calcium 01/11/2024 9.7 (H)  8.2 - 9.6 mg/dL Final    Glucose 01/11/2024 180 (H)  70 - 99 mg/dL Final    Magnesium 01/11/2024 1.9  1.7 - 2.3 mg/dL Final    Troponin T, High Sensitivity 01/11/2024 23 (H)  <=14 ng/L Final    Either a High Sensitivity Troponin T baseline (0 hours) value = 100 ng/L, or an increase in High Sensitivity Troponin T = 7 ng/L at 2 hours compared to 0 hours (2-0 hours), suggests myocardial injury, and urgent clinical attention is required.    If the 2-0 hours increase is <7 ng/L, a High Sensitivity Troponin T result above gender-specific reference ranges warrants further evaluation.   Recommendations for further evaluation include correlation with clinical decision-making tool (e.g., HEART), a 3rd High Sensitivity Troponin T test 2 hours after the 2nd (a 20% change from baseline would represent concern), admission for observation, close PCC/cardiology follow-up, or urgent outpatient provocative testing.    N terminal Pro BNP Inpatient 01/11/2024 731  0 - 1,800 pg/mL Final    Reference range shown and results flagged as abnormal are suggested  inpatient cut points for confirming diagnosis if CHF in an acute setting. Establishing a baseline value for each individual patient is useful for follow-up. An inpatient or emergency department NT-proPBNP <300 pg/mL effectively rules out acute CHF, with 99% negative predictive value.    The outpatient non-acute reference range for ruling out CHF is:  0-125 pg/mL (age 18 to less than 75)  0-450 pg/mL (age 75 yrs and older)     WBC Count 01/11/2024 9.2  4.0 - 11.0 10e3/uL Final    RBC Count 01/11/2024 3.93  3.80 - 5.20 10e6/uL Final    Hemoglobin 01/11/2024 11.7  11.7 - 15.7 g/dL Final    Hematocrit 01/11/2024 35.2  35.0 - 47.0 % Final    MCV 01/11/2024 90  78 - 100 fL Final    MCH 01/11/2024 29.8  26.5 - 33.0 pg Final    MCHC 01/11/2024 33.2  31.5 - 36.5 g/dL Final    RDW 01/11/2024 13.7  10.0 - 15.0 % Final    Platelet Count 01/11/2024 230  150 - 450 10e3/uL Final    Color Urine 01/12/2024 Light Yellow  Colorless, Straw, Light Yellow, Yellow Final    Appearance Urine 01/12/2024 Clear  Clear Final    Glucose Urine 01/12/2024 Negative  Negative mg/dL Final    Bilirubin Urine 01/12/2024 Negative  Negative Final    Ketones Urine 01/12/2024 Negative  Negative mg/dL Final    Specific Gravity Urine 01/12/2024 1.016  1.001 - 1.030 Final    Blood Urine 01/12/2024 Negative  Negative Final    pH Urine 01/12/2024 5.5  5.0 - 7.0 Final    Protein Albumin Urine 01/12/2024 70 (A)  Negative mg/dL Final    Urobilinogen Urine 01/12/2024 <2.0  <2.0 mg/dL Final    Nitrite Urine 01/12/2024 Negative  Negative Final    Leukocyte Esterase Urine 01/12/2024 75 [place]/uL (A)  Negative Final    Mucus Urine 01/12/2024 Present (A)  None Seen /LPF Final    RBC Urine 01/12/2024 2  <=2 /HPF Final    WBC Urine 01/12/2024 11 (H)  <=5 /HPF Final    Troponin T, High Sensitivity 01/12/2024 22 (H)  <=14 ng/L Final    Either a High Sensitivity Troponin T baseline (0 hours) value = 100 ng/L, or an increase in High Sensitivity Troponin T = 7 ng/L at 2 hours  compared to 0 hours (2-0 hours), suggests myocardial injury, and urgent clinical attention is required.    If the 2-0 hours increase is <7 ng/L, a High Sensitivity Troponin T result above gender-specific reference ranges warrants further evaluation.   Recommendations for further evaluation include correlation with clinical decision-making tool (e.g., HEART), a 3rd High Sensitivity Troponin T test 2 hours after the 2nd (a 20% change from baseline would represent concern), admission for observation, close PCC/cardiology follow-up, or urgent outpatient provocative testing.    Hemoglobin A1C 01/11/2024 6.4 (H)  <5.7 % Final    Normal <5.7%   Prediabetes 5.7-6.4%    Diabetes 6.5% or higher     Note: Adopted from ADA consensus guidelines.    LVEF  01/12/2024 >70%   Final    Culture 01/12/2024 <10,000 CFU/mL Mixture of Urogenital Susie   Final    GLUCOSE BY METER POCT 01/12/2024 103 (H)  70 - 99 mg/dL Final    GLUCOSE BY METER POCT 01/12/2024 145 (H)  70 - 99 mg/dL Final    GLUCOSE BY METER POCT 01/12/2024 154 (H)  70 - 99 mg/dL Final    GLUCOSE BY METER POCT 01/12/2024 126 (H)  70 - 99 mg/dL Final    Sodium 01/13/2024 139  135 - 145 mmol/L Final    Reference intervals for this test were updated on 09/26/2023 to more accurately reflect our healthy population. There may be differences in the flagging of prior results with similar values performed with this method. Interpretation of those prior results can be made in the context of the updated reference intervals.     Potassium 01/13/2024 4.0  3.4 - 5.3 mmol/L Final    Chloride 01/13/2024 103  98 - 107 mmol/L Final    Carbon Dioxide (CO2) 01/13/2024 26  22 - 29 mmol/L Final    Anion Gap 01/13/2024 10  7 - 15 mmol/L Final    Urea Nitrogen 01/13/2024 28.3 (H)  8.0 - 23.0 mg/dL Final    Creatinine 01/13/2024 0.99 (H)  0.51 - 0.95 mg/dL Final    GFR Estimate 01/13/2024 52 (L)  >60 mL/min/1.73m2 Final    Calcium 01/13/2024 10.3 (H)  8.2 - 9.6 mg/dL Final    Glucose 01/13/2024 129  (H)  70 - 99 mg/dL Final    WBC Count 01/13/2024 14.2 (H)  4.0 - 11.0 10e3/uL Final    RBC Count 01/13/2024 4.18  3.80 - 5.20 10e6/uL Final    Hemoglobin 01/13/2024 12.4  11.7 - 15.7 g/dL Final    Hematocrit 01/13/2024 37.8  35.0 - 47.0 % Final    MCV 01/13/2024 90  78 - 100 fL Final    MCH 01/13/2024 29.7  26.5 - 33.0 pg Final    MCHC 01/13/2024 32.8  31.5 - 36.5 g/dL Final    RDW 01/13/2024 14.0  10.0 - 15.0 % Final    Platelet Count 01/13/2024 246  150 - 450 10e3/uL Final    GLUCOSE BY METER POCT 01/13/2024 121 (H)  70 - 99 mg/dL Final           Current Outpatient Medications:     amLODIPine (NORVASC) 2.5 MG tablet, Take 1 tablet (2.5 mg) by mouth daily, Disp: 90 tablet, Rfl: 2    aspirin 81 MG EC tablet, [ASPIRIN 81 MG EC TABLET] Take 81 mg by mouth daily., Disp: , Rfl:     atorvastatin (LIPITOR) 40 MG tablet, Take 1 tablet (40 mg) by mouth daily, Disp: 90 tablet, Rfl: 2    blood glucose (ACCU-CHEK AMIRA PLUS) test strip, 1 strip by In Vitro route 3 times daily Use to test blood sugar 3 times daily or as directed., Disp: 100 strip, Rfl: 1    blood glucose monitoring (NO BRAND SPECIFIED) meter device kit, by In Vitro route daily, Disp: 1 kit, Rfl: 0    cholecalciferol, vitamin D3, (VITAMIN D3) 1,000 unit capsule, [CHOLECALCIFEROL, VITAMIN D3, (VITAMIN D3) 1,000 UNIT CAPSULE] Take 1,000 Units by mouth daily., Disp: , Rfl:     cycloSPORINE (RESTASIS) 0.05 % ophthalmic emulsion, Place 1 drop into both eyes 2 times daily, Disp: , Rfl:     DENTAGEL 1.1 % GEL topical gel, Apply to affected area 2 times daily, Disp: , Rfl:     generic lancets (ACCU-CHEK FASTCLIX LANCING DEV), [GENERIC LANCETS (ACCU-CHEK FASTCLIX LANCING DEV)] Use 1 each As Directed daily., Disp: 200 each, Rfl: 2    glucosamine-chondroitin 500-400 mg cap, [GLUCOSAMINE-CHONDROITIN 500-400 MG CAP] Take 2 capsules by mouth daily., Disp: , Rfl:     guaiFENesin-codeine (ROBITUSSIN AC) 100-10 MG/5ML solution, Take 5-10 mLs by mouth every 4 hours as needed for  cough, Disp: 473 mL, Rfl: 3    losartan (COZAAR) 50 MG tablet, Take 1.5 tablets (75 mg) by mouth daily, Disp: 135 tablet, Rfl: 3    MELATONIN ORAL, Take 10 mg by mouth nightly as needed (Sleep), Disp: , Rfl:     multivitamin therapeutic (THERAGRAN) tablet, [MULTIVITAMIN THERAPEUTIC (THERAGRAN) TABLET] Take 1 tablet by mouth daily., Disp: , Rfl:     RISEdronate (ACTONEL) 150 MG tablet, Take one tablet monthly, Disp: 3 tablet, Rfl: 3    magnesium 250 mg Tab, [MAGNESIUM 250 MG TAB] Take 1 tablet by mouth daily. (Patient not taking: Reported on 4/3/2024), Disp: , Rfl:     vit C/E/Zn/coppr/lutein/zeaxan (PRESERVISION AREDS-2 ORAL), [VIT C/E/ZN/COPPR/LUTEIN/ZEAXAN (PRESERVISION AREDS-2 ORAL)] Take 1 tablet by mouth 2 (two) times a day. (Patient not taking: Reported on 4/3/2024), Disp: , Rfl:     Signed Electronically by: Vidhi Pearl NP

## 2024-05-06 NOTE — PROGRESS NOTES
ASSESSMENT & PLAN    Patricia was seen today for pain.    Diagnoses and all orders for this visit:    Left knee pain, unspecified chronicity  -     Orthopedic  Referral  -     Large Joint Injection/Arthocentesis: L knee joint         Left knee with osteoarthritis and chondrocalcinosis of left knee with concern for meniscus injury. Swelling on exam and aspirated clear synovial fluid. No pain with weightbearing appearing less likely insufficiency fracture but can consider future imaging if not improved.   -Discussed nonsurgical options today including cortisone injection  -Open to cortisone injection to help with pain relief  -Will continue to wear knee sleeve but can consider hinge knee brace in the future.   -Tylenol 500-1000mg (up to three times per day) as needed for pain relief. Can start with 500-1000mg twice per day.   -home exercises as tolerated and can consider formal physical therapy if not improved  -Also if not improved can consider further imaging in furture  -Follow-up one month                         Large Joint Injection/Arthocentesis: L knee joint    Date/Time: 5/9/2024 11:56 AM    Performed by: Sarahi Odom DO  Authorized by: Sarahi Odom DO    Indications:  Pain, osteoarthritis and joint swelling  Needle Size:  25 G  Guidance: ultrasound    Approach:  Anterolateral  Location:  Knee   Location comment:  Left knee joint      Medications:  40 mg triamcinolone 40 MG/ML; 3 mL ROPivacaine 5 MG/ML; 3 mL lidocaine 1 %  Aspirate amount (mL):  22  Aspirate:  Clear and yellow  Outcome:  Tolerated well, no immediate complications  Procedure discussed: discussed risks, benefits, and alternatives    Consent Given by:  Patient  Timeout: timeout called immediately prior to procedure    Prep: patient was prepped and draped in usual sterile fashion     Ultrasound was used to ensure safe and accurate needle placement and injection. Ultrasound images of the procedure were permanently  stored.      Discussed risks and benefits of CSI injection. Agreeable to CSI injection  today. Consented. Tolerated well. Hemodynamically stable. See procedure note below. They are aware of risks such as skin hypopigmentation, hyperglycemia, bleeding, and infection.        Sarahi Odom DO  [facility]    -----  Chief Complaint   Patient presents with    Left Knee - Pain       SUBJECTIVE  Patricia Bose is a/an 89 year old female who is seen in consultation at the request of  Vidhi Pearl N.P. for evaluation of left knee pain.     The patient is seen with their son.    Onset: 4 week(s) ago. Patient describes injury as twisted knee around with foot planted.   Location of Pain: left medial knee  Worsened by: moving in bed.  Better with: tylenol, ice, aspirin  Treatments tried: soft knee sleeve, tylenol, aspirin, ice, elevation  Associated symptoms: some knee instability that as there before the injury. Some swelling, aching pain.  Denies numbness, tingling, locking  Orthopedic/Surgical history: NO, but has had bilateral sciatica, and neuropathy  Social History/Occupation: enjoys walking and chair yoga.     Patient Active Problem List   Diagnosis    Mixed hyperlipidemia    Hypertension    Insomnia    CAD (coronary artery disease)    Type 2 diabetes mellitus with stage 3a chronic kidney disease, without long-term current use of insulin (H)    Chronic kidney disease, stage 3 (H)    Other chronic pancreatitis (H)    IPF (idiopathic pulmonary fibrosis) (H)    Hypercalcemia    Osteopenia    Protein in urine    Bundle branch block, left    Chest pressure    Postural dizziness with near syncope       Current Outpatient Medications   Medication Sig Dispense Refill    amLODIPine (NORVASC) 2.5 MG tablet Take 1 tablet (2.5 mg) by mouth daily 90 tablet 2    aspirin 81 MG EC tablet [ASPIRIN 81 MG EC TABLET] Take 81 mg by mouth daily.      atorvastatin (LIPITOR) 40 MG tablet  Take 1 tablet (40 mg) by mouth daily 90 tablet 2    blood glucose (ACCU-CHEK AMIRA PLUS) test strip 1 strip by In Vitro route 3 times daily Use to test blood sugar 3 times daily or as directed. 100 strip 1    blood glucose monitoring (NO BRAND SPECIFIED) meter device kit by In Vitro route daily 1 kit 0    cholecalciferol, vitamin D3, (VITAMIN D3) 1,000 unit capsule [CHOLECALCIFEROL, VITAMIN D3, (VITAMIN D3) 1,000 UNIT CAPSULE] Take 1,000 Units by mouth daily.      cycloSPORINE (RESTASIS) 0.05 % ophthalmic emulsion Place 1 drop into both eyes 2 times daily      DENTAGEL 1.1 % GEL topical gel Apply to affected area 2 times daily      generic lancets (ACCU-CHEK FASTCLIX LANCING DEV) [GENERIC LANCETS (ACCU-CHEK FASTCLIX LANCING DEV)] Use 1 each As Directed daily. 200 each 2    glucosamine-chondroitin 500-400 mg cap [GLUCOSAMINE-CHONDROITIN 500-400 MG CAP] Take 2 capsules by mouth daily.      guaiFENesin-codeine (ROBITUSSIN AC) 100-10 MG/5ML solution Take 5-10 mLs by mouth every 4 hours as needed for cough 473 mL 3    losartan (COZAAR) 50 MG tablet Take 1.5 tablets (75 mg) by mouth daily 135 tablet 3    magnesium 250 mg Tab [MAGNESIUM 250 MG TAB] Take 1 tablet by mouth daily. (Patient not taking: Reported on 4/3/2024)      MELATONIN ORAL Take 10 mg by mouth nightly as needed (Sleep)      multivitamin therapeutic (THERAGRAN) tablet [MULTIVITAMIN THERAPEUTIC (THERAGRAN) TABLET] Take 1 tablet by mouth daily.      RISEdronate (ACTONEL) 150 MG tablet Take one tablet monthly 3 tablet 3    vit C/E/Zn/coppr/lutein/zeaxan (PRESERVISION AREDS-2 ORAL) [VIT C/E/ZN/COPPR/LUTEIN/ZEAXAN (PRESERVISION AREDS-2 ORAL)] Take 1 tablet by mouth 2 (two) times a day. (Patient not taking: Reported on 4/3/2024)         PMH, Medications and Allergies were reviewed and updated as needed.    REVIEW OF SYSTEMS:  10 point ROS is negative other than symptoms noted above in HPI        OBJECTIVE:  LMP  (LMP Unknown)    General: healthy, alert and in no  distress  Skin: no suspicious lesions or rash.  CV: distal perfusion intact left lower  Resp: normal respiratory effort without conversational dyspnea   Psych: normal mood and affect  Gait: antalgic  Neuro: Normal light sensory exam of lower left extremity    LEFT KNEE  Inspection:    Normal alignment; no edema, erythema, or ecchymosis present  Palpation:    Tender about the medial joint line; medial tibial plateau. Remainder of bony and ligamentous landmarks are nontender.    Small effusion is present    Patellofemoral crepitus is Present  Range of Motion:     00 extension to 1000 flexion  Strength:    Quadriceps 5/5    Extensor mechanism intact  Special Tests:    Negative: MCL/valgus stress (0 & 30 deg), LCL/varus stress (0 & 30 deg), Lachman's 1A       RADIOLOGY:  Final results and radiologist's interpretation, available in the [facility] health record.  Images were reviewed with the patient in the office today.    My personal interpretation of the performed imaging:   X-ray left knee 2 views 5/2/2024:  Positive joint effusion.  No acute fracture or dislocation.  Chondrocalcinosis with mild medial joint space narrowing.             Disclaimer: This note consists of symbols derived from keyboarding, dictation and/or voice recognition software. As a result, there may be errors in the script that have gone undetected. Please consider this when interpreting information found in this chart.

## 2024-05-09 ENCOUNTER — OFFICE VISIT (OUTPATIENT)
Dept: ORTHOPEDICS | Facility: CLINIC | Age: 89
End: 2024-05-09
Payer: COMMERCIAL

## 2024-05-09 VITALS — SYSTOLIC BLOOD PRESSURE: 152 MMHG | DIASTOLIC BLOOD PRESSURE: 70 MMHG

## 2024-05-09 DIAGNOSIS — M17.12 PRIMARY OSTEOARTHRITIS OF LEFT KNEE: Primary | ICD-10-CM

## 2024-05-09 DIAGNOSIS — M25.562 LEFT KNEE PAIN, UNSPECIFIED CHRONICITY: ICD-10-CM

## 2024-05-09 PROCEDURE — 99203 OFFICE O/P NEW LOW 30 MIN: CPT | Mod: 25 | Performed by: STUDENT IN AN ORGANIZED HEALTH CARE EDUCATION/TRAINING PROGRAM

## 2024-05-09 PROCEDURE — 20611 DRAIN/INJ JOINT/BURSA W/US: CPT | Mod: LT | Performed by: STUDENT IN AN ORGANIZED HEALTH CARE EDUCATION/TRAINING PROGRAM

## 2024-05-09 RX ORDER — TRIAMCINOLONE ACETONIDE 40 MG/ML
40 INJECTION, SUSPENSION INTRA-ARTICULAR; INTRAMUSCULAR
Status: SHIPPED | OUTPATIENT
Start: 2024-05-09

## 2024-05-09 RX ORDER — LIDOCAINE HYDROCHLORIDE 10 MG/ML
3 INJECTION, SOLUTION INFILTRATION; PERINEURAL
Status: SHIPPED | OUTPATIENT
Start: 2024-05-09

## 2024-05-09 RX ORDER — ROPIVACAINE HYDROCHLORIDE 5 MG/ML
3 INJECTION, SOLUTION EPIDURAL; INFILTRATION; PERINEURAL
Status: SHIPPED | OUTPATIENT
Start: 2024-05-09

## 2024-05-09 RX ADMIN — TRIAMCINOLONE ACETONIDE 40 MG: 40 INJECTION, SUSPENSION INTRA-ARTICULAR; INTRAMUSCULAR at 11:56

## 2024-05-09 RX ADMIN — LIDOCAINE HYDROCHLORIDE 3 ML: 10 INJECTION, SOLUTION INFILTRATION; PERINEURAL at 11:56

## 2024-05-09 RX ADMIN — ROPIVACAINE HYDROCHLORIDE 3 ML: 5 INJECTION, SOLUTION EPIDURAL; INFILTRATION; PERINEURAL at 11:56

## 2024-05-09 NOTE — LETTER
5/9/2024         RE: Patricia Bose  [address]  [place] [address]        Dear Colleague,    Thank you for referring your patient, Patricia Bose, to the [facility]. Please see a copy of my visit note below.    ASSESSMENT & PLAN    Patricia was seen today for pain.    Diagnoses and all orders for this visit:    Left knee pain, unspecified chronicity  -     Orthopedic  Referral  -     Large Joint Injection/Arthocentesis: L knee joint         Left knee with osteoarthritis and chondrocalcinosis of left knee with concern for meniscus injury. Swelling on exam and aspirated clear synovial fluid. No pain with weightbearing appearing less likely insufficiency fracture but can consider future imaging if not improved.   -Discussed nonsurgical options today including cortisone injection  -Open to cortisone injection to help with pain relief  -Will continue to wear knee sleeve but can consider hinge knee brace in the future.   -Tylenol 500-1000mg (up to three times per day) as needed for pain relief. Can start with 500-1000mg twice per day.   -home exercises as tolerated and can consider formal physical therapy if not improved  -Also if not improved can consider further imaging in furture  -Follow-up one month                         Large Joint Injection/Arthocentesis: L knee joint    Date/Time: 5/9/2024 11:56 AM    Performed by: Sarahi Odom DO  Authorized by: Sarahi Odom DO    Indications:  Pain, osteoarthritis and joint swelling  Needle Size:  25 G  Guidance: ultrasound    Approach:  Anterolateral  Location:  Knee   Location comment:  Left knee joint      Medications:  40 mg triamcinolone 40 MG/ML; 3 mL ROPivacaine 5 MG/ML; 3 mL lidocaine 1 %  Aspirate amount (mL):  22  Aspirate:  Clear and yellow  Outcome:  Tolerated well, no immediate complications  Procedure discussed: discussed risks, benefits, and alternatives    Consent Given by:   Patient  Timeout: timeout called immediately prior to procedure    Prep: patient was prepped and draped in usual sterile fashion     Ultrasound was used to ensure safe and accurate needle placement and injection. Ultrasound images of the procedure were permanently stored.      Discussed risks and benefits of CSI injection. Agreeable to CSI injection  today. Consented. Tolerated well. Hemodynamically stable. See procedure note below. They are aware of risks such as skin hypopigmentation, hyperglycemia, bleeding, and infection.        Sarahi Odom [facility]    -----  Chief Complaint   Patient presents with     Left Knee - Pain       SUBJECTIVE  Patricia Bose is a/an 89 year old female who is seen in consultation at the request of  Vidhi Pearl N.P. for evaluation of left knee pain.     The patient is seen with their son.    Onset: 4 week(s) ago. Patient describes injury as twisted knee around with foot planted.   Location of Pain: left medial knee  Worsened by: moving in bed.  Better with: tylenol, ice, aspirin  Treatments tried: soft knee sleeve, tylenol, aspirin, ice, elevation  Associated symptoms: some knee instability that as there before the injury. Some swelling, aching pain.  Denies numbness, tingling, locking  Orthopedic/Surgical history: NO, but has had bilateral sciatica, and neuropathy  Social History/Occupation: enjoys walking and chair yoga.     Patient Active Problem List   Diagnosis     Mixed hyperlipidemia     Hypertension     Insomnia     CAD (coronary artery disease)     Type 2 diabetes mellitus with stage 3a chronic kidney disease, without long-term current use of insulin (H)     Chronic kidney disease, stage 3 (H)     Other chronic pancreatitis (H)     IPF (idiopathic pulmonary fibrosis) (H)     Hypercalcemia     Osteopenia     Protein in urine     Bundle branch block, left     Chest pressure     Postural dizziness with near syncope        Current Outpatient Medications   Medication Sig Dispense Refill     amLODIPine (NORVASC) 2.5 MG tablet Take 1 tablet (2.5 mg) by mouth daily 90 tablet 2     aspirin 81 MG EC tablet [ASPIRIN 81 MG EC TABLET] Take 81 mg by mouth daily.       atorvastatin (LIPITOR) 40 MG tablet Take 1 tablet (40 mg) by mouth daily 90 tablet 2     blood glucose (ACCU-CHEK AMIRA PLUS) test strip 1 strip by In Vitro route 3 times daily Use to test blood sugar 3 times daily or as directed. 100 strip 1     blood glucose monitoring (NO BRAND SPECIFIED) meter device kit by In Vitro route daily 1 kit 0     cholecalciferol, vitamin D3, (VITAMIN D3) 1,000 unit capsule [CHOLECALCIFEROL, VITAMIN D3, (VITAMIN D3) 1,000 UNIT CAPSULE] Take 1,000 Units by mouth daily.       cycloSPORINE (RESTASIS) 0.05 % ophthalmic emulsion Place 1 drop into both eyes 2 times daily       DENTAGEL 1.1 % GEL topical gel Apply to affected area 2 times daily       generic lancets (ACCU-CHEK FASTCLIX LANCING DEV) [GENERIC LANCETS (ACCU-CHEK FASTCLIX LANCING DEV)] Use 1 each As Directed daily. 200 each 2     glucosamine-chondroitin 500-400 mg cap [GLUCOSAMINE-CHONDROITIN 500-400 MG CAP] Take 2 capsules by mouth daily.       guaiFENesin-codeine (ROBITUSSIN AC) 100-10 MG/5ML solution Take 5-10 mLs by mouth every 4 hours as needed for cough 473 mL 3     losartan (COZAAR) 50 MG tablet Take 1.5 tablets (75 mg) by mouth daily 135 tablet 3     magnesium 250 mg Tab [MAGNESIUM 250 MG TAB] Take 1 tablet by mouth daily. (Patient not taking: Reported on 4/3/2024)       MELATONIN ORAL Take 10 mg by mouth nightly as needed (Sleep)       multivitamin therapeutic (THERAGRAN) tablet [MULTIVITAMIN THERAPEUTIC (THERAGRAN) TABLET] Take 1 tablet by mouth daily.       RISEdronate (ACTONEL) 150 MG tablet Take one tablet monthly 3 tablet 3     vit C/E/Zn/coppr/lutein/zeaxan (PRESERVISION AREDS-2 ORAL) [VIT C/E/ZN/COPPR/LUTEIN/ZEAXAN (PRESERVISION AREDS-2 ORAL)] Take 1 tablet by mouth 2 (two)  times a day. (Patient not taking: Reported on 4/3/2024)         PMH, Medications and Allergies were reviewed and updated as needed.    REVIEW OF SYSTEMS:  10 point ROS is negative other than symptoms noted above in HPI        OBJECTIVE:  LMP  (LMP Unknown)    General: healthy, alert and in no distress  Skin: no suspicious lesions or rash.  CV: distal perfusion intact left lower  Resp: normal respiratory effort without conversational dyspnea   Psych: normal mood and affect  Gait: antalgic  Neuro: Normal light sensory exam of lower left extremity    LEFT KNEE  Inspection:    Normal alignment; no edema, erythema, or ecchymosis present  Palpation:    Tender about the medial joint line; medial tibial plateau. Remainder of bony and ligamentous landmarks are nontender.    Small effusion is present    Patellofemoral crepitus is Present  Range of Motion:     00 extension to 1000 flexion  Strength:    Quadriceps 5/5    Extensor mechanism intact  Special Tests:    Negative: MCL/valgus stress (0 & 30 deg), LCL/varus stress (0 & 30 deg), Lachman's 1A       RADIOLOGY:  Final results and radiologist's interpretation, available in the [facility] health record.  Images were reviewed with the patient in the office today.    My personal interpretation of the performed imaging:   X-ray left knee 2 views 5/2/2024:  Positive joint effusion.  No acute fracture or dislocation.  Chondrocalcinosis with mild medial joint space narrowing.             Disclaimer: This note consists of symbols derived from keyboarding, dictation and/or voice recognition software. As a result, there may be errors in the script that have gone undetected. Please consider this when interpreting information found in this chart.       Again, thank you for allowing me to participate in the care of your patient.        Sincerely,        Sarahi Odom, DO

## 2024-05-09 NOTE — PATIENT INSTRUCTIONS
1. Primary osteoarthritis of left knee    2. Left knee pain, unspecified chronicity       Left knee with osteoarthritis and chondrocalcinosis of left knee with concern for meniscus injury. Swelling on exam  -Discussed nonsurgical options today including cortisone injection  -Open to cortisone injection to help with pain relief  -Will continue to wear knee sleeve but can consider hinge knee brace in the past  --Tylenol 500-1000mg (up to three times per day) as needed for pain relief. Can start with 500-1000mg twice per day.   -home exercises as tolerated and can consider formal physical therapy if not improved  -Also if not improved can consider further imaging in furture  -Follow-up one month    Left knee Cortisone injection today.. can repeat every 3-4 months as needed. Discussed risks. Consented. Tolerated well. See procedure note.      Injection Discharge Instructions    You may shower, however avoid swimming, tub baths or hot tubs for 24 hours following your procedure  You may have a mild to moderate increase in pain for several days following the injection.  It may take up to 14 days for the steroid medication to start working although you may feel the effect as early as a few days after the procedure.  You may use ice packs for 10-15 minutes, 3 to 4 times a day at the injection site for comfort  You may use anti-inflammatory medications (such as Ibuprofen or Aleve or Advil) or Tylenol for pain control if necessary  If you were fasting, you may resume your normal diet and medications after the procedure  If you have diabetes, check your blood sugar more frequently than usual as your blood sugar may be higher than normal for 10-14 days following a steroid injection. Contact your doctor who manages your diabetes if your blood sugar is higher than usual    If you experience any of the following, call  @ [phone] or [phone]  -Fever over 100 degree F  -Swelling, bleeding, redness, drainage, warmth at the  injection site  - New or worsening pain     Please call [phone]  Ask for my team if you have any questions or concerns    Sarahi Odom DO  [facility]      Thank you for choosing [facility]!    CLINIC LOCATIONS:     [place]  TRIAGE LINE: [phone] [address] APPOINTMENTS: [phone]   [place] [address] RADIOLOGY: [phone]   (Monday, Thursday & Friday) PHYSICAL THERAPY: [phone]    BILLING QUESTIONS: [phone]   [place] FAX: [phone] [address]    [place] [address]    (Wednesday)        Patient Education   Knee Arthritis: Exercises  Introduction  Here are some examples of exercises for you to try. The exercises may be suggested for a condition or for rehabilitation. Start each exercise slowly. Ease off the exercises if you start to have pain.  You will be told when to start these exercises and which ones will work best for you.  How to do the exercises  Heel slide (ankles crossed)    Lie on your back with your knees bent.  Slide your heel back by bending your affected knee as far as you can. Then hook your other foot around your ankle to help pull your heel even farther back.  Hold for about 6 seconds.  Return to your starting position.  Repeat 8 to 12 times.  If you can, repeat these steps for your other knee.  Quad set    Sit or lie down on a firm surface or the floor with your affected leg straight. Place a small, rolled-up towel under your knee.  Tighten the thigh muscles of your straight leg by pressing the back of your knee down into the towel.  Hold for about 6 seconds, then rest.  Repeat 8 to 12 times.  It's a good idea to repeat these steps with your other leg.  Hip flexion (lying down, leg straight)    Lie on your back with your affected leg straight. You can bend your other leg, if that feels more comfortable.  Tighten the thigh muscles in your affected leg by pressing the back of your knee  down. Hold your knee straight.  Keeping the thigh muscles tight and your leg straight, lift your affected leg up so that your heel is about 12 inches off the floor. Hold for about 6 seconds, then lower slowly.  Repeat 8 to 12 times.  It's a good idea to repeat these steps with your other leg.  Hamstring curl (lying down)    Lie on your stomach with your legs straight.  Lift the foot of your affected leg by bending your knee so that you bring your foot up toward your buttock. If this motion hurts, try it without bending your knee quite as far. This may help you avoid any painful motion.  Slowly lower your foot back to the floor.  Repeat 8 to 12 times.  It's a good idea to repeat these steps with your other leg.  If you're not comfortable, try placing a pillow under your stomach.  If your kneecap is uncomfortable, roll up a washcloth and put it under your leg just above your kneecap.  When you can do this exercise with ease and no pain, add some resistance. To do this:  Tie the ends of an exercise band together to form a loop. To hold the band in place, shut a door on the band so the knot is on the other side of the door, or attach one end of the loop to a secure object. (Or you can have someone hold one end of the loop to provide resistance.)  Loop the other end of the exercise band around the lower part of your affected leg.  Repeat steps 1 through 5, slowly pulling back on the exercise band with your leg.  Quad stretch (facedown)    Lie on your stomach.  Wrap a towel or belt strap around the lower part of your affected leg. Then use the towel or belt strap to slowly pull your heel toward your buttock until you feel a stretch.  Hold for about 15 to 30 seconds, then relax your leg against the towel or belt strap.  Repeat 2 to 4 times.  It's a good idea to repeat these steps with your other leg.  Stationary biking    Adjust the height of the bike seat so that your knee is slightly bent when your leg is extended  downward. If your knee hurts when the pedal reaches the top, you can raise the seat so that your knee does not bend as much.  Start slowly. At first, try to do 5 to 10 minutes of cycling with little to no resistance. Increase your time and resistance bit by bit. The goal is to do 20 to 30 minutes with some effort, and without pain.  If you start to have pain, rest or cycle for less time or with less effort.  If you do not have a stationary exercise bike at home, you might find one to ride at your local health club or community center.  Follow-up care is a key part of your treatment and safety. Be sure to make and go to all appointments, and call your doctor if you are having problems. It's also a good idea to know your test results and keep a list of the medicines you take.  Current as of: July 17, 2023               Content Version: 14.0    1805-4412 [email or web address].   Care instructions adapted under license by your healthcare professional. If you have questions about a medical condition or this instruction, always ask your healthcare professional. [email or web address] disclaims any warranty or liability for your use of this information.

## 2024-05-29 ENCOUNTER — PATIENT OUTREACH (OUTPATIENT)
Dept: CARE COORDINATION | Facility: CLINIC | Age: 89
End: 2024-05-29
Payer: COMMERCIAL

## 2024-06-05 NOTE — PROGRESS NOTES
ASSESSMENT & PLAN    Patricia was seen today for pain and follow up.    Diagnoses and all orders for this visit:    Primary osteoarthritis of left knee      Follow-up for osteoarthritis left knee pain. Much improved s/p cortisone injection and aspiration 5/9/2024. Feels like pain and mobility controlled. Will continue conservative management.   PLAN:   Improved pain after cortisone shot one month ago 5/9/2024  Can repeat shot after 8/9/2024 if pain return or worsens. If pain worsens prior to three months after cortisone shot can consider doing gel hyaluronic shot for pain relief, however do not have gel shots in clinic and will need to call in prior to appointment as we will have to do prior auth for medication.   -can consider brace or formal physical therapy if worsening   -Continue home exercises  -Assistive walking devices as needed such as cane  -continue tylenol as needed for pain relief    Return sooner if develops new or worsening symptoms.    Options for treatment and/or follow-up care were reviewed with the patient was actively involved in the decision making process. Patient verbalized understanding and was in agreement with the plan.    >30 minutes spent on the date of the encounter doing chart reivew, history and exam, documentation and further activities as noted above with the exception of procedures.    Sarahi Wallace [facility] SP[facility]TS MEDICINE CLINIC [facility]    SUBJECTIVE- June 14, 2024    Chief Complaint   Patient presents with    Left Knee - Pain, Follow Up       Patricia Bose is a 89 year old female who is seen in f/u up for left knee pain. Since last visit on 5/9/2024 patient has had great improvements and relief with cortisone injection as well as doing home exercises for the knee.  She reports being more mobile than usual, and has no pain at the visit today.  She reports she feels her knee is less swollen..  - Now ~ 2 months from initial onset      PMH, Medications and  Allergies were reviewed and updated as needed.    ROS:  As noted above otherwise negative.    Patient Active Problem List   Diagnosis    Mixed hyperlipidemia    Hypertension    Insomnia    CAD (coronary artery disease)    Type 2 diabetes mellitus with stage 3a chronic kidney disease, without long-term current use of insulin (H)    Chronic kidney disease, stage 3 (H)    Other chronic pancreatitis (H)    IPF (idiopathic pulmonary fibrosis) (H)    Hypercalcemia    Osteopenia    Protein in urine    Bundle branch block, left    Chest pressure    Postural dizziness with near syncope       Current Outpatient Medications   Medication Sig Dispense Refill    amLODIPine (NORVASC) 2.5 MG tablet Take 1 tablet (2.5 mg) by mouth daily 90 tablet 2    aspirin 81 MG EC tablet [ASPIRIN 81 MG EC TABLET] Take 81 mg by mouth daily.      atorvastatin (LIPITOR) 40 MG tablet Take 1 tablet (40 mg) by mouth daily 90 tablet 2    blood glucose (ACCU-CHEK AMIRA PLUS) test strip 1 strip by In Vitro route 3 times daily Use to test blood sugar 3 times daily or as directed. 100 strip 1    blood glucose monitoring (NO BRAND SPECIFIED) meter device kit by In Vitro route daily 1 kit 0    cholecalciferol, vitamin D3, (VITAMIN D3) 1,000 unit capsule [CHOLECALCIFEROL, VITAMIN D3, (VITAMIN D3) 1,000 UNIT CAPSULE] Take 1,000 Units by mouth daily.      cycloSPORINE (RESTASIS) 0.05 % ophthalmic emulsion Place 1 drop into both eyes 2 times daily      DENTAGEL 1.1 % GEL topical gel Apply to affected area 2 times daily      generic lancets (ACCU-CHEK FASTCLIX LANCING DEV) [GENERIC LANCETS (ACCU-CHEK FASTCLIX LANCING DEV)] Use 1 each As Directed daily. 200 each 2    glucosamine-chondroitin 500-400 mg cap [GLUCOSAMINE-CHONDROITIN 500-400 MG CAP] Take 2 capsules by mouth daily.      guaiFENesin-codeine (ROBITUSSIN AC) 100-10 MG/5ML solution Take 5-10 mLs by mouth every 4 hours as needed for cough 473 mL 3    losartan (COZAAR) 50 MG tablet Take 1.5 tablets (75 mg)  by mouth daily 135 tablet 3    magnesium 250 mg Tab [MAGNESIUM 250 MG TAB] Take 1 tablet by mouth daily. (Patient not taking: Reported on 4/3/2024)      MELATONIN ORAL Take 10 mg by mouth nightly as needed (Sleep)      multivitamin therapeutic (THERAGRAN) tablet [MULTIVITAMIN THERAPEUTIC (THERAGRAN) TABLET] Take 1 tablet by mouth daily.      RISEdronate (ACTONEL) 150 MG tablet Take one tablet monthly 3 tablet 3    vit C/E/Zn/coppr/lutein/zeaxan (PRESERVISION AREDS-2 ORAL) [VIT C/E/ZN/COPPR/LUTEIN/ZEAXAN (PRESERVISION AREDS-2 ORAL)] Take 1 tablet by mouth 2 (two) times a day. (Patient not taking: Reported on 4/3/2024)           OBJECTIVE:  LMP  (LMP Unknown)    General: healthy, alert and in no distress  Skin: no suspicious lesions or rash.  CV: distal perfusion intact LLE  Resp: normal respiratory effort without conversational dyspnea   Psych: normal mood and affect  Gait: cane  Neuro: Normal light sensory exam of LL extremity       LEFT KNEE  Inspection:    Normal alignment; no edema, erythema, or ecchymosis present  Palpation:    Tender about the medial joint line. Remainder of bony and ligamentous landmarks are nontender.    Trace effusion is present    Patellofemoral crepitus is Present  Range of Motion:     00 extension to 1200 flexion  Strength:    Quadriceps 5/5    Extensor mechanism intact  Special Tests:    Positive: none    Negative: Lachman's 1A     RADIOLOGY:  Final results and radiologist's interpretation, available in the [facility] health record.  Images were reviewed with the patient in the office    Narrative & Impression   EXAM: XR KNEE LEFT 1/2 VIEWS  LOCATION: [facility]  DATE: 5/2/2024     INDICATION:  Left knee pain, unspecified chronicity  COMPARISON: None.                                                                      IMPRESSION:      There is diffuse osseous demineralization, which decreases radiographic sensitivity for nondisplaced fractures. There is a left knee  joint effusion. No acute, displaced fracture is identified. There is chondrocalcinosis. No joint space narrowing.   Vascular atherosclerotic calcifications are noted.                      Disclaimer: This note consists of symbols derived from keyboarding, dictation and/or voice recognition software. As a result, there may be errors in the script that have gone undetected. Please consider this when interpreting information found in this chart.

## 2024-06-14 ENCOUNTER — OFFICE VISIT (OUTPATIENT)
Dept: ORTHOPEDICS | Facility: CLINIC | Age: 89
End: 2024-06-14
Payer: COMMERCIAL

## 2024-06-14 VITALS — DIASTOLIC BLOOD PRESSURE: 70 MMHG | SYSTOLIC BLOOD PRESSURE: 144 MMHG

## 2024-06-14 DIAGNOSIS — M17.12 PRIMARY OSTEOARTHRITIS OF LEFT KNEE: Primary | ICD-10-CM

## 2024-06-14 PROCEDURE — 99214 OFFICE O/P EST MOD 30 MIN: CPT | Performed by: STUDENT IN AN ORGANIZED HEALTH CARE EDUCATION/TRAINING PROGRAM

## 2024-06-14 NOTE — PATIENT INSTRUCTIONS
1. Primary osteoarthritis of left knee        Improved pain after cortisone shot one month ago 5/9/2024  Can repeat shot after 8/9/2024 if pain return or worsens. If pain worsens prior to three months after cortisone shot can consider doing gel hyaluronic shot for pain relief, however do not have gel shots in clinic and will need to call in prior to appointment as we will have to do prior auth for medication.   -can consider brace or formal physical therapy if worsening   -Continue home exercises  -Assistive walking devices as needed such as cane  -continue tylenol as needed for pain relief    Please call [phone]  Ask for my team if you have any questions or concerns    Sarahi Odom DO  [facility]      Thank you for choosing [facility]!    CLINIC LOCATIONS:     [place]  TRIAGE LINE: [phone] [address] [facility] APPOINTMENTS: [phone]   [place] [address] RADIOLOGY: [phone]   (Monday, Thursday & Friday) PHYSICAL THERAPY: [phone]    BILLING QUESTIONS: [phone]   [place] FAX: [phone] [address]    [place] [address]    (Wednesday)

## 2024-06-14 NOTE — LETTER
6/14/2024      Patricia Bose  [address]  [place] [address]      Dear Colleague,    Thank you for referring your patient, Patricia Bose, to the [facility]. Please see a copy of my visit note below.    ASSESSMENT & PLAN    Patricia was seen today for pain and follow up.    Diagnoses and all orders for this visit:    Primary osteoarthritis of left knee      Follow-up for osteoarthritis left knee pain. Much improved s/p cortisone injection and aspiration 5/9/2024. Feels like pain and mobility controlled. Will continue conservative management.   PLAN:   Improved pain after cortisone shot one month ago 5/9/2024  Can repeat shot after 8/9/2024 if pain return or worsens. If pain worsens prior to three months after cortisone shot can consider doing gel hyaluronic shot for pain relief, however do not have gel shots in clinic and will need to call in prior to appointment as we will have to do prior auth for medication.   -can consider brace or formal physical therapy if worsening   -Continue home exercises  -Assistive walking devices as needed such as cane  -continue tylenol as needed for pain relief    Return sooner if develops new or worsening symptoms.    Options for treatment and/or follow-up care were reviewed with the patient was actively involved in the decision making process. Patient verbalized understanding and was in agreement with the plan.    >30 minutes spent on the date of the encounter doing chart reivew, history and exam, documentation and further activities as noted above with the exception of procedures.    Sarahi Odom DO  [facility]IC[facility]    SUBJECTIVE- June 14, 2024    Chief Complaint   Patient presents with     Left Knee - Pain, Follow Up       Patricia Bose is a 89 year old female who is seen in f/u up for left knee pain. Since last visit on 5/9/2024 patient has had great improvements and  relief with cortisone injection as well as doing home exercises for the knee.  She reports being more mobile than usual, and has no pain at the visit today.  She reports she feels her knee is less swollen..  - Now ~ 2 months from initial onset      PMH, Medications and Allergies were reviewed and updated as needed.    ROS:  As noted above otherwise negative.    Patient Active Problem List   Diagnosis     Mixed hyperlipidemia     Hypertension     Insomnia     CAD (coronary artery disease)     Type 2 diabetes mellitus with stage 3a chronic kidney disease, without long-term current use of insulin (H)     Chronic kidney disease, stage 3 (H)     Other chronic pancreatitis (H)     IPF (idiopathic pulmonary fibrosis) (H)     Hypercalcemia     Osteopenia     Protein in urine     Bundle branch block, left     Chest pressure     Postural dizziness with near syncope       Current Outpatient Medications   Medication Sig Dispense Refill     amLODIPine (NORVASC) 2.5 MG tablet Take 1 tablet (2.5 mg) by mouth daily 90 tablet 2     aspirin 81 MG EC tablet [ASPIRIN 81 MG EC TABLET] Take 81 mg by mouth daily.       atorvastatin (LIPITOR) 40 MG tablet Take 1 tablet (40 mg) by mouth daily 90 tablet 2     blood glucose (ACCU-CHEK AMIRA PLUS) test strip 1 strip by In Vitro route 3 times daily Use to test blood sugar 3 times daily or as directed. 100 strip 1     blood glucose monitoring (NO BRAND SPECIFIED) meter device kit by In Vitro route daily 1 kit 0     cholecalciferol, vitamin D3, (VITAMIN D3) 1,000 unit capsule [CHOLECALCIFEROL, VITAMIN D3, (VITAMIN D3) 1,000 UNIT CAPSULE] Take 1,000 Units by mouth daily.       cycloSPORINE (RESTASIS) 0.05 % ophthalmic emulsion Place 1 drop into both eyes 2 times daily       DENTAGEL 1.1 % GEL topical gel Apply to affected area 2 times daily       generic lancets (ACCU-CHEK FASTCLIX LANCING DEV) [GENERIC LANCETS (ACCU-CHEK FASTCLIX LANCING DEV)] Use 1 each As Directed daily. 200 each 2      glucosamine-chondroitin 500-400 mg cap [GLUCOSAMINE-CHONDROITIN 500-400 MG CAP] Take 2 capsules by mouth daily.       guaiFENesin-codeine (ROBITUSSIN AC) 100-10 MG/5ML solution Take 5-10 mLs by mouth every 4 hours as needed for cough 473 mL 3     losartan (COZAAR) 50 MG tablet Take 1.5 tablets (75 mg) by mouth daily 135 tablet 3     magnesium 250 mg Tab [MAGNESIUM 250 MG TAB] Take 1 tablet by mouth daily. (Patient not taking: Reported on 4/3/2024)       MELATONIN ORAL Take 10 mg by mouth nightly as needed (Sleep)       multivitamin therapeutic (THERAGRAN) tablet [MULTIVITAMIN THERAPEUTIC (THERAGRAN) TABLET] Take 1 tablet by mouth daily.       RISEdronate (ACTONEL) 150 MG tablet Take one tablet monthly 3 tablet 3     vit C/E/Zn/coppr/lutein/zeaxan (PRESERVISION AREDS-2 ORAL) [VIT C/E/ZN/COPPR/LUTEIN/ZEAXAN (PRESERVISION AREDS-2 ORAL)] Take 1 tablet by mouth 2 (two) times a day. (Patient not taking: Reported on 4/3/2024)           OBJECTIVE:  LMP  (LMP Unknown)    General: healthy, alert and in no distress  Skin: no suspicious lesions or rash.  CV: distal perfusion intact LLE  Resp: normal respiratory effort without conversational dyspnea   Psych: normal mood and affect  Gait: cane  Neuro: Normal light sensory exam of LL extremity       LEFT KNEE  Inspection:    Normal alignment; no edema, erythema, or ecchymosis present  Palpation:    Tender about the medial joint line. Remainder of bony and ligamentous landmarks are nontender.    Trace effusion is present    Patellofemoral crepitus is Present  Range of Motion:     00 extension to 1200 flexion  Strength:    Quadriceps 5/5    Extensor mechanism intact  Special Tests:    Positive: none    Negative: Lachman's 1A     RADIOLOGY:  Final results and radiologist's interpretation, available in the [facility] health record.  Images were reviewed with the patient in the office    Narrative & Impression   EXAM: XR KNEE LEFT 1/2 VIEWS  LOCATION: [facility]  DATE: 5/2/2024     INDICATION:  Left knee pain, unspecified chronicity  COMPARISON: None.                                                                      IMPRESSION:      There is diffuse osseous demineralization, which decreases radiographic sensitivity for nondisplaced fractures. There is a left knee joint effusion. No acute, displaced fracture is identified. There is chondrocalcinosis. No joint space narrowing.   Vascular atherosclerotic calcifications are noted.                      Disclaimer: This note consists of symbols derived from keyboarding, dictation and/or voice recognition software. As a result, there may be errors in the script that have gone undetected. Please consider this when interpreting information found in this chart.        Again, thank you for allowing me to participate in the care of your patient.        Sincerely,        Sarahi Odom, DO

## 2024-07-16 DIAGNOSIS — I10 BENIGN ESSENTIAL HYPERTENSION: ICD-10-CM

## 2024-07-16 RX ORDER — LOSARTAN POTASSIUM 50 MG/1
75 TABLET ORAL DAILY
Qty: 135 TABLET | Refills: 3 | Status: SHIPPED | OUTPATIENT
Start: 2024-07-16

## 2024-07-21 ENCOUNTER — HEALTH MAINTENANCE LETTER (OUTPATIENT)
Age: 89
End: 2024-07-21

## 2024-09-13 ENCOUNTER — TRANSFERRED RECORDS (OUTPATIENT)
Dept: HEALTH INFORMATION MANAGEMENT | Facility: CLINIC | Age: 89
End: 2024-09-13
Payer: COMMERCIAL

## 2024-09-29 ENCOUNTER — HEALTH MAINTENANCE LETTER (OUTPATIENT)
Age: 89
End: 2024-09-29

## 2024-10-31 ENCOUNTER — TELEPHONE (OUTPATIENT)
Dept: INTERNAL MEDICINE | Facility: CLINIC | Age: 89
End: 2024-10-31
Payer: COMMERCIAL

## 2024-10-31 NOTE — TELEPHONE ENCOUNTER
"Pt states she has had a UTI for 2 days. Pt has had itching, odor, cloudy urine, and foul smelling urine.     Pt denies frequency or dysuria.     Pt states she cannot come in to clinic and would like a medication sent to   [facility] [id number] [facility] - [place], [place] - [address]       Pt states she is unsure when she last had a UTI \"maybe within the last year?\"  "

## 2024-11-01 NOTE — TELEPHONE ENCOUNTER
Vidhi Pearl NP not in the office until next Tuesday.    Options include WALK IN CARE, Urgency Room, virtual visit with provider with opening, or evisit.    Lalito Marquez MD  General Internal Medicine  [facility]  10/31/2024, 9:11 PM

## 2024-11-01 NOTE — TELEPHONE ENCOUNTER
Called patient and relayed below recommendations from provider.     Patient states understanding and agreeable for urgent care if she can get a ride to clinic.     Patient thanked for call back.

## 2024-11-04 ENCOUNTER — TELEPHONE (OUTPATIENT)
Dept: INTERNAL MEDICINE | Facility: CLINIC | Age: 89
End: 2024-11-04

## 2024-11-04 ENCOUNTER — VIRTUAL VISIT (OUTPATIENT)
Dept: INTERNAL MEDICINE | Facility: CLINIC | Age: 89
End: 2024-11-04
Payer: COMMERCIAL

## 2024-11-04 DIAGNOSIS — N39.0 URINARY TRACT INFECTION WITHOUT HEMATURIA, SITE UNSPECIFIED: Primary | ICD-10-CM

## 2024-11-04 PROCEDURE — 99442 PR PHYSICIAN TELEPHONE EVALUATION 11-20 MIN: CPT | Mod: 93 | Performed by: INTERNAL MEDICINE

## 2024-11-04 RX ORDER — SULFAMETHOXAZOLE AND TRIMETHOPRIM 800; 160 MG/1; MG/1
1 TABLET ORAL 2 TIMES DAILY
Qty: 20 TABLET | Refills: 1 | Status: SHIPPED | OUTPATIENT
Start: 2024-11-04

## 2024-11-04 NOTE — PROGRESS NOTES
Patricia Bose is a 89 year oldwho is being evaluated via a billable telephone visit.       What phone number would you like to be contacted at? [phone]      Assessment & Plan  (N39.0) Urinary tract infection without hematuria, site unspecified  (primary encounter diagnosis)  Comment: Not a current problem but does note cloudy urine and minimal dysuria there is no fever or chills or back pain associated and there is no blood in the urine grossly.  Plan: sulfamethoxazole-trimethoprim (BACTRIM DS)         800-160 MG tablet        No drug allergies noted encourage fluids.  I also emphasized acidification of the urine as this may help treat some bacterial urinary tract infections.  She has been ill for a few days.  Accompanied by her son who is very supportive.  She is 89.  Her reticulocyte acid and cranberry juice emphasize plus vitamin C ascorbic acid to 50 mg once daily.         15 minutes spent on the date of the encounter doing chart review, patient visit and documentation I spoke with the patient directly on the phone 11 minutes.  We had a good discussion.       Subjective  History as noted above not a recurrent problem are urinary tract infections.  The patient was advised regarding staying well-hydrated and acidification of the urine plus Septra DS or sulfa compound.  She has no known drug allergies except for ibuprofen.     Review of Systems   No blood in stool or urine med list reviewed reconciled in the chart.       Viktor Ravi MD    The longitudinal plan of care for the diagnosis(es)/condition(s) as documented were addressed during this visit. Due to the added complexity in care, I will continue to support Patricia in the subsequent management and with ongoing continuity of care.

## 2024-11-04 NOTE — TELEPHONE ENCOUNTER
Patient calling looking for an appt with her PCP. Writer relayed PCP has no appt availability today or Thursday when PCP is in clinic.    Patient reports urinary sx for 3 days. Patient reports cloudy and odor in urine. Denies pain or frequency of urination.     Patient scheduled for an appt with another provider today at 3:40 PM. Writer also offerd Walk in Care.     Patient thanked for call.

## 2024-11-14 ENCOUNTER — TELEPHONE (OUTPATIENT)
Dept: INTERNAL MEDICINE | Facility: CLINIC | Age: 89
End: 2024-11-14
Payer: COMMERCIAL

## 2024-11-14 NOTE — TELEPHONE ENCOUNTER
Attempted to reach Patricia CHRISTIE Bose in regards to their losartan. Left voicemail, with callback number, requesting return call. Per our records last filled 7/16/24 for 90 day supply and is 31 days late to refill.      Josefina Marie, PharmD, [facility]  Pharmacy   [phone]

## 2024-11-16 ENCOUNTER — TRANSFERRED RECORDS (OUTPATIENT)
Dept: MULTI SPECIALTY CLINIC | Facility: CLINIC | Age: 89
End: 2024-11-16
Payer: COMMERCIAL

## 2024-11-25 ENCOUNTER — MYC MEDICAL ADVICE (OUTPATIENT)
Dept: INTERNAL MEDICINE | Facility: CLINIC | Age: 89
End: 2024-11-25
Payer: COMMERCIAL

## 2024-11-25 DIAGNOSIS — I10 BENIGN ESSENTIAL HYPERTENSION: ICD-10-CM

## 2024-11-25 NOTE — TELEPHONE ENCOUNTER
Per our records, losartan last filled 7/16/24 for 90 days and is 42 days late to refill.      Josefina Marie, PharmD, [facility]  Retail Pharmacy Specialist  [phone]

## 2024-11-26 RX ORDER — LOSARTAN POTASSIUM 50 MG/1
75 TABLET ORAL DAILY
Qty: 135 TABLET | Refills: 2 | Status: SHIPPED | OUTPATIENT
Start: 2024-11-26

## 2024-12-24 ENCOUNTER — NURSE TRIAGE (OUTPATIENT)
Dept: INTERNAL MEDICINE | Facility: CLINIC | Age: 89
End: 2024-12-24
Payer: COMMERCIAL

## 2024-12-24 NOTE — TELEPHONE ENCOUNTER
"Nurse Triage SBAR    Is this a 2nd Level Triage? NO    Situation:  Incoming call from patient's daughter in law.  She is calling on behalf of Patricia and not currently with her.  She has been in close communication with Patricia and checking on her.  Concern is over an ongoing cough.    Background:  Patricia came down with likely a viral respiratory infection about 2 weeks ago without any significant worrisome symptoms besides a typical cough, nasal congestion.  She is still dealing with a nagging cough this week.    Otherwise fairly unremarkable pulmonary history.    Assessment:  As of today, Patricia is doing otherwise fine, however she is complaining of a bit of a nagging cough. Cough is a touch productive.  No fever, chills, shortness of breath or other red flag symptoms.    Protocol Recommended Disposition:   Home Care    Recommendation:  recommended home care at this point as no significantly concerning symptoms are present.  If any symptoms change/worsen patient is to be evaluated same day.  Patricia is requesting an appointment with PCP on Monday next week just in case things do not improve, PCP out of office assisted with scheduling a visit with an alternate provider at her home clinic.    NA    Does the patient meet one of the following criteria for ADS visit consideration? 16+ years old, with an [facility] PCP     TIP  Providers, please consider if this condition is appropriate for management at one of our Acute and Diagnostic Services sites.     If patient is a good candidate, please use dotphrase <dot>triageresponse and select Refer to ADS to document.  Reason for Disposition   Cough with no complications    Answer Assessment - Initial Assessment Questions  1. ONSET: \"When did the cough begin?\"       2 weeks   2. SEVERITY: \"How bad is the cough today?\"       *No Answer*  3. SPUTUM: \"Describe the color of your sputum\" (e.g., none, dry cough; clear, white, yellow, green)      *No Answer*  4. HEMOPTYSIS: \"Are you " "coughing up any blood?\" If Yes, ask: \"How much?\" (e.g., flecks, streaks, tablespoons, etc.)      No  5. DIFFICULTY BREATHING: \"Are you having difficulty breathing?\" If Yes, ask: \"How bad is it?\" (e.g., mild, moderate, severe)       None  6. FEVER: \"Do you have a fever?\" If Yes, ask: \"What is your temperature, how was it measured, and when did it start?\"      No  7. CARDIAC HISTORY: \"Do you have any history of heart disease?\" (e.g., heart attack, congestive heart failure)       *No Answer*  8. LUNG HISTORY: \"Do you have any history of lung disease?\"  (e.g., pulmonary embolus, asthma, emphysema)      *No Answer*  9. PE RISK FACTORS: \"Do you have a history of blood clots?\" (or: recent major surgery, recent prolonged travel, bedridden)      *No Answer*  10. OTHER SYMPTOMS: \"Do you have any other symptoms?\" (e.g., runny nose, wheezing, chest pain)        *No Answer*  11. PREGNANCY: \"Is there any chance you are pregnant?\" \"When was your last menstrual period?\"        *No Answer*  12. TRAVEL: \"Have you traveled out of the country in the last month?\" (e.g., travel history, exposures)        *No Answer*    Protocols used: Cough-A-OH    "

## 2025-01-28 DIAGNOSIS — J84.112 IPF (IDIOPATHIC PULMONARY FIBROSIS) (H): ICD-10-CM

## 2025-01-29 RX ORDER — CODEINE PHOSPHATE AND GUAIFENESIN 10; 100 MG/5ML; MG/5ML
5 SOLUTION ORAL EVERY 4 HOURS PRN
Qty: 473 ML | Refills: 0 | Status: SHIPPED | OUTPATIENT
Start: 2025-01-29

## 2025-02-16 ENCOUNTER — HEALTH MAINTENANCE LETTER (OUTPATIENT)
Age: OVER 89
End: 2025-02-16

## 2025-03-13 DIAGNOSIS — E11.65 TYPE 2 DIABETES MELLITUS WITH HYPERGLYCEMIA, WITHOUT LONG-TERM CURRENT USE OF INSULIN (H): ICD-10-CM

## 2025-03-13 RX ORDER — BLOOD SUGAR DIAGNOSTIC
1 STRIP MISCELLANEOUS 3 TIMES DAILY
Qty: 300 STRIP | Refills: 2 | Status: SHIPPED | OUTPATIENT
Start: 2025-03-13

## 2025-03-16 ENCOUNTER — HEALTH MAINTENANCE LETTER (OUTPATIENT)
Age: OVER 89
End: 2025-03-16

## 2025-03-18 DIAGNOSIS — R73.9 HYPERGLYCEMIA: ICD-10-CM

## 2025-03-18 DIAGNOSIS — E11.65 TYPE 2 DIABETES MELLITUS WITH HYPERGLYCEMIA, WITH LONG-TERM CURRENT USE OF INSULIN (H): ICD-10-CM

## 2025-03-18 DIAGNOSIS — Z79.4 TYPE 2 DIABETES MELLITUS WITH HYPERGLYCEMIA, WITH LONG-TERM CURRENT USE OF INSULIN (H): ICD-10-CM

## 2025-03-18 NOTE — TELEPHONE ENCOUNTER
FAX [facility] Prescription Renewal Request    generic lancets (ACCU-CHEK FASTCLIX LANCING DEV)

## 2025-03-21 ENCOUNTER — TELEPHONE (OUTPATIENT)
Dept: INTERNAL MEDICINE | Facility: CLINIC | Age: OVER 89
End: 2025-03-21
Payer: COMMERCIAL

## 2025-03-21 DIAGNOSIS — Z79.4 TYPE 2 DIABETES MELLITUS WITH HYPERGLYCEMIA, WITH LONG-TERM CURRENT USE OF INSULIN (H): ICD-10-CM

## 2025-03-21 DIAGNOSIS — E11.65 TYPE 2 DIABETES MELLITUS WITH HYPERGLYCEMIA, WITH LONG-TERM CURRENT USE OF INSULIN (H): ICD-10-CM

## 2025-03-21 DIAGNOSIS — R73.9 HYPERGLYCEMIA: ICD-10-CM

## 2025-03-21 DIAGNOSIS — E11.65 TYPE 2 DIABETES MELLITUS WITH HYPERGLYCEMIA, WITHOUT LONG-TERM CURRENT USE OF INSULIN (H): ICD-10-CM

## 2025-03-21 NOTE — TELEPHONE ENCOUNTER
Medication Question or Refill    Contacts       Contact Date/Time Type Contact Phone/Fax    2025 03:17 PM CDT Phone (Incoming) , Patricia CHRISTIE (Self) [phone] (M)            What medication are you calling about (include dose and sig)?: Accuchek guide me glucose meter    Preferred Pharmacy:   [place] [address] [place] [facility] - [place], [place] - [address]  [address]  [place] [address]  Phone: [phone] Fax: [phone]    [facility] - [place], [place] - [address]  [address] [address]  Phone: [phone] Fax: [phone]      Controlled Substance Agreement on file:   CSA -- Patient Level:    CSA: None found at the patient level.       Who prescribed the medication?: Dr Pearl     Do you need a refill? Yes    When did you use the medication last? NA    Patient offered an appointment? Yes    Do you have any questions or concerns?  Yes: Patient states hers  last year. Pls send to [facility]       Could we send this information to you in [place] or would you prefer to receive a phone call?:   Patient would prefer a phone call   Okay to leave a detailed message?: Yes at Cell number on file:    Telephone Information:   [employer] [phone]

## 2025-03-23 RX ORDER — LANCING DEVICE/LANCETS
KIT MISCELLANEOUS
Qty: 1 EACH | Refills: 0 | Status: SHIPPED | OUTPATIENT
Start: 2025-03-23

## 2025-03-23 RX ORDER — BLOOD SUGAR DIAGNOSTIC
1 STRIP MISCELLANEOUS 3 TIMES DAILY
Qty: 300 STRIP | Refills: 2 | Status: SHIPPED | OUTPATIENT
Start: 2025-03-23

## 2025-03-23 RX ORDER — LANCETS
EACH MISCELLANEOUS
Qty: 102 EACH | Refills: 6 | Status: SHIPPED | OUTPATIENT
Start: 2025-03-23

## 2025-03-23 RX ORDER — GLUCOSAMINE HCL/CHONDROITIN SU 500-400 MG
CAPSULE ORAL
Qty: 100 EACH | Refills: 6 | Status: SHIPPED | OUTPATIENT
Start: 2025-03-23

## 2025-04-16 ENCOUNTER — TRANSFERRED RECORDS (OUTPATIENT)
Dept: MULTI SPECIALTY CLINIC | Facility: CLINIC | Age: OVER 89
End: 2025-04-16

## 2025-04-16 LAB — RETINOPATHY: NORMAL

## 2025-05-29 ENCOUNTER — OFFICE VISIT (OUTPATIENT)
Dept: INTERNAL MEDICINE | Facility: CLINIC | Age: OVER 89
End: 2025-05-29
Payer: COMMERCIAL

## 2025-05-29 ENCOUNTER — ORDERS ONLY (AUTO-RELEASED) (OUTPATIENT)
Dept: INTERNAL MEDICINE | Facility: CLINIC | Age: OVER 89
End: 2025-05-29

## 2025-05-29 VITALS
OXYGEN SATURATION: 98 % | HEIGHT: 56 IN | RESPIRATION RATE: 18 BRPM | HEART RATE: 93 BPM | SYSTOLIC BLOOD PRESSURE: 180 MMHG | BODY MASS INDEX: 23.44 KG/M2 | WEIGHT: 104.19 LBS | DIASTOLIC BLOOD PRESSURE: 80 MMHG

## 2025-05-29 DIAGNOSIS — N18.31 TYPE 2 DIABETES MELLITUS WITH STAGE 3A CHRONIC KIDNEY DISEASE, WITHOUT LONG-TERM CURRENT USE OF INSULIN (H): ICD-10-CM

## 2025-05-29 DIAGNOSIS — Z13.6 SCREENING FOR CARDIOVASCULAR CONDITION: ICD-10-CM

## 2025-05-29 DIAGNOSIS — Z00.00 ENCOUNTER FOR MEDICARE ANNUAL WELLNESS EXAM: Primary | ICD-10-CM

## 2025-05-29 DIAGNOSIS — I10 BENIGN ESSENTIAL HYPERTENSION: ICD-10-CM

## 2025-05-29 DIAGNOSIS — I25.83 CORONARY ARTERY DISEASE DUE TO LIPID RICH PLAQUE: ICD-10-CM

## 2025-05-29 DIAGNOSIS — I10 ESSENTIAL HYPERTENSION: ICD-10-CM

## 2025-05-29 DIAGNOSIS — K86.1 OTHER CHRONIC PANCREATITIS (H): ICD-10-CM

## 2025-05-29 DIAGNOSIS — I25.10 CORONARY ARTERY DISEASE DUE TO LIPID RICH PLAQUE: ICD-10-CM

## 2025-05-29 DIAGNOSIS — E11.22 TYPE 2 DIABETES MELLITUS WITH STAGE 3A CHRONIC KIDNEY DISEASE, WITHOUT LONG-TERM CURRENT USE OF INSULIN (H): ICD-10-CM

## 2025-05-29 DIAGNOSIS — J84.112 IPF (IDIOPATHIC PULMONARY FIBROSIS) (H): ICD-10-CM

## 2025-05-29 DIAGNOSIS — Z12.11 SPECIAL SCREENING FOR MALIGNANT NEOPLASMS, COLON: ICD-10-CM

## 2025-05-29 DIAGNOSIS — N39.0 URINARY TRACT INFECTION WITHOUT HEMATURIA, SITE UNSPECIFIED: ICD-10-CM

## 2025-05-29 DIAGNOSIS — R07.89 CHEST PRESSURE: ICD-10-CM

## 2025-05-29 DIAGNOSIS — N18.30 STAGE 3 CHRONIC KIDNEY DISEASE, UNSPECIFIED WHETHER STAGE 3A OR 3B CKD (H): ICD-10-CM

## 2025-05-29 DIAGNOSIS — I25.10 CORONARY ARTERY DISEASE INVOLVING NATIVE CORONARY ARTERY OF NATIVE HEART WITHOUT ANGINA PECTORIS: ICD-10-CM

## 2025-05-29 DIAGNOSIS — Z78.0 POSTMENOPAUSAL STATUS: ICD-10-CM

## 2025-05-29 LAB
ALBUMIN SERPL BCG-MCNC: 4.2 G/DL (ref 3.5–5.2)
ALBUMIN UR-MCNC: 100 MG/DL
ALP SERPL-CCNC: 68 U/L (ref 40–150)
ALT SERPL W P-5'-P-CCNC: 18 U/L (ref 0–50)
ANION GAP SERPL CALCULATED.3IONS-SCNC: 11 MMOL/L (ref 7–15)
APPEARANCE UR: ABNORMAL
AST SERPL W P-5'-P-CCNC: 31 U/L (ref 0–45)
BACTERIA #/AREA URNS HPF: ABNORMAL /HPF
BILIRUB SERPL-MCNC: 0.3 MG/DL
BILIRUB UR QL STRIP: NEGATIVE
BUN SERPL-MCNC: 30.4 MG/DL (ref 8–23)
CALCIUM SERPL-MCNC: 9.9 MG/DL (ref 8.8–10.4)
CHLORIDE SERPL-SCNC: 100 MMOL/L (ref 98–107)
CHOLEST SERPL-MCNC: 204 MG/DL
COLOR UR AUTO: YELLOW
CREAT SERPL-MCNC: 0.81 MG/DL (ref 0.51–0.95)
EGFRCR SERPLBLD CKD-EPI 2021: 65 ML/MIN/1.73M2
EST. AVERAGE GLUCOSE BLD GHB EST-MCNC: 143 MG/DL
FASTING STATUS PATIENT QL REPORTED: ABNORMAL
FASTING STATUS PATIENT QL REPORTED: ABNORMAL
GLUCOSE SERPL-MCNC: 126 MG/DL (ref 70–99)
GLUCOSE UR STRIP-MCNC: NEGATIVE MG/DL
HBA1C MFR BLD: 6.6 % (ref 0–5.6)
HCO3 SERPL-SCNC: 25 MMOL/L (ref 22–29)
HDLC SERPL-MCNC: 60 MG/DL
HGB UR QL STRIP: ABNORMAL
KETONES UR STRIP-MCNC: NEGATIVE MG/DL
LDLC SERPL CALC-MCNC: 85 MG/DL
LEUKOCYTE ESTERASE UR QL STRIP: ABNORMAL
MAGNESIUM SERPL-MCNC: 2 MG/DL (ref 1.7–2.3)
NITRATE UR QL: POSITIVE
NONHDLC SERPL-MCNC: 144 MG/DL
PH UR STRIP: 6 [PH] (ref 5–8)
POTASSIUM SERPL-SCNC: 4.2 MMOL/L (ref 3.4–5.3)
PROT SERPL-MCNC: 7.3 G/DL (ref 6.4–8.3)
RBC #/AREA URNS AUTO: ABNORMAL /HPF
SODIUM SERPL-SCNC: 136 MMOL/L (ref 135–145)
SP GR UR STRIP: 1.02 (ref 1–1.03)
SQUAMOUS #/AREA URNS AUTO: ABNORMAL /LPF
TRIGL SERPL-MCNC: 296 MG/DL
UROBILINOGEN UR STRIP-ACNC: 0.2 E.U./DL
WBC #/AREA URNS AUTO: >100 /HPF

## 2025-05-29 RX ORDER — LOSARTAN POTASSIUM 50 MG/1
75 TABLET ORAL DAILY
Qty: 135 TABLET | Refills: 3 | Status: SHIPPED | OUTPATIENT
Start: 2025-05-29

## 2025-05-29 RX ORDER — SULFAMETHOXAZOLE AND TRIMETHOPRIM 800; 160 MG/1; MG/1
1 TABLET ORAL 2 TIMES DAILY
Qty: 20 TABLET | Refills: 1 | Status: SHIPPED | OUTPATIENT
Start: 2025-05-29

## 2025-05-29 RX ORDER — AMLODIPINE BESYLATE 2.5 MG/1
2.5 TABLET ORAL DAILY
Qty: 90 TABLET | Refills: 3 | Status: SHIPPED | OUTPATIENT
Start: 2025-05-29

## 2025-05-29 RX ORDER — ATORVASTATIN CALCIUM 40 MG/1
40 TABLET, FILM COATED ORAL DAILY
Qty: 90 TABLET | Refills: 0 | Status: CANCELLED | OUTPATIENT
Start: 2025-05-29

## 2025-05-29 SDOH — HEALTH STABILITY: PHYSICAL HEALTH: ON AVERAGE, HOW MANY DAYS PER WEEK DO YOU ENGAGE IN MODERATE TO STRENUOUS EXERCISE (LIKE A BRISK WALK)?: 0 DAYS

## 2025-05-29 SDOH — HEALTH STABILITY: PHYSICAL HEALTH: ON AVERAGE, HOW MANY MINUTES DO YOU ENGAGE IN EXERCISE AT THIS LEVEL?: 0 MIN

## 2025-05-29 ASSESSMENT — PAIN SCALES - GENERAL: PAINLEVEL_OUTOF10: NO PAIN (0)

## 2025-05-29 ASSESSMENT — SOCIAL DETERMINANTS OF HEALTH (SDOH): HOW OFTEN DO YOU GET TOGETHER WITH FRIENDS OR RELATIVES?: MORE THAN THREE TIMES A WEEK

## 2025-05-29 NOTE — PROGRESS NOTES
"Preventive Care Visit  [facility] TOMMIE Pearl NP, Internal Medicine  May 29, 2025      Assessment & Plan   Problem List Items Addressed This Visit       Hypertension    Relevant Medications    amLODIPine (NORVASC) 2.5 MG tablet    losartan (COZAAR) 50 MG tablet    CAD (coronary artery disease)    Relevant Orders    Lipid panel reflex to direct LDL Non-fasting    Type 2 diabetes mellitus with stage 3a chronic kidney disease, without long-term current use of insulin (H)    Relevant Medications    blood glucose (NO BRAND SPECIFIED) test strip    Other Relevant Orders    HEMOGLOBIN A1C    Magnesium    Comprehensive metabolic panel (BMP + Alb, Alk Phos, ALT, AST, Total. Bili, TP)    Chronic kidney disease, stage 3 (H)    Relevant Orders    Albumin Random Urine Quantitative with Creat Ratio    Chest pressure    Relevant Medications    amLODIPine (NORVASC) 2.5 MG tablet     Other Visit Diagnoses         Encounter for Medicare annual wellness exam    -  Primary      Benign essential hypertension        Relevant Medications    losartan (COZAAR) 50 MG tablet      Screening for cardiovascular condition          Special screening for malignant neoplasms, colon        Relevant Orders    COLOGUARD(EXACT SCIENCES)      Postmenopausal status        Relevant Orders    DX Bone Density      Urinary tract infection without hematuria, site unspecified        Relevant Medications    sulfamethoxazole-trimethoprim (BACTRIM DS) 800-160 MG tablet    Other Relevant Orders    UA Macroscopic with reflex to Microscopic and Culture - Lab Collect           Patient describes posterior thigh discomfort with occasional weakness though indicates she can \"walk it off.  Will hold her statin medication at this time.    She has a history of insomnia discussed utilization of white noise continue utilization of Tylenol as needed or low-dose melatonin as needed    Patient has trace bilateral ankle swelling recommend elevating " legs above heart 1 hour/day she denies any orthopnea.  Discussed possible utilization of compression stockings on in the a.m. off at night    Patient reports cloudy urine with no additional urinary symptoms will obtain a UA and treat accordingly.    Patient is due for bone density has been 3 years since the last bone density she had elected not to start treatment at that time we will recheck and discuss post testing treatment options.    Patient requesting Cologuard currently not symptomatic at patient's request an order has been placed    Patient will continue on all other current medications as prescribed         Counseling  Appropriate preventive services were addressed with this patient via screening, questionnaire, or discussion as appropriate for fall prevention, nutrition, physical activity, Tobacco-use cessation, social engagement, weight loss and cognition.  Checklist reviewing preventive services available has been given to the patient.  Reviewed patient's diet, addressing concerns and/or questions.   I have reviewed Opioid Use Disorder and Substance Use Disorder risk factors and made any needed referrals.         Reynaldo Gomez is a 89 year old, presenting for the following:  Physical (AWV) and UTI (Having cloudy urine. No discharge. No burning or frequency. )        5/29/2025    11:28 AM   Additional Questions   Roomed by Roldan HESS CMA   Accompanied by Family        Via the Health Maintenance questionnaire, the patient has reported the following services have been completed -Eye Exam: [facility] eye [facility] 2025-04-16, this information has been sent to the abstraction team.    UTI           Genitourinary - Female  Onset/Duration: over a week   Description:   Painful urination (Dysuria): No           Frequency: No  Blood in urine (Hematuria): No  Delay in urine (Hesitency): No  Intensity: mild  Progression of Symptoms:  same  Accompanying Signs & Symptoms:  Fever/chills: No  Flank pain:  No  Nausea and vomiting: No  Vaginal symptoms: cloudiness   Abdominal/Pelvic Pain: No  History:   History of frequent UTI s: YES  History of kidney stones: No  Sexually Active: No  Possibility of pregnancy: No  Precipitating or alleviating factors: None  Therapies tried and outcome:     Advance Care Planning    Document on file is a Health Care Directive or POLST.        5/29/2025   General Health   How would you rate your overall physical health? Good   Feel stress (tense, anxious, or unable to sleep) Not at all         5/29/2025   Nutrition   Diet: Regular (no restrictions)         5/29/2025   Exercise   Days per week of moderate/strenous exercise 0 days   Average minutes spent exercising at this level 0 min   (!) EXERCISE CONCERN      5/29/2025   Social Factors   Frequency of gathering with friends or relatives More than three times a week   Worry food won't last until get money to buy more No   Food not last or not have enough money for food? No   Do you have housing? (Housing is defined as stable permanent housing and does not include staying outside in a car, in a tent, in an abandoned building, in an overnight shelter, or couch-surfing.) Yes   Are you worried about losing your housing? No   Lack of transportation? No   Unable to get utilities (heat,electricity)? No         5/29/2025   Fall Risk   Fallen 2 or more times in the past year? No   Trouble with walking or balance? No          5/29/2025   Activities of Daily Living- Home Safety   Needs help with the following daily activites None of the above   Safety concerns in the home None of the above         5/29/2025   Dental   Dentist two times every year? Yes         5/29/2025   Hearing Screening   Hearing concerns? None of the above         5/29/2025   Driving Risk Screening   Patient/family members have concerns about driving (!) DECLINE         5/29/2025   General Alertness/Fatigue Screening   Have you been more tired than usual lately? No          5/29/2025   Urinary Incontinence Screening   Bothered by leaking urine in past 6 months No         Today's PHQ-2 Score:       5/29/2025     6:51 AM   PHQ-2 ( 1999 Pf[employer])   PHQ-2 Score Incomplete           5/29/2025   Substance Use   Alcohol more than 3/day or more than 7/wk Not Applicable   Do you have a current opioid prescription? (!) YES   How severe/bad is pain from 1 to 10? 0/10 (No Pain)   Do you use any other substances recreationally? No       Social History     Tobacco Use    Smoking status: Never    Smokeless tobacco: Never   Vaping Use    Vaping status: Never Used   Substance Use Topics    Alcohol use: Not Currently     Alcohol/week: 1.0 standard drink of alcohol     Types: 1 Standard drinks or equivalent per week     Comment: 2 glasses of wine monthly    Drug use: No           4/29/2022   LAST FHS-7 RESULTS   1st degree relative breast or ovarian cancer Yes   Any relative bilateral breast cancer No   Any male have breast cancer No   Any ONE woman have BOTH breast AND ovarian cancer No   Any woman with breast cancer before 50yrs No   2 or more relatives with breast AND/OR ovarian cancer Yes   2 or more relatives with breast AND/OR bowel cancer No        Mammogram Screening - After age 74- determine frequency with patient based on health status, life expectancy and patient goals          Reviewed and updated as needed this visit by Provider                    Past Medical History:   Diagnosis Date    Carpal tunnel syndrome     Diabetes mellitus (H)     Hyperlipidemia     Hypertension     Upper respiratory infection      Past Surgical History:   Procedure Laterality Date    ARTHROSCOPY SHOULDER ROTATOR CUFF REPAIR  10 years ago    left shoulder     BIOPSY BREAST Left 1980    benign    BIOPSY BREAST Left     before 1980    benign    CATARACT EXTRACTION      HC REMOVAL OF TONSILS,<11 Y/O      Description: Tonsillectomy;  Recorded: 04/29/2014;    HYSTERECTOMY  1957    benign    OOPHORECTOMY  1957    benign     ZZC TOTAL ABDOM HYSTERECTOMY      Description: Hysterectomy;  Recorded: 2014;     OB History    Para Term  AB Living   1 1 1 0 0 0   SAB IAB Ectopic Multiple Live Births   0 0 0 0 0      # Outcome Date GA Lbr Jose De Jesus/2nd Weight Sex Type Anes PTL Lv   1 Term              BP Readings from Last 3 Encounters:   25 (!) 180/80   24 (!) 144/70   24 (!) 152/70    Wt Readings from Last 3 Encounters:   25 47.3 kg (104 lb 3 oz)   24 46.9 kg (103 lb 4.8 oz)   24 47.5 kg (104 lb 11.2 oz)                  Patient Active Problem List   Diagnosis    Mixed hyperlipidemia    Hypertension    Insomnia    CAD (coronary artery disease)    Type 2 diabetes mellitus with stage 3a chronic kidney disease, without long-term current use of insulin (H)    Chronic kidney disease, stage 3 (H)    Other chronic pancreatitis (H)    IPF (idiopathic pulmonary fibrosis) (H)    Hypercalcemia    Osteopenia    Protein in urine    Bundle branch block, left    Chest pressure    Postural dizziness with near syncope     Past Surgical History:   Procedure Laterality Date    ARTHROSCOPY SHOULDER ROTATOR CUFF REPAIR  10 years ago    left shoulder     BIOPSY BREAST Left     benign    BIOPSY BREAST Left     before     benign    CATARACT EXTRACTION      HC REMOVAL OF TONSILS,<13 Y/O      Description: Tonsillectomy;  Recorded: 2014;    HYSTERECTOMY      benign    OOPHORECTOMY      benign    ZZC TOTAL ABDOM HYSTERECTOMY      Description: Hysterectomy;  Recorded: 2014;       Social History     Tobacco Use    Smoking status: Never    Smokeless tobacco: Never   Substance Use Topics    Alcohol use: Not Currently     Alcohol/week: 1.0 standard drink of alcohol     Types: 1 Standard drinks or equivalent per week     Comment: 2 glasses of wine monthly     Family History   Problem Relation Age of Onset    Breast Cancer Mother 52    Parkinsonism Mother     Breast Cancer Sister 72    Coronary Artery  Disease Brother     Diabetes Brother     Coronary Artery Disease Father 67    Cancer Father         bladder         Current Outpatient Medications   Medication Sig Dispense Refill    alcohol swab prep pads Use to swab area of injection/itz as directed. 100 each 6    amLODIPine (NORVASC) 2.5 MG tablet Take 1 tablet (2.5 mg) by mouth daily. 90 tablet 3    aspirin 81 MG EC tablet [ASPIRIN 81 MG EC TABLET] Take 81 mg by mouth daily.      blood glucose (ACCU-CHEK AMIRA PLUS) test strip 1 strip by In Vitro route 3 times daily. Use to test blood sugar 3 times daily or as directed. 300 strip 2    blood glucose (ACCU-CHEK FASTCLIX) lancing device Lancing device to be used with lancets. 1 each 0    blood glucose (NO BRAND SPECIFIED) lancets standard Use to test blood sugar 3 times daily or as directed. 300 each 2    blood glucose (NO BRAND SPECIFIED) test strip Use to test blood sugar DAILY times daily or as directed. 90 strip 3    blood glucose monitoring (ACCU-CHEK AMIRA PLUS) meter device kit Use to test blood sugar one times daily or as directed. 1 kit 1    blood glucose monitoring (ACCU-CHEK FASTCLIX) lancets Use to test blood sugar once times daily. 102 each 6    blood glucose monitoring (NO BRAND SPECIFIED) meter device kit by In Vitro route daily 1 kit 0    cholecalciferol, vitamin D3, (VITAMIN D3) 1,000 unit capsule [CHOLECALCIFEROL, VITAMIN D3, (VITAMIN D3) 1,000 UNIT CAPSULE] Take 1,000 Units by mouth daily.      cycloSPORINE (RESTASIS) 0.05 % ophthalmic emulsion Place 1 drop into both eyes 2 times daily      DENTAGEL 1.1 % GEL topical gel Apply to affected area 2 times daily      generic lancets (ACCU-CHEK FASTCLIX LANCING DEV) [GENERIC LANCETS (ACCU-CHEK FASTCLIX LANCING DEV)] Use 1 each As Directed daily. 200 each 2    glucosamine-chondroitin 500-400 mg cap [GLUCOSAMINE-CHONDROITIN 500-400 MG CAP] Take 2 capsules by mouth daily.      guaiFENesin-codeine (ROBITUSSIN AC) 100-10 MG/5ML solution Take 5 mLs by  mouth every 4 hours as needed for cough. 473 mL 0    losartan (COZAAR) 50 MG tablet Take 1.5 tablets (75 mg) by mouth daily. 135 tablet 3    MELATONIN ORAL Take 10 mg by mouth nightly as needed (Sleep)      multivitamin therapeutic (THERAGRAN) tablet [MULTIVITAMIN THERAPEUTIC (THERAGRAN) TABLET] Take 1 tablet by mouth daily.      RISEdronate (ACTONEL) 150 MG tablet Take one tablet monthly 3 tablet 3    sulfamethoxazole-trimethoprim (BACTRIM DS) 800-160 MG tablet Take 1 tablet by mouth 2 times daily. 20 tablet 1     Allergies   Allergen Reactions    Ibuprofen Shortness Of Breath     Recent Labs   Lab Test 01/13/24  0710 01/11/24  2212 01/08/24  1059 06/28/23  1242 04/23/23  1056 12/07/22  0926 08/31/22  1355 04/27/22  0937 11/12/21  1023 06/23/21  0834 02/19/21  1055 07/31/19  0855 06/12/19  1039   A1C  --  6.4*  --  6.4*  --  6.5*  --  6.7*   < > 7.0* 6.6*   < > 7.3*   LDL  --   --   --  74  --   --   --  81  --  86 85   < > 102   HDL  --   --   --  71  --   --   --  59  --  55 62  --  63   TRIG  --   --   --  121  --   --   --  111  --  87 92  --  146   ALT  --   --   --  25  --   --   --  21  --  20 28   < > 25   CR 0.99* 1.00*   < > 0.86   < > 1.11*   < > 1.01   < > 0.97 0.94   < > 0.94   GFRESTIMATED 52* 51*   < > 61   < > 45*   < > 51*   < > 53* 55*   < > 56*   GFRESTBLACK  --   --   --   --   --   --   --   --   --  >60 >60   < > >60   POTASSIUM 4.0 3.7  --  4.8   < > 4.0   < > 4.8   < > 4.7 4.7   < > 4.6   TSH  --   --   --   --   --   --   --   --   --   --   --   --  1.95    < > = values in this interval not displayed.           Current providers sharing in care for this patient include:  Patient Care Team:  Vidhi Pearl NP as PCP - General  Vidhi Pearl NP as Assigned PCP  Verónica Lopez NP as Nurse Practitioner  Sarahi Odom DO as Assigned Musculoskeletal Provider    The following health maintenance items are reviewed in Epic and correct as of today:  Health Maintenance   Topic Date Due  "   EYE EXAM  10/01/2021    DIABETIC FOOT EXAM  02/19/2022    LIPID  06/28/2024    MICROALBUMIN  06/28/2024    ANNUAL REVIEW OF HM ORDERS  06/28/2024    A1C  07/11/2024    PHQ-2 (once per calendar year)  01/01/2025    BMP  01/13/2025    COVID-19 VACCINE (10 - 2024-25 season) 03/20/2025    HEMOGLOBIN  01/13/2025    MEDICARE ANNUAL WELLNESS VISIT  05/29/2026    FALL RISK ASSESSMENT  05/29/2026    ADVANCE CARE PLANNING  07/18/2028    DTAP/TDAP/TD VACCINE (3 - Td or Tdap) 06/18/2031    INFLUENZA VACCINE  Completed    PNEUMOCOCCAL VACCINE 50+ YEARS  Completed    URINALYSIS  Completed    ZOSTER VACCINE  Completed    RSV VACCINE  Completed    HPV VACCINE  Aged Out    MENINGITIS VACCINE  Aged Out         Review of Systems  Constitutional, HEENT, cardiovascular, pulmonary, GI, , musculoskeletal, neuro, skin, endocrine and psych systems are negative, except as otherwise noted.     Objective    Exam  BP (!) 180/80 (BP Location: Right arm, Patient Position: Sitting, Cuff Size: Adult Regular)   Pulse 93   Resp 18   Ht 1.42 m (4' 7.9\")   Wt 47.3 kg (104 lb 3 oz)   LMP  (LMP Unknown)   SpO2 98%   Breastfeeding No   BMI 23.44 kg/m     Estimated body mass index is 23.44 kg/m  as calculated from the following:    Height as of this encounter: 1.42 m (4' 7.9\").    Weight as of this encounter: 47.3 kg (104 lb 3 oz).    Physical Exam  GENERAL: alert and no distress  EYES: Eyes grossly normal to inspection,  conjunctivae and sclerae normal  RESP: Respirations regular nonlabored  SKIN: no suspicious lesions or rashes, trace bilateral ankle edema noted  PSYCH: mentation appears normal, affect normal/bright         No data to display                       Signed Electronically by: Vidhi Pearl, CNP    "

## 2025-05-29 NOTE — PATIENT INSTRUCTIONS
Hold Lipitor         Patient Education   Preventive Care Advice   This is general advice given by our system to help you stay healthy. However, your care team may have specific advice just for you. Please talk to your care team about your preventive care needs.  Nutrition  Eat 5 or more servings of fruits and vegetables each day.  Try wheat bread, brown rice and whole grain pasta (instead of white bread, rice, and pasta).  Get enough calcium and vitamin D. Check the label on foods and aim for 100% of the RDA (recommended daily allowance).  Lifestyle  Exercise at least 150 minutes each week  (30 minutes a day, 5 days a week).  Do muscle strengthening activities 2 days a week. These help control your weight and prevent disease.  No smoking.  Wear sunscreen to prevent skin cancer.  Have a dental exam and cleaning every 6 months.  Yearly exams  See your health care team every year to talk about:  Any changes in your health.  Any medicines your care team has prescribed.  Preventive care, family planning, and ways to prevent chronic diseases.  Shots (vaccines)   HPV shots (up to age 26), if you've never had them before.  Hepatitis B shots (up to age 59), if you've never had them before.  COVID-19 shot: Get this shot when it's due.  Flu shot: Get a flu shot every year.  Tetanus shot: Get a tetanus shot every 10 years.  Pneumococcal, hepatitis A, and RSV shots: Ask your care team if you need these based on your risk.  Shingles shot (for age 50 and up)  General health tests  Diabetes screening:  Starting at age 35, Get screened for diabetes at least every 3 years.  If you are younger than age 35, ask your care team if you should be screened for diabetes.  Cholesterol test: At age 39, start having a cholesterol test every 5 years, or more often if advised.  Bone density scan (DEXA): At age 50, ask your care team if you should have this scan for osteoporosis (brittle bones).  Hepatitis C: Get tested at least once in your  life.  STIs (sexually transmitted infections)  Before age 24: Ask your care team if you should be screened for STIs.  After age 24: Get screened for STIs if you're at risk. You are at risk for STIs (including HIV) if:  You are sexually active with more than one person.  You don't use condoms every time.  You or a partner was diagnosed with a sexually transmitted infection.  If you are at risk for HIV, ask about PrEP medicine to prevent HIV.  Get tested for HIV at least once in your life, whether you are at risk for HIV or not.  Cancer screening tests  Cervical cancer screening: If you have a cervix, begin getting regular cervical cancer screening tests starting at age 21.  Breast cancer scan (mammogram): If you've ever had breasts, begin having regular mammograms starting at age 40. This is a scan to check for breast cancer.  Colon cancer screening: It is important to start screening for colon cancer at age 45.  Have a colonoscopy test every 10 years (or more often if you're at risk) Or, ask your provider about stool tests like a FIT test every year or Cologuard test every 3 years.  To learn more about your testing options, visit:   .  For help making a decision, visit:   https://bit.ly/ij38116.  Prostate cancer screening test: If you have a prostate, ask your care team if a prostate cancer screening test (PSA) at age 55 is right for you.  Lung cancer screening: If you are a current or former smoker ages 50 to 80, ask your care team if ongoing lung cancer screenings are right for you.  For informational purposes only. Not to replace the advice of your health care provider. Copyright   2023 [facility]. All rights reserved. Clinically reviewed by the [facility] Transitions Program. [employer] [id number] - REV 01/24.  Eating Healthy Foods: Care Instructions  With every meal, you can make healthy food choices. Try to eat a variety of fruits, vegetables, whole grains, lean proteins, and low-fat dairy  "products. This can help you get the right balance of nutrients, including vitamins and minerals. Small changes add up over time. You can start by adding one healthy food to your meals each day.    Try to make half your plate fruits and vegetables, one-fourth whole grains, and one-fourth lean proteins. Try including dairy with your meals.   Eat more fruits and vegetables. Try to have them with most meals and snacks.   Foods for healthy eating        Fruits   These can be fresh, frozen, canned, or dried.  Try to choose whole fruit rather than fruit juice.  Eat a variety of colors.        Vegetables   These can be fresh, frozen, canned, or dried.  Beans, peas, and lentils count too.        Whole grains   Choose whole-grain breads, cereals, and noodles.  Try brown rice.        Lean proteins   These can include lean meat, poultry, fish, and eggs.  You can also have tofu, beans, peas, lentils, nuts, and seeds.        Dairy   Try milk, yogurt, and cheese.  Choose low-fat or fat-free when you can.  If you need to, use lactose-free milk or fortified plant-based milk products, such as soy milk.        Water   Drink water when you're thirsty.  Limit sugar-sweetened drinks, including soda, fruit drinks, and sports drinks.  Where can you learn more?  Go to https://www.[employer].net/patiented  Enter T756 in the search box to learn more about \"Eating Healthy Foods: Care Instructions.\"  Current as of: October 7, 2024  Content Version: 14.4    3087-2290 [employer].   Care instructions adapted under license by your healthcare professional. If you have questions about a medical condition or this instruction, always ask your healthcare professional. [employer] disclaims any warranty or liability for your use of this information.    Chronic Pain: Care Instructions  Your Care Instructions     Chronic pain is pain that lasts a long time (months or even years) and may or may not have a clear cause. It is different " from acute pain, which usually does have a clear cause--like an injury or illness--and gets better over time. Chronic pain:  Lasts over time but may vary from day to day.  Does not go away despite efforts to end it.  May disrupt your sleep and lead to fatigue.  May cause depression or anxiety.  May make your muscles tense, causing more pain.  Can disrupt your work, hobbies, home life, and relationships with friends and family.  Chronic pain is a very real condition. It is not just in your head. Treatment can help and usually includes several methods used together, such as medicines, physical therapy, exercise, and other treatments. Learning how to relax and changing negative thought patterns can also help you cope.  Chronic pain is complex. Taking an active role in your treatment will help you better manage your pain. Tell your doctor if you have trouble dealing with your pain. You may have to try several things before you find what works best for you.  Follow-up care is a key part of your treatment and safety. Be sure to make and go to all appointments, and call your doctor if you are having problems. It's also a good idea to know your test results and keep a list of the medicines you take.  How can you care for yourself at home?  Pace yourself. Break up large jobs into smaller tasks. Save harder tasks for days when you have less pain, or go back and forth between hard tasks and easier ones. Take rest breaks.  Relax, and reduce stress. Relaxation techniques such as deep breathing or meditation can help.  Keep moving. Gentle, daily exercise can help reduce pain over the long run. Try low- or no-impact exercises such as walking, swimming, and stationary biking. Do stretches to stay flexible.  Try heat, cold packs, and massage.  Get enough sleep. Chronic pain can make you tired and drain your energy. Talk with your doctor if you have trouble sleeping because of pain.  Think positive. Your thoughts can affect your pain  level. Do things that you enjoy to distract yourself when you have pain instead of focusing on the pain. See a movie, read a book, listen to music, or spend time with a friend.  If you think you are depressed, talk to your doctor about treatment.  Keep a daily pain diary. Record how your moods, thoughts, sleep patterns, activities, and medicine affect your pain. You may find that your pain is worse during or after certain activities or when you are feeling a certain emotion. Having a record of your pain can help you and your doctor find the best ways to treat your pain.  Take pain medicines exactly as directed.  If the doctor gave you a prescription medicine for pain, take it as prescribed.  If you are not taking a prescription pain medicine, ask your doctor if you can take an over-the-counter medicine.  Reducing constipation caused by pain medicine  Talk to your doctor about a laxative. If a laxative doesn't work, your doctor may suggest a prescription medicine.  Include fruits, vegetables, beans, and whole grains in your diet each day. These foods are high in fiber.  If your doctor recommends it, get more exercise. Walking is a good choice. Bit by bit, increase the amount you walk every day. Try for at least 30 minutes on most days of the week.  Schedule time each day for a bowel movement. A daily routine may help. Take your time and do not strain when having a bowel movement.  When should you call for help?   Call your doctor now or seek immediate medical care if:    Your pain gets worse or is out of control.     You feel down or blue, or you do not enjoy things like you once did. You may be depressed, which is common in people with chronic pain. Depression can be treated.     You have vomiting or cramps for more than 2 hours.   Watch closely for changes in your health, and be sure to contact your doctor if:    You cannot sleep because of pain.     You are very worried or anxious about your pain.     You have  "trouble taking your pain medicine.     You have any concerns about your pain medicine.     You have trouble with bowel movements, such as:  No bowel movement in 3 days.  Blood in the anal area, in your stool, or on the toilet paper.  Diarrhea for more than 24 hours.   Where can you learn more?  Go to https://www.[employer].net/patiented  Enter N004 in the search box to learn more about \"Chronic Pain: Care Instructions.\"  Current as of: July 31, 2024  Content Version: 14.4    0709-0201 [employer].   Care instructions adapted under license by your healthcare professional. If you have questions about a medical condition or this instruction, always ask your healthcare professional. [employer] disclaims any warranty or liability for your use of this information.    Preventive Care Advice   This is general advice given by our system to help you stay healthy. However, your care team may have specific advice just for you. Please talk to your care team about your preventive care needs.  Nutrition  Eat 5 or more servings of fruits and vegetables each day.  Try wheat bread, brown rice and whole grain pasta (instead of white bread, rice, and pasta).  Get enough calcium and vitamin D. Check the label on foods and aim for 100% of the RDA (recommended daily allowance).  Lifestyle  Exercise at least 150 minutes each week  (30 minutes a day, 5 days a week).  Do muscle strengthening activities 2 days a week. These help control your weight and prevent disease.  No smoking.  Wear sunscreen to prevent skin cancer.  Have a dental exam and cleaning every 6 months.  Yearly exams  See your health care team every year to talk about:  Any changes in your health.  Any medicines your care team has prescribed.  Preventive care, family planning, and ways to prevent chronic diseases.  Shots (vaccines)   HPV shots (up to age 26), if you've never had them before.  Hepatitis B shots (up to age 59), if you've never had them " before.  COVID-19 shot: Get this shot when it's due.  Flu shot: Get a flu shot every year.  Tetanus shot: Get a tetanus shot every 10 years.  Pneumococcal, hepatitis A, and RSV shots: Ask your care team if you need these based on your risk.  Shingles shot (for age 50 and up)  General health tests  Diabetes screening:  Starting at age 35, Get screened for diabetes at least every 3 years.  If you are younger than age 35, ask your care team if you should be screened for diabetes.  Cholesterol test: At age 39, start having a cholesterol test every 5 years, or more often if advised.  Bone density scan (DEXA): At age 50, ask your care team if you should have this scan for osteoporosis (brittle bones).  Hepatitis C: Get tested at least once in your life.  STIs (sexually transmitted infections)  Before age 24: Ask your care team if you should be screened for STIs.  After age 24: Get screened for STIs if you're at risk. You are at risk for STIs (including HIV) if:  You are sexually active with more than one person.  You don't use condoms every time.  You or a partner was diagnosed with a sexually transmitted infection.  If you are at risk for HIV, ask about PrEP medicine to prevent HIV.  Get tested for HIV at least once in your life, whether you are at risk for HIV or not.  Cancer screening tests  Cervical cancer screening: If you have a cervix, begin getting regular cervical cancer screening tests starting at age 21.  Breast cancer scan (mammogram): If you've ever had breasts, begin having regular mammograms starting at age 40. This is a scan to check for breast cancer.  Colon cancer screening: It is important to start screening for colon cancer at age 45.  Have a colonoscopy test every 10 years (or more often if you're at risk) Or, ask your provider about stool tests like a FIT test every year or Cologuard test every 3 years.  To learn more about your testing options, visit:   .  For help making a decision, visit:    https://[address].[employer]/sb58783.  Prostate cancer screening test: If you have a prostate, ask your care team if a prostate cancer screening test (PSA) at age 55 is right for you.  Lung cancer screening: If you are a current or former smoker ages 50 to 80, ask your care team if ongoing lung cancer screenings are right for you.  For informational purposes only. Not to replace the advice of your health care provider. Copyright   2023 [facility]. All rights reserved. Clinically reviewed by the [facility] Transitions Program. [employer] [id number] - REV 01/24.

## 2025-05-30 ENCOUNTER — RESULTS FOLLOW-UP (OUTPATIENT)
Dept: INTERNAL MEDICINE | Facility: CLINIC | Age: OVER 89
End: 2025-05-30
Payer: COMMERCIAL

## 2025-05-31 LAB — BACTERIA UR CULT: ABNORMAL

## 2025-06-18 ENCOUNTER — NURSE TRIAGE (OUTPATIENT)
Dept: INTERNAL MEDICINE | Facility: CLINIC | Age: OVER 89
End: 2025-06-18
Payer: COMMERCIAL
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
## 2025-06-19 ENCOUNTER — OFFICE VISIT (OUTPATIENT)
Dept: URGENT CARE | Facility: URGENT CARE | Age: OVER 89
End: 2025-06-19
Payer: COMMERCIAL

## 2025-06-19 VITALS
OXYGEN SATURATION: 99 % | TEMPERATURE: 97.9 F | RESPIRATION RATE: 18 BRPM | HEART RATE: 89 BPM | DIASTOLIC BLOOD PRESSURE: 72 MMHG | SYSTOLIC BLOOD PRESSURE: 169 MMHG

## 2025-06-19 DIAGNOSIS — T14.8XXA BRUISING: Primary | ICD-10-CM

## 2025-06-19 LAB
ALBUMIN SERPL BCG-MCNC: 4.3 G/DL (ref 3.5–5.2)
ALP SERPL-CCNC: 55 U/L (ref 40–150)
ALT SERPL W P-5'-P-CCNC: 16 U/L (ref 0–50)
ANION GAP SERPL CALCULATED.3IONS-SCNC: 9 MMOL/L (ref 7–15)
AST SERPL W P-5'-P-CCNC: 34 U/L (ref 0–45)
BASOPHILS # BLD AUTO: 0.1 10E3/UL (ref 0–0.2)
BASOPHILS NFR BLD AUTO: 1 %
BILIRUB SERPL-MCNC: 0.2 MG/DL
BUN SERPL-MCNC: 28.7 MG/DL (ref 8–23)
CALCIUM SERPL-MCNC: 9.9 MG/DL (ref 8.8–10.4)
CHLORIDE SERPL-SCNC: 101 MMOL/L (ref 98–107)
CREAT SERPL-MCNC: 0.77 MG/DL (ref 0.51–0.95)
EGFRCR SERPLBLD CKD-EPI 2021: 69 ML/MIN/1.73M2
EOSINOPHIL # BLD AUTO: 0.1 10E3/UL (ref 0–0.7)
EOSINOPHIL NFR BLD AUTO: 1 %
ERYTHROCYTE [DISTWIDTH] IN BLOOD BY AUTOMATED COUNT: 14.6 % (ref 10–15)
GLUCOSE SERPL-MCNC: 100 MG/DL (ref 70–99)
HCO3 SERPL-SCNC: 25 MMOL/L (ref 22–29)
HCT VFR BLD AUTO: 34.1 % (ref 35–47)
HGB BLD-MCNC: 11.3 G/DL (ref 11.7–15.7)
IMM GRANULOCYTES # BLD: 0 10E3/UL
IMM GRANULOCYTES NFR BLD: 0 %
LYMPHOCYTES # BLD AUTO: 1.9 10E3/UL (ref 0.8–5.3)
LYMPHOCYTES NFR BLD AUTO: 17 %
MCH RBC QN AUTO: 29.6 PG (ref 26.5–33)
MCHC RBC AUTO-ENTMCNC: 33.1 G/DL (ref 31.5–36.5)
MCV RBC AUTO: 89 FL (ref 78–100)
MONOCYTES # BLD AUTO: 0.7 10E3/UL (ref 0–1.3)
MONOCYTES NFR BLD AUTO: 7 %
NEUTROPHILS # BLD AUTO: 8 10E3/UL (ref 1.6–8.3)
NEUTROPHILS NFR BLD AUTO: 74 %
PLATELET # BLD AUTO: 246 10E3/UL (ref 150–450)
POTASSIUM SERPL-SCNC: 4.7 MMOL/L (ref 3.4–5.3)
PROT SERPL-MCNC: 7 G/DL (ref 6.4–8.3)
RBC # BLD AUTO: 3.82 10E6/UL (ref 3.8–5.2)
SODIUM SERPL-SCNC: 135 MMOL/L (ref 135–145)
WBC # BLD AUTO: 10.9 10E3/UL (ref 4–11)
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
## 2025-06-19 NOTE — PROGRESS NOTES
ASSESSMENT & PLAN:   Diagnoses and all orders for this visit:  Bruising  -     CBC with platelets and differential; Future  -     Comprehensive metabolic panel (BMP + Alb, Alk Phos, ALT, AST, Total. Bili, TP); Future    Bruising bilateral forearms - L for few weeks, R for few days.  CBC reassuring- no anemia, normal PLT.  CMP is pending.   Follow-up with PCP if new/worsening symptoms.     There are no Patient Instructions on file for this visit.    No follow-ups on file.    At the end of the encounter, I discussed results, diagnosis, medications. Discussed red flags for immediate return to clinic/ER, as well as indications for follow up if no improvement. Patient and/or caregiver understood and agreed to plan. Patient was stable for discharge.    ------------------------------------------------------------------------  SUBJECTIVE  History was obtained from patient.    Patient presents with:  Light/dark Spots: X 3 days red spots on both arms.    HPI  Patricia Bose is a(n) 89 year old female presenting to urgent care for bruising on forearms. Noticed spot on left arm a couple weeks ago. Has faded since onset. Then noticed spot on right arm a few days ago. No known injury. She woke up with the bruises. They are not painful. Does not take blood thinners.    Current Outpatient Medications   Medication Sig Dispense Refill    alcohol swab prep pads Use to swab area of injection/itz as directed. 100 each 6    amLODIPine (NORVASC) 2.5 MG tablet Take 1 tablet (2.5 mg) by mouth daily. 90 tablet 3    aspirin 81 MG EC tablet [ASPIRIN 81 MG EC TABLET] Take 81 mg by mouth daily.      blood glucose (ACCU-CHEK AMIRA PLUS) test strip 1 strip by In Vitro route 3 times daily. Use to test blood sugar 3 times daily or as directed. 300 strip 2    blood glucose (ACCU-CHEK FASTCLIX) lancing device Lancing device to be used with lancets. 1 each 0    blood glucose (NO BRAND SPECIFIED) lancets standard Use to test blood sugar 3 times  daily or as directed. 300 each 2    blood glucose (NO BRAND SPECIFIED) test strip Use to test blood sugar DAILY times daily or as directed. 90 strip 3    blood glucose monitoring (ACCU-CHEK AMIRA PLUS) meter device kit Use to test blood sugar one times daily or as directed. 1 kit 1    blood glucose monitoring (ACCU-CHEK FASTCLIX) lancets Use to test blood sugar once times daily. 102 each 6    blood glucose monitoring (NO BRAND SPECIFIED) meter device kit by In Vitro route daily 1 kit 0    cholecalciferol, vitamin D3, (VITAMIN D3) 1,000 unit capsule [CHOLECALCIFEROL, VITAMIN D3, (VITAMIN D3) 1,000 UNIT CAPSULE] Take 1,000 Units by mouth daily.      cycloSPORINE (RESTASIS) 0.05 % ophthalmic emulsion Place 1 drop into both eyes 2 times daily      DENTAGEL 1.1 % GEL topical gel Apply to affected area 2 times daily      generic lancets (ACCU-CHEK FASTCLIX LANCING DEV) [GENERIC LANCETS (ACCU-CHEK FASTCLIX LANCING DEV)] Use 1 each As Directed daily. 200 each 2    glucosamine-chondroitin 500-400 mg cap [GLUCOSAMINE-CHONDROITIN 500-400 MG CAP] Take 2 capsules by mouth daily.      guaiFENesin-codeine (ROBITUSSIN AC) 100-10 MG/5ML solution Take 5 mLs by mouth every 4 hours as needed for cough. 473 mL 0    losartan (COZAAR) 50 MG tablet Take 1.5 tablets (75 mg) by mouth daily. 135 tablet 3    MELATONIN ORAL Take 10 mg by mouth nightly as needed (Sleep)      multivitamin therapeutic (THERAGRAN) tablet [MULTIVITAMIN THERAPEUTIC (THERAGRAN) TABLET] Take 1 tablet by mouth daily.      RISEdronate (ACTONEL) 150 MG tablet Take one tablet monthly 3 tablet 3    sulfamethoxazole-trimethoprim (BACTRIM DS) 800-160 MG tablet Take 1 tablet by mouth 2 times daily. 20 tablet 1         OBJECTIVE  Vitals:    06/19/25 1119 06/19/25 1121   BP: (!) 179/73 (!) 169/72   BP Location: Right arm    Patient Position: Sitting    Cuff Size: Adult Regular    Pulse: 89    Resp: 18    Temp: 97.9  F (36.6  C)    TempSrc: Oral    SpO2: 99%      Physical Exam    GENERAL: healthy, alert, no acute distress.   PSYCH: mentation appears normal. Normal affect  SKIN: left distal forearm with round faded ecchymosis approx 1 cm in diameter. No tenderness or induration. Right middle forearm with round ecchymosis approx 1 cm in diameter. No tenderness or induration. There are no other lesions, petechiae, ecchymosis on bilateral arms or legs.        Results for orders placed or performed in visit on 06/19/25   CBC with platelets and differential     Status: Abnormal   Result Value Ref Range    WBC Count 10.9 4.0 - 11.0 10e3/uL    RBC Count 3.82 3.80 - 5.20 10e6/uL    Hemoglobin 11.3 (L) 11.7 - 15.7 g/dL    Hematocrit 34.1 (L) 35.0 - 47.0 %    MCV 89 78 - 100 fL    MCH 29.6 26.5 - 33.0 pg    MCHC 33.1 31.5 - 36.5 g/dL    RDW 14.6 10.0 - 15.0 %    Platelet Count 246 150 - 450 10e3/uL    % Neutrophils 74 %    % Lymphocytes 17 %    % Monocytes 7 %    % Eosinophils 1 %    % Basophils 1 %    % Immature Granulocytes 0 %    Absolute Neutrophils 8.0 1.6 - 8.3 10e3/uL    Absolute Lymphocytes 1.9 0.8 - 5.3 10e3/uL    Absolute Monocytes 0.7 0.0 - 1.3 10e3/uL    Absolute Eosinophils 0.1 0.0 - 0.7 10e3/uL    Absolute Basophils 0.1 0.0 - 0.2 10e3/uL    Absolute Immature Granulocytes 0.0 <=0.4 10e3/uL   CBC with platelets and differential     Status: Abnormal    Narrative    The following orders were created for panel order CBC with platelets and differential.  Procedure                               Abnormality         Status                     ---------                               -----------         ------                     CBC with platelets and ...[[id number]]  Abnormal            Final result                 Please view results for these tests on the individual orders.

## 2025-06-19 NOTE — TELEPHONE ENCOUNTER
Called and spoke with daughter in law Arylis. Message below from provider relayed to pt. They verbalized understanding and agreed to bring pt to urgent care today. No further questions.

## 2025-06-19 NOTE — TELEPHONE ENCOUNTER
Attempted to reach patient to: Reschedule an appointment    When patient returns call, please take this action: Assist with rescheduling    Reason for the reschedule: Provider out     Date/time of appointment that needs to be rescheduled: 06/19/25    Reason for the visit: Marks on skin     When to reschedule: Next available    Additional comments/info: With any provider if pt cannot wait to see PCP. Can offer urgent care if needed.     Roldan Valentino Jr., CMA on 6/19/2025 at 6:48 AM